# Patient Record
Sex: MALE | Race: BLACK OR AFRICAN AMERICAN | NOT HISPANIC OR LATINO | Employment: OTHER | URBAN - METROPOLITAN AREA
[De-identification: names, ages, dates, MRNs, and addresses within clinical notes are randomized per-mention and may not be internally consistent; named-entity substitution may affect disease eponyms.]

---

## 2023-04-21 ENCOUNTER — OFFICE VISIT (OUTPATIENT)
Dept: PRIMARY CARE | Facility: CLINIC | Age: 63
End: 2023-04-21
Payer: COMMERCIAL

## 2023-04-21 VITALS
SYSTOLIC BLOOD PRESSURE: 138 MMHG | HEIGHT: 61 IN | BODY MASS INDEX: 38.17 KG/M2 | TEMPERATURE: 97.2 F | OXYGEN SATURATION: 98 % | DIASTOLIC BLOOD PRESSURE: 88 MMHG | HEART RATE: 70 BPM

## 2023-04-21 DIAGNOSIS — E11.69 TYPE 2 DIABETES MELLITUS WITH OTHER SPECIFIED COMPLICATION, WITH LONG-TERM CURRENT USE OF INSULIN (MULTI): ICD-10-CM

## 2023-04-21 DIAGNOSIS — N28.9 LESION OF RIGHT NATIVE KIDNEY: ICD-10-CM

## 2023-04-21 DIAGNOSIS — T82.9XXA COMPLICATION DUE TO IMPLANTABLE CARDIOVERTER-DEFIBRILLATOR (ICD), INITIAL ENCOUNTER: ICD-10-CM

## 2023-04-21 DIAGNOSIS — B19.20 HEPATITIS C VIRUS INFECTION WITHOUT HEPATIC COMA, UNSPECIFIED CHRONICITY: ICD-10-CM

## 2023-04-21 DIAGNOSIS — I50.20 HFREF (HEART FAILURE WITH REDUCED EJECTION FRACTION) (MULTI): ICD-10-CM

## 2023-04-21 DIAGNOSIS — T81.49XA WOUND INFECTION AFTER SURGERY: Primary | ICD-10-CM

## 2023-04-21 DIAGNOSIS — I10 PRIMARY HYPERTENSION: ICD-10-CM

## 2023-04-21 DIAGNOSIS — Z79.4 TYPE 2 DIABETES MELLITUS WITH OTHER SPECIFIED COMPLICATION, WITH LONG-TERM CURRENT USE OF INSULIN (MULTI): ICD-10-CM

## 2023-04-21 PROCEDURE — 3079F DIAST BP 80-89 MM HG: CPT | Performed by: STUDENT IN AN ORGANIZED HEALTH CARE EDUCATION/TRAINING PROGRAM

## 2023-04-21 PROCEDURE — 1036F TOBACCO NON-USER: CPT | Performed by: STUDENT IN AN ORGANIZED HEALTH CARE EDUCATION/TRAINING PROGRAM

## 2023-04-21 PROCEDURE — 99214 OFFICE O/P EST MOD 30 MIN: CPT | Performed by: STUDENT IN AN ORGANIZED HEALTH CARE EDUCATION/TRAINING PROGRAM

## 2023-04-21 PROCEDURE — 3051F HG A1C>EQUAL 7.0%<8.0%: CPT | Performed by: STUDENT IN AN ORGANIZED HEALTH CARE EDUCATION/TRAINING PROGRAM

## 2023-04-21 PROCEDURE — 3075F SYST BP GE 130 - 139MM HG: CPT | Performed by: STUDENT IN AN ORGANIZED HEALTH CARE EDUCATION/TRAINING PROGRAM

## 2023-04-21 RX ORDER — ATORVASTATIN CALCIUM 40 MG/1
40 TABLET, FILM COATED ORAL
Qty: 90 TABLET | Refills: 3 | Status: CANCELLED | OUTPATIENT
Start: 2023-04-21

## 2023-04-21 RX ORDER — ISOSORBIDE MONONITRATE 60 MG/1
60 TABLET, EXTENDED RELEASE ORAL
COMMUNITY
Start: 2021-07-02 | End: 2023-04-27

## 2023-04-21 RX ORDER — LOPERAMIDE HYDROCHLORIDE 2 MG/1
4 CAPSULE ORAL EVERY 12 HOURS PRN
COMMUNITY
Start: 2018-10-17 | End: 2023-09-27 | Stop reason: SDUPTHER

## 2023-04-21 RX ORDER — CARVEDILOL 25 MG/1
2 TABLET ORAL 2 TIMES DAILY
COMMUNITY
Start: 2018-09-13 | End: 2023-09-27 | Stop reason: SDUPTHER

## 2023-04-21 RX ORDER — CARVEDILOL 25 MG/1
50 TABLET ORAL 2 TIMES DAILY
Status: CANCELLED | OUTPATIENT
Start: 2023-04-21

## 2023-04-21 RX ORDER — INSULIN GLARGINE 100 [IU]/ML
40 INJECTION, SOLUTION SUBCUTANEOUS NIGHTLY
Qty: 144 ML | Refills: 0 | Status: CANCELLED | OUTPATIENT
Start: 2023-04-21 | End: 2024-04-20

## 2023-04-21 RX ORDER — NAPROXEN SODIUM 220 MG/1
81 TABLET, FILM COATED ORAL DAILY
Qty: 90 TABLET | Refills: 3 | Status: CANCELLED | OUTPATIENT
Start: 2023-04-21

## 2023-04-21 RX ORDER — ATORVASTATIN CALCIUM 40 MG/1
40 TABLET, FILM COATED ORAL
COMMUNITY
Start: 2021-07-02 | End: 2023-09-27 | Stop reason: SDUPTHER

## 2023-04-21 RX ORDER — NAPROXEN SODIUM 220 MG/1
1 TABLET, FILM COATED ORAL DAILY
COMMUNITY
Start: 2018-09-13 | End: 2023-09-27 | Stop reason: SDUPTHER

## 2023-04-21 RX ORDER — SACUBITRIL AND VALSARTAN 97; 103 MG/1; MG/1
1 TABLET, FILM COATED ORAL 2 TIMES DAILY
COMMUNITY
End: 2023-09-27 | Stop reason: SDUPTHER

## 2023-04-21 ASSESSMENT — PAIN SCALES - GENERAL: PAINLEVEL: 7

## 2023-04-21 NOTE — PROGRESS NOTES
Subjective   Patient ID: Delvis Ventura is a 62 y.o. male who presents for Establish care    HPI    HPI:      Here to establish care     Mr. Ventura is a 62 year old male with a PMHx of HFrEF (EF 55-60 in 5/2022, 15-20%, TTE 10/2021),   HTN, HLD, IDDM, GERD, PTSD, depression,   cerebral palsy c/b chronic diarrhea and hepatitis C (unclear treatment history). Records obtained from [facility] EMR.    #DMII  - HA 1c at 8.4%   - Needs Freestyle maite 2 sensors; out of refills  - BG have been in 120s per report     #HFrEF  - EF 15-20% in 10/2021  - Defibrillator placed at [facility] on 3/6/23 by Dr. Daren Torres  - Having pus seep out of incision site a few days after surgery. Had seen Dr. Torres about 2 weeks post-op to remove stitches who didn't feel he needed further assessment. Patient upset with care he received at [facility] and wants to establish care at .    SOC:  Alcohol use- no  Tobacco use-no  Illicit drug use- denied  lives alone    Medications per [facility] (called after appointment):  - Aspirin 81mg PO daily  - Atorvastatin 40mg PO daily  - Carvedilol 50mg PO BID  - Cetirizine 10mg PO daily  - Entresto 97-103mg PO BID  - Farxiga 10mg PO daily  - Ferrous Sulfate 325mg PO daily  - Free Style Maite (sensor - 14 day sensor)  - Isosorbide Mononitrate ER 90mg ER PO daily  - Loperamide 4mg PO BID  ----------------------------------  - Has not picked up Lantus Solostar 100u/ml 40u subcutaneous daily for several months      Review of Systems   Constitutional:  Negative for chills, diaphoresis, fatigue and fever.   HENT:  Negative for congestion.    Eyes:  Negative for pain and visual disturbance.   Respiratory:  Negative for cough, chest tightness, shortness of breath and wheezing.    Cardiovascular:  Negative for chest pain, palpitations and leg swelling.   Gastrointestinal:  Negative for abdominal distention, abdominal pain, constipation, diarrhea, nausea and vomiting.   Genitourinary:  Negative for dysuria.  "  Musculoskeletal:  Positive for arthralgias and myalgias. Negative for back pain and joint swelling.   Skin:  Positive for wound. Negative for color change and rash.   Neurological:  Negative for dizziness, weakness, light-headedness, numbness and headaches.   Psychiatric/Behavioral:  Negative for behavioral problems.        Objective   Physical Exam  Constitutional:       Appearance: Normal appearance. He is obese.   HENT:      Head: Normocephalic and atraumatic.      Nose: Nose normal.   Eyes:      Extraocular Movements: Extraocular movements intact.      Conjunctiva/sclera: Conjunctivae normal.   Cardiovascular:      Rate and Rhythm: Normal rate and regular rhythm.      Pulses: Normal pulses.      Heart sounds: Normal heart sounds. No murmur heard.  Pulmonary:      Effort: Pulmonary effort is normal. No respiratory distress.      Breath sounds: Normal breath sounds. No wheezing.   Chest:      Chest wall: Tenderness present.   Abdominal:      General: Abdomen is flat. Bowel sounds are normal.      Palpations: Abdomen is soft.   Musculoskeletal:         General: Normal range of motion.      Cervical back: Normal range of motion and neck supple.   Skin:     General: Skin is warm and dry.      Capillary Refill: Capillary refill takes less than 2 seconds.      Comments: 2 incision sites noted near xiphoid process (1 inch) as well as lateral left rib cage (0.5 inch) with pus visualized on kerlex material; sites are mildly erythematous    Neurological:      General: No focal deficit present.      Mental Status: He is alert and oriented to person, place, and time. Mental status is at baseline.   Psychiatric:         Mood and Affect: Mood normal.         Behavior: Behavior normal.       Temp 36.2 °C (97.2 °F)   Ht 1.549 m (5' 1\")   BMI 38.17 kg/m²      Assessment/Plan   Problem List Items Addressed This Visit    None    Mr. Ventura is a 62 year old male with a PMHx of HFrEF (EF 55-60 in 5/2022, 15-20%, TTE 10/2021), " HTN, HLD, IDDM, GERD, PTSD, depression, cerebral palsy c/b chronic diarrhea and hepatitis C (unclear treatment history) here to establish care.     #HTN  #HFrEF (EF 55-60 in 5/2022, 15-20%, TTE 10/2021),   - Concern for infected ICD site; does have visiting home nurse that comes every other day to change dressings of wounds  - C/w Carvedilol, Entresto, Farxiga, and Imdur; will hold refills until patient evaluated in ED pending possible admission with changes in medication dosing; per pharmacy, patient has enough medication to last him at least 30 days  - Plan to send patient directly to ED for further imaging/evaluation given yellow-thick pustulant discharge from incision site     #DM2  -Last A1c 8.4 5/2022  -C/w Farxiga 10mg PO daily  - Refilled Freestyle Maite 2 Sensors for 1 year  -Per pharmacy, has not been on home Lantus in several months  - Given blood glucose levels in 120s-low 200s and no recent lantus refill, will not refill Lantus at this time     #Hep C  -Unclear tx hx; needs HCV Viral Load tested  -Will assess at next visit     #R renal lesion  -5/2022 CT AP showed c/f R kidney lower pole hypodensity, question pyleo v infarct, a kidney US w/ doppler was neg. UA neg for infx markers  -Pt not endorsing any sxs, pain/urinary  -CTPE was negative. patient was started on lovenox 5/28/22, and home with xarelto, for what was presumed to be possible renal infarct, given a month supply. Patient ran out as he did not f/u and hasn't taken since then.   -Consider repeat CT AP  -Discuss referral to Urology at next visit     Plan for patient to go to ED for further evaluation of ICD placement site for possible wound infection. ED aware of patient's arrival.     Plan to follow-up in 2 weeks. Patient's meds will be refilled after ED visit reviewed.     Patient seen and discussed with Dr. Gallito Byrnes MD   Resident Physician, PGY3  Family and Preventive Medicine

## 2023-04-21 NOTE — PROGRESS NOTES
"62 year old man with recovered , severe heart failure.  Initial EF in 2021 15% in setting of fluid overload, MI.  Also diabetic on insulin.  2022 EF 55%.  Followed by Dr. Hernandez in cardiology at , but in March 2023 had implanted defibrillator placed by his EP cardiologist Dr. Torres at [facility].  I am not able to view an operative report.  He has two wounds, one over the lower sternum and one in L flank.  He had an ED visit at [place] because of bleeding and a hematoma was evacuated from wound site on L flank.   Since then, he has had wound care every 2 days from a home nurse.  8 days ago, he was seen in Dr. Torres's office, where 2 \"blue\" sutures were removed from sternal wound.  At that time, there was no redness surrounding the wounds, and no purulent discharge.  Mr. Ling notes that every day, his 4 X 4\" dressings are soaked with yellow, thick pus.  He has pain at the flank wound.  Now it is red in about an 8 cm diameter area.  The wound edges both look like they are granulating in, but they have a deep center, from which pus is draining.  WE advised he immediately contact and followup with his EP surgeon.  However, Mr. Ling refuses and states that he wants all of his care, including a \"second opinion\" at  and will not see Dr. Torres for followup.  He also requires refills of his medications from [facility], but states that the discharge med list from [facility] is not accurate, does not know which meds he is taking, because they all come in a packet daily.      Because of the concern for infection, given purulent drainage from the wounds, and surrounding redness and increased pain, we are advising him to go to the ED to consult today, and we will find out his meds and send refills to [facility].   "

## 2023-04-27 RX ORDER — FLASH GLUCOSE SENSOR
KIT MISCELLANEOUS
Qty: 1 EACH | Refills: 11 | Status: SHIPPED | OUTPATIENT
Start: 2023-04-27 | End: 2023-05-16 | Stop reason: SDUPTHER

## 2023-04-27 RX ORDER — ISOSORBIDE MONONITRATE 60 MG/1
60 TABLET, EXTENDED RELEASE ORAL DAILY
COMMUNITY
End: 2023-09-27 | Stop reason: SDUPTHER

## 2023-04-27 RX ORDER — FLASH GLUCOSE SCANNING READER
EACH MISCELLANEOUS
Qty: 1 EACH | Refills: 0 | Status: SHIPPED | OUTPATIENT
Start: 2023-04-27 | End: 2023-05-09

## 2023-04-27 RX ORDER — ISOSORBIDE MONONITRATE 30 MG/1
30 TABLET, EXTENDED RELEASE ORAL DAILY
COMMUNITY
End: 2023-10-13 | Stop reason: DRUGHIGH

## 2023-04-27 RX ORDER — FERROUS SULFATE 325(65) MG
65 TABLET ORAL
COMMUNITY
End: 2023-09-27 | Stop reason: ALTCHOICE

## 2023-04-27 RX ORDER — DAPAGLIFLOZIN 10 MG/1
10 TABLET, FILM COATED ORAL DAILY
COMMUNITY
End: 2023-09-27 | Stop reason: SDUPTHER

## 2023-04-27 RX ORDER — CETIRIZINE HYDROCHLORIDE 10 MG/1
10 TABLET ORAL DAILY
COMMUNITY
End: 2023-09-27 | Stop reason: SDUPTHER

## 2023-04-27 ASSESSMENT — ENCOUNTER SYMPTOMS
DIARRHEA: 0
ARTHRALGIAS: 1
NUMBNESS: 0
MYALGIAS: 1
DIZZINESS: 0
NAUSEA: 0
WHEEZING: 0
DYSURIA: 0
FEVER: 0
CHEST TIGHTNESS: 0
ABDOMINAL PAIN: 0
CONSTIPATION: 0
DIAPHORESIS: 0
WOUND: 1
COUGH: 0
EYE PAIN: 0
HEADACHES: 0
CHILLS: 0
SHORTNESS OF BREATH: 0
JOINT SWELLING: 0
VOMITING: 0
COLOR CHANGE: 0
WEAKNESS: 0
ABDOMINAL DISTENTION: 0
PALPITATIONS: 0
LIGHT-HEADEDNESS: 0
BACK PAIN: 0
FATIGUE: 0

## 2023-05-04 ENCOUNTER — PATIENT OUTREACH (OUTPATIENT)
Dept: CARE COORDINATION | Facility: CLINIC | Age: 63
End: 2023-05-04
Payer: COMMERCIAL

## 2023-05-04 NOTE — PROGRESS NOTES
Patient returned CM call.    Outreach call to patient to support a smooth transition of care from recent admission.  Spoke with patient, reviewed discharge medications, discharge instructions, assessed social needs, and provided education on importance of follow-up appointment with provider. Enrolled patient in [employer] for additional support and education through transition period.  Will continue to monitor through transition period.    Engagement  Call Start Time: 1308 (5/4/2023  1:17 PM)    Medications  Medications reviewed with patient/caregiver?: Yes (5/4/2023  1:17 PM)  Is the patient having any side effects they believe may be caused by any medication additions or changes?: No (5/4/2023  1:17 PM)  Does the patient have all medications ordered at discharge?: Yes (5/4/2023  1:17 PM)  Care Management Interventions: No intervention needed (5/4/2023  1:17 PM)  Prescription Comments: Patient states he has his new medications prescribed at discharge and his pill pack is scheduled to be delivered tomorrow. (5/4/2023  1:17 PM)  Is the patient taking all medications as directed (includes completed medication regime)?: Yes (5/4/2023  1:17 PM)    Appointments  Does the patient have a primary care provider?: Yes (scheduled for 5/11/2023) (5/4/2023  1:17 PM)  Care Management Interventions: Verified appointment date/time/provider (5/4/2023  1:17 PM)  Has the patient kept scheduled appointments due by today?: No (5/4/2023  1:17 PM)    Self Management  What is the home health agency?: [facility] (resumption of services) (5/4/2023  1:17 PM)  Has home health visited the patient within 72 hours of discharge?: Yes (Patient states his next visit is scheduled for tomorrow (5/5/2023)) (5/4/2023  1:17 PM)  What Durable Medical Equipment (DME) was ordered?: not applicable (5/4/2023  1:17 PM)    Patient Teaching  Does the patient have access to their discharge instructions?: Yes (5/4/2023  1:17 PM)  Care Management  Interventions: Reviewed instructions with patient (5/4/2023  1:17 PM)  What is the patient's perception of their health status since discharge?: Improving (5/4/2023  1:17 PM)  Is the patient/caregiver able to teach back the hierarchy of who to call/visit for symptoms/problems? PCP, Specialist, Home Health nurse, Urgent Care, ED, 911: Yes (5/4/2023  1:17 PM)

## 2023-05-04 NOTE — PROGRESS NOTES
Outreach call to patient to support a smooth transition of care from recent admission.  Left voicemail message for patient with my contact information.  Enrolled patient in [employer] chatbot for additional support and patient education through transition period.  Will continue to monitor through transition period.

## 2023-05-09 ENCOUNTER — TELEPHONE (OUTPATIENT)
Dept: PRIMARY CARE | Facility: CLINIC | Age: 63
End: 2023-05-09
Payer: COMMERCIAL

## 2023-05-09 NOTE — TELEPHONE ENCOUNTER
Patient called in and stated that he only needs the sensor for his freestyle because the insurance is not going to pay for it. He already has the [place] he just need the sensors to be sent back over to [facility]

## 2023-05-12 ENCOUNTER — TELEPHONE (OUTPATIENT)
Dept: PRIMARY CARE | Facility: CLINIC | Age: 63
End: 2023-05-12
Payer: COMMERCIAL

## 2023-05-12 DIAGNOSIS — Z79.4 TYPE 2 DIABETES MELLITUS WITH OTHER SPECIFIED COMPLICATION, WITH LONG-TERM CURRENT USE OF INSULIN (MULTI): ICD-10-CM

## 2023-05-12 DIAGNOSIS — E11.69 TYPE 2 DIABETES MELLITUS WITH OTHER SPECIFIED COMPLICATION, WITH LONG-TERM CURRENT USE OF INSULIN (MULTI): ICD-10-CM

## 2023-05-12 NOTE — TELEPHONE ENCOUNTER
Patient called and asked for a callback to address his medication for his said that he was recently in the hospital and wants to talk with you

## 2023-05-16 ENCOUNTER — TELEPHONE (OUTPATIENT)
Dept: PRIMARY CARE | Facility: CLINIC | Age: 63
End: 2023-05-16
Payer: COMMERCIAL

## 2023-05-16 DIAGNOSIS — E11.9 TYPE 2 DIABETES MELLITUS WITHOUT COMPLICATION, WITH LONG-TERM CURRENT USE OF INSULIN (MULTI): Primary | ICD-10-CM

## 2023-05-16 DIAGNOSIS — Z79.4 TYPE 2 DIABETES MELLITUS WITHOUT COMPLICATION, WITH LONG-TERM CURRENT USE OF INSULIN (MULTI): Primary | ICD-10-CM

## 2023-05-16 RX ORDER — INSULIN GLARGINE 100 [IU]/ML
40 INJECTION, SOLUTION SUBCUTANEOUS NIGHTLY
Qty: 10 ML | Refills: 11 | Status: SHIPPED | OUTPATIENT
Start: 2023-05-16 | End: 2023-05-22 | Stop reason: ENTERED-IN-ERROR

## 2023-05-16 RX ORDER — FLASH GLUCOSE SENSOR
KIT MISCELLANEOUS
Qty: 1 EACH | Refills: 11 | Status: SHIPPED | OUTPATIENT
Start: 2023-05-16 | End: 2023-05-23 | Stop reason: SDUPTHER

## 2023-05-19 NOTE — TELEPHONE ENCOUNTER
PATIENT CALLED SAYING THAT HIS MEDICATION WAS WRONG HE TAKES THE PEN INSTEAD OF WHAT YOU PRESCRIBED.

## 2023-05-22 DIAGNOSIS — Z79.4 TYPE 2 DIABETES MELLITUS WITH OTHER SPECIFIED COMPLICATION, WITH LONG-TERM CURRENT USE OF INSULIN (MULTI): Primary | ICD-10-CM

## 2023-05-22 DIAGNOSIS — E11.69 TYPE 2 DIABETES MELLITUS WITH OTHER SPECIFIED COMPLICATION, WITH LONG-TERM CURRENT USE OF INSULIN (MULTI): Primary | ICD-10-CM

## 2023-05-22 RX ORDER — INSULIN GLARGINE 100 [IU]/ML
40 INJECTION, SOLUTION SUBCUTANEOUS NIGHTLY
Qty: 36 ML | Refills: 3 | Status: SHIPPED | OUTPATIENT
Start: 2023-05-22 | End: 2024-05-21

## 2023-05-22 RX ORDER — PEN NEEDLE, DIABETIC 30 GX3/16"
NEEDLE, DISPOSABLE MISCELLANEOUS
Qty: 100 EACH | Refills: 11 | Status: SHIPPED | OUTPATIENT
Start: 2023-05-22 | End: 2024-05-21

## 2023-05-23 DIAGNOSIS — E11.69 TYPE 2 DIABETES MELLITUS WITH OTHER SPECIFIED COMPLICATION, WITH LONG-TERM CURRENT USE OF INSULIN (MULTI): ICD-10-CM

## 2023-05-23 DIAGNOSIS — Z79.4 TYPE 2 DIABETES MELLITUS WITH OTHER SPECIFIED COMPLICATION, WITH LONG-TERM CURRENT USE OF INSULIN (MULTI): ICD-10-CM

## 2023-05-23 RX ORDER — FLASH GLUCOSE SENSOR
KIT MISCELLANEOUS
Qty: 1 EACH | Refills: 11 | Status: SHIPPED | OUTPATIENT
Start: 2023-05-23 | End: 2023-06-18 | Stop reason: SDUPTHER

## 2023-06-18 DIAGNOSIS — E11.69 TYPE 2 DIABETES MELLITUS WITH OTHER SPECIFIED COMPLICATION, WITH LONG-TERM CURRENT USE OF INSULIN (MULTI): ICD-10-CM

## 2023-06-18 DIAGNOSIS — Z79.4 TYPE 2 DIABETES MELLITUS WITH OTHER SPECIFIED COMPLICATION, WITH LONG-TERM CURRENT USE OF INSULIN (MULTI): ICD-10-CM

## 2023-06-18 RX ORDER — FLASH GLUCOSE SENSOR
KIT MISCELLANEOUS
Qty: 4 EACH | Refills: 11 | Status: SHIPPED | OUTPATIENT
Start: 2023-06-18 | End: 2023-08-09

## 2023-07-07 LAB — PROSTATE SPECIFIC ANTIGEN,SCREEN: 1.45 NG/ML (ref 0–4)

## 2023-08-08 ENCOUNTER — TELEPHONE (OUTPATIENT)
Dept: PRIMARY CARE | Facility: CLINIC | Age: 63
End: 2023-08-08
Payer: COMMERCIAL

## 2023-08-08 DIAGNOSIS — E11.69 TYPE 2 DIABETES MELLITUS WITH OTHER SPECIFIED COMPLICATION, WITH LONG-TERM CURRENT USE OF INSULIN (MULTI): Primary | ICD-10-CM

## 2023-08-08 DIAGNOSIS — Z79.4 TYPE 2 DIABETES MELLITUS WITH OTHER SPECIFIED COMPLICATION, WITH LONG-TERM CURRENT USE OF INSULIN (MULTI): Primary | ICD-10-CM

## 2023-08-08 RX ORDER — BLOOD-GLUCOSE SENSOR
EACH MISCELLANEOUS
Qty: 1 EACH | Refills: 0 | Status: SHIPPED | OUTPATIENT
Start: 2023-08-08 | End: 2023-09-28 | Stop reason: SDUPTHER

## 2023-08-08 RX ORDER — BLOOD-GLUCOSE,RECEIVER,CONT
EACH MISCELLANEOUS
Qty: 1 EACH | Refills: 0 | Status: SHIPPED | OUTPATIENT
Start: 2023-08-08 | End: 2023-09-27 | Stop reason: SDUPTHER

## 2023-08-08 NOTE — TELEPHONE ENCOUNTER
Patient is requesting to switch from the FreeStyle Maite to the Dexcom G7 as the FreeStyle Maite does not stay on his arm

## 2023-09-01 ENCOUNTER — PATIENT OUTREACH (OUTPATIENT)
Dept: CARE COORDINATION | Facility: CLINIC | Age: 63
End: 2023-09-01
Payer: COMMERCIAL

## 2023-09-01 NOTE — PROGRESS NOTES
Outreach call to patient to support a smooth transition of care from recent admission.  Left voicemail message for patient with my contact information.    Lani MANUELN RN CCM

## 2023-09-05 ENCOUNTER — PATIENT OUTREACH (OUTPATIENT)
Dept: CARE COORDINATION | Facility: CLINIC | Age: 63
End: 2023-09-05
Payer: COMMERCIAL

## 2023-09-05 NOTE — PROGRESS NOTES
Second unsuccessful attempt to reach patient.  Request for call back left on unidentified answering machine.     Lani MANUELN RN CCM

## 2023-09-25 PROBLEM — I42.9 CARDIOMYOPATHY (MULTI): Status: ACTIVE | Noted: 2023-05-03

## 2023-09-25 PROBLEM — R06.00 DYSPNEA: Status: ACTIVE | Noted: 2023-09-25

## 2023-09-25 PROBLEM — L03.90 CELLULITIS: Status: ACTIVE | Noted: 2023-09-25

## 2023-09-25 PROBLEM — N52.9 MALE ERECTILE DYSFUNCTION, UNSPECIFIED: Status: ACTIVE | Noted: 2023-09-25

## 2023-09-25 PROBLEM — R07.9 CHEST PAIN: Status: ACTIVE | Noted: 2022-03-10

## 2023-09-25 PROBLEM — H25.813 COMBINED FORM OF SENILE CATARACT OF BOTH EYES: Status: ACTIVE | Noted: 2022-12-02

## 2023-09-25 PROBLEM — F33.1 MODERATE RECURRENT MAJOR DEPRESSION (MULTI): Status: ACTIVE | Noted: 2023-09-25

## 2023-09-25 PROBLEM — E66.811 OBESITY, CLASS I, BMI 30-34.9: Status: ACTIVE | Noted: 2021-06-29

## 2023-09-25 PROBLEM — R39.11 URINARY HESITANCY: Status: ACTIVE | Noted: 2023-09-25

## 2023-09-25 PROBLEM — E66.9 OBESITY, CLASS I, BMI 30-34.9: Status: ACTIVE | Noted: 2021-06-29

## 2023-09-25 PROBLEM — K21.9 GASTROESOPHAGEAL REFLUX DISEASE WITHOUT ESOPHAGITIS: Status: ACTIVE | Noted: 2017-03-14

## 2023-09-25 PROBLEM — F32.A DEPRESSION, UNSPECIFIED: Status: ACTIVE | Noted: 2022-03-10

## 2023-09-25 PROBLEM — N50.82 PAIN IN SCROTUM: Status: ACTIVE | Noted: 2023-09-25

## 2023-09-25 PROBLEM — I50.9 CONGESTIVE HEART FAILURE OF UNKNOWN ETIOLOGY (MULTI): Status: ACTIVE | Noted: 2023-07-22

## 2023-09-25 PROBLEM — R09.89 CARDIOVASCULAR SYMPTOMS: Status: ACTIVE | Noted: 2022-05-31

## 2023-09-25 PROBLEM — G47.00 INSOMNIA: Status: ACTIVE | Noted: 2023-09-25

## 2023-09-25 PROBLEM — N28.0 RENAL INFARCT (MULTI): Status: ACTIVE | Noted: 2023-09-25

## 2023-09-25 PROBLEM — R61 EXCESSIVE SWEATING: Status: ACTIVE | Noted: 2022-05-31

## 2023-09-25 PROBLEM — M79.89 SYMPTOM OF LEG SWELLING: Status: ACTIVE | Noted: 2023-09-25

## 2023-09-25 PROBLEM — I50.32 CHRONIC DIASTOLIC HEART FAILURE (MULTI): Status: ACTIVE | Noted: 2023-09-25

## 2023-09-25 PROBLEM — M25.519 SHOULDER PAIN: Status: ACTIVE | Noted: 2023-09-25

## 2023-09-25 PROBLEM — M17.9 OSTEOARTHRITIS OF KNEE: Status: ACTIVE | Noted: 2023-09-25

## 2023-09-25 PROBLEM — Z91.148 PATIENT'S OTHER NONCOMPLIANCE WITH MEDICATION REGIMEN FOR OTHER REASON: Status: ACTIVE | Noted: 2023-05-03

## 2023-09-25 PROBLEM — R06.02 SOB (SHORTNESS OF BREATH): Status: ACTIVE | Noted: 2018-07-06

## 2023-09-25 PROBLEM — F43.12 CHRONIC POST-TRAUMATIC STRESS DISORDER (PTSD): Status: ACTIVE | Noted: 2022-05-31

## 2023-09-25 PROBLEM — E11.9 TYPE 2 DIABETES MELLITUS WITHOUT COMPLICATIONS (MULTI): Status: ACTIVE | Noted: 2022-03-10

## 2023-09-25 PROBLEM — E11.65 TYPE 2 DIABETES MELLITUS WITH HYPERGLYCEMIA (MULTI): Status: ACTIVE | Noted: 2023-09-25

## 2023-09-25 PROBLEM — M77.8 TENDINITIS OF SHOULDER: Status: ACTIVE | Noted: 2023-09-25

## 2023-09-25 PROBLEM — I50.30 HEART FAILURE WITH PRESERVED EJECTION FRACTION (MULTI): Status: ACTIVE | Noted: 2017-03-14

## 2023-09-25 PROBLEM — I50.42 CHRONIC COMBINED SYSTOLIC AND DIASTOLIC HEART FAILURE, NYHA CLASS 3 (MULTI): Status: ACTIVE | Noted: 2023-09-25

## 2023-09-25 PROBLEM — M75.120 COMPLETE TEAR OF ROTATOR CUFF: Status: ACTIVE | Noted: 2023-09-25

## 2023-09-25 PROBLEM — T82.9XXA: Status: ACTIVE | Noted: 2023-04-22

## 2023-09-25 PROBLEM — M67.919 DISORDER OF TENDON OF SHOULDER REGION: Status: ACTIVE | Noted: 2023-09-25

## 2023-09-25 PROBLEM — T81.49XA POSTOPERATIVE WOUND INFECTION: Status: ACTIVE | Noted: 2023-05-03

## 2023-09-25 PROBLEM — E61.1 IRON DEFICIENCY: Status: ACTIVE | Noted: 2022-12-02

## 2023-09-25 PROBLEM — R05.3 CHRONIC COUGH: Status: ACTIVE | Noted: 2017-03-14

## 2023-09-25 PROBLEM — E87.6 HYPOKALEMIA: Status: ACTIVE | Noted: 2023-09-25

## 2023-09-25 PROBLEM — R42 DIZZINESS AND GIDDINESS: Status: ACTIVE | Noted: 2023-05-03

## 2023-09-25 PROBLEM — R53.81 PHYSICAL DECONDITIONING: Status: ACTIVE | Noted: 2023-01-13

## 2023-09-25 PROBLEM — R07.89 ATYPICAL CHEST PAIN: Status: ACTIVE | Noted: 2023-09-25

## 2023-09-25 PROBLEM — M17.9 KNEE OSTEOARTHRITIS: Status: ACTIVE | Noted: 2023-09-25

## 2023-09-25 PROBLEM — R53.1 WEAKNESS: Status: ACTIVE | Noted: 2022-03-10

## 2023-09-25 PROBLEM — R00.0 TACHYCARDIA: Status: ACTIVE | Noted: 2023-09-25

## 2023-09-25 RX ORDER — HYDRALAZINE HYDROCHLORIDE 50 MG/1
50 TABLET, FILM COATED ORAL EVERY 8 HOURS
COMMUNITY
End: 2023-09-27 | Stop reason: SDUPTHER

## 2023-09-25 RX ORDER — SPIRONOLACTONE 25 MG/1
25 TABLET ORAL
COMMUNITY
Start: 2023-09-02 | End: 2023-09-27 | Stop reason: SDUPTHER

## 2023-09-25 RX ORDER — TRAMADOL HYDROCHLORIDE 50 MG/1
50 TABLET ORAL 2 TIMES DAILY
COMMUNITY
Start: 2022-11-17 | End: 2023-09-27 | Stop reason: ALTCHOICE

## 2023-09-25 RX ORDER — PRAZOSIN HYDROCHLORIDE 2 MG/1
2 CAPSULE ORAL NIGHTLY
COMMUNITY
End: 2023-09-27 | Stop reason: SDUPTHER

## 2023-09-27 ENCOUNTER — OFFICE VISIT (OUTPATIENT)
Dept: PRIMARY CARE | Facility: CLINIC | Age: 63
End: 2023-09-27
Payer: COMMERCIAL

## 2023-09-27 VITALS
HEIGHT: 71 IN | BODY MASS INDEX: 28.15 KG/M2 | HEART RATE: 100 BPM | DIASTOLIC BLOOD PRESSURE: 102 MMHG | TEMPERATURE: 96.9 F | SYSTOLIC BLOOD PRESSURE: 152 MMHG | OXYGEN SATURATION: 96 %

## 2023-09-27 DIAGNOSIS — I50.42 CHRONIC COMBINED SYSTOLIC AND DIASTOLIC HEART FAILURE, NYHA CLASS 3 (MULTI): ICD-10-CM

## 2023-09-27 DIAGNOSIS — K21.9 GASTROESOPHAGEAL REFLUX DISEASE WITHOUT ESOPHAGITIS: ICD-10-CM

## 2023-09-27 DIAGNOSIS — T78.40XS ALLERGY, SEQUELA: ICD-10-CM

## 2023-09-27 DIAGNOSIS — Z79.4 TYPE 2 DIABETES MELLITUS WITH OTHER SPECIFIED COMPLICATION, WITH LONG-TERM CURRENT USE OF INSULIN (MULTI): ICD-10-CM

## 2023-09-27 DIAGNOSIS — G47.00 INSOMNIA, UNSPECIFIED TYPE: ICD-10-CM

## 2023-09-27 DIAGNOSIS — I10 BENIGN ESSENTIAL HYPERTENSION: Primary | ICD-10-CM

## 2023-09-27 DIAGNOSIS — E78.5 HYPERLIPIDEMIA, UNSPECIFIED HYPERLIPIDEMIA TYPE: ICD-10-CM

## 2023-09-27 DIAGNOSIS — G80.0 SPASTIC QUADRIPLEGIC CEREBRAL PALSY (MULTI): ICD-10-CM

## 2023-09-27 DIAGNOSIS — E11.9 TYPE 2 DIABETES MELLITUS WITHOUT COMPLICATION, UNSPECIFIED WHETHER LONG TERM INSULIN USE (MULTI): ICD-10-CM

## 2023-09-27 DIAGNOSIS — E11.69 TYPE 2 DIABETES MELLITUS WITH OTHER SPECIFIED COMPLICATION, WITH LONG-TERM CURRENT USE OF INSULIN (MULTI): ICD-10-CM

## 2023-09-27 DIAGNOSIS — R19.7 DIARRHEA, UNSPECIFIED TYPE: ICD-10-CM

## 2023-09-27 PROCEDURE — 3077F SYST BP >= 140 MM HG: CPT | Performed by: FAMILY MEDICINE

## 2023-09-27 PROCEDURE — 1036F TOBACCO NON-USER: CPT | Performed by: FAMILY MEDICINE

## 2023-09-27 PROCEDURE — 3052F HG A1C>EQUAL 8.0%<EQUAL 9.0%: CPT | Performed by: FAMILY MEDICINE

## 2023-09-27 PROCEDURE — 99213 OFFICE O/P EST LOW 20 MIN: CPT | Performed by: FAMILY MEDICINE

## 2023-09-27 PROCEDURE — 3080F DIAST BP >= 90 MM HG: CPT | Performed by: FAMILY MEDICINE

## 2023-09-27 PROCEDURE — 3008F BODY MASS INDEX DOCD: CPT | Performed by: FAMILY MEDICINE

## 2023-09-27 RX ORDER — LOPERAMIDE HYDROCHLORIDE 2 MG/1
4 CAPSULE ORAL EVERY 12 HOURS PRN
Qty: 30 CAPSULE | Refills: 3 | Status: SHIPPED | OUTPATIENT
Start: 2023-09-27 | End: 2023-10-17

## 2023-09-27 RX ORDER — ZOLPIDEM TARTRATE 5 MG/1
5 TABLET ORAL NIGHTLY PRN
Qty: 10 TABLET | Refills: 3 | Status: SHIPPED | OUTPATIENT
Start: 2023-09-27 | End: 2023-09-27 | Stop reason: SDUPTHER

## 2023-09-27 RX ORDER — SPIRONOLACTONE 25 MG/1
25 TABLET ORAL DAILY
Qty: 30 TABLET | Refills: 3 | Status: SHIPPED | OUTPATIENT
Start: 2023-09-27

## 2023-09-27 RX ORDER — SACUBITRIL AND VALSARTAN 97; 103 MG/1; MG/1
1 TABLET, FILM COATED ORAL 2 TIMES DAILY
Qty: 60 TABLET | Refills: 3 | Status: SHIPPED | OUTPATIENT
Start: 2023-09-27

## 2023-09-27 RX ORDER — CETIRIZINE HYDROCHLORIDE 10 MG/1
10 TABLET ORAL DAILY
Qty: 30 TABLET | Refills: 3 | Status: SHIPPED | OUTPATIENT
Start: 2023-09-27 | End: 2024-01-04 | Stop reason: ENTERED-IN-ERROR

## 2023-09-27 RX ORDER — BLOOD-GLUCOSE,RECEIVER,CONT
EACH MISCELLANEOUS
Qty: 1 EACH | Refills: 3 | Status: SHIPPED | OUTPATIENT
Start: 2023-09-27

## 2023-09-27 RX ORDER — PRAZOSIN HYDROCHLORIDE 2 MG/1
2 CAPSULE ORAL NIGHTLY
Qty: 30 CAPSULE | Refills: 3 | Status: SHIPPED | OUTPATIENT
Start: 2023-09-27

## 2023-09-27 RX ORDER — AMIODARONE HYDROCHLORIDE 200 MG/1
TABLET ORAL
COMMUNITY
Start: 2023-09-06 | End: 2023-09-27 | Stop reason: SDUPTHER

## 2023-09-27 RX ORDER — ATORVASTATIN CALCIUM 40 MG/1
40 TABLET, FILM COATED ORAL
Qty: 30 TABLET | Refills: 3 | Status: SHIPPED | OUTPATIENT
Start: 2023-09-27

## 2023-09-27 RX ORDER — DAPAGLIFLOZIN 10 MG/1
10 TABLET, FILM COATED ORAL DAILY
Qty: 30 TABLET | Refills: 3 | Status: SHIPPED | OUTPATIENT
Start: 2023-09-27

## 2023-09-27 RX ORDER — ISOSORBIDE MONONITRATE 60 MG/1
60 TABLET, EXTENDED RELEASE ORAL DAILY
Qty: 30 TABLET | Refills: 3 | Status: SHIPPED | OUTPATIENT
Start: 2023-09-27 | End: 2023-10-13 | Stop reason: SDUPTHER

## 2023-09-27 RX ORDER — NAPROXEN SODIUM 220 MG/1
81 TABLET, FILM COATED ORAL DAILY
Qty: 30 TABLET | Refills: 3 | Status: SHIPPED | OUTPATIENT
Start: 2023-09-27 | End: 2024-03-03

## 2023-09-27 RX ORDER — PANTOPRAZOLE SODIUM 40 MG/1
TABLET, DELAYED RELEASE ORAL
COMMUNITY
Start: 2023-09-06 | End: 2023-09-27 | Stop reason: SDUPTHER

## 2023-09-27 RX ORDER — PANTOPRAZOLE SODIUM 40 MG/1
TABLET, DELAYED RELEASE ORAL
Qty: 30 TABLET | Refills: 3 | Status: SHIPPED | OUTPATIENT
Start: 2023-09-27 | End: 2024-01-04 | Stop reason: ENTERED-IN-ERROR

## 2023-09-27 RX ORDER — ZOLPIDEM TARTRATE 5 MG/1
5 TABLET ORAL NIGHTLY PRN
Qty: 14 TABLET | Refills: 0 | Status: SHIPPED | OUTPATIENT
Start: 2023-09-27 | End: 2024-05-24

## 2023-09-27 RX ORDER — AMIODARONE HYDROCHLORIDE 200 MG/1
TABLET ORAL
Qty: 30 TABLET | Refills: 3 | Status: SHIPPED | OUTPATIENT
Start: 2023-09-27 | End: 2024-02-15 | Stop reason: HOSPADM

## 2023-09-27 RX ORDER — CARVEDILOL 25 MG/1
50 TABLET ORAL 2 TIMES DAILY
Qty: 30 TABLET | Refills: 3 | Status: SHIPPED | OUTPATIENT
Start: 2023-09-27 | End: 2023-11-13 | Stop reason: HOSPADM

## 2023-09-27 RX ORDER — HYDRALAZINE HYDROCHLORIDE 50 MG/1
50 TABLET, FILM COATED ORAL EVERY 8 HOURS
Qty: 30 TABLET | Refills: 3 | Status: SHIPPED | OUTPATIENT
Start: 2023-09-27 | End: 2023-10-11

## 2023-09-27 RX ORDER — FUROSEMIDE 40 MG/1
TABLET ORAL
COMMUNITY
Start: 2023-09-06 | End: 2023-09-27 | Stop reason: SINTOL

## 2023-09-27 RX ORDER — FUROSEMIDE 40 MG/1
TABLET ORAL
Qty: 30 TABLET | Refills: 0 | Status: ON HOLD | OUTPATIENT
Start: 2023-09-27 | End: 2023-11-13 | Stop reason: SDUPTHER

## 2023-09-27 ASSESSMENT — ENCOUNTER SYMPTOMS
FEVER: 0
PALPITATIONS: 0
CHEST TIGHTNESS: 0
ABDOMINAL PAIN: 0
CHILLS: 0
DIARRHEA: 0
SHORTNESS OF BREATH: 0
WHEEZING: 0
APPETITE CHANGE: 0
ACTIVITY CHANGE: 0
CONSTIPATION: 0
COUGH: 0
ABDOMINAL DISTENTION: 0

## 2023-09-27 ASSESSMENT — PAIN SCALES - GENERAL: PAINLEVEL: 0-NO PAIN

## 2023-09-27 NOTE — PROGRESS NOTES
History Of Present Illness  Delvis Ventura is a 63 y.o. male with a history significant for T2DM, HTN,  cerebral palsy, NICM w. rEF presenting for and established care visit.    New concerns: Pt complains of new onset sleeping concerns and would like a new medication for it. He states that he is sleeping only two two hours. Takes melatonin and it does not work. He has tried lifestyle changes including reducing light and sleeping in the bed. Denies any other new concerns.     Diabetes  Patient states that blood sugar is well controlled. Ranges from 100 or less. He takes medication as prescribed and feel the DexomG7 is helpful.  Patient is interested in physical therapy to assist with exercise. He avoids sugar and salt.     Unsure when last eye doctor appointment.    HTN  Patient states that blood pressures is 140's/80's. Blood pressure 151/102 today. Patient takes medication as prescribed and is interested in modifying medication. He believes it will improve with improved sleep and decreased.     NICM  Managed by Dr. Hernandez. Plan to see her October 13.    Cerebral Palsy  Stable    Past Medical History  He has a past medical history of Chronic systolic (congestive) heart failure (CMS/HCC) (09/01/2020) and Unspecified systolic (congestive) heart failure (CMS/HCC) (06/01/2021).    Surgical History  He has no past surgical history on file.     Social History  He reports that he has never smoked. He has never used smokeless tobacco. He reports current alcohol use. He reports that he does not use drugs.    Family History  No family history on file.     Allergies  Metformin    Social History  Smoking: Never  Alcohol: Beer occasionally  Recreational Drug: Never  Lives with: Lives alone  Work: Retired,         Review of Systems   Constitutional:  Negative for activity change, appetite change, chills and fever.   HENT: Negative.     Respiratory:  Negative for cough, chest tightness, shortness of breath and  "wheezing.    Cardiovascular:  Negative for chest pain, palpitations and leg swelling.   Gastrointestinal:  Negative for abdominal distention, abdominal pain, constipation and diarrhea.   Genitourinary: Negative.    Musculoskeletal:  Positive for gait problem.        History of cerebral palsy   Skin: Negative.           Physical Exam  Constitutional:       General: He is not in acute distress.     Appearance: Normal appearance.   Eyes:      Pupils: Pupils are equal, round, and reactive to light.   Cardiovascular:      Rate and Rhythm: Normal rate and regular rhythm.      Pulses: Normal pulses.      Heart sounds: Normal heart sounds.      Comments: S3 present  Pulmonary:      Effort: Pulmonary effort is normal.      Breath sounds: Normal breath sounds.   Abdominal:      General: Abdomen is flat. Bowel sounds are normal.      Palpations: Abdomen is soft.   Musculoskeletal:         General: No swelling or tenderness.   Skin:     General: Skin is warm and dry.      Capillary Refill: Capillary refill takes less than 2 seconds.      Findings: No bruising, erythema, lesion or rash.   Neurological:      Mental Status: He is alert.          Last Recorded Vitals  Blood pressure (!) 152/102, pulse 100, temperature 36.1 °C (96.9 °F), temperature source Temporal, height 1.791 m (5' 10.5\"), SpO2 96 %.      Assessment/Plan      Insomnia  Pt complains of sleep disturbances for the last few months. Only sleeps about 2 hours per night. He takes meletonin which is ineffective. He has tried ambien in the past that worked.    P: Ambien 5 mg for 14 days PRN  Patient to follow up in 2-4 weeks     HTN  A: Patient reports elevated blood pressure at home in 140/80's. Clinic blood pressure 152/102. Pt denies hypertensive symptoms but at increased risk of complications related to hypertension. Patient reports not sleeping well and think this is affecting his blood pressure.    P: Plan to increase hydralazine to 50 mg daily    Pt to follow up in " 4 weeks

## 2023-09-28 ENCOUNTER — TELEPHONE (OUTPATIENT)
Dept: PRIMARY CARE | Facility: CLINIC | Age: 63
End: 2023-09-28
Payer: COMMERCIAL

## 2023-09-28 DIAGNOSIS — Z79.4 TYPE 2 DIABETES MELLITUS WITH OTHER SPECIFIED COMPLICATION, WITH LONG-TERM CURRENT USE OF INSULIN (MULTI): ICD-10-CM

## 2023-09-28 DIAGNOSIS — E11.69 TYPE 2 DIABETES MELLITUS WITH OTHER SPECIFIED COMPLICATION, WITH LONG-TERM CURRENT USE OF INSULIN (MULTI): ICD-10-CM

## 2023-09-29 NOTE — TELEPHONE ENCOUNTER
PT is calling saying that the medication was ordered in correctly for the Loperamide HCI.  You put it as needed and its supposed to be two, 2 mg tablets.  Twice a day.  Once in the morning and once In the evening.

## 2023-10-02 ENCOUNTER — APPOINTMENT (OUTPATIENT)
Dept: PRIMARY CARE | Facility: CLINIC | Age: 63
End: 2023-10-02
Payer: COMMERCIAL

## 2023-10-02 NOTE — TELEPHONE ENCOUNTER
Pharmacy rep calling for refills of Dexcom G7 sensors. Multiple attempts made. Message to provider.

## 2023-10-06 NOTE — PROGRESS NOTES
I saw and evaluated the patient. I personally obtained the key and critical portions of the history and physical exam or was physically present for key and critical portions performed by the resident/fellow. I reviewed the resident/fellow's documentation and discussed the patient with the resident/fellow. I agree with the resident/fellow's medical decision making as documented in the note with the exception/addition of the following:    Htydralazine is TID, notdaily.   Ambien may be prescribed once,  for short term of episodeic use only, and should not be represcribed.     Zenia Robbins MD

## 2023-10-11 RX ORDER — BLOOD-GLUCOSE SENSOR
EACH MISCELLANEOUS
Qty: 1 EACH | Refills: 0 | Status: SHIPPED | OUTPATIENT
Start: 2023-10-11

## 2023-10-13 ENCOUNTER — BIOMETRIC VISIT (OUTPATIENT)
Dept: GASTROENTEROLOGY | Facility: HOSPITAL | Age: 63
End: 2023-10-13
Payer: COMMERCIAL

## 2023-10-13 ENCOUNTER — OFFICE VISIT (OUTPATIENT)
Dept: CARDIOLOGY | Facility: HOSPITAL | Age: 63
End: 2023-10-13
Payer: COMMERCIAL

## 2023-10-13 VITALS — OXYGEN SATURATION: 97 % | DIASTOLIC BLOOD PRESSURE: 82 MMHG | HEART RATE: 96 BPM | SYSTOLIC BLOOD PRESSURE: 144 MMHG

## 2023-10-13 VITALS
WEIGHT: 202 LBS | HEIGHT: 70 IN | SYSTOLIC BLOOD PRESSURE: 122 MMHG | BODY MASS INDEX: 28.92 KG/M2 | DIASTOLIC BLOOD PRESSURE: 82 MMHG

## 2023-10-13 DIAGNOSIS — I10 BENIGN ESSENTIAL HYPERTENSION: ICD-10-CM

## 2023-10-13 DIAGNOSIS — I50.42 CHRONIC COMBINED SYSTOLIC AND DIASTOLIC HEART FAILURE, NYHA CLASS 3 (MULTI): Primary | ICD-10-CM

## 2023-10-13 DIAGNOSIS — Z12.11 COLON CANCER SCREENING: Primary | ICD-10-CM

## 2023-10-13 PROCEDURE — G0463 HOSPITAL OUTPT CLINIC VISIT: HCPCS | Performed by: STUDENT IN AN ORGANIZED HEALTH CARE EDUCATION/TRAINING PROGRAM

## 2023-10-13 PROCEDURE — 3008F BODY MASS INDEX DOCD: CPT | Performed by: STUDENT IN AN ORGANIZED HEALTH CARE EDUCATION/TRAINING PROGRAM

## 2023-10-13 PROCEDURE — 3077F SYST BP >= 140 MM HG: CPT | Performed by: STUDENT IN AN ORGANIZED HEALTH CARE EDUCATION/TRAINING PROGRAM

## 2023-10-13 PROCEDURE — 3066F NEPHROPATHY DOC TX: CPT | Performed by: STUDENT IN AN ORGANIZED HEALTH CARE EDUCATION/TRAINING PROGRAM

## 2023-10-13 PROCEDURE — 1036F TOBACCO NON-USER: CPT | Performed by: STUDENT IN AN ORGANIZED HEALTH CARE EDUCATION/TRAINING PROGRAM

## 2023-10-13 PROCEDURE — 3052F HG A1C>EQUAL 8.0%<EQUAL 9.0%: CPT | Performed by: STUDENT IN AN ORGANIZED HEALTH CARE EDUCATION/TRAINING PROGRAM

## 2023-10-13 PROCEDURE — 99215 OFFICE O/P EST HI 40 MIN: CPT | Performed by: STUDENT IN AN ORGANIZED HEALTH CARE EDUCATION/TRAINING PROGRAM

## 2023-10-13 PROCEDURE — 3079F DIAST BP 80-89 MM HG: CPT | Performed by: STUDENT IN AN ORGANIZED HEALTH CARE EDUCATION/TRAINING PROGRAM

## 2023-10-13 RX ORDER — HYDRALAZINE HYDROCHLORIDE 50 MG/1
75 TABLET, FILM COATED ORAL EVERY 8 HOURS
Qty: 135 TABLET | Refills: 3 | Status: SHIPPED | OUTPATIENT
Start: 2023-10-13 | End: 2023-11-13 | Stop reason: HOSPADM

## 2023-10-13 RX ORDER — ISOSORBIDE MONONITRATE 120 MG/1
120 TABLET, EXTENDED RELEASE ORAL DAILY
Qty: 30 TABLET | Refills: 3 | Status: SHIPPED | OUTPATIENT
Start: 2023-10-13 | End: 2024-01-30

## 2023-10-13 ASSESSMENT — LIFESTYLE VARIABLES
HOW OFTEN DO YOU HAVE SIX OR MORE DRINKS ON ONE OCCASION: NEVER
HOW OFTEN DO YOU HAVE A DRINK CONTAINING ALCOHOL: NEVER
SKIP TO QUESTIONS 9-10: 1
AUDIT-C TOTAL SCORE: 0
HOW MANY STANDARD DRINKS CONTAINING ALCOHOL DO YOU HAVE ON A TYPICAL DAY: PATIENT DOES NOT DRINK

## 2023-10-13 ASSESSMENT — PAIN SCALES - GENERAL: PAINLEVEL: 0-NO PAIN

## 2023-10-13 NOTE — PATIENT INSTRUCTIONS
63-year-old male for evaluation at community out reach/biometric screening    He follows with a new primary care provider and has an upcoming on 10/19/2023  He has an allergy to metformin and takes insulin.  He bone and is in a wheelchair    Treated for diabetes and hypertension  He has never had a colonoscopy and no family history of colon cancer  We did discuss colonoscopy versus Cologuard and he does not want to have a colonoscopy and he he will not touch his feces to collect a stool study.  We did discuss the possibility of somebody else being able to collect his stool such as a home health aide he does not want to consider doing this at this time    It was a pleasure meeting you at the /[facility] community outreach program today

## 2023-10-13 NOTE — PROGRESS NOTES
Chief Complaint    Ambulatory heart failure care        History of Present Illness    Accompanied by:alone    HPI:    63 y.o. former / known to have DM, cerebral palsy, nonischemic cardiomyopathy (on [facility] 8/2018 he had trivial CAD), HFrEF previously considered recovered who has completed enrollment in the GALACTIC -HF trial ([employer] Mecarbil v/s placebo). On TTE 8/2018 which LVEF ~ 20-25%, he has been started on study medication and he previously had LV systolic recovery with LVEF 50-55% on TTE 2/2020 and was symptomatically improved. He had COVID 12/2021.  He was hospitalized that the [facility] 10/2021 and reports that he was found to have depressed ejection fraction. On TTE 10/2021 LVEF 15 -20% LVIDD 5.1 cm with decreased right ventricular systolic function. Normal nuclear pharmacological stress test 6/2022.  Since then he has been hospitalized 11/2021, 12/2021 with acutely decompensated heart failure. He was admitted 3/2022 with abdominal pain.  He is s/p S-ICD ([employer] scientific; 3/6/2023; by Dr. Mickey Torres at [facility]) and was admitted 4/2023 with non healing wound from ICD site c/f device infection. He is s/p ICD explant on 4/24/2023 from left lateral chest wall and epigastric region, . 4/24 cultures from lead tip, subxiphoid incision, left lateral chest wall incision all positive Staph aureas treat with antibiotics. He is s/p single chamber ICD implantation 4/28/2023   HF GDMT is being re-tiitrated.    Today Mr. Ling reports that he has been well between visits. No further fevers, chills, chest pain, dyspnea at rest, exertional dyspnea, orthopnea, PND, syncope, palpitations or leg swelling.     He has been fully adherent with prescribed therapies.  He has not had any defibrillator shocks.    Functionally, he is wheelchair bound due to cerebral palsy but is able to get around in his apartment without exertional symptoms.    He was considered for CardioMEMS  implantation, but for logistical reasons was unable to be implanted.    He was hospitalized 8/19/23-8/29/23 for ADHF.    The electronic medical record has been reviewed by me for salient history. All cardiovascular imaging and testing available in the electronic medical record, and [employer] has been reviewed.          Active Problems  Problems    · Benign essential hypertension (401.1) (I10)   · Cardiomyopathy (425.4) (I42.9)   · Chronic combined systolic and diastolic heart failure, NYHA class 3 (428.42) (I50.42)   · Combined form of senile cataract of both eyes (366.19) (H25.813)   · Diarrhea (787.91) (R19.7)   · Insomnia (780.52) (G47.00)   · Iron deficiency (280.9) (E61.1)   · Knee osteoarthritis (715.36) (M17.9)   · Male erectile dysfunction, unspecified (607.84) (N52.9)   · Other cerebral palsy (343.8) (G80.8)   · Renal infarct (593.81) (N28.0)   · Tachycardia (785.0) (R00.0)   · Tendinopathy of left shoulder (727.9) (M67.912)   · Type 2 diabetes mellitus with hyperglycemia (250.00) (E11.65)   · Type 2 diabetes mellitus without complications (250.00) (E11.9)    (HFpEF) heart failure with preserved ejection fraction (428.9) (I50.30)      Male erectile dysfunction, unspecified (607.84) (N52.9)     Surgical History  Problems    · History of Cardioverter defibrillator insertion   · History of Cardioverter defibrillator removal    Past Medical History  Problems    · History of Chronic systolic heart failure (428.22) (I50.22)   · Resolved Date: 02 Mar 2020   · Encounter for preventive health examination (V70.0) (Z00.00)   · Resolved Date: 01 Jan 1900   · History of HFrEF (heart failure with reduced ejection fraction) (428.20) (I50.20)    Current Meds    Medication Name Instruction   Acarbose 25 MG Oral Tablet TAKE 1 TABLET 3 times daily   Aspirin 81 MG Oral Tablet Chewable Take 1 tablet daily   Atorvastatin Calcium 40 MG Oral Tablet TAKE 1 TABLET Bedtime   Carvedilol 25 MG Oral Tablet TAKE 2 TABLETS BY MOUTH TWICE  DAILY.   Entresto  MG Oral Tablet Take 1 tablet twice daily   Farxiga 10 MG Oral Tablet Take 1 tablet daily   Ferrous Sulfate 325 (65 Fe) MG Oral Tablet Take 1 tablet twice daily   FreeStyle Maite 2 Cornell Device Use as directed.   FreeStyle Maite 2 Sensor USE AS DIRECTED   hydrALAZINE HCl - 100 MG Oral Tablet TAKE  1  TABLET 3 times daily   Isosorbide Mononitrate ER 60 MG Oral Tablet Extended Release 24 Hour TAKE 1.5 TABLET Daily   Lantus SoloStar 100 UNIT/ML Subcutaneous Solution Pen-injector INJECT 40 UNIT   Loperamide HCl - 2 MG Oral Capsule TAKE 2 CAPSULES AT 1ST DIARRHEAL BOWEL MOVEMENT, THEN TAKE 1 CAPSULE AT EACH ADDITIONAL BOWEL MOVEMENT.  MAXIMUM 8 CAPSULES IN 24 HOURS.   Multiple Vitamins Oral Tablet TAKE 1 TABLET DAILY.   Prazosin HCl - 2 MG Oral Capsule TAKE 1 CAPSULE Bedtime   Torsemide 20 MG Oral Tablet TAKE  2  TABLET Daily   traMADol HCl - 50 MG Oral Tablet Take 1 tablet twice a day for pain as needed     Allergies  Medication    · metformin  Intolerance; Diarrhea; Updated By: Lexus Nicholson; 11/15/2022 1:08:57 PM    Family History  Mother    · No pertinent family history  Father    · No pertinent family history    Social History  Problems    · Does not use illicit drugs (V49.89) (Z78.9)   · Feels safe at home   · Never smoker   · No alcohol use   · Person living alone (V60.3) (Z60.2)    Review of Systems    Constitutional: not feeling tired, not feeling poorly, no fever and no chills.   Cardiovascular: no chest pain, no palpitations and no lower extremity edema.   Respiratory: no cough, no shortness of breath, no shortness of breath during exertion, no wheezing, no orthopnea and no PND.   Gastrointestinal: no change in bowel habits, no blood in stools, no diarrhea, no constipation, no nausea and no vomiting.   Genitourinary: no dysuria, no incontinence and no urinary hesitancy.   Musculoskeletal: no arthralgias, no limb pain and no limb swelling.   Integumentary: no skin lesions.    Neurological: no frequent falls, no headache, no dizziness, no tingling and no numbness.      Vitals  Vitals:    10/13/23 1405   BP: 144/82   Pulse: 96   SpO2:          Physical Exam  On examination:    Very pleasant obese -American man in no apparent CP or painful distress. In wheelchair   Neck: No JVD or HJR  CVS: HS 1,2. No added sounds  Resp: CTA bilaterally. Percussion note resonant  Abdomen: Obese, SNT, BS wnl  Extremities: right 1=, left trace pedal oedema bilaterally  Skin: warm and dry  CNS: AO x 4, no gross deficits       Impressions    IMPRESSION/PLAN:    63 y.o. former / known to have DM, cerebral palsy, nonischemic cardiomyopathy (on [facility] 8/2018 he had trivial CAD), HFrEF previously considered recovered who has completed enrollment in the GALACTIC -HF trial ([employer] Mecarbil v/s placebo). On TTE 8/2018 which LVEF ~ 20-25%, he has been started on study medication and he previously had LV systolic recovery with LVEF 50-55% on TTE 2/2020 and was symptomatically improved. He had COVID 12/2021.  He was hospitalized that the [facility] 10/2021 and reports that he was found to have depressed ejection fraction. On TTE 10/2021 LVEF 15 -20% LVIDD 5.1 cm with decreased right ventricular systolic function. Normal nuclear pharmacological stress test 6/2022.  Since then he has been hospitalized 11/2021, 12/2021 with acutely decompensated heart failure. He was admitted 3/2022 with abdominal pain.  He is s/p S-ICD ([employer]; 3/6/2023; by Dr. Mickey Torres at [facility]) and was admitted 4/2023 with non healing wound from ICD site c/f device infection. He is s/p ICD explant on 4/24/2023 from left lateral chest wall and epigastric region, . 4/24 cultures from lead tip, subxiphoid incision, left lateral chest wall incision all positive Staph aureas treat with antibiotics. He is s/p single chamber ICD implantation 4/28/2023   HF GDMT is being re-tiitrated.    NYHA  FC ~ 2 ( difficult to assess as wheelchair bound but no symptoms with in home exertion)  ACC C  Volume status: Euvolemic  Perfusion status: Warm to touch    Plan:  #HFrEF (declined LVEF 10/2021)  -Medication:  -Increase hydralazine to 75 mg 3 times daily  -Increase isosorbide mononitrate to 120 mg once daily  -Continue carvedilol 50 mg twice daily, dapagliflozin 10 mg once daily, sacubitril/valsartan 97/103 mg twice daily, spironolactone 25 mg once daily  -Labs and echocardiogram in 12/2023  -Consider cardiac modulation once HF GDMT is optimized, if LVEF remains low      This note was transcribed using the Dragon Dictation system. There may be grammatical, punctuation, or verbiage errors that can occur with voice recognition programs.      Su Hernandez MD PhD

## 2023-10-13 NOTE — PATIENT INSTRUCTIONS
Thank you for coming in today. If you have any questions or concerns, you may call the Heart Failure Office at [phone] option [phone], or [phone]. You may also contact our heart failure nursing team via email on [email or web address]     Please bring all your pills/medications to your Cardiology appointments.     **  - Please make the following medication changes:  INCREASE Hydralazine to 75 mg three times a day  INCREASE isosorbide mononitrate to 120 mg once a day    - Please get an echocardiogram done in DECEMBER 2023     - Please have the following tests done:  1.Blood tests just before your next visit (RFP, BNP, CBC, iron, TIBC, ferritin)     - Please make an appointment to be seen in DECEMBER 2023

## 2023-10-14 DIAGNOSIS — R19.7 DIARRHEA, UNSPECIFIED TYPE: ICD-10-CM

## 2023-10-16 ENCOUNTER — HOSPITAL ENCOUNTER (OUTPATIENT)
Dept: CARDIOLOGY | Facility: CLINIC | Age: 63
Discharge: HOME | End: 2023-10-16
Payer: COMMERCIAL

## 2023-10-16 DIAGNOSIS — I42.0 DILATED CARDIOMYOPATHY (MULTI): ICD-10-CM

## 2023-10-16 DIAGNOSIS — Z95.810 PRESENCE OF AUTOMATIC (IMPLANTABLE) CARDIAC DEFIBRILLATOR: ICD-10-CM

## 2023-10-17 RX ORDER — LOPERAMIDE HYDROCHLORIDE 2 MG/1
CAPSULE ORAL
Qty: 30 CAPSULE | Refills: 2 | Status: SHIPPED | OUTPATIENT
Start: 2023-10-17

## 2023-11-03 DIAGNOSIS — I50.30 HEART FAILURE WITH PRESERVED EJECTION FRACTION, UNSPECIFIED HF CHRONICITY (MULTI): Primary | ICD-10-CM

## 2023-11-03 RX ORDER — FERROUS SULFATE TAB 325 MG (65 MG ELEMENTAL FE) 325 (65 FE) MG
1 TAB ORAL 2 TIMES DAILY
Qty: 60 TABLET | Refills: 3 | Status: SHIPPED | OUTPATIENT
Start: 2023-11-03

## 2023-11-06 ENCOUNTER — HOSPITAL ENCOUNTER (OUTPATIENT)
Dept: CARDIOLOGY | Facility: CLINIC | Age: 63
Discharge: HOME | End: 2023-11-06
Payer: COMMERCIAL

## 2023-11-06 DIAGNOSIS — Z95.810 PRESENCE OF AUTOMATIC (IMPLANTABLE) CARDIAC DEFIBRILLATOR: ICD-10-CM

## 2023-11-06 DIAGNOSIS — I42.0 DILATED CARDIOMYOPATHY (MULTI): ICD-10-CM

## 2023-11-07 ENCOUNTER — HOSPITAL ENCOUNTER (INPATIENT)
Facility: HOSPITAL | Age: 63
LOS: 6 days | Discharge: HOME | DRG: 291 | End: 2023-11-13
Attending: EMERGENCY MEDICINE | Admitting: INTERNAL MEDICINE
Payer: COMMERCIAL

## 2023-11-07 ENCOUNTER — APPOINTMENT (OUTPATIENT)
Dept: RADIOLOGY | Facility: HOSPITAL | Age: 63
DRG: 291 | End: 2023-11-07
Payer: COMMERCIAL

## 2023-11-07 DIAGNOSIS — I50.23 ACUTE ON CHRONIC SYSTOLIC CONGESTIVE HEART FAILURE (MULTI): ICD-10-CM

## 2023-11-07 DIAGNOSIS — I10 BENIGN ESSENTIAL HYPERTENSION: ICD-10-CM

## 2023-11-07 DIAGNOSIS — R42 DIZZINESS: ICD-10-CM

## 2023-11-07 DIAGNOSIS — I50.42 CHRONIC COMBINED SYSTOLIC AND DIASTOLIC HEART FAILURE, NYHA CLASS 3 (MULTI): ICD-10-CM

## 2023-11-07 DIAGNOSIS — I50.9 HEART FAILURE, UNSPECIFIED HF CHRONICITY, UNSPECIFIED HEART FAILURE TYPE (MULTI): Primary | ICD-10-CM

## 2023-11-07 LAB
ALBUMIN SERPL BCP-MCNC: 4 G/DL (ref 3.4–5)
ALP SERPL-CCNC: 153 U/L (ref 33–136)
ALT SERPL W P-5'-P-CCNC: 46 U/L (ref 10–52)
ANION GAP SERPL CALC-SCNC: 18 MMOL/L (ref 10–20)
AST SERPL W P-5'-P-CCNC: 29 U/L (ref 9–39)
BASOPHILS # BLD AUTO: 0.03 X10*3/UL (ref 0–0.1)
BASOPHILS NFR BLD AUTO: 0.6 %
BILIRUB SERPL-MCNC: 1.4 MG/DL (ref 0–1.2)
BNP SERPL-MCNC: 1857 PG/ML (ref 0–99)
BUN SERPL-MCNC: 13 MG/DL (ref 6–23)
CALCIUM SERPL-MCNC: 8.8 MG/DL (ref 8.6–10.6)
CARDIAC TROPONIN I PNL SERPL HS: 42 NG/L (ref 0–53)
CARDIAC TROPONIN I PNL SERPL HS: 43 NG/L (ref 0–53)
CHLORIDE SERPL-SCNC: 106 MMOL/L (ref 98–107)
CO2 SERPL-SCNC: 19 MMOL/L (ref 21–32)
CREAT SERPL-MCNC: 0.77 MG/DL (ref 0.5–1.3)
EOSINOPHIL # BLD AUTO: 0.04 X10*3/UL (ref 0–0.7)
EOSINOPHIL NFR BLD AUTO: 0.8 %
ERYTHROCYTE [DISTWIDTH] IN BLOOD BY AUTOMATED COUNT: 15.8 % (ref 11.5–14.5)
GFR SERPL CREATININE-BSD FRML MDRD: >90 ML/MIN/1.73M*2
GLUCOSE BLD MANUAL STRIP-MCNC: 239 MG/DL (ref 74–99)
GLUCOSE BLD MANUAL STRIP-MCNC: 262 MG/DL (ref 74–99)
GLUCOSE SERPL-MCNC: 201 MG/DL (ref 74–99)
HCT VFR BLD AUTO: 48.5 % (ref 41–52)
HGB BLD-MCNC: 15.6 G/DL (ref 13.5–17.5)
IMM GRANULOCYTES # BLD AUTO: 0.01 X10*3/UL (ref 0–0.7)
IMM GRANULOCYTES NFR BLD AUTO: 0.2 % (ref 0–0.9)
LYMPHOCYTES # BLD AUTO: 2.17 X10*3/UL (ref 1.2–4.8)
LYMPHOCYTES NFR BLD AUTO: 45.8 %
MAGNESIUM SERPL-MCNC: 1.56 MG/DL (ref 1.6–2.4)
MCH RBC QN AUTO: 28.9 PG (ref 26–34)
MCHC RBC AUTO-ENTMCNC: 32.2 G/DL (ref 32–36)
MCV RBC AUTO: 90 FL (ref 80–100)
MONOCYTES # BLD AUTO: 0.24 X10*3/UL (ref 0.1–1)
MONOCYTES NFR BLD AUTO: 5.1 %
NEUTROPHILS # BLD AUTO: 2.25 X10*3/UL (ref 1.2–7.7)
NEUTROPHILS NFR BLD AUTO: 47.5 %
NRBC BLD-RTO: 0 /100 WBCS (ref 0–0)
PLATELET # BLD AUTO: 171 X10*3/UL (ref 150–450)
POTASSIUM SERPL-SCNC: 3.8 MMOL/L (ref 3.5–5.3)
PROT SERPL-MCNC: 6.7 G/DL (ref 6.4–8.2)
RBC # BLD AUTO: 5.39 X10*6/UL (ref 4.5–5.9)
SODIUM SERPL-SCNC: 139 MMOL/L (ref 136–145)
WBC # BLD AUTO: 4.7 X10*3/UL (ref 4.4–11.3)

## 2023-11-07 PROCEDURE — 85025 COMPLETE CBC W/AUTO DIFF WBC: CPT

## 2023-11-07 PROCEDURE — 84484 ASSAY OF TROPONIN QUANT: CPT

## 2023-11-07 PROCEDURE — 36415 COLL VENOUS BLD VENIPUNCTURE: CPT

## 2023-11-07 PROCEDURE — 93010 ELECTROCARDIOGRAM REPORT: CPT | Performed by: EMERGENCY MEDICINE

## 2023-11-07 PROCEDURE — 83880 ASSAY OF NATRIURETIC PEPTIDE: CPT

## 2023-11-07 PROCEDURE — 83735 ASSAY OF MAGNESIUM: CPT

## 2023-11-07 PROCEDURE — 80053 COMPREHEN METABOLIC PANEL: CPT

## 2023-11-07 PROCEDURE — 2500000001 HC RX 250 WO HCPCS SELF ADMINISTERED DRUGS (ALT 637 FOR MEDICARE OP)

## 2023-11-07 PROCEDURE — 99285 EMERGENCY DEPT VISIT HI MDM: CPT | Mod: 25 | Performed by: EMERGENCY MEDICINE

## 2023-11-07 PROCEDURE — 96374 THER/PROPH/DIAG INJ IV PUSH: CPT

## 2023-11-07 PROCEDURE — 82947 ASSAY GLUCOSE BLOOD QUANT: CPT

## 2023-11-07 PROCEDURE — 99285 EMERGENCY DEPT VISIT HI MDM: CPT | Performed by: EMERGENCY MEDICINE

## 2023-11-07 PROCEDURE — 2500000001 HC RX 250 WO HCPCS SELF ADMINISTERED DRUGS (ALT 637 FOR MEDICARE OP): Performed by: STUDENT IN AN ORGANIZED HEALTH CARE EDUCATION/TRAINING PROGRAM

## 2023-11-07 PROCEDURE — 2500000004 HC RX 250 GENERAL PHARMACY W/ HCPCS (ALT 636 FOR OP/ED)

## 2023-11-07 PROCEDURE — 99285 EMERGENCY DEPT VISIT HI MDM: CPT | Mod: 25

## 2023-11-07 PROCEDURE — 1200000002 HC GENERAL ROOM WITH TELEMETRY DAILY

## 2023-11-07 PROCEDURE — 71045 X-RAY EXAM CHEST 1 VIEW: CPT | Mod: FY

## 2023-11-07 PROCEDURE — 84484 ASSAY OF TROPONIN QUANT: CPT | Performed by: STUDENT IN AN ORGANIZED HEALTH CARE EDUCATION/TRAINING PROGRAM

## 2023-11-07 PROCEDURE — 2500000002 HC RX 250 W HCPCS SELF ADMINISTERED DRUGS (ALT 637 FOR MEDICARE OP, ALT 636 FOR OP/ED): Performed by: STUDENT IN AN ORGANIZED HEALTH CARE EDUCATION/TRAINING PROGRAM

## 2023-11-07 PROCEDURE — 2500000004 HC RX 250 GENERAL PHARMACY W/ HCPCS (ALT 636 FOR OP/ED): Performed by: STUDENT IN AN ORGANIZED HEALTH CARE EDUCATION/TRAINING PROGRAM

## 2023-11-07 RX ORDER — POLYETHYLENE GLYCOL 3350 17 G/17G
17 POWDER, FOR SOLUTION ORAL DAILY PRN
Status: DISCONTINUED | OUTPATIENT
Start: 2023-11-07 | End: 2023-11-13 | Stop reason: HOSPADM

## 2023-11-07 RX ORDER — ACETAMINOPHEN 160 MG/5ML
650 SOLUTION ORAL EVERY 6 HOURS PRN
Status: DISCONTINUED | OUTPATIENT
Start: 2023-11-07 | End: 2023-11-13 | Stop reason: HOSPADM

## 2023-11-07 RX ORDER — DEXTROSE MONOHYDRATE 100 MG/ML
0.3 INJECTION, SOLUTION INTRAVENOUS ONCE AS NEEDED
Status: DISCONTINUED | OUTPATIENT
Start: 2023-11-07 | End: 2023-11-13 | Stop reason: HOSPADM

## 2023-11-07 RX ORDER — CARVEDILOL 25 MG/1
50 TABLET ORAL 2 TIMES DAILY
Status: DISCONTINUED | OUTPATIENT
Start: 2023-11-08 | End: 2023-11-10

## 2023-11-07 RX ORDER — ENOXAPARIN SODIUM 100 MG/ML
40 INJECTION SUBCUTANEOUS EVERY 24 HOURS
Status: DISCONTINUED | OUTPATIENT
Start: 2023-11-07 | End: 2023-11-13 | Stop reason: HOSPADM

## 2023-11-07 RX ORDER — PANTOPRAZOLE SODIUM 40 MG/1
40 TABLET, DELAYED RELEASE ORAL
Status: DISCONTINUED | OUTPATIENT
Start: 2023-11-08 | End: 2023-11-13 | Stop reason: HOSPADM

## 2023-11-07 RX ORDER — DEXTROSE 50 % IN WATER (D50W) INTRAVENOUS SYRINGE
25
Status: DISCONTINUED | OUTPATIENT
Start: 2023-11-07 | End: 2023-11-13 | Stop reason: HOSPADM

## 2023-11-07 RX ORDER — SPIRONOLACTONE 25 MG/1
25 TABLET ORAL DAILY
Status: DISCONTINUED | OUTPATIENT
Start: 2023-11-07 | End: 2023-11-13 | Stop reason: HOSPADM

## 2023-11-07 RX ORDER — ACETAMINOPHEN 325 MG/1
650 TABLET ORAL EVERY 6 HOURS PRN
Status: DISCONTINUED | OUTPATIENT
Start: 2023-11-07 | End: 2023-11-13 | Stop reason: HOSPADM

## 2023-11-07 RX ORDER — AMIODARONE HYDROCHLORIDE 200 MG/1
200 TABLET ORAL DAILY
Status: DISCONTINUED | OUTPATIENT
Start: 2023-11-07 | End: 2023-11-13 | Stop reason: HOSPADM

## 2023-11-07 RX ORDER — ISOSORBIDE MONONITRATE 30 MG/1
120 TABLET, EXTENDED RELEASE ORAL DAILY
Status: DISCONTINUED | OUTPATIENT
Start: 2023-11-07 | End: 2023-11-13 | Stop reason: HOSPADM

## 2023-11-07 RX ORDER — FERROUS SULFATE 325(65) MG
1 TABLET ORAL 2 TIMES DAILY
Status: DISCONTINUED | OUTPATIENT
Start: 2023-11-07 | End: 2023-11-13 | Stop reason: HOSPADM

## 2023-11-07 RX ORDER — CARVEDILOL 12.5 MG/1
25 TABLET ORAL ONCE
Status: COMPLETED | OUTPATIENT
Start: 2023-11-07 | End: 2023-11-07

## 2023-11-07 RX ORDER — INSULIN LISPRO 100 [IU]/ML
0-5 INJECTION, SOLUTION INTRAVENOUS; SUBCUTANEOUS
Status: DISCONTINUED | OUTPATIENT
Start: 2023-11-08 | End: 2023-11-13 | Stop reason: HOSPADM

## 2023-11-07 RX ORDER — FUROSEMIDE 10 MG/ML
40 INJECTION INTRAMUSCULAR; INTRAVENOUS ONCE
Status: COMPLETED | OUTPATIENT
Start: 2023-11-07 | End: 2023-11-07

## 2023-11-07 RX ORDER — NAPROXEN SODIUM 220 MG/1
81 TABLET, FILM COATED ORAL DAILY
Status: DISCONTINUED | OUTPATIENT
Start: 2023-11-07 | End: 2023-11-13 | Stop reason: HOSPADM

## 2023-11-07 RX ORDER — MAGNESIUM SULFATE HEPTAHYDRATE 40 MG/ML
2 INJECTION, SOLUTION INTRAVENOUS ONCE
Status: COMPLETED | OUTPATIENT
Start: 2023-11-07 | End: 2023-11-07

## 2023-11-07 RX ORDER — HYDRALAZINE HYDROCHLORIDE 10 MG/1
10 TABLET, FILM COATED ORAL 3 TIMES DAILY
Status: DISCONTINUED | OUTPATIENT
Start: 2023-11-07 | End: 2023-11-07

## 2023-11-07 RX ORDER — FUROSEMIDE 10 MG/ML
60 INJECTION INTRAMUSCULAR; INTRAVENOUS ONCE
Status: COMPLETED | OUTPATIENT
Start: 2023-11-07 | End: 2023-11-07

## 2023-11-07 RX ORDER — PRAZOSIN HYDROCHLORIDE 2 MG/1
2 CAPSULE ORAL NIGHTLY
Status: DISCONTINUED | OUTPATIENT
Start: 2023-11-07 | End: 2023-11-13 | Stop reason: HOSPADM

## 2023-11-07 RX ORDER — SODIUM CHLORIDE 9 MG/ML
3 INJECTION, SOLUTION INTRAMUSCULAR; INTRAVENOUS; SUBCUTANEOUS AS NEEDED
Status: DISCONTINUED | OUTPATIENT
Start: 2023-11-07 | End: 2023-11-07

## 2023-11-07 RX ORDER — LOPERAMIDE HYDROCHLORIDE 2 MG/1
4 CAPSULE ORAL 2 TIMES DAILY
Status: DISCONTINUED | OUTPATIENT
Start: 2023-11-08 | End: 2023-11-13 | Stop reason: HOSPADM

## 2023-11-07 RX ORDER — SODIUM CHLORIDE 9 MG/ML
100 INJECTION, SOLUTION INTRAVENOUS CONTINUOUS
Status: DISCONTINUED | OUTPATIENT
Start: 2023-11-07 | End: 2023-11-07

## 2023-11-07 RX ORDER — ATORVASTATIN CALCIUM 40 MG/1
40 TABLET, FILM COATED ORAL
Status: DISCONTINUED | OUTPATIENT
Start: 2023-11-08 | End: 2023-11-13 | Stop reason: HOSPADM

## 2023-11-07 RX ORDER — CARVEDILOL 12.5 MG/1
50 TABLET ORAL 2 TIMES DAILY
Status: DISCONTINUED | OUTPATIENT
Start: 2023-11-07 | End: 2023-11-07

## 2023-11-07 RX ORDER — INSULIN GLARGINE 100 [IU]/ML
30 INJECTION, SOLUTION SUBCUTANEOUS NIGHTLY
Status: DISCONTINUED | OUTPATIENT
Start: 2023-11-07 | End: 2023-11-13 | Stop reason: HOSPADM

## 2023-11-07 RX ORDER — BISMUTH SUBSALICYLATE 262 MG
1 TABLET,CHEWABLE ORAL DAILY
Status: DISCONTINUED | OUTPATIENT
Start: 2023-11-07 | End: 2023-11-13 | Stop reason: HOSPADM

## 2023-11-07 RX ORDER — DAPAGLIFLOZIN 10 MG/1
10 TABLET, FILM COATED ORAL DAILY
Status: DISCONTINUED | OUTPATIENT
Start: 2023-11-07 | End: 2023-11-13 | Stop reason: HOSPADM

## 2023-11-07 RX ADMIN — CARVEDILOL 25 MG: 12.5 TABLET, FILM COATED ORAL at 16:15

## 2023-11-07 RX ADMIN — DAPAGLIFLOZIN 10 MG: 10 TABLET, FILM COATED ORAL at 20:18

## 2023-11-07 RX ADMIN — ISOSORBIDE MONONITRATE 120 MG: 30 TABLET, EXTENDED RELEASE ORAL at 20:17

## 2023-11-07 RX ADMIN — INSULIN GLARGINE 30 UNITS: 100 INJECTION, SOLUTION SUBCUTANEOUS at 23:13

## 2023-11-07 RX ADMIN — Medication: at 22:30

## 2023-11-07 RX ADMIN — FERROUS SULFATE TAB 325 MG (65 MG ELEMENTAL FE) 325 MG: 325 (65 FE) TAB at 20:18

## 2023-11-07 RX ADMIN — ASPIRIN 81 MG CHEWABLE TABLET 81 MG: 81 TABLET CHEWABLE at 20:17

## 2023-11-07 RX ADMIN — HYDRALAZINE HYDROCHLORIDE 10 MG: 10 TABLET, FILM COATED ORAL at 16:14

## 2023-11-07 RX ADMIN — SACUBITRIL AND VALSARTAN 1 TABLET: 97; 103 TABLET, FILM COATED ORAL at 20:20

## 2023-11-07 RX ADMIN — ENOXAPARIN SODIUM 40 MG: 100 INJECTION SUBCUTANEOUS at 20:18

## 2023-11-07 RX ADMIN — HYDRALAZINE HYDROCHLORIDE 75 MG: 50 TABLET, FILM COATED ORAL at 20:17

## 2023-11-07 RX ADMIN — SPIRONOLACTONE 25 MG: 25 TABLET ORAL at 19:10

## 2023-11-07 RX ADMIN — AMIODARONE HYDROCHLORIDE 200 MG: 200 TABLET ORAL at 20:17

## 2023-11-07 RX ADMIN — FUROSEMIDE 40 MG: 10 INJECTION, SOLUTION INTRAVENOUS at 16:15

## 2023-11-07 RX ADMIN — FUROSEMIDE 60 MG: 10 INJECTION, SOLUTION INTRAVENOUS at 23:13

## 2023-11-07 RX ADMIN — MAGNESIUM SULFATE HEPTAHYDRATE 2 G: 40 INJECTION, SOLUTION INTRAVENOUS at 20:23

## 2023-11-07 SDOH — SOCIAL STABILITY: SOCIAL INSECURITY: HAVE YOU HAD THOUGHTS OF HARMING ANYONE ELSE?: NO

## 2023-11-07 SDOH — SOCIAL STABILITY: SOCIAL INSECURITY: ABUSE: ADULT

## 2023-11-07 SDOH — SOCIAL STABILITY: SOCIAL INSECURITY: DOES ANYONE TRY TO KEEP YOU FROM HAVING/CONTACTING OTHER FRIENDS OR DOING THINGS OUTSIDE YOUR HOME?: NO

## 2023-11-07 SDOH — SOCIAL STABILITY: SOCIAL INSECURITY: DO YOU FEEL ANYONE HAS EXPLOITED OR TAKEN ADVANTAGE OF YOU FINANCIALLY OR OF YOUR PERSONAL PROPERTY?: NO

## 2023-11-07 SDOH — SOCIAL STABILITY: SOCIAL INSECURITY: ARE YOU OR HAVE YOU BEEN THREATENED OR ABUSED PHYSICALLY, EMOTIONALLY, OR SEXUALLY BY ANYONE?: NO

## 2023-11-07 SDOH — SOCIAL STABILITY: SOCIAL INSECURITY: ARE THERE ANY APPARENT SIGNS OF INJURIES/BEHAVIORS THAT COULD BE RELATED TO ABUSE/NEGLECT?: NO

## 2023-11-07 SDOH — SOCIAL STABILITY: SOCIAL INSECURITY: DO YOU FEEL UNSAFE GOING BACK TO THE PLACE WHERE YOU ARE LIVING?: NO

## 2023-11-07 SDOH — SOCIAL STABILITY: SOCIAL INSECURITY: HAS ANYONE EVER THREATENED TO HURT YOUR FAMILY OR YOUR PETS?: NO

## 2023-11-07 ASSESSMENT — LIFESTYLE VARIABLES
AUDIT-C TOTAL SCORE: 1
HAVE YOU EVER FELT YOU SHOULD CUT DOWN ON YOUR DRINKING: NO
HOW OFTEN DO YOU HAVE A DRINK CONTAINING ALCOHOL: MONTHLY OR LESS
REASON UNABLE TO ASSESS: NO
EVER HAD A DRINK FIRST THING IN THE MORNING TO STEADY YOUR NERVES TO GET RID OF A HANGOVER: NO
EVER FELT BAD OR GUILTY ABOUT YOUR DRINKING: NO
SUBSTANCE_ABUSE_PAST_12_MONTHS: NO
HOW OFTEN DO YOU HAVE 6 OR MORE DRINKS ON ONE OCCASION: NEVER
PRESCIPTION_ABUSE_PAST_12_MONTHS: NO
SKIP TO QUESTIONS 9-10: 1
HOW MANY STANDARD DRINKS CONTAINING ALCOHOL DO YOU HAVE ON A TYPICAL DAY: 1 OR 2
AUDIT-C TOTAL SCORE: 1
HAVE PEOPLE ANNOYED YOU BY CRITICIZING YOUR DRINKING: NO

## 2023-11-07 ASSESSMENT — ACTIVITIES OF DAILY LIVING (ADL)
ADEQUATE_TO_COMPLETE_ADL: YES
TOILETING: NEEDS ASSISTANCE
JUDGMENT_ADEQUATE_SAFELY_COMPLETE_DAILY_ACTIVITIES: YES
HEARING - RIGHT EAR: FUNCTIONAL
ASSISTIVE_DEVICE: EYEGLASSES;WHEELCHAIR
WALKS IN HOME: DEPENDENT
BATHING: NEEDS ASSISTANCE
PATIENT'S MEMORY ADEQUATE TO SAFELY COMPLETE DAILY ACTIVITIES?: YES
DRESSING YOURSELF: INDEPENDENT
GROOMING: INDEPENDENT
HEARING - LEFT EAR: FUNCTIONAL
FEEDING YOURSELF: INDEPENDENT

## 2023-11-07 ASSESSMENT — PAIN SCALES - GENERAL: PAINLEVEL_OUTOF10: 8

## 2023-11-07 ASSESSMENT — COLUMBIA-SUICIDE SEVERITY RATING SCALE - C-SSRS
1. IN THE PAST MONTH, HAVE YOU WISHED YOU WERE DEAD OR WISHED YOU COULD GO TO SLEEP AND NOT WAKE UP?: NO
2. HAVE YOU ACTUALLY HAD ANY THOUGHTS OF KILLING YOURSELF?: NO
6. HAVE YOU EVER DONE ANYTHING, STARTED TO DO ANYTHING, OR PREPARED TO DO ANYTHING TO END YOUR LIFE?: NO

## 2023-11-07 ASSESSMENT — PATIENT HEALTH QUESTIONNAIRE - PHQ9
2. FEELING DOWN, DEPRESSED OR HOPELESS: NOT AT ALL
SUM OF ALL RESPONSES TO PHQ9 QUESTIONS 1 & 2: 0
1. LITTLE INTEREST OR PLEASURE IN DOING THINGS: NOT AT ALL

## 2023-11-07 ASSESSMENT — PAIN - FUNCTIONAL ASSESSMENT: PAIN_FUNCTIONAL_ASSESSMENT: 0-10

## 2023-11-07 ASSESSMENT — PAIN DESCRIPTION - DESCRIPTORS: DESCRIPTORS: POUNDING

## 2023-11-07 NOTE — ED PROVIDER NOTES
CC: Shortness of Breath (Sob x 2 days. Mild cough. Chest discomfort midchest. pressure)     HPI:  This patient is a 63-year-old male with past medical history of heart failure with reduced ejection fraction (ejection fraction 10 to 15% in April 2023), nonischemic cardiomyopathy status post ICD placement in March 2023, hypertension, diabetes, cerebral palsy, hepatitis C who presents to the emergency department with shortness of breath.  Patient states his symptoms have been going on for about 3 days now.  Endorses shortness of breath while he is at rest and worse when he lies down.  He does not get up and walk around due to using a motorized scooter to get around.  Does state his shortness of breath is sometimes worse when he lies flat however happens all the time.  He also describes chest pain associated with this.  Centrally located and does not radiate.  It feels like a chest pressure.  Denies any diaphoresis or radiation of his pain.  Denies any abdominal pain however has had nausea and vomiting for the past couple of days.  States he has felt overall unwell and has been not taking his medications.  Also endorses headache.  Denies any sick contacts.  Does complain of a dry cough.  No other symptoms at this time.    Limitations to history: None  Independent historian(s): Patient  Records Reviewed: Recent available ED and inpatient notes reviewed in EMR.    PMHx/PSHx:  Per HPI.   - has a past medical history of Chronic systolic (congestive) heart failure (CMS/HCC) (09/01/2020) and Unspecified systolic (congestive) heart failure (CMS/HCC) (06/01/2021).  - has no past surgical history on file.    Medications:  Reviewed in EMR. See EMR for complete list of medications and doses.    Allergies:  Metformin    Social History:  - Tobacco:  reports that he has never smoked. He has never been exposed to tobacco smoke. He has never used smokeless tobacco.   - Alcohol:  reports current alcohol use.   - Illicit Drugs:  reports  no history of drug use.     ROS:  Per HPI.       ???????????????????????????????????????????????????????????????  Triage Vitals:  T 36.6 °C (97.9 °F)    BP (!) 162/95  RR 18  O2 98 %      Physical Exam    General: Male patient sitting in a chair, no acute distress, breathing easily, chronically ill-appearing, appropriately conversational without confusion or mental status changes.  Head: Normocephalic. Atraumatic.  Neck: FROM. No gross masses.   Eyes: EOMI. No scleral icterus or injection.  ENT: Moist mucous membranes, no apparent trauma or lesions.  CV: Regular rhythm. No murmurs, rubs, gallops appreciated. 2+ radial pulses bilaterally.  Resp: Decreased aeration throughout however clear to auscultation bilaterally. No respiratory distress.   GI: Soft, non-distended.  No tenderness with palpation.    EXT: No peripheral edema, contusions, or wounds.  Skin: Warm and dry, no rashes or lesions.  Neuro: Alert and oriented.  Cranial nerves II-XII grossly intact.  No focal neurological deficits.  Motor and sensation intact throughout.  Speech fluent.    ???????????????????????????????????????????????????????????????  Labs:   Labs Reviewed   CBC WITH AUTO DIFFERENTIAL - Abnormal       Result Value    WBC 4.7      nRBC 0.0      RBC 5.39      Hemoglobin 15.6      Hematocrit 48.5      MCV 90      MCH 28.9      MCHC 32.2      RDW 15.8 (*)     Platelets 171      Neutrophils % 47.5      Immature Granulocytes %, Automated 0.2      Lymphocytes % 45.8      Monocytes % 5.1      Eosinophils % 0.8      Basophils % 0.6      Neutrophils Absolute 2.25      Immature Granulocytes Absolute, Automated 0.01      Lymphocytes Absolute 2.17      Monocytes Absolute 0.24      Eosinophils Absolute 0.04      Basophils Absolute 0.03     COMPREHENSIVE METABOLIC PANEL - Abnormal    Glucose 201 (*)     Sodium 139      Potassium 3.8      Chloride 106      Bicarbonate 19 (*)     Anion Gap 18      Urea Nitrogen 13      Creatinine 0.77      eGFR  >90      Calcium 8.8      Albumin 4.0      Alkaline Phosphatase 153 (*)     Total Protein 6.7      AST 29      Bilirubin, Total 1.4 (*)     ALT 46     MAGNESIUM - Abnormal    Magnesium 1.56 (*)    B-TYPE NATRIURETIC PEPTIDE - Abnormal    BNP 1,857 (*)     Narrative:        <100 pg/mL - Heart failure unlikely  100-299 pg/mL - Intermediate probability of acute heart                  failure exacerbation. Correlate with clinical                  context and patient history.    >=300 pg/mL - Heart Failure likely. Correlate with clinical                  context and patient history.     Biotin interference may cause falsely decreased results. Patients taking a Biotin dose of up to 5 mg/day should refrain from taking Biotin for 24 hours before sample  collection. Providers may contact their local laboratory for further information.   SERIAL TROPONIN-INITIAL - Normal    Troponin I, High Sensitivity 42      Narrative:     Less than 99th percentile of normal range cutoff-  Female and children under 18 years old <35 ng/L; Male <54 ng/L: Negative  Repeat testing should be performed if clinically indicated.     Female and children under 18 years old  ng/L; Male  ng/L:  Consistent with possible cardiac damage and possible increased clinical   risk. Serial measurements may help to assess extent of myocardial damage.     >120 ng/L: Consistent with cardiac damage, increased clinical risk and  myocardial infarction. Serial measurements may help assess extent of   myocardial damage.      NOTE: Children less than 1 year old may have higher baseline troponin   levels and results should be interpreted in conjunction with the overall   clinical context.    NOTE: Troponin I testing is performed using a different   testing methodology at [facility] than at other   [facility]. Direct result comparisons should only   be made within the same method.     TROPONIN SERIES- (INITIAL, 1 HR)    Narrative:     The  following orders were created for panel order Troponin I Series, High Sensitivity (0, 1 HR).  Procedure                               Abnormality         Status                     ---------                               -----------         ------                     Troponin I, High Sensiti...[[id number]]  Normal              Final result               Troponin, High Sensitivi...[[id number]]                                                   Please view results for these tests on the individual orders.   SERIAL TROPONIN, 1 HOUR        Imaging:   XR chest 1 view   Final Result   1.  Cardiomegaly with findings suggestive of interstitial pulmonary   edema, correlation with patient volume status may be of value.        Signed by: Kylie Quintanilla 2023 3:42 PM   Dictation workstation:   [id number]           EK: 15: Rate of 100 bpm, sinus rhythm, right axis deviation, SC interval of 198, QTc interview of 508, , no evidence of ST segment elevation or depressions, no patterned T wave abnormalities.  Overall similar findings to EKG obtained 2023.    17: 54: Rate of 89 bpm, sinus rhythm, right axis, SC interval 202, , QTc 515, no evidence of ST segment elevations or depressions, no pattern T wave abnormalities.  Similar EKG to prior.    MDM:  This patient is a 63-year-old male who presents to the emergency department with shortness of breath.  Initial vital signs are significant for hypertension with 162/95.  Other vitals are normal.  Patient continued to be hypertensive with peak blood pressure of 173/135.  His physical exam is largely unremarkable.  He is having decreased aeration throughout his lungs however no significant respiratory distress.  At this time given his history I am concerned for heart failure exacerbation given self discontinuation of medication.  His recent echo shows ejection fraction of 10 to 15%.  Will obtain labs to rule out acute coronary syndrome as cause of  exacerbation.  Will obtain chest x-ray to rule out pneumonia and evaluate for fluid overload.  Lower suspicion at this time for pulmonary cause of his shortness of breath such as COPD or asthma given lack of history and physical exam findings.  Home blood pressure medications as well as diuresis ordered.  There is no leukocytosis or anemia on CBC.  Electrolytes within normal limits on metabolic panel.  Alkaline phosphatase and bilirubin mildly elevated.  BNP markedly increased at 1857.  Patient had a similarly elevated BNP in August when he was admitted.  He had a normal BNP in April.  Troponins are 42 and 40 on repeat.  Chest x-ray obtained and interpreted independently by myself showing bilateral vascular congestion without any effusions or consolidations.  Lab analysis, imaging, and patient's clinical presentation all consistent with CHF exacerbation.  Patient did have good response to diuresis.  Blood pressures improved with treatment. Given the patient's multiple comorbidities I do think he is appropriate for admission to the hospital to reestablish adequate diuresis and ensure continued improvement in his symptoms.  Patient expressed his understanding and agreed with this plan.  Admissions coordinator contacted and the patient is excepted to medicine.  He remained hemodynamically stable be transferred to regular nursing floor.    ED Course:  Diagnoses as of 11/09/23 1701   Acute on chronic systolic congestive heart failure (CMS/HCC)       Social Determinants Limiting Care:      Disposition:  Admit    Paul Arrieta DO   Emergency Medicine PGY-2  [facility]      Procedures ? SmartLinks last updated 11/7/2023 4:21 PM        Paul Arrieta DO  Resident  11/09/23 1711

## 2023-11-08 ENCOUNTER — APPOINTMENT (OUTPATIENT)
Dept: CARDIOLOGY | Facility: HOSPITAL | Age: 63
DRG: 291 | End: 2023-11-08
Payer: COMMERCIAL

## 2023-11-08 LAB
ALBUMIN SERPL BCP-MCNC: 3.1 G/DL (ref 3.4–5)
ALBUMIN SERPL BCP-MCNC: 3.6 G/DL (ref 3.4–5)
ANION GAP SERPL CALC-SCNC: 15 MMOL/L (ref 10–20)
ANION GAP SERPL CALC-SCNC: 16 MMOL/L (ref 10–20)
ATRIAL RATE: 100 BPM
ATRIAL RATE: 89 BPM
BUN SERPL-MCNC: 12 MG/DL (ref 6–23)
BUN SERPL-MCNC: 16 MG/DL (ref 6–23)
CALCIUM SERPL-MCNC: 9 MG/DL (ref 8.6–10.6)
CALCIUM SERPL-MCNC: 9.2 MG/DL (ref 8.6–10.6)
CARDIAC TROPONIN I PNL SERPL HS: 40 NG/L (ref 0–53)
CHLORIDE SERPL-SCNC: 103 MMOL/L (ref 98–107)
CHLORIDE SERPL-SCNC: 104 MMOL/L (ref 98–107)
CO2 SERPL-SCNC: 24 MMOL/L (ref 21–32)
CO2 SERPL-SCNC: 25 MMOL/L (ref 21–32)
CREAT SERPL-MCNC: 0.82 MG/DL (ref 0.5–1.3)
CREAT SERPL-MCNC: 1.07 MG/DL (ref 0.5–1.3)
ERYTHROCYTE [DISTWIDTH] IN BLOOD BY AUTOMATED COUNT: 15.3 % (ref 11.5–14.5)
GFR SERPL CREATININE-BSD FRML MDRD: 78 ML/MIN/1.73M*2
GFR SERPL CREATININE-BSD FRML MDRD: >90 ML/MIN/1.73M*2
GLUCOSE BLD MANUAL STRIP-MCNC: 145 MG/DL (ref 74–99)
GLUCOSE BLD MANUAL STRIP-MCNC: 268 MG/DL (ref 74–99)
GLUCOSE BLD MANUAL STRIP-MCNC: 325 MG/DL (ref 74–99)
GLUCOSE SERPL-MCNC: 139 MG/DL (ref 74–99)
GLUCOSE SERPL-MCNC: 229 MG/DL (ref 74–99)
HCT VFR BLD AUTO: 49.4 % (ref 41–52)
HGB BLD-MCNC: 16.3 G/DL (ref 13.5–17.5)
MAGNESIUM SERPL-MCNC: 1.77 MG/DL (ref 1.6–2.4)
MCH RBC QN AUTO: 29.3 PG (ref 26–34)
MCHC RBC AUTO-ENTMCNC: 33 G/DL (ref 32–36)
MCV RBC AUTO: 89 FL (ref 80–100)
NRBC BLD-RTO: 0 /100 WBCS (ref 0–0)
P AXIS: 53 DEGREES
P AXIS: 61 DEGREES
P OFFSET: 174 MS
P OFFSET: 182 MS
P ONSET: 114 MS
P ONSET: 120 MS
PHOSPHATE SERPL-MCNC: 3.4 MG/DL (ref 2.5–4.9)
PHOSPHATE SERPL-MCNC: 4.4 MG/DL (ref 2.5–4.9)
PLATELET # BLD AUTO: 213 X10*3/UL (ref 150–450)
POTASSIUM SERPL-SCNC: 3.1 MMOL/L (ref 3.5–5.3)
POTASSIUM SERPL-SCNC: 3.8 MMOL/L (ref 3.5–5.3)
PR INTERVAL: 198 MS
PR INTERVAL: 202 MS
Q ONSET: 215 MS
Q ONSET: 219 MS
QRS COUNT: 14 BEATS
QRS COUNT: 17 BEATS
QRS DURATION: 106 MS
QRS DURATION: 110 MS
QT INTERVAL: 394 MS
QT INTERVAL: 424 MS
QTC CALCULATION(BAZETT): 508 MS
QTC CALCULATION(BAZETT): 515 MS
QTC FREDERICIA: 467 MS
QTC FREDERICIA: 483 MS
R AXIS: -58 DEGREES
R AXIS: 119 DEGREES
RBC # BLD AUTO: 5.57 X10*6/UL (ref 4.5–5.9)
SODIUM SERPL-SCNC: 139 MMOL/L (ref 136–145)
SODIUM SERPL-SCNC: 141 MMOL/L (ref 136–145)
T AXIS: 6 DEGREES
T AXIS: 71 DEGREES
T OFFSET: 416 MS
T OFFSET: 427 MS
VENTRICULAR RATE: 100 BPM
VENTRICULAR RATE: 89 BPM
WBC # BLD AUTO: 6.5 X10*3/UL (ref 4.4–11.3)

## 2023-11-08 PROCEDURE — 1200000002 HC GENERAL ROOM WITH TELEMETRY DAILY

## 2023-11-08 PROCEDURE — 93005 ELECTROCARDIOGRAM TRACING: CPT

## 2023-11-08 PROCEDURE — 82947 ASSAY GLUCOSE BLOOD QUANT: CPT

## 2023-11-08 PROCEDURE — 36415 COLL VENOUS BLD VENIPUNCTURE: CPT | Performed by: STUDENT IN AN ORGANIZED HEALTH CARE EDUCATION/TRAINING PROGRAM

## 2023-11-08 PROCEDURE — 2500000001 HC RX 250 WO HCPCS SELF ADMINISTERED DRUGS (ALT 637 FOR MEDICARE OP): Performed by: STUDENT IN AN ORGANIZED HEALTH CARE EDUCATION/TRAINING PROGRAM

## 2023-11-08 PROCEDURE — 2500000002 HC RX 250 W HCPCS SELF ADMINISTERED DRUGS (ALT 637 FOR MEDICARE OP, ALT 636 FOR OP/ED): Performed by: STUDENT IN AN ORGANIZED HEALTH CARE EDUCATION/TRAINING PROGRAM

## 2023-11-08 PROCEDURE — 2500000004 HC RX 250 GENERAL PHARMACY W/ HCPCS (ALT 636 FOR OP/ED)

## 2023-11-08 PROCEDURE — 84484 ASSAY OF TROPONIN QUANT: CPT

## 2023-11-08 PROCEDURE — 83735 ASSAY OF MAGNESIUM: CPT | Performed by: STUDENT IN AN ORGANIZED HEALTH CARE EDUCATION/TRAINING PROGRAM

## 2023-11-08 PROCEDURE — 99222 1ST HOSP IP/OBS MODERATE 55: CPT | Performed by: INTERNAL MEDICINE

## 2023-11-08 PROCEDURE — 2500000004 HC RX 250 GENERAL PHARMACY W/ HCPCS (ALT 636 FOR OP/ED): Performed by: STUDENT IN AN ORGANIZED HEALTH CARE EDUCATION/TRAINING PROGRAM

## 2023-11-08 PROCEDURE — 36415 COLL VENOUS BLD VENIPUNCTURE: CPT

## 2023-11-08 PROCEDURE — 85027 COMPLETE CBC AUTOMATED: CPT | Performed by: STUDENT IN AN ORGANIZED HEALTH CARE EDUCATION/TRAINING PROGRAM

## 2023-11-08 PROCEDURE — 96372 THER/PROPH/DIAG INJ SC/IM: CPT | Performed by: STUDENT IN AN ORGANIZED HEALTH CARE EDUCATION/TRAINING PROGRAM

## 2023-11-08 PROCEDURE — 80069 RENAL FUNCTION PANEL: CPT

## 2023-11-08 RX ORDER — FUROSEMIDE 10 MG/ML
60 INJECTION INTRAMUSCULAR; INTRAVENOUS ONCE
Status: COMPLETED | OUTPATIENT
Start: 2023-11-08 | End: 2023-11-08

## 2023-11-08 RX ORDER — POTASSIUM CHLORIDE 20 MEQ/1
20 TABLET, EXTENDED RELEASE ORAL ONCE
Status: COMPLETED | OUTPATIENT
Start: 2023-11-08 | End: 2023-11-08

## 2023-11-08 RX ORDER — MAGNESIUM SULFATE HEPTAHYDRATE 40 MG/ML
2 INJECTION, SOLUTION INTRAVENOUS ONCE
Status: COMPLETED | OUTPATIENT
Start: 2023-11-08 | End: 2023-11-08

## 2023-11-08 RX ADMIN — MAGNESIUM SULFATE HEPTAHYDRATE 2 G: 40 INJECTION, SOLUTION INTRAVENOUS at 09:23

## 2023-11-08 RX ADMIN — LOPERAMIDE HYDROCHLORIDE 4 MG: 2 CAPSULE ORAL at 03:05

## 2023-11-08 RX ADMIN — DAPAGLIFLOZIN 10 MG: 10 TABLET, FILM COATED ORAL at 09:16

## 2023-11-08 RX ADMIN — CARVEDILOL 50 MG: 25 TABLET, FILM COATED ORAL at 20:50

## 2023-11-08 RX ADMIN — HYDRALAZINE HYDROCHLORIDE 75 MG: 50 TABLET, FILM COATED ORAL at 03:05

## 2023-11-08 RX ADMIN — SACUBITRIL AND VALSARTAN 1 TABLET: 97; 103 TABLET, FILM COATED ORAL at 09:16

## 2023-11-08 RX ADMIN — FERROUS SULFATE TAB 325 MG (65 MG ELEMENTAL FE) 325 MG: 325 (65 FE) TAB at 20:51

## 2023-11-08 RX ADMIN — ISOSORBIDE MONONITRATE 120 MG: 30 TABLET, EXTENDED RELEASE ORAL at 09:21

## 2023-11-08 RX ADMIN — ENOXAPARIN SODIUM 40 MG: 100 INJECTION SUBCUTANEOUS at 18:42

## 2023-11-08 RX ADMIN — HYDRALAZINE HYDROCHLORIDE 75 MG: 50 TABLET, FILM COATED ORAL at 18:41

## 2023-11-08 RX ADMIN — POTASSIUM CHLORIDE 20 MEQ: 1500 TABLET, EXTENDED RELEASE ORAL at 11:51

## 2023-11-08 RX ADMIN — PANTOPRAZOLE SODIUM 40 MG: 40 TABLET, DELAYED RELEASE ORAL at 09:21

## 2023-11-08 RX ADMIN — LOPERAMIDE HYDROCHLORIDE 4 MG: 2 CAPSULE ORAL at 09:17

## 2023-11-08 RX ADMIN — MULTIVITAMIN TABLET 1 TABLET: TABLET at 09:00

## 2023-11-08 RX ADMIN — LOPERAMIDE HYDROCHLORIDE 4 MG: 2 CAPSULE ORAL at 22:36

## 2023-11-08 RX ADMIN — INSULIN LISPRO 3 UNITS: 100 INJECTION, SOLUTION INTRAVENOUS; SUBCUTANEOUS at 11:31

## 2023-11-08 RX ADMIN — INSULIN LISPRO 4 UNITS: 100 INJECTION, SOLUTION INTRAVENOUS; SUBCUTANEOUS at 18:05

## 2023-11-08 RX ADMIN — ASPIRIN 81 MG CHEWABLE TABLET 81 MG: 81 TABLET CHEWABLE at 09:21

## 2023-11-08 RX ADMIN — FERROUS SULFATE TAB 325 MG (65 MG ELEMENTAL FE) 325 MG: 325 (65 FE) TAB at 09:22

## 2023-11-08 RX ADMIN — SACUBITRIL AND VALSARTAN 1 TABLET: 97; 103 TABLET, FILM COATED ORAL at 22:32

## 2023-11-08 RX ADMIN — CARVEDILOL 50 MG: 25 TABLET, FILM COATED ORAL at 09:21

## 2023-11-08 RX ADMIN — INSULIN GLARGINE 30 UNITS: 100 INJECTION, SOLUTION SUBCUTANEOUS at 20:48

## 2023-11-08 RX ADMIN — PRAZOSIN HYDROCHLORIDE 2 MG: 2 CAPSULE ORAL at 22:34

## 2023-11-08 RX ADMIN — HYDRALAZINE HYDROCHLORIDE 75 MG: 50 TABLET, FILM COATED ORAL at 11:30

## 2023-11-08 RX ADMIN — SPIRONOLACTONE 25 MG: 25 TABLET ORAL at 09:22

## 2023-11-08 RX ADMIN — FUROSEMIDE 60 MG: 10 INJECTION, SOLUTION INTRAVENOUS at 09:22

## 2023-11-08 RX ADMIN — ATORVASTATIN CALCIUM 40 MG: 40 TABLET, FILM COATED ORAL at 09:22

## 2023-11-08 RX ADMIN — AMIODARONE HYDROCHLORIDE 200 MG: 200 TABLET ORAL at 09:22

## 2023-11-08 ASSESSMENT — PAIN SCALES - GENERAL
PAINLEVEL_OUTOF10: 0 - NO PAIN

## 2023-11-08 ASSESSMENT — COGNITIVE AND FUNCTIONAL STATUS - GENERAL
STANDING UP FROM CHAIR USING ARMS: A LOT
MOVING FROM LYING ON BACK TO SITTING ON SIDE OF FLAT BED WITH BEDRAILS: A LITTLE
MOVING TO AND FROM BED TO CHAIR: A LITTLE
WALKING IN HOSPITAL ROOM: TOTAL
TOILETING: A LITTLE
MOVING TO AND FROM BED TO CHAIR: A LOT
STANDING UP FROM CHAIR USING ARMS: TOTAL
TOILETING: A LITTLE
MOVING FROM LYING ON BACK TO SITTING ON SIDE OF FLAT BED WITH BEDRAILS: A LITTLE
DAILY ACTIVITIY SCORE: 24
CLIMB 3 TO 5 STEPS WITH RAILING: A LOT
MOBILITY SCORE: 14
MOVING TO AND FROM BED TO CHAIR: A LOT
STANDING UP FROM CHAIR USING ARMS: TOTAL
PATIENT BASELINE BEDBOUND: NO
TURNING FROM BACK TO SIDE WHILE IN FLAT BAD: A LITTLE
CLIMB 3 TO 5 STEPS WITH RAILING: TOTAL
DAILY ACTIVITIY SCORE: 23
WALKING IN HOSPITAL ROOM: TOTAL
WALKING IN HOSPITAL ROOM: A LOT
TURNING FROM BACK TO SIDE WHILE IN FLAT BAD: A LITTLE
DAILY ACTIVITIY SCORE: 23
MOVING FROM LYING ON BACK TO SITTING ON SIDE OF FLAT BED WITH BEDRAILS: A LITTLE
CLIMB 3 TO 5 STEPS WITH RAILING: TOTAL
TURNING FROM BACK TO SIDE WHILE IN FLAT BAD: A LITTLE
MOBILITY SCORE: 12
MOBILITY SCORE: 11

## 2023-11-08 ASSESSMENT — PAIN SCALES - WONG BAKER: WONGBAKER_NUMERICALRESPONSE: NO HURT

## 2023-11-08 ASSESSMENT — ACTIVITIES OF DAILY LIVING (ADL): LACK_OF_TRANSPORTATION: NO

## 2023-11-08 NOTE — PROGRESS NOTES
"Delvis Ventura is a 63 y.o. male on day 1 of admission presenting with Acute on chronic systolic congestive heart failure (CMS/HCC).    Subjective   Tristan Ventura had no acute event overnight. He is endorsing flank pain s/p 100 mg Lasix IV and UO ~7.5 L in 12 hours. Otherwise denies CP, SOB, fevers, chills, N/V/D.      Objective     Physical Exam  Constitutional:       Appearance: Normal appearance.   HENT:      Head: Normocephalic and atraumatic.      Mouth/Throat:      Mouth: Mucous membranes are dry.   Eyes:      Conjunctiva/sclera: Conjunctivae normal.      Pupils: Pupils are equal, round, and reactive to light.   Cardiovascular:      Rate and Rhythm: Normal rate and regular rhythm.      Pulses: Normal pulses.      Heart sounds: Murmur (systolic murmur heard over l sternal border) heard.   Pulmonary:      Effort: Pulmonary effort is normal.      Breath sounds: Normal breath sounds. No rhonchi or rales.   Abdominal:      General: Abdomen is flat.      Palpations: Abdomen is soft.   Musculoskeletal:      Right lower leg: Edema present.      Left lower leg: Edema present.   Skin:     General: Skin is warm and dry.      Capillary Refill: Capillary refill takes less than 2 seconds.   Neurological:      Mental Status: He is alert and oriented to person, place, and time. Mental status is at baseline.   Psychiatric:         Mood and Affect: Mood normal.         Behavior: Behavior normal.         Last Recorded Vitals  Blood pressure 132/73, pulse 85, temperature 37 °C (98.6 °F), resp. rate 20, height 1.778 m (5' 10\"), weight 91.6 kg (202 lb), SpO2 95 %.  Intake/Output last 3 Shifts:  I/O last 3 completed shifts:  In: 400 (4.4 mL/kg) [P.O.:350; I.V.:50 (0.5 mL/kg)]  Out: 6300 (68.8 mL/kg) [Urine:6300 (1.9 mL/kg/hr)]  Weight: 91.6 kg     Relevant Results  Results for orders placed or performed during the hospital encounter of 11/07/23 (from the past 24 hour(s))   CBC and Auto Differential   Result Value Ref Range    WBC 4.7 " 4.4 - 11.3 x10*3/uL    nRBC 0.0 0.0 - 0.0 /100 WBCs    RBC 5.39 4.50 - 5.90 x10*6/uL    Hemoglobin 15.6 13.5 - 17.5 g/dL    Hematocrit 48.5 41.0 - 52.0 %    MCV 90 80 - 100 fL    MCH 28.9 26.0 - 34.0 pg    MCHC 32.2 32.0 - 36.0 g/dL    RDW 15.8 (H) 11.5 - 14.5 %    Platelets 171 150 - 450 x10*3/uL    Neutrophils % 47.5 40.0 - 80.0 %    Immature Granulocytes %, Automated 0.2 0.0 - 0.9 %    Lymphocytes % 45.8 13.0 - 44.0 %    Monocytes % 5.1 2.0 - 10.0 %    Eosinophils % 0.8 0.0 - 6.0 %    Basophils % 0.6 0.0 - 2.0 %    Neutrophils Absolute 2.25 1.20 - 7.70 x10*3/uL    Immature Granulocytes Absolute, Automated 0.01 0.00 - 0.70 x10*3/uL    Lymphocytes Absolute 2.17 1.20 - 4.80 x10*3/uL    Monocytes Absolute 0.24 0.10 - 1.00 x10*3/uL    Eosinophils Absolute 0.04 0.00 - 0.70 x10*3/uL    Basophils Absolute 0.03 0.00 - 0.10 x10*3/uL   B-type natriuretic peptide   Result Value Ref Range    BNP 1,857 (H) 0 - 99 pg/mL   Troponin I, High Sensitivity, Initial   Result Value Ref Range    Troponin I, High Sensitivity 42 0 - 53 ng/L   Comprehensive Metabolic Panel   Result Value Ref Range    Glucose 201 (H) 74 - 99 mg/dL    Sodium 139 136 - 145 mmol/L    Potassium 3.8 3.5 - 5.3 mmol/L    Chloride 106 98 - 107 mmol/L    Bicarbonate 19 (L) 21 - 32 mmol/L    Anion Gap 18 10 - 20 mmol/L    Urea Nitrogen 13 6 - 23 mg/dL    Creatinine 0.77 0.50 - 1.30 mg/dL    eGFR >90 >60 mL/min/1.73m*2    Calcium 8.8 8.6 - 10.6 mg/dL    Albumin 4.0 3.4 - 5.0 g/dL    Alkaline Phosphatase 153 (H) 33 - 136 U/L    Total Protein 6.7 6.4 - 8.2 g/dL    AST 29 9 - 39 U/L    Bilirubin, Total 1.4 (H) 0.0 - 1.2 mg/dL    ALT 46 10 - 52 U/L   Magnesium   Result Value Ref Range    Magnesium 1.56 (L) 1.60 - 2.40 mg/dL   Troponin I, High Sensitivity   Result Value Ref Range    Troponin I, High Sensitivity 40 0 - 53 ng/L   Troponin, High Sensitivity, 1 Hour   Result Value Ref Range    Troponin I, High Sensitivity 43 0 - 53 ng/L   POCT GLUCOSE   Result Value Ref Range     POCT Glucose 239 (H) 74 - 99 mg/dL   POCT GLUCOSE   Result Value Ref Range    POCT Glucose 262 (H) 74 - 99 mg/dL   ECG 12 lead   Result Value Ref Range    Ventricular Rate 89 BPM    Atrial Rate 89 BPM    AL Interval 202 ms    QRS Duration 110 ms    QT Interval 424 ms    QTC Calculation(Bazett) 515 ms    P Axis 61 degrees    R Axis -58 degrees    T Axis 71 degrees    QRS Count 14 beats    Q Onset 215 ms    P Onset 114 ms    P Offset 174 ms    T Offset 427 ms    QTC Fredericia 483 ms   ECG 12 lead   Result Value Ref Range    Ventricular Rate 100 BPM    Atrial Rate 100 BPM    AL Interval 198 ms    QRS Duration 106 ms    QT Interval 394 ms    QTC Calculation(Bazett) 508 ms    P Axis 53 degrees    R Axis 119 degrees    T Axis 6 degrees    QRS Count 17 beats    Q Onset 219 ms    P Onset 120 ms    P Offset 182 ms    T Offset 416 ms    QTC Fredericia 467 ms   POCT GLUCOSE   Result Value Ref Range    POCT Glucose 145 (H) 74 - 99 mg/dL   CBC   Result Value Ref Range    WBC 6.5 4.4 - 11.3 x10*3/uL    nRBC 0.0 0.0 - 0.0 /100 WBCs    RBC 5.57 4.50 - 5.90 x10*6/uL    Hemoglobin 16.3 13.5 - 17.5 g/dL    Hematocrit 49.4 41.0 - 52.0 %    MCV 89 80 - 100 fL    MCH 29.3 26.0 - 34.0 pg    MCHC 33.0 32.0 - 36.0 g/dL    RDW 15.3 (H) 11.5 - 14.5 %    Platelets 213 150 - 450 x10*3/uL   Magnesium   Result Value Ref Range    Magnesium 1.77 1.60 - 2.40 mg/dL   Renal function panel   Result Value Ref Range    Glucose 139 (H) 74 - 99 mg/dL    Sodium 141 136 - 145 mmol/L    Potassium 3.1 (L) 3.5 - 5.3 mmol/L    Chloride 104 98 - 107 mmol/L    Bicarbonate 24 21 - 32 mmol/L    Anion Gap 16 10 - 20 mmol/L    Urea Nitrogen 12 6 - 23 mg/dL    Creatinine 0.82 0.50 - 1.30 mg/dL    eGFR >90 >60 mL/min/1.73m*2    Calcium 9.2 8.6 - 10.6 mg/dL    Phosphorus 3.4 2.5 - 4.9 mg/dL    Albumin 3.6 3.4 - 5.0 g/dL   POCT GLUCOSE   Result Value Ref Range    POCT Glucose 268 (H) 74 - 99 mg/dL         Assessment/Plan   Principal Problem:    Acute on chronic  systolic congestive heart failure (CMS/HCC)    Tristan Ventura is a 64 yo M w/ PMHx of HFrEF (10-15% 2023), NICM s/p ICD (replaced 2023 for infected line), HTN, T2DM on insulin, CP w/ residual BLE weakness requiring motorized chair, Hep C who was admitted to the cardiology inpatient service on 23 for ADHF w/ symptoms of shortness of breath and BLE swelling making transfer difficult. CXR revealed interstitial edema and BNP was ~2k. No O2 requirement. EKG revealed no ischemic changes and hs troponin was ~40. The patient has symptomatically improved s/p 3x doses of IV Lasix w/ almost 9 L UO. Found to be hypokalemic s/o diuresis and actively being repleted PRN.     #CHF exacerbation  #HFrEF (EF 10-15% in 2023)  #HTN   :: s/s ADHF in form of SOB w/ CXR interstitial edema, BLE swelling  - s/p ICD 2023, no discharges  - s/p lasix 40mg, Lasix 60mg IV x2  - Home meds: hydralazine to 75 mg 3 TID  - isosorbide mononitrate to 120 mg once daily  - GDMT: carvedilol 50 mg twice daily, dapagliflozin 10 mg once daily, sacubitril/valsartan 97/103 mg twice daily, spironolactone 25 mg once daily  - BNP 1857,  Plan  - c/w home meds  - continuous tele   - BID RFP  - hold lasix for electrolyte derangement and symptomatic improvement, most likeley dispo on home torsemide w/ follow up with Dr. Hernandez scheduled.  - PT eval today     #DM Type 2  - last A1c 8.4 on 23  - home regimen: glargine 40u QHS  Plan:  - glargine 30u QHS  - mild SSI  - Diabetes educator for DexCmG7 BS sensor    #Hypokalemia  - 3.3 s/p diuresis, repleted PO   - BID RFP    F: IVF prn   E: Lytes prn  N: Adult Cardiac  A: PIV     DVT ppx: lovenox  GI ppx: PPI    Code Status: FULL CODE (confirmed at bedside)  Surrogate Medical Decision-maker: Pt states most of his family is  and he has no friends.  Patient informed in the case of emergency a hospital appointed POA would be assigned.  Patient was agreeable.             Salinas Lo MD  PGY1  Medicine

## 2023-11-08 NOTE — H&P
"Chief complaint: \"hard to breath\"    HPI:  Mr. Ventura is a 63-year-old male with a PMH of HFrEF (EF 10-15% in April 2023), nonischemic cardiomyopathy status post ICD placement in March 2023, hypertension, DM, cerebral palsy, and hepatitis C who presented to the ED due to shortness of breath.  CXR was significant for interstitial pulmonary edema and labs were notable for BNP 1,857.  The patient was given Lasix 40 in the ED.      Per Patient:   Patient reports experiencing shortness of breath for the past week that has worsened within the past few days.  He was not able to get out of the bed this morning due to severe shortness of breath with exertion.  He reports compliance with his HF medication; however, noncompliance was reported to the ED physician during his admission. His SOB is associated with central chest pain which he describes as a constant pressure that does not radiate.  Upon evaluation, he denied fever, chills, or hemoptysis; however, endorses  ongoing nausea, orthopnea, decreased PO intake, productive cough (yellow sputum), and increased lower extremity edema.    ED Course:    Imaging:    -CXR:  1.  Cardiomegaly with findings suggestive of interstitial pulmonary  edema, correlation with patient volume status may be of value    ED Interventions  -lasix 40mg     Cardiac Hx:  TTE 8/2023:   1. Left ventricular systolic function is severely decreased with a 15-20% estimated ejection fraction.  2. Spectral Doppler shows a pseudonormal pattern of left ventricular diastolic filling.  3. There is no evidence of left ventricular hypertrophy.  4. There is mildly reduced right ventricular systolic function.  5. The pulmonary artery is not well visualized.  6. There is global hypokinesis of the left ventricle with minor regional variations.      Past medical history:  Past Medical History:   Diagnosis Date    Chronic systolic (congestive) heart failure (CMS/[facility]) 09/01/2020    Chronic systolic heart failure    " Unspecified systolic (congestive) heart failure (CMS/[facility]) 2021    HFrEF (heart failure with reduced ejection fraction)       Surgical history:  No past surgical history on file.    Family history:  No family history on file.    Social history:   reports that he has never smoked. He has never been exposed to tobacco smoke. He has never used smokeless tobacco. He reports current alcohol use. He reports that he does not use drugs.Pt states most of his family is  and he has no friends.     Medications:  Scheduled medications  amiodarone, 200 mg, oral, Daily  aspirin, 81 mg, oral, Daily  [START ON 2023] atorvastatin, 40 mg, oral, Daily  [START ON 2023] carvedilol, 50 mg, oral, BID  dapagliflozin propanediol, 10 mg, oral, Daily  enoxaparin, 40 mg, subcutaneous, q24h  ferrous sulfate, 1 tablet, oral, BID  furosemide, 60 mg, intravenous, Once  hydrALAZINE, 75 mg, oral, q8h  insulin glargine, 30 Units, subcutaneous, Nightly  [START ON 2023] insulin lispro, 0-5 Units, subcutaneous, TID with meals  isosorbide mononitrate ER, 120 mg, oral, Daily  magnesium sulfate, 2 g, intravenous, Once  multivitamin, 1 tablet, oral, Daily  [START ON 2023] pantoprazole, 40 mg, oral, Daily before breakfast  prazosin, 2 mg, oral, Nightly  sacubitriL-valsartan, 1 tablet, oral, BID  spironolactone, 25 mg, oral, Daily    Continuous medications     PRN medications  PRN medications: acetaminophen **OR** acetaminophen, dextrose 10 % in water (D10W), dextrose, glucagon, oxygen, polyethylene glycol       Allergies:  Allergies   Allergen Reactions    Metformin Diarrhea     Review of systems:  A 12 point ROS was significant for findings mentioned in the HPI.    Vitals:  Vitals:    23   BP: (!) 140/92   Pulse: 79   Resp: 20   Temp: 36.4 °C (97.5 °F)   SpO2: 96%       Physical exam:  Constitutional: Well-developed male in no acute distress.  HEENT: Normocephalic, atraumatic. PERRL. EOMI. No cervical  lymphadenopathy.  Respiratory: CTA bilaterally. No wheezes, rales, or rhonchi. Normal respiratory effort.  Cardiovascular: RRR. No murmurs, gallops, or rubs. Difficult to assess JVD due to body habitus    Abdominal: Soft, nondistended, nontender to palpation. Bowel sounds present.   Neuro: CN II-XII intact. UE and LE strength 5/5 bilaterally and sensation intact.   MSK: 3+ LE edema bilaterally.  Skin: Warm, dry. No rashes or wounds.  Psych: Appropriate mood and affect.    Labs:  Results for orders placed or performed during the hospital encounter of 11/07/23 (from the past 24 hour(s))   CBC and Auto Differential   Result Value Ref Range    WBC 4.7 4.4 - 11.3 x10*3/uL    nRBC 0.0 0.0 - 0.0 /100 WBCs    RBC 5.39 4.50 - 5.90 x10*6/uL    Hemoglobin 15.6 13.5 - 17.5 g/dL    Hematocrit 48.5 41.0 - 52.0 %    MCV 90 80 - 100 fL    MCH 28.9 26.0 - 34.0 pg    MCHC 32.2 32.0 - 36.0 g/dL    RDW 15.8 (H) 11.5 - 14.5 %    Platelets 171 150 - 450 x10*3/uL    Neutrophils % 47.5 40.0 - 80.0 %    Immature Granulocytes %, Automated 0.2 0.0 - 0.9 %    Lymphocytes % 45.8 13.0 - 44.0 %    Monocytes % 5.1 2.0 - 10.0 %    Eosinophils % 0.8 0.0 - 6.0 %    Basophils % 0.6 0.0 - 2.0 %    Neutrophils Absolute 2.25 1.20 - 7.70 x10*3/uL    Immature Granulocytes Absolute, Automated 0.01 0.00 - 0.70 x10*3/uL    Lymphocytes Absolute 2.17 1.20 - 4.80 x10*3/uL    Monocytes Absolute 0.24 0.10 - 1.00 x10*3/uL    Eosinophils Absolute 0.04 0.00 - 0.70 x10*3/uL    Basophils Absolute 0.03 0.00 - 0.10 x10*3/uL   B-type natriuretic peptide   Result Value Ref Range    BNP 1,857 (H) 0 - 99 pg/mL   Troponin I, High Sensitivity, Initial   Result Value Ref Range    Troponin I, High Sensitivity 42 0 - 53 ng/L   Comprehensive Metabolic Panel   Result Value Ref Range    Glucose 201 (H) 74 - 99 mg/dL    Sodium 139 136 - 145 mmol/L    Potassium 3.8 3.5 - 5.3 mmol/L    Chloride 106 98 - 107 mmol/L    Bicarbonate 19 (L) 21 - 32 mmol/L    Anion Gap 18 10 - 20 mmol/L     Urea Nitrogen 13 6 - 23 mg/dL    Creatinine 0.77 0.50 - 1.30 mg/dL    eGFR >90 >60 mL/min/1.73m*2    Calcium 8.8 8.6 - 10.6 mg/dL    Albumin 4.0 3.4 - 5.0 g/dL    Alkaline Phosphatase 153 (H) 33 - 136 U/L    Total Protein 6.7 6.4 - 8.2 g/dL    AST 29 9 - 39 U/L    Bilirubin, Total 1.4 (H) 0.0 - 1.2 mg/dL    ALT 46 10 - 52 U/L   Magnesium   Result Value Ref Range    Magnesium 1.56 (L) 1.60 - 2.40 mg/dL   Troponin, High Sensitivity, 1 Hour   Result Value Ref Range    Troponin I, High Sensitivity 43 0 - 53 ng/L   POCT GLUCOSE   Result Value Ref Range    POCT Glucose 239 (H) 74 - 99 mg/dL       Imaging:  XR chest 1 view    Result Date: 11/7/2023  Interpreted By:  Kylie Quintanilla, STUDY: XR CHEST 1 VIEW;  11/7/2023 2:58 pm   INDICATION: Signs/Symptoms:short of breath, suspect heart failure exacerbation.   COMPARISON: Chest x-ray 08/20/2023.   ACCESSION NUMBER(S): [id number]   ORDERING CLINICIAN: FRANCINE DON   FINDINGS: AP radiograph of the chest.   A left subclavian approach cardiac pacer/AICD is in stable position.   CARDIOMEDIASTINAL SILHOUETTE: Cardiomediastinal silhouette is stable in size and configuration. Persistently enlarged.   LUNGS: There is prominence of the interstitial markings. No discrete pleural effusion. No pneumothorax.   ABDOMEN: No remarkable upper abdominal findings.   BONES: No acute osseous changes.       1.  Cardiomegaly with findings suggestive of interstitial pulmonary edema, correlation with patient volume status may be of value.   Signed by: Kylie Quintanilla 11/7/2023 3:42 PM Dictation workstation:   [id number]      Assessment and plan:  Mr. Ventura is a 63-year-old male with a PMH of HFrEF (EF 10-15% in April 2023), nonischemic cardiomyopathy status post ICD placement in March 2023, hypertension, DM, cerebral palsy, and hepatitis C who presents emergency department shortness of breath.  CXR was significant for interstitial pulmonary edema and labs were notable for BNP 1,857.  The  patient was given Lasix 40 in the ED . Urine output was minimally responsive to Lasix 40 and he was given an additional Lasix 60. Clinical presentation and patient evaluation are concerning for heart failure exacerbation.  Upon evaluation the patient was stable and saturating well on room air.      #CHF exacerbation  #HFrEF (EF 10-15% in 2023)  #HTN   -s/p ICD   - s/p lasix 40mg, Lasix 60mg IV   -Home meds: hydralazine to 75 mg 3 TID  -isosorbide mononitrate to 120 mg once daily  -GDMT: carvedilol 50 mg twice daily, dapagliflozin 10 mg once daily, sacubitril/valsartan 97/103 mg twice daily, spironolactone 25 mg once daily  -BNP 1857   Plan  - c/w home meds  - continuous tele     #DM Type 2  - last A1c 8.4 on 23  - home regimen: glargine 40u QHS  Plan:  - glargine 30u QHS  - mild SSI    F: IVF prn   E: Lytes prn  N: Adult diet/low carb/NPO  A: PIV    DVT ppx: lovenox  GI ppx: PPI    Code Status: FULL CODE (confirmed at bedside)  Surrogate Medical Decision-maker: Pt states most of his family is  and he has no friends.  Patient informed in the case of emergency a hospital appointed POA would be assigned.  Patient was agreeable.

## 2023-11-08 NOTE — CONSULTS
"Inpatient Diabetes Education Consult    Reason for Visit:  Delvis Ventura is a 63 y.o. male who presents for heart failure.      Consulting Service/Provider: Dr. Sutton    Visit Type: Initial visit    Visit Modality: In-person    Discharge Equipment/Supply Needs:       Patient has supplies at home: Blood glucose meter: One touch meter, Testing strips:  , Lancets, Pen needles, and CGM supplies: DexCom G7 sensors at home but needs assist to place.    Patient History and Assessment:  New diagnosis:  n/a  Previous diagnosis: Type 2  Patient known to Diabetes Education department: Yes  Treatment prior to hospital admission: Oral medications Farxiga  Insulin: Long acting  Blood glucose testing: Before Meals  Complications: cardiovascular disease  PTA Medications:    Current Outpatient Medications   Medication Instructions    amiodarone (Pacerone) 200 mg tablet Delvis Emersonoach    aspirin 81 mg, oral, Daily    atorvastatin (LIPITOR) 40 mg, oral, Daily RT    carvedilol (COREG) 50 mg, oral, 2 times daily    cetirizine (ZYRTEC) 10 mg, oral, Daily    dapagliflozin propanediol (FARXIGA) 10 mg, oral, Daily    Dexcom G4 platinum  (Dexcom G7 ) misc Use as instructed    Dexcom G7 Sensor device Please use to check your glucose before meals and at night.    Entresto  mg tablet 1 tablet, oral, 2 times daily    FeroSuL 325 mg, oral, 2 times daily    furosemide (Lasix) 40 mg tablet Delvis Ventura    hydrALAZINE (APRESOLINE) 75 mg, oral, Every 8 hours    insulin glargine (LANTUS) 40 Units, subcutaneous, Nightly, Take as directed per insulin instructions.    isosorbide mononitrate ER (IMDUR) 120 mg, oral, Daily, Do not crush or chew.    loperamide (Imodium) 2 mg capsule Take 2 capsules by mouth every 12 hours if needed for diarrhea.    MULTIVITAMIN ORAL 1 tablet, oral, Daily    pantoprazole (ProtoNix) 40 mg EC tablet Delvis Ventura    pen needle, diabetic 31 gauge x 5/16\" needle Use to inject insulin daily    prazosin " "(MINIPRESS) 2 mg, oral, Nightly    spironolactone (ALDACTONE) 25 mg, oral, Daily    zolpidem (AMBIEN) 5 mg, oral, Nightly PRN       Glucose   Date/Time Value Ref Range Status   11/08/2023 08:57  (H) 74 - 99 mg/dL Final   11/07/2023 03:06  (H) 74 - 99 mg/dL Final   08/29/2023 06:58  (H) 74 - 99 mg/dL Final   08/28/2023 07:01 AM 95 74 - 99 mg/dL Final   08/26/2023 07:24 AM 97 74 - 99 mg/dL Final   08/25/2023 08:38  (H) 74 - 99 mg/dL Final   08/24/2023 07:07 AM 76 74 - 99 mg/dL Final   08/23/2023 02:02 AM 78 74 - 99 mg/dL Final   08/22/2023 02:07  (H) 74 - 99 mg/dL Final   08/21/2023 06:14  (H) 74 - 99 mg/dL Final   08/21/2023 02:17  (H) 74 - 99 mg/dL Final   08/19/2023 07:02  (H) 74 - 99 mg/dL Final     No results found for: \"CPEPTIDE\"  Hemoglobin A1C   Date Value Ref Range Status   08/21/2023 8.4 (A) % Final     Comment:          Diagnosis of Diabetes-Adults   Non-Diabetic: < or = 5.6%   Increased risk for developing diabetes: 5.7-6.4%   Diagnostic of diabetes: > or = 6.5%  .       Monitoring of Diabetes                Age (y)     Therapeutic Goal (%)   Adults:          >18           <7.0   Pediatrics:    13-18           <7.5                   7-12           <8.0                   0- 6            7.5-8.5   American Diabetes Association. Diabetes Care 33(S1), Jan 2010.     04/22/2023 7.6 (A) % Final     Comment:          Diagnosis of Diabetes-Adults   Non-Diabetic: < or = 5.6%   Increased risk for developing diabetes: 5.7-6.4%   Diagnostic of diabetes: > or = 6.5%  .       Monitoring of Diabetes                Age (y)     Therapeutic Goal (%)   Adults:          >18           <7.0   Pediatrics:    13-18           <7.5                   7-12           <8.0                   0- 6            7.5-8.5   American Diabetes Association. Diabetes Care 33(S1), Jan 2010.     01/08/2023 10.3 (H) 4.3 - 5.6 % Final     Comment:     American Diabetes Association guidelines indicate that " patients with HgbA1c in the range 5.7-6.4% are at increased risk for development of diabetes, and intervention by lifestyle modification may be beneficial. HgbA1c greater or equal to 6.5% is considered diagnostic of diabetes.   11/04/2022 8.4 (A) % Final     Comment:          Diagnosis of Diabetes-Adults   Non-Diabetic: < or = 5.6%   Increased risk for developing diabetes: 5.7-6.4%   Diagnostic of diabetes: > or = 6.5%  .       Monitoring of Diabetes                Age (y)     Therapeutic Goal (%)   Adults:          >18           <7.0   Pediatrics:    13-18           <7.5                   7-12           <8.0                   0- 6            7.5-8.5   American Diabetes Association. Diabetes Care 33(S1), Jan 2010.     09/30/2022 7.8 (H) 4.0 - 6.0 % Final     Comment:     Hemoglobin A1C levels are related to mean blood glucose during the   preceding 2-3 months. The relationship table below may be used as a   general guide. Each 1% increase in HGB A1C is a reflection of an   increase in mean glucose of approximately 30 mg/dl.   Reference: Diabetes Care, volume 29, supplement 1 Jan. 2006                        HGB A1C ................. Approx. Mean Glucose   _______________________________________________   6%   ...............................  120 mg/dl   7%   ...............................  150 mg/dl   8%   ...............................  180 mg/dl   9%   ...............................  210 mg/dl   10%  ...............................  240 mg/dl  Performed at [address] [address]         Patient Learning/Readiness Assessment:  Mr. Ventura expresses desire to learn how to apply CGM: DexCom G7    Interventions/Topics Covered:  See After Visit Summary for handouts/information sheets provided to patient.  Education Documentation  No documentation found.        Additional topics covered: reviewed his current insulin regimen and he is comfortable with the plan  Additional materials provided:  n/a    Trial CGM provided to patient: Dexcom will place prior to discharge and instruct on how to apply device.    Education Outcome/Recommendations:        Recommendations for bedside nursing: Allow patient to self-inject insulin (supervised)    Recommendations for Providers: Follow-up w/ PCP and/or Endocrinology    Additional Comments: Mr. Ventura was in hospital approx 4 months ago.  He manages his diabetes fairly well and is comfortable with insulin and oral med combo. Will follow as needed while inpatient.  Follow up visit on 11/10 to place CGM.   He is agreeable to this plan.  Time spent:  25 mins.

## 2023-11-08 NOTE — SIGNIFICANT EVENT
63 y.o. pmh DM, cerebral palsy, nonischemic cardiomyopathy (on [facility] 8/2018 he had trivial CAD), HFrEF (EF 15-20%) s/p S-ICD 4/28/23. Presenting with 3 days of increasing lower leg swelling and shortness of breath.  Patient endorses ongoing left-sided chest pain, cough without sputum production, and orthopnea.  He states his chest pain is not triggered by anything, he is unable to elaborate on duration of chest pain or quality.  Patient initially told the ED resident that he was not taking his medications although told this provider that he  was taking all of his medications including his torsemide 20 mg once a day.  He denies any fevers, chills, sweats, abdominal pain, dysuria, constipation, and diarrhea.    Discharge weight at last admission:  89.3 kg     ED:  -lasix 40mg    8/21/23 TTE  CONCLUSIONS:  1. Left ventricular systolic function is severely decreased with a 15-20% estimated ejection fraction.  2. Spectral Doppler shows a pseudonormal pattern of left ventricular diastolic filling.  3. There is no evidence of left ventricular hypertrophy.  4. There is mildly reduced right ventricular systolic function.  5. The pulmonary artery is not well visualized.  6. There is global hypokinesis of the left ventricle with minor regional variations.    Results for orders placed or performed during the hospital encounter of 11/07/23 (from the past 24 hour(s))   CBC and Auto Differential   Result Value Ref Range    WBC 4.7 4.4 - 11.3 x10*3/uL    nRBC 0.0 0.0 - 0.0 /100 WBCs    RBC 5.39 4.50 - 5.90 x10*6/uL    Hemoglobin 15.6 13.5 - 17.5 g/dL    Hematocrit 48.5 41.0 - 52.0 %    MCV 90 80 - 100 fL    MCH 28.9 26.0 - 34.0 pg    MCHC 32.2 32.0 - 36.0 g/dL    RDW 15.8 (H) 11.5 - 14.5 %    Platelets 171 150 - 450 x10*3/uL    Neutrophils % 47.5 40.0 - 80.0 %    Immature Granulocytes %, Automated 0.2 0.0 - 0.9 %    Lymphocytes % 45.8 13.0 - 44.0 %    Monocytes % 5.1 2.0 - 10.0 %    Eosinophils % 0.8 0.0 - 6.0 %    Basophils % 0.6  0.0 - 2.0 %    Neutrophils Absolute 2.25 1.20 - 7.70 x10*3/uL    Immature Granulocytes Absolute, Automated 0.01 0.00 - 0.70 x10*3/uL    Lymphocytes Absolute 2.17 1.20 - 4.80 x10*3/uL    Monocytes Absolute 0.24 0.10 - 1.00 x10*3/uL    Eosinophils Absolute 0.04 0.00 - 0.70 x10*3/uL    Basophils Absolute 0.03 0.00 - 0.10 x10*3/uL   B-type natriuretic peptide   Result Value Ref Range    BNP 1,857 (H) 0 - 99 pg/mL   Troponin I, High Sensitivity, Initial   Result Value Ref Range    Troponin I, High Sensitivity 42 0 - 53 ng/L   Comprehensive Metabolic Panel   Result Value Ref Range    Glucose 201 (H) 74 - 99 mg/dL    Sodium 139 136 - 145 mmol/L    Potassium 3.8 3.5 - 5.3 mmol/L    Chloride 106 98 - 107 mmol/L    Bicarbonate 19 (L) 21 - 32 mmol/L    Anion Gap 18 10 - 20 mmol/L    Urea Nitrogen 13 6 - 23 mg/dL    Creatinine 0.77 0.50 - 1.30 mg/dL    eGFR >90 >60 mL/min/1.73m*2    Calcium 8.8 8.6 - 10.6 mg/dL    Albumin 4.0 3.4 - 5.0 g/dL    Alkaline Phosphatase 153 (H) 33 - 136 U/L    Total Protein 6.7 6.4 - 8.2 g/dL    AST 29 9 - 39 U/L    Bilirubin, Total 1.4 (H) 0.0 - 1.2 mg/dL    ALT 46 10 - 52 U/L   Magnesium   Result Value Ref Range    Magnesium 1.56 (L) 1.60 - 2.40 mg/dL   Troponin, High Sensitivity, 1 Hour   Result Value Ref Range    Troponin I, High Sensitivity 43 0 - 53 ng/L   POCT GLUCOSE   Result Value Ref Range    POCT Glucose 239 (H) 74 - 99 mg/dL       XR chest 1 view    Result Date: 11/7/2023  Interpreted By:  Kylie Quintanilla, STUDY: XR CHEST 1 VIEW;  11/7/2023 2:58 pm   INDICATION: Signs/Symptoms:short of breath, suspect heart failure exacerbation.   COMPARISON: Chest x-ray 08/20/2023.   ACCESSION NUMBER(S): [id number]   ORDERING CLINICIAN: FRANCINE DON   FINDINGS: AP radiograph of the chest.   A left subclavian approach cardiac pacer/AICD is in stable position.   CARDIOMEDIASTINAL SILHOUETTE: Cardiomediastinal silhouette is stable in size and configuration. Persistently enlarged.   LUNGS: There is  prominence of the interstitial markings. No discrete pleural effusion. No pneumothorax.   ABDOMEN: No remarkable upper abdominal findings.   BONES: No acute osseous changes.       1.  Cardiomegaly with findings suggestive of interstitial pulmonary edema, correlation with patient volume status may be of value.   Signed by: Kylie Quintanilla 11/7/2023 3:42 PM Dictation workstation:   [id number]     PE:  GENERAL APPEARANCE: Well developed, well nourished, alert and cooperative, and appears to be in no acute distress.  CARDIAC: Normal S1 and S2. RRR. Difficult to assess JVD due to body habitus   LUNGS: Clear to auscultation bilaterlaly   ABDOMEN: Positive bowel sounds. Soft, nondistended, nontender. No guarding or rebound. No masses.  EXTREMITIES: 3+ bilateral lower extremity edema   NEUROLOGICAL: CN II-XII grossly intact. Strength and sensation symmetric and intact throughout.   SKIN: Skin normal color, texture and turgor with no lesions or eruptions.      A/P:  63 y.o. pmh DM, cerebral palsy, nonischemic cardiomyopathy (on [facility] 8/2018 he had trivial CAD), HFrEF (EF 15-20%)  s/p S-ICD 4/28/23 admitted for ADHF. Plan to diuresis and continue GDMT.     #HFrEF EF (15-20%)  #s/p ICD  -c/w home hydralazine to 75 mg 3 times daily, isosorbide mononitrate to 120 mg once daily, carvedilol 50 mg twice daily, dapagliflozin 10 mg once daily, sacubitril/valsartan 97/103 mg twice daily, spironolactone 25 mg once daily  -lasix 40mg, Lasix 60mg IV  -home diuretic: torsemide 30mg  -BNP 1857     #DMII  -home glargine 40u QHS  -glargine 30u QHS + mild SSI     FULL CODE  NOK: per patient has no family

## 2023-11-08 NOTE — CARE PLAN
Problem: Fall/Injury  Goal: Not fall by end of shift  Outcome: Progressing  Goal: Be free from injury by end of the shift  Outcome: Progressing  Goal: Verbalize understanding of personal risk factors for fall in the hospital  Outcome: Progressing  Goal: Verbalize understanding of risk factor reduction measures to prevent injury from fall in the home  Outcome: Progressing  Goal: Use assistive devices by end of the shift  Outcome: Progressing  Goal: Pace activities to prevent fatigue by end of the shift  Outcome: Progressing   The patient's goals for the shift include rest    The clinical goals for the shift include remain safe, hds    Over the shift, the patient did make progress toward the following goals.

## 2023-11-08 NOTE — CARE PLAN
The patient's goals for the shift include rest    The clinical goals for the shift include remain safe, hds    Over the shift, the patient did make progress toward the following goals.

## 2023-11-09 ENCOUNTER — HOME HEALTH ADMISSION (OUTPATIENT)
Dept: HOME HEALTH SERVICES | Facility: HOME HEALTH | Age: 63
End: 2023-11-09
Payer: COMMERCIAL

## 2023-11-09 ENCOUNTER — APPOINTMENT (OUTPATIENT)
Dept: RADIOLOGY | Facility: HOSPITAL | Age: 63
DRG: 291 | End: 2023-11-09
Payer: COMMERCIAL

## 2023-11-09 ENCOUNTER — HOSPITAL ENCOUNTER (OUTPATIENT)
Dept: CARDIOLOGY | Facility: HOSPITAL | Age: 63
Discharge: HOME | End: 2023-11-09
Payer: COMMERCIAL

## 2023-11-09 LAB
ALBUMIN SERPL BCP-MCNC: 3 G/DL (ref 3.4–5)
ALBUMIN SERPL BCP-MCNC: 3.1 G/DL (ref 3.4–5)
ANION GAP SERPL CALC-SCNC: 13 MMOL/L (ref 10–20)
ANION GAP SERPL CALC-SCNC: 14 MMOL/L (ref 10–20)
BASOPHILS # BLD AUTO: 0.01 X10*3/UL (ref 0–0.1)
BASOPHILS NFR BLD AUTO: 0.2 %
BUN SERPL-MCNC: 16 MG/DL (ref 6–23)
BUN SERPL-MCNC: 20 MG/DL (ref 6–23)
CALCIUM SERPL-MCNC: 8.8 MG/DL (ref 8.6–10.6)
CALCIUM SERPL-MCNC: 9 MG/DL (ref 8.6–10.6)
CARDIAC TROPONIN I PNL SERPL HS: 29 NG/L (ref 0–53)
CARDIAC TROPONIN I PNL SERPL HS: 38 NG/L (ref 0–53)
CARDIAC TROPONIN I PNL SERPL HS: 42 NG/L (ref 0–53)
CHLORIDE SERPL-SCNC: 101 MMOL/L (ref 98–107)
CHLORIDE SERPL-SCNC: 104 MMOL/L (ref 98–107)
CO2 SERPL-SCNC: 25 MMOL/L (ref 21–32)
CO2 SERPL-SCNC: 26 MMOL/L (ref 21–32)
CREAT SERPL-MCNC: 1.04 MG/DL (ref 0.5–1.3)
CREAT SERPL-MCNC: 1.21 MG/DL (ref 0.5–1.3)
EOSINOPHIL # BLD AUTO: 0.03 X10*3/UL (ref 0–0.7)
EOSINOPHIL NFR BLD AUTO: 0.6 %
ERYTHROCYTE [DISTWIDTH] IN BLOOD BY AUTOMATED COUNT: 15 % (ref 11.5–14.5)
GFR SERPL CREATININE-BSD FRML MDRD: 67 ML/MIN/1.73M*2
GFR SERPL CREATININE-BSD FRML MDRD: 81 ML/MIN/1.73M*2
GLUCOSE BLD MANUAL STRIP-MCNC: 167 MG/DL (ref 74–99)
GLUCOSE BLD MANUAL STRIP-MCNC: 183 MG/DL (ref 74–99)
GLUCOSE BLD MANUAL STRIP-MCNC: 245 MG/DL (ref 74–99)
GLUCOSE BLD MANUAL STRIP-MCNC: 277 MG/DL (ref 74–99)
GLUCOSE SERPL-MCNC: 169 MG/DL (ref 74–99)
GLUCOSE SERPL-MCNC: 238 MG/DL (ref 74–99)
HCT VFR BLD AUTO: 42.4 % (ref 41–52)
HGB BLD-MCNC: 14.4 G/DL (ref 13.5–17.5)
IMM GRANULOCYTES # BLD AUTO: 0.02 X10*3/UL (ref 0–0.7)
IMM GRANULOCYTES NFR BLD AUTO: 0.4 % (ref 0–0.9)
LACTATE SERPL-SCNC: 0.9 MMOL/L (ref 0.4–2)
LYMPHOCYTES # BLD AUTO: 1.29 X10*3/UL (ref 1.2–4.8)
LYMPHOCYTES NFR BLD AUTO: 24.1 %
MAGNESIUM SERPL-MCNC: 1.94 MG/DL (ref 1.6–2.4)
MCH RBC QN AUTO: 29.1 PG (ref 26–34)
MCHC RBC AUTO-ENTMCNC: 34 G/DL (ref 32–36)
MCV RBC AUTO: 86 FL (ref 80–100)
MONOCYTES # BLD AUTO: 0.46 X10*3/UL (ref 0.1–1)
MONOCYTES NFR BLD AUTO: 8.6 %
NEUTROPHILS # BLD AUTO: 3.55 X10*3/UL (ref 1.2–7.7)
NEUTROPHILS NFR BLD AUTO: 66.1 %
NRBC BLD-RTO: 0 /100 WBCS (ref 0–0)
PHOSPHATE SERPL-MCNC: 4.5 MG/DL (ref 2.5–4.9)
PHOSPHATE SERPL-MCNC: 5.3 MG/DL (ref 2.5–4.9)
PLATELET # BLD AUTO: 185 X10*3/UL (ref 150–450)
POTASSIUM SERPL-SCNC: 3.4 MMOL/L (ref 3.5–5.3)
POTASSIUM SERPL-SCNC: 4.4 MMOL/L (ref 3.5–5.3)
RBC # BLD AUTO: 4.94 X10*6/UL (ref 4.5–5.9)
SODIUM SERPL-SCNC: 137 MMOL/L (ref 136–145)
SODIUM SERPL-SCNC: 139 MMOL/L (ref 136–145)
WBC # BLD AUTO: 5.4 X10*3/UL (ref 4.4–11.3)

## 2023-11-09 PROCEDURE — 2500000004 HC RX 250 GENERAL PHARMACY W/ HCPCS (ALT 636 FOR OP/ED)

## 2023-11-09 PROCEDURE — 93005 ELECTROCARDIOGRAM TRACING: CPT

## 2023-11-09 PROCEDURE — 2500000001 HC RX 250 WO HCPCS SELF ADMINISTERED DRUGS (ALT 637 FOR MEDICARE OP): Performed by: STUDENT IN AN ORGANIZED HEALTH CARE EDUCATION/TRAINING PROGRAM

## 2023-11-09 PROCEDURE — 83735 ASSAY OF MAGNESIUM: CPT

## 2023-11-09 PROCEDURE — 97161 PT EVAL LOW COMPLEX 20 MIN: CPT | Mod: GP | Performed by: PHYSICAL THERAPIST

## 2023-11-09 PROCEDURE — 84484 ASSAY OF TROPONIN QUANT: CPT

## 2023-11-09 PROCEDURE — 2500000002 HC RX 250 W HCPCS SELF ADMINISTERED DRUGS (ALT 637 FOR MEDICARE OP, ALT 636 FOR OP/ED): Performed by: STUDENT IN AN ORGANIZED HEALTH CARE EDUCATION/TRAINING PROGRAM

## 2023-11-09 PROCEDURE — 1200000002 HC GENERAL ROOM WITH TELEMETRY DAILY

## 2023-11-09 PROCEDURE — 36415 COLL VENOUS BLD VENIPUNCTURE: CPT

## 2023-11-09 PROCEDURE — 80069 RENAL FUNCTION PANEL: CPT

## 2023-11-09 PROCEDURE — 71045 X-RAY EXAM CHEST 1 VIEW: CPT | Mod: FY

## 2023-11-09 PROCEDURE — 84100 ASSAY OF PHOSPHORUS: CPT

## 2023-11-09 PROCEDURE — 2500000001 HC RX 250 WO HCPCS SELF ADMINISTERED DRUGS (ALT 637 FOR MEDICARE OP)

## 2023-11-09 PROCEDURE — 97530 THERAPEUTIC ACTIVITIES: CPT | Mod: GO

## 2023-11-09 PROCEDURE — 85025 COMPLETE CBC W/AUTO DIFF WBC: CPT

## 2023-11-09 PROCEDURE — 2500000004 HC RX 250 GENERAL PHARMACY W/ HCPCS (ALT 636 FOR OP/ED): Performed by: STUDENT IN AN ORGANIZED HEALTH CARE EDUCATION/TRAINING PROGRAM

## 2023-11-09 PROCEDURE — 99232 SBSQ HOSP IP/OBS MODERATE 35: CPT | Performed by: INTERNAL MEDICINE

## 2023-11-09 PROCEDURE — 93010 ELECTROCARDIOGRAM REPORT: CPT | Performed by: INTERNAL MEDICINE

## 2023-11-09 PROCEDURE — 71045 X-RAY EXAM CHEST 1 VIEW: CPT | Performed by: RADIOLOGY

## 2023-11-09 PROCEDURE — 82947 ASSAY GLUCOSE BLOOD QUANT: CPT

## 2023-11-09 PROCEDURE — 97530 THERAPEUTIC ACTIVITIES: CPT | Mod: GP | Performed by: PHYSICAL THERAPIST

## 2023-11-09 PROCEDURE — 96372 THER/PROPH/DIAG INJ SC/IM: CPT | Performed by: STUDENT IN AN ORGANIZED HEALTH CARE EDUCATION/TRAINING PROGRAM

## 2023-11-09 PROCEDURE — 83605 ASSAY OF LACTIC ACID: CPT

## 2023-11-09 PROCEDURE — 97165 OT EVAL LOW COMPLEX 30 MIN: CPT | Mod: GO

## 2023-11-09 RX ORDER — LOPERAMIDE HYDROCHLORIDE 2 MG/1
2 CAPSULE ORAL 2 TIMES DAILY PRN
Status: DISCONTINUED | OUTPATIENT
Start: 2023-11-09 | End: 2023-11-13 | Stop reason: HOSPADM

## 2023-11-09 RX ORDER — POTASSIUM CHLORIDE 20 MEQ/1
20 TABLET, EXTENDED RELEASE ORAL ONCE
Status: COMPLETED | OUTPATIENT
Start: 2023-11-09 | End: 2023-11-09

## 2023-11-09 RX ADMIN — ENOXAPARIN SODIUM 40 MG: 100 INJECTION SUBCUTANEOUS at 19:10

## 2023-11-09 RX ADMIN — AMIODARONE HYDROCHLORIDE 200 MG: 200 TABLET ORAL at 08:54

## 2023-11-09 RX ADMIN — INSULIN LISPRO 1 UNITS: 100 INJECTION, SOLUTION INTRAVENOUS; SUBCUTANEOUS at 08:57

## 2023-11-09 RX ADMIN — LOPERAMIDE HYDROCHLORIDE 4 MG: 2 CAPSULE ORAL at 22:00

## 2023-11-09 RX ADMIN — CARVEDILOL 50 MG: 25 TABLET, FILM COATED ORAL at 08:54

## 2023-11-09 RX ADMIN — DAPAGLIFLOZIN 10 MG: 10 TABLET, FILM COATED ORAL at 08:57

## 2023-11-09 RX ADMIN — POTASSIUM CHLORIDE 20 MEQ: 1500 TABLET, EXTENDED RELEASE ORAL at 12:33

## 2023-11-09 RX ADMIN — CARVEDILOL 50 MG: 25 TABLET, FILM COATED ORAL at 22:00

## 2023-11-09 RX ADMIN — SACUBITRIL AND VALSARTAN 1 TABLET: 97; 103 TABLET, FILM COATED ORAL at 08:56

## 2023-11-09 RX ADMIN — LOPERAMIDE HYDROCHLORIDE 4 MG: 2 CAPSULE ORAL at 08:55

## 2023-11-09 RX ADMIN — INSULIN LISPRO 3 UNITS: 100 INJECTION, SOLUTION INTRAVENOUS; SUBCUTANEOUS at 12:40

## 2023-11-09 RX ADMIN — ISOSORBIDE MONONITRATE 120 MG: 30 TABLET, EXTENDED RELEASE ORAL at 08:54

## 2023-11-09 RX ADMIN — ATORVASTATIN CALCIUM 40 MG: 40 TABLET, FILM COATED ORAL at 06:06

## 2023-11-09 RX ADMIN — PANTOPRAZOLE SODIUM 40 MG: 40 TABLET, DELAYED RELEASE ORAL at 06:06

## 2023-11-09 RX ADMIN — HYDRALAZINE HYDROCHLORIDE 75 MG: 50 TABLET, FILM COATED ORAL at 03:29

## 2023-11-09 RX ADMIN — INSULIN LISPRO 1 UNITS: 100 INJECTION, SOLUTION INTRAVENOUS; SUBCUTANEOUS at 17:58

## 2023-11-09 RX ADMIN — FERROUS SULFATE TAB 325 MG (65 MG ELEMENTAL FE) 325 MG: 325 (65 FE) TAB at 08:54

## 2023-11-09 RX ADMIN — PRAZOSIN HYDROCHLORIDE 2 MG: 2 CAPSULE ORAL at 22:00

## 2023-11-09 RX ADMIN — FERROUS SULFATE TAB 325 MG (65 MG ELEMENTAL FE) 325 MG: 325 (65 FE) TAB at 22:00

## 2023-11-09 RX ADMIN — INSULIN GLARGINE 30 UNITS: 100 INJECTION, SOLUTION SUBCUTANEOUS at 22:44

## 2023-11-09 RX ADMIN — MULTIVITAMIN TABLET 1 TABLET: TABLET at 09:00

## 2023-11-09 RX ADMIN — ASPIRIN 81 MG CHEWABLE TABLET 81 MG: 81 TABLET CHEWABLE at 08:54

## 2023-11-09 RX ADMIN — SACUBITRIL AND VALSARTAN 1 TABLET: 97; 103 TABLET, FILM COATED ORAL at 22:00

## 2023-11-09 ASSESSMENT — COGNITIVE AND FUNCTIONAL STATUS - GENERAL
CLIMB 3 TO 5 STEPS WITH RAILING: TOTAL
DAILY ACTIVITIY SCORE: 21
MOVING FROM LYING ON BACK TO SITTING ON SIDE OF FLAT BED WITH BEDRAILS: A LITTLE
TURNING FROM BACK TO SIDE WHILE IN FLAT BAD: A LITTLE
MOBILITY SCORE: 12
TOILETING: A LITTLE
HELP NEEDED FOR BATHING: A LITTLE
STANDING UP FROM CHAIR USING ARMS: TOTAL
DRESSING REGULAR LOWER BODY CLOTHING: A LITTLE
MOVING TO AND FROM BED TO CHAIR: A LITTLE
WALKING IN HOSPITAL ROOM: TOTAL

## 2023-11-09 ASSESSMENT — PAIN - FUNCTIONAL ASSESSMENT
PAIN_FUNCTIONAL_ASSESSMENT: 0-10
PAIN_FUNCTIONAL_ASSESSMENT: 0-10

## 2023-11-09 ASSESSMENT — PAIN SCALES - GENERAL
PAINLEVEL_OUTOF10: 6
PAINLEVEL_OUTOF10: 6

## 2023-11-09 ASSESSMENT — ACTIVITIES OF DAILY LIVING (ADL)
ADL_ASSISTANCE: INDEPENDENT
ADL_ASSISTANCE: INDEPENDENT

## 2023-11-09 NOTE — NURSING NOTE
"Mr. Ventura is resting in bed and feeling \"very tired today\"  He states he may be discharged tomorrow and will have Dexcom G7 placed at that time.  He has had opportunity to work on his phone to be able to get no for the CGM.  Will follow up in am.    "

## 2023-11-09 NOTE — PROGRESS NOTES
Occupational Therapy    Evaluation/Treatment    Patient Name: Delvis Ventura  MRN: [id number]  : 1960  Today's Date: 23  Time Calculation  Start Time: 1029  Stop Time: 1114  Time Calculation (min): 45 min       Assessment:  OT Assessment: Delvis is a 62yo male  Prognosis: Good  Barriers to Discharge: None  Evaluation/Treatment Tolerance: Patient tolerated treatment well, Patient limited by fatigue  Medical Staff Made Aware: Yes  End of Session Communication: Bedside nurse, Physician  End of Session Patient Position: Alarm off, caregiver present (EOB discussing with care team)  OT Assessment Results: Decreased upper extremity strength, Decreased endurance, Decreased functional mobility  Prognosis: Good  Barriers to Discharge: None  Evaluation/Treatment Tolerance: Patient tolerated treatment well, Patient limited by fatigue  Medical Staff Made Aware: Yes  Strengths: Capable of completing ADLs semi/independent, Housing layout, Living arrangement secure  Plan:  Treatment Interventions: ADL retraining, UE strengthening/ROM, Endurance training, Functional transfer training  OT Frequency: 2 times per week  OT Discharge Recommendations: Low intensity level of continued care  OT Recommended Transfer Status: Assist of 1  OT - OK to Discharge: Yes  Treatment Interventions: ADL retraining, UE strengthening/ROM, Endurance training, Functional transfer training    Subjective   Current Problem:  1. Acute on chronic systolic congestive heart failure (CMS/HCC)  Referral to Cardiology        General:   OT Received On: 23  General  Reason for Referral: Admitted for SOB and BLE edema  Past Medical History Relevant to Rehab: ICD placement, HTN, DM, CP  Co-Treatment: PT  Co-Treatment Reason: To facilitate higher level therapeutic benefits and faciliate safe transfers  Prior to Session Communication: Bedside nurse  Patient Position Received: Bed, 3 rail up, Alarm off, not on at start of session  Preferred Learning Style:  verbal  General Comment: Patient uses motor chair at baseline, does not walk, completes transfers only for functional mobility  Precautions:  Medical Precautions: Fall precautions  Vital Signs:  Heart Rate: 69  SpO2: 95 %  Pain:  Pain Assessment  Pain Assessment: 0-10  Pain Score: 6  Pain Location: Chest    Objective   Cognition:  Overall Cognitive Status: Within Functional Limits     Confusion Assessment Method (CAM)  Acute Onset and Fluctuating Course (1A): No     Home Living:  Type of Home: Apartment  Lives With: Alone  Home Adaptive Equipment: Wheelchair-power  Home Layout: One level  Home Access: Elevator (11th floor)  Bathroom Shower/Tub: Tub/shower unit  Bathroom Toilet: Standard  Bathroom Accessibility: reports bathroom is too small for power chair uses toilet to transfer to toilet  Prior Function:  Level of [place]: Independent with ADLs and functional transfers, Independent with homemaking with wheelchair  ADL Assistance: Independent  Homemaking Assistance: Independent  Prior Function Comments: Pt reports I with ADLs at baseline, reports he completes ADLs from power chair. Pt reports he rolls out of bed onto his knees from a low bed, reports he crawls for any transfers in home. Completes toilet transfer from ground and then toilet to tub  d/t no space for shower chair. Pt reports he orders groceries  IADL History:  Mode of Transportation: Bus, Other (Comment) (Paratransit)  ADL:  LE Dressing Assistance: Stand by  LE Dressing Deficit: Supervision/safety (Pt demos ability to bring LE up to figure 4 position in power chair)  Activities of Daily Living:    Activity Tolerance:  Endurance: Tolerates less than 10 min exercise, no significant change in vital signs  Functional Standing Tolerance:     Bed Mobility/Transfers: Bed Mobility  Bed Mobility: Yes  Bed Mobility 1  Bed Mobility 1: Supine to sitting  Level of Assistance 1: Minimum assistance  Bed Mobility Comments 1: Requires assistance for  BLE    Transfers  Transfer: Yes  Transfer 1  Transfer From 1: Bed to  Transfer to 1: Wheelchair  Technique 1: Lateral  Transfer Device 1:  (none)  Transfer Level of Assistance 1: Minimum assistance  Trials/Comments 1: Pt requires Min A for sliding from bed to power chair  Transfers 2  Transfer From 2: Wheelchair to  Transfer to 2: Bed  Technique 2: Lateral  Transfer Level of Assistance 2: Minimum assistance  Trials/Comments 2: Requires MIn A for safe transfer from wc to bed    Sitting Balance:  Static Sitting Balance  Static Sitting-Balance Support: No upper extremity supported  Static Sitting-Level of Assistance: Close supervision  Dynamic Sitting Balance  Dynamic Sitting-Balance Support: Bilateral upper extremity supported  Dynamic Sitting-Comments: pt requires BUE support for dynamic sitting balance at EOB while preparing for transfer    Modalities:     IADL's:   Splinting:     Casting:     Therapy/Activity: Therapeutic Activity  Therapeutic Activity Performed: Yes  Therapeutic Activity 1: Pt completing functional transfers from bed to wc and back. Pt completing wheelchair mobility navigating obstacles. Pt reporting fatigue and UE weakness during session and requires increased assistance s/t this  Sensory:     Cognitive Skill Development:     Community/Work Reintegration Training:     Community Mobility:     Vision:Vision - Basic Assessment  Current Vision: No visual deficits  Sensation:     Strength:     Other Activity:     Home Environment:     Perception:     Coordination:  Movements are Fluid and Coordinated: No  Coordination Comment: mildly increased time for UE coordination   Hand Function:     Extremities: RUE   RUE : Exceptions to WFL  RUE Strength  RUE Overall Strength: Greater than or equal to 3/5 as evidenced by functional mobility, Deficits (fatigue and weakness reported compared to baseline, about 4-/5 quick to fatigue) and LUE   LUE: Exceptions to WFL  LUE Strength  LUE Overall Strength: Greater  than or equal to 3/5 as evidenced by functional mobility, Deficits (fatigue and weakness reported compared to baseline, about 4-/5 quick to fatigue)      Outcome Measures: [facility] Daily Activity  Putting on and taking off regular lower body clothing: A little  Bathing (including washing, rinsing, drying): A little  Putting on and taking off regular upper body clothing: None  Toileting, which includes using toilet, bedpan or urinal: A little  Taking care of personal grooming such as brushing teeth: None  Eating Meals: None  Daily Activity - Total Score: 21        , Brief Confusion Assessment Method (bCAM)  CAM Result: CAM -    , and OT Adult Other Outcome Measures  4AT: -    Education Documentation  Precautions, taught by Mary Sylvester OT at 11/9/2023 12:06 PM.  Learner: Patient  Readiness: Acceptance  Method: Explanation  Response: Verbalizes Understanding    ADL Training, taught by Mary Sylvester OT at 11/9/2023 12:06 PM.  Learner: Patient  Readiness: Acceptance  Method: Explanation  Response: Verbalizes Understanding    Education Comments  No comments found.        OP EDUCATION:       Goals:  Encounter Problems       Encounter Problems (Active)       ADLs       Patient will complete toileting including hygiene clothing management/hygiene with contact guard assist level of assistance and bedside commode. (Progressing)       Start:  11/09/23    Expected End:  11/30/23               EXERCISE/STRENGTHENING       Patient with increase BUE to 5/5 strength. (Progressing)       Start:  11/09/23    Expected End:  11/30/23            Pt will demonstrate I with energy conservation techniques to increase functional activity tolerance to x15+ min without rest breaks/while maintaining O2 sats.  (Progressing)       Start:  11/09/23    Expected End:  11/30/23               TRANSFERS       Patient will complete all functional transfers with power wheelchair with modified independent level of assistance. (Progressing)       Start:   11/09/23    Expected End:  11/30/23

## 2023-11-09 NOTE — HOSPITAL COURSE
Mr. Ventura was admitted to the cardiology inpatient service on 11/7/23 for s/s of ADHF (elevated BNP, interstitial infiltrates on CXR, SOB, pitting edema). He received 3x IV Lasix totaling 160 mg. He had extensive UO totaling net negative 10 L. Interval EKGs revealed no ischemic changes, and monitoring metabolic panels had no severe electrolyte disturbances. We added home going diuretic Lasix with intention to follow up with cardiology. Additionally, we prescribed meclazine for dizziness.

## 2023-11-09 NOTE — PROGRESS NOTES
"Delvis Ventura is a 63 y.o. male on day 2 of admission presenting with Acute on chronic systolic congestive heart failure (CMS/HCC).    Subjective   Tristan Ventura had no acute event overnight. He is endorsing chest pain that is chronic in nature.     Objective     Physical Exam  Constitutional:       Appearance: Normal appearance.   HENT:      Head: Normocephalic and atraumatic.      Mouth/Throat:      Mouth: Mucous membranes are dry.   Eyes:      Conjunctiva/sclera: Conjunctivae normal.      Pupils: Pupils are equal, round, and reactive to light.   Cardiovascular:      Rate and Rhythm: Normal rate and regular rhythm.      Pulses: Normal pulses.      Heart sounds: Murmur (systolic murmur heard over l sternal border) heard.   Pulmonary:      Effort: Pulmonary effort is normal.      Breath sounds: Normal breath sounds. No rhonchi or rales.   Abdominal:      General: Abdomen is flat.      Palpations: Abdomen is soft.   Musculoskeletal:      Right lower leg: Edema present.      Left lower leg: Edema present.   Skin:     General: Skin is warm and dry.      Capillary Refill: Capillary refill takes less than 2 seconds.   Neurological:      Mental Status: He is alert and oriented to person, place, and time. Mental status is at baseline.   Psychiatric:         Mood and Affect: Mood normal.         Behavior: Behavior normal.       Last Recorded Vitals  Blood pressure 85/52, pulse 63, temperature 36.6 °C (97.9 °F), resp. rate 16, height 1.778 m (5' 10\"), weight 91.6 kg (202 lb), SpO2 97 %.  Intake/Output last 3 Shifts:  I/O last 3 completed shifts:  In: 400 (4.4 mL/kg) [P.O.:350; I.V.:50 (0.5 mL/kg)]  Out: 9075 (99 mL/kg) [Urine:9075 (2.8 mL/kg/hr)]  Weight: 91.6 kg     Relevant Results  Results for orders placed or performed during the hospital encounter of 11/07/23 (from the past 24 hour(s))   POCT GLUCOSE   Result Value Ref Range    POCT Glucose 325 (H) 74 - 99 mg/dL   Renal Function Panel   Result Value Ref Range    Glucose " 229 (H) 74 - 99 mg/dL    Sodium 139 136 - 145 mmol/L    Potassium 3.8 3.5 - 5.3 mmol/L    Chloride 103 98 - 107 mmol/L    Bicarbonate 25 21 - 32 mmol/L    Anion Gap 15 10 - 20 mmol/L    Urea Nitrogen 16 6 - 23 mg/dL    Creatinine 1.07 0.50 - 1.30 mg/dL    eGFR 78 >60 mL/min/1.73m*2    Calcium 9.0 8.6 - 10.6 mg/dL    Phosphorus 4.4 2.5 - 4.9 mg/dL    Albumin 3.1 (L) 3.4 - 5.0 g/dL   Troponin I, High Sensitivity   Result Value Ref Range    Troponin I, High Sensitivity 42 0 - 53 ng/L   POCT GLUCOSE   Result Value Ref Range    POCT Glucose 167 (H) 74 - 99 mg/dL   Magnesium   Result Value Ref Range    Magnesium 1.94 1.60 - 2.40 mg/dL   Renal Function Panel   Result Value Ref Range    Glucose 169 (H) 74 - 99 mg/dL    Sodium 139 136 - 145 mmol/L    Potassium 3.4 (L) 3.5 - 5.3 mmol/L    Chloride 104 98 - 107 mmol/L    Bicarbonate 25 21 - 32 mmol/L    Anion Gap 13 10 - 20 mmol/L    Urea Nitrogen 16 6 - 23 mg/dL    Creatinine 1.04 0.50 - 1.30 mg/dL    eGFR 81 >60 mL/min/1.73m*2    Calcium 8.8 8.6 - 10.6 mg/dL    Phosphorus 4.5 2.5 - 4.9 mg/dL    Albumin 3.0 (L) 3.4 - 5.0 g/dL   CBC and Auto Differential   Result Value Ref Range    WBC 5.4 4.4 - 11.3 x10*3/uL    nRBC 0.0 0.0 - 0.0 /100 WBCs    RBC 4.94 4.50 - 5.90 x10*6/uL    Hemoglobin 14.4 13.5 - 17.5 g/dL    Hematocrit 42.4 41.0 - 52.0 %    MCV 86 80 - 100 fL    MCH 29.1 26.0 - 34.0 pg    MCHC 34.0 32.0 - 36.0 g/dL    RDW 15.0 (H) 11.5 - 14.5 %    Platelets 185 150 - 450 x10*3/uL    Neutrophils % 66.1 40.0 - 80.0 %    Immature Granulocytes %, Automated 0.4 0.0 - 0.9 %    Lymphocytes % 24.1 13.0 - 44.0 %    Monocytes % 8.6 2.0 - 10.0 %    Eosinophils % 0.6 0.0 - 6.0 %    Basophils % 0.2 0.0 - 2.0 %    Neutrophils Absolute 3.55 1.20 - 7.70 x10*3/uL    Immature Granulocytes Absolute, Automated 0.02 0.00 - 0.70 x10*3/uL    Lymphocytes Absolute 1.29 1.20 - 4.80 x10*3/uL    Monocytes Absolute 0.46 0.10 - 1.00 x10*3/uL    Eosinophils Absolute 0.03 0.00 - 0.70 x10*3/uL    Basophils  "Absolute 0.01 0.00 - 0.10 x10*3/uL   Lactate   Result Value Ref Range    Lactate 0.9 0.4 - 2.0 mmol/L   POCT GLUCOSE   Result Value Ref Range    POCT Glucose 277 (H) 74 - 99 mg/dL         Assessment/Plan   Principal Problem:    Acute on chronic systolic congestive heart failure (CMS/HCC)    Tristan Ventura is a 64 yo M w/ PMHx of HFrEF (10-15% 04/2023), NICM s/p ICD (replaced 03/2023 for infected line), HTN, T2DM on insulin, CP w/ residual BLE weakness requiring motorized chair, Hep C who was admitted to the cardiology inpatient service on 11/7/23 for ADHF w/ symptoms of shortness of breath and BLE swelling making transfer difficult. CXR revealed interstitial edema and BNP was ~2k. No O2 requirement. EKG revealed no ischemic changes and hs troponin was ~40. The patient has symptomatically improved s/p 3x doses of IV Lasix w/ almost 9 L UO. Found to be hypokalemic s/o diuresis and actively being repleted PRN. On 11/9/23, is relatively hypotensive and \"dizzy,\" although worked with PT w/o problems.     Updated 11/9/23  - Cr 1.0  - Hypotensive and presyncopal, holding hydral and aldactone until SBP > 120.  - Continue to monitor electrolytes (K = 3.8 11/9/23, s/p K+ PO supplementation)    #CHF exacerbation  #HFrEF (EF 10-15% in April 2023)  #HTN   :: s/s ADHF in form of SOB w/ CXR interstitial edema, BLE swelling  - s/p ICD 03/2023, no discharges recorded since last interrogation  - s/p lasix 40mg, Lasix 60mg IV x2  - Home meds: hydralazine to 75 mg 3 TID  - isosorbide mononitrate to 120 mg once daily  - GDMT: carvedilol 50 mg twice daily, dapagliflozin 10 mg once daily, sacubitril/valsartan 97/103 mg twice daily, spironolactone 25 mg once daily  - BNP 1857,  Plan  - c/w home meds  - continuous tele   - BID RFP  - hold lasix for electrolyte derangement and symptomatic improvement, most likeley dispo on home torsemide w/ follow up with Dr. Hernandez scheduled.  - PT eval today     #DM Type 2  - last A1c 8.4 on 8/21/23  - home " regimen: glargine 40u QHS  Plan:  - glargine 30u QHS  - mild SSI  - Diabetes educator for DexCmG7 BS sensor pending dispo will set up no on phone.    #Hypokalemia  - 3.3 s/p diuresis, repleted PO   - BID RFP    F: IVF prn   E: Lytes prn  N: Adult Cardiac  A: PIV     DVT ppx: lovenox  GI ppx: PPI    Code Status: FULL CODE (confirmed at bedside)  Surrogate Medical Decision-maker: Pt states most of his family is  and he has no friends.  Patient informed in the case of emergency a hospital appointed POA would be assigned.  Patient was agreeable.             Salinas Lo MD  PGY1 Medicine

## 2023-11-09 NOTE — PROGRESS NOTES
Physical Therapy    Physical Therapy Evaluation & Treatment    Patient Name: Delvis Ventura  MRN: [id number]  Today's Date: 11/9/2023   Time Calculation  Start Time: 1028  Stop Time: 1114  Time Calculation (min): 46 min    Assessment/Plan   PT Assessment  PT Assessment Results: Decreased strength, Decreased range of motion, Decreased endurance, Impaired balance, Decreased mobility  Rehab Prognosis: Good  End of Session Communication: Bedside nurse  End of Session Patient Position: Bed, 3 rail up, Alarm off, not on at start of session  IP OR SWING BED PT PLAN  Inpatient or Swing Bed: Inpatient  PT Plan  Treatment/Interventions: Bed mobility, Transfer training, Balance training, Strengthening, Endurance training, Therapeutic exercise, Therapeutic activity, Home exercise program  PT Plan: Skilled PT  PT Frequency: 3 times per week  PT Discharge Recommendations: Low intensity level of continued care  PT Recommended Transfer Status: Assist x1  PT - OK to Discharge: Yes      Subjective     General Visit Information:  General  Reason for Referral: Admitted to the cardiology inpatient service on 11/7/23 for ADHF w/ symptoms of shortness of breath and BLE swelling  Past Medical History Relevant to Rehab: HFrEF (10-15% 04/2023), NICM s/p ICD (replaced 03/2023 for infected line), HTN, T2DM on insulin, CP w/ residual BLE weakness requiring motorized chair, Hep C  Family/Caregiver Present: No  Co-Treatment: OT  Co-Treatment Reason: Cotx with OT to maximize safety during mobility training  Prior to Session Communication: Bedside nurse  Patient Position Received: Bed, 3 rail up, Alarm off, not on at start of session  Preferred Learning Style: verbal  General Comment: Pt supine in bed upon arrival and agreeable to participate in PT session  Home Living:  Home Living  Type of Home: Apartment  Lives With: Alone  Home Adaptive Equipment: Wheelchair-power  Home Layout: One level  Home Access: Elevator (11 th floor)  Bathroom Shower/Tub:  Tub/shower unit  Prior Level of Function:  Prior Function Per Pt/Caregiver Report  Level of [place]: Independent with ADLs and functional transfers, Independent with homemaking with wheelchair  ADL Assistance: Independent  Homemaking Assistance: Independent  Ambulatory Assistance:  (Non ambulatory at baseline, Bill with mobility using power W/C)  Hand Dominance: Left  Prior Function Comments: Pt reports his bed is low and he rolls out of bed onto his knees and  crawls for transfers at baseline. Pt reports he orders groceries and uses public tranportation  Precautions:  Precautions  Medical Precautions: Fall precautions  Vital Signs:  Vital Signs  Heart Rate: 69  SpO2: 95 %    Objective   Pain:  Pain Assessment  Pain Assessment: 0-10  Pain Score: 6  Pain Location: Chest  Cognition:  Cognition  Overall Cognitive Status: Within Functional Limits    General Assessments:                                          Static Sitting Balance  Static Sitting-Level of Assistance: Close supervision  Dynamic Sitting Balance  Dynamic Sitting-Balance:  (SBA)       Functional Assessments:  Bed Mobility  Bed Mobility: Yes  Bed Mobility 1  Bed Mobility 1: Supine to sitting  Level of Assistance 1: Minimum assistance  Bed Mobility Comments 1: increased time to complete (cues for sequencing)  Bed Mobility 2  Bed Mobility  2: Sitting to supine  Level of Assistance 2: Minimum assistance    Transfers  Transfer: Yes  Transfer 1  Transfer From 1: Bed to  Transfer to 1: Wheelchair  Technique 1: Lateral  Transfer Device 1:  (none)  Transfer Level of Assistance 1: Minimum assistance  Trials/Comments 1: cues for  sequencing and safety, Pt educated on lateral scooting technique from bed >w/c, pt declined  Transfers 2  Transfer From 2: Wheelchair to  Transfer to 2: Bed  Technique 2: Lateral  Transfer Device 2:  (none)  Transfer Level of Assistance 2: Minimum assistance  Trials/Comments 2: cues for sequencing and safety    Wheelchair  Activities  Wheelchair Type:  (power w/c)  Propulsion: Yes (Bill w/c mobility ~150 ft)  Extremity/Trunk Assessments:  RLE   RLE :  (2+/5 to 3-/5)  LLE   LLE :  (2+/5 to 3-/5)  Treatments:     Transfers  Transfer: Yes  Transfer 1  Transfer From 1: Bed to  Transfer to 1: Wheelchair  Technique 1: Lateral  Transfer Device 1:  (none)  Transfer Level of Assistance 1: Minimum assistance  Trials/Comments 1: cues for  sequencing and safety, Pt educated on lateral scooting technque from bed >w/c, pt declined  Transfers 2  Transfer From 2: Wheelchair to  Transfer to 2: Bed  Technique 2: Lateral  Transfer Device 2:  (none)  Transfer Level of Assistance 2: Minimum assistance  Trials/Comments 2: cues for sequencing and safety  Outcome Measures:  [facility] Basic Mobility  Turning from your back to your side while in a flat bed without using bedrails: A little  Moving from lying on your back to sitting on the side of a flat bed without using bedrails: A little  Moving to and from bed to chair (including a wheelchair): A little  Standing up from a chair using your arms (e.g. wheelchair or bedside chair): Total  To walk in hospital room: Total  Climbing 3-5 steps with railing: Total  Basic Mobility - Total Score: 12    Encounter Problems       Encounter Problems (Active)       PT Problem       Patient will increase BLE strength to 3+/5 for improved independence with mobility (Progressing)       Start:  11/09/23    Expected End:  11/23/23               Transfers       Patient will be Bill with transfers with LRAD (Progressing)       Start:  11/09/23    Expected End:  11/23/23            Patient will be Bill with bed mobility (Progressing)       Start:  11/09/23    Expected End:  11/23/23                   Education Documentation  Precautions, taught by Ariadna Linda PT at 11/9/2023  1:29 PM.  Learner: Patient  Readiness: Acceptance  Method: Explanation  Response: Verbalizes Understanding    Mobility Training, taught by Ariadna Linda PT at  11/9/2023  1:29 PM.  Learner: Patient  Readiness: Acceptance  Method: Explanation  Response: Verbalizes Understanding    Education Comments  No comments found.

## 2023-11-10 LAB
ALBUMIN SERPL BCP-MCNC: 2.9 G/DL (ref 3.4–5)
ALBUMIN SERPL BCP-MCNC: 3 G/DL (ref 3.4–5)
ANION GAP SERPL CALC-SCNC: 10 MMOL/L (ref 10–20)
ANION GAP SERPL CALC-SCNC: 13 MMOL/L (ref 10–20)
BASOPHILS # BLD AUTO: 0.03 X10*3/UL (ref 0–0.1)
BASOPHILS NFR BLD AUTO: 0.6 %
BUN SERPL-MCNC: 18 MG/DL (ref 6–23)
BUN SERPL-MCNC: 21 MG/DL (ref 6–23)
CALCIUM SERPL-MCNC: 8.4 MG/DL (ref 8.6–10.6)
CALCIUM SERPL-MCNC: 8.9 MG/DL (ref 8.6–10.6)
CHLORIDE SERPL-SCNC: 103 MMOL/L (ref 98–107)
CHLORIDE SERPL-SCNC: 104 MMOL/L (ref 98–107)
CO2 SERPL-SCNC: 26 MMOL/L (ref 21–32)
CO2 SERPL-SCNC: 29 MMOL/L (ref 21–32)
CREAT SERPL-MCNC: 1.06 MG/DL (ref 0.5–1.3)
CREAT SERPL-MCNC: 1.07 MG/DL (ref 0.5–1.3)
EOSINOPHIL # BLD AUTO: 0.1 X10*3/UL (ref 0–0.7)
EOSINOPHIL NFR BLD AUTO: 1.9 %
ERYTHROCYTE [DISTWIDTH] IN BLOOD BY AUTOMATED COUNT: 15.5 % (ref 11.5–14.5)
GFR SERPL CREATININE-BSD FRML MDRD: 78 ML/MIN/1.73M*2
GFR SERPL CREATININE-BSD FRML MDRD: 79 ML/MIN/1.73M*2
GLUCOSE BLD MANUAL STRIP-MCNC: 131 MG/DL (ref 74–99)
GLUCOSE BLD MANUAL STRIP-MCNC: 188 MG/DL (ref 74–99)
GLUCOSE BLD MANUAL STRIP-MCNC: 210 MG/DL (ref 74–99)
GLUCOSE BLD MANUAL STRIP-MCNC: 252 MG/DL (ref 74–99)
GLUCOSE SERPL-MCNC: 121 MG/DL (ref 74–99)
GLUCOSE SERPL-MCNC: 233 MG/DL (ref 74–99)
HCT VFR BLD AUTO: 43.8 % (ref 41–52)
HGB BLD-MCNC: 14.3 G/DL (ref 13.5–17.5)
IMM GRANULOCYTES # BLD AUTO: 0.04 X10*3/UL (ref 0–0.7)
IMM GRANULOCYTES NFR BLD AUTO: 0.8 % (ref 0–0.9)
LYMPHOCYTES # BLD AUTO: 1.92 X10*3/UL (ref 1.2–4.8)
LYMPHOCYTES NFR BLD AUTO: 37.4 %
MAGNESIUM SERPL-MCNC: 1.89 MG/DL (ref 1.6–2.4)
MCH RBC QN AUTO: 29.5 PG (ref 26–34)
MCHC RBC AUTO-ENTMCNC: 32.6 G/DL (ref 32–36)
MCV RBC AUTO: 90 FL (ref 80–100)
MONOCYTES # BLD AUTO: 0.49 X10*3/UL (ref 0.1–1)
MONOCYTES NFR BLD AUTO: 9.5 %
NEUTROPHILS # BLD AUTO: 2.56 X10*3/UL (ref 1.2–7.7)
NEUTROPHILS NFR BLD AUTO: 49.8 %
NRBC BLD-RTO: 0 /100 WBCS (ref 0–0)
PHOSPHATE SERPL-MCNC: 4.2 MG/DL (ref 2.5–4.9)
PHOSPHATE SERPL-MCNC: 4.2 MG/DL (ref 2.5–4.9)
PLATELET # BLD AUTO: 184 X10*3/UL (ref 150–450)
POTASSIUM SERPL-SCNC: 3.8 MMOL/L (ref 3.5–5.3)
POTASSIUM SERPL-SCNC: 4.6 MMOL/L (ref 3.5–5.3)
RBC # BLD AUTO: 4.85 X10*6/UL (ref 4.5–5.9)
SODIUM SERPL-SCNC: 137 MMOL/L (ref 136–145)
SODIUM SERPL-SCNC: 139 MMOL/L (ref 136–145)
WBC # BLD AUTO: 5.1 X10*3/UL (ref 4.4–11.3)

## 2023-11-10 PROCEDURE — 80069 RENAL FUNCTION PANEL: CPT

## 2023-11-10 PROCEDURE — 2500000001 HC RX 250 WO HCPCS SELF ADMINISTERED DRUGS (ALT 637 FOR MEDICARE OP): Performed by: STUDENT IN AN ORGANIZED HEALTH CARE EDUCATION/TRAINING PROGRAM

## 2023-11-10 PROCEDURE — 85025 COMPLETE CBC W/AUTO DIFF WBC: CPT

## 2023-11-10 PROCEDURE — 82947 ASSAY GLUCOSE BLOOD QUANT: CPT

## 2023-11-10 PROCEDURE — 36415 COLL VENOUS BLD VENIPUNCTURE: CPT

## 2023-11-10 PROCEDURE — 2500000001 HC RX 250 WO HCPCS SELF ADMINISTERED DRUGS (ALT 637 FOR MEDICARE OP)

## 2023-11-10 PROCEDURE — 1200000002 HC GENERAL ROOM WITH TELEMETRY DAILY

## 2023-11-10 PROCEDURE — 83735 ASSAY OF MAGNESIUM: CPT

## 2023-11-10 PROCEDURE — 99232 SBSQ HOSP IP/OBS MODERATE 35: CPT | Performed by: INTERNAL MEDICINE

## 2023-11-10 PROCEDURE — 2500000002 HC RX 250 W HCPCS SELF ADMINISTERED DRUGS (ALT 637 FOR MEDICARE OP, ALT 636 FOR OP/ED): Performed by: STUDENT IN AN ORGANIZED HEALTH CARE EDUCATION/TRAINING PROGRAM

## 2023-11-10 PROCEDURE — 2500000004 HC RX 250 GENERAL PHARMACY W/ HCPCS (ALT 636 FOR OP/ED): Performed by: STUDENT IN AN ORGANIZED HEALTH CARE EDUCATION/TRAINING PROGRAM

## 2023-11-10 PROCEDURE — 96372 THER/PROPH/DIAG INJ SC/IM: CPT | Performed by: STUDENT IN AN ORGANIZED HEALTH CARE EDUCATION/TRAINING PROGRAM

## 2023-11-10 RX ORDER — CARVEDILOL 25 MG/1
25 TABLET ORAL 2 TIMES DAILY
Status: DISCONTINUED | OUTPATIENT
Start: 2023-11-10 | End: 2023-11-13 | Stop reason: HOSPADM

## 2023-11-10 RX ADMIN — LOPERAMIDE HYDROCHLORIDE 4 MG: 2 CAPSULE ORAL at 21:03

## 2023-11-10 RX ADMIN — SACUBITRIL AND VALSARTAN 1 TABLET: 97; 103 TABLET, FILM COATED ORAL at 08:21

## 2023-11-10 RX ADMIN — SACUBITRIL AND VALSARTAN 1 TABLET: 97; 103 TABLET, FILM COATED ORAL at 21:02

## 2023-11-10 RX ADMIN — INSULIN LISPRO 2 UNITS: 100 INJECTION, SOLUTION INTRAVENOUS; SUBCUTANEOUS at 12:28

## 2023-11-10 RX ADMIN — FERROUS SULFATE TAB 325 MG (65 MG ELEMENTAL FE) 325 MG: 325 (65 FE) TAB at 08:18

## 2023-11-10 RX ADMIN — ISOSORBIDE MONONITRATE 120 MG: 30 TABLET, EXTENDED RELEASE ORAL at 08:18

## 2023-11-10 RX ADMIN — ASPIRIN 81 MG CHEWABLE TABLET 81 MG: 81 TABLET CHEWABLE at 08:18

## 2023-11-10 RX ADMIN — PANTOPRAZOLE SODIUM 40 MG: 40 TABLET, DELAYED RELEASE ORAL at 06:13

## 2023-11-10 RX ADMIN — PRAZOSIN HYDROCHLORIDE 2 MG: 2 CAPSULE ORAL at 21:02

## 2023-11-10 RX ADMIN — CARVEDILOL 25 MG: 25 TABLET, FILM COATED ORAL at 21:01

## 2023-11-10 RX ADMIN — ENOXAPARIN SODIUM 40 MG: 100 INJECTION SUBCUTANEOUS at 21:10

## 2023-11-10 RX ADMIN — LOPERAMIDE HYDROCHLORIDE 4 MG: 2 CAPSULE ORAL at 08:18

## 2023-11-10 RX ADMIN — MULTIVITAMIN TABLET 1 TABLET: TABLET at 09:00

## 2023-11-10 RX ADMIN — INSULIN GLARGINE 30 UNITS: 100 INJECTION, SOLUTION SUBCUTANEOUS at 21:06

## 2023-11-10 RX ADMIN — FERROUS SULFATE TAB 325 MG (65 MG ELEMENTAL FE) 325 MG: 325 (65 FE) TAB at 21:01

## 2023-11-10 RX ADMIN — AMIODARONE HYDROCHLORIDE 200 MG: 200 TABLET ORAL at 08:18

## 2023-11-10 RX ADMIN — ATORVASTATIN CALCIUM 40 MG: 40 TABLET, FILM COATED ORAL at 06:13

## 2023-11-10 RX ADMIN — HYDRALAZINE HYDROCHLORIDE 75 MG: 50 TABLET, FILM COATED ORAL at 12:37

## 2023-11-10 RX ADMIN — DAPAGLIFLOZIN 10 MG: 10 TABLET, FILM COATED ORAL at 08:19

## 2023-11-10 ASSESSMENT — COGNITIVE AND FUNCTIONAL STATUS - GENERAL
TURNING FROM BACK TO SIDE WHILE IN FLAT BAD: A LITTLE
MOVING TO AND FROM BED TO CHAIR: A LITTLE
WALKING IN HOSPITAL ROOM: TOTAL
HELP NEEDED FOR BATHING: A LITTLE
MOVING FROM LYING ON BACK TO SITTING ON SIDE OF FLAT BED WITH BEDRAILS: A LITTLE
STANDING UP FROM CHAIR USING ARMS: TOTAL
MOVING FROM LYING ON BACK TO SITTING ON SIDE OF FLAT BED WITH BEDRAILS: A LITTLE
CLIMB 3 TO 5 STEPS WITH RAILING: TOTAL
CLIMB 3 TO 5 STEPS WITH RAILING: TOTAL
TOILETING: A LITTLE
DAILY ACTIVITIY SCORE: 21
WALKING IN HOSPITAL ROOM: TOTAL
MOVING TO AND FROM BED TO CHAIR: A LITTLE
DAILY ACTIVITIY SCORE: 22
STANDING UP FROM CHAIR USING ARMS: TOTAL
DAILY ACTIVITIY SCORE: 21
CLIMB 3 TO 5 STEPS WITH RAILING: TOTAL
MOVING FROM LYING ON BACK TO SITTING ON SIDE OF FLAT BED WITH BEDRAILS: A LITTLE
TURNING FROM BACK TO SIDE WHILE IN FLAT BAD: A LITTLE
TOILETING: A LITTLE
DRESSING REGULAR LOWER BODY CLOTHING: A LITTLE
HELP NEEDED FOR BATHING: A LITTLE
DRESSING REGULAR LOWER BODY CLOTHING: A LITTLE
MOVING TO AND FROM BED TO CHAIR: A LITTLE
TURNING FROM BACK TO SIDE WHILE IN FLAT BAD: A LITTLE
DRESSING REGULAR LOWER BODY CLOTHING: A LITTLE
MOBILITY SCORE: 12
MOBILITY SCORE: 12
STANDING UP FROM CHAIR USING ARMS: TOTAL
MOBILITY SCORE: 12
WALKING IN HOSPITAL ROOM: TOTAL
HELP NEEDED FOR BATHING: A LITTLE

## 2023-11-10 ASSESSMENT — PAIN SCALES - GENERAL
PAINLEVEL_OUTOF10: 0 - NO PAIN
PAINLEVEL_OUTOF10: 0 - NO PAIN
PAINLEVEL_OUTOF10: 8

## 2023-11-10 ASSESSMENT — PAIN SCALES - PAIN ASSESSMENT IN ADVANCED DEMENTIA (PAINAD)
BREATHING: NORMAL
FACIALEXPRESSION: SMILING OR INEXPRESSIVE

## 2023-11-10 ASSESSMENT — PAIN SCALES - WONG BAKER: WONGBAKER_NUMERICALRESPONSE: HURTS WHOLE LOT

## 2023-11-10 NOTE — CARE PLAN
The patient's goals for the shift include rest    The clinical goals for the shift include Pt will remain HDS throughout shift      Problem: Fall/Injury  Goal: Not fall by end of shift  Outcome: Progressing  Goal: Be free from injury by end of the shift  Outcome: Progressing  Goal: Verbalize understanding of personal risk factors for fall in the hospital  Outcome: Progressing  Goal: Verbalize understanding of risk factor reduction measures to prevent injury from fall in the home  Outcome: Progressing  Goal: Use assistive devices by end of the shift  Outcome: Progressing  Goal: Pace activities to prevent fatigue by end of the shift  Outcome: Progressing     Problem: Respiratory  Goal: Minimize anxiety/maximize coping throughout shift  Outcome: Progressing  Goal: Minimal/no exertional discomfort or dyspnea this shift  Outcome: Progressing  Goal: No signs of respiratory distress (eg. Use of accessory muscles. Peds grunting)  Outcome: Progressing  Goal: Verbalize decreased shortness of breath this shift  Outcome: Progressing     Problem: Pain  Goal: My pain/discomfort is manageable  Outcome: Progressing     Problem: Safety  Goal: Patient will be injury free during hospitalization  Outcome: Progressing  Goal: I will remain free of falls  Outcome: Progressing     Problem: Daily Care  Goal: Daily care needs are met  Outcome: Progressing     Problem: Psychosocial Needs  Goal: Demonstrates ability to cope with hospitalization/illness  Outcome: Progressing  Goal: Collaborate with me, my family, and caregiver to identify my specific goals  Outcome: Progressing     Problem: Discharge Barriers  Goal: My discharge needs are met  Outcome: Progressing     Problem: Skin  Goal: Participates in plan/prevention/treatment measures  Outcome: Progressing  Goal: Prevent/manage excess moisture  Outcome: Progressing  Goal: Prevent/minimize sheer/friction injuries  Outcome: Progressing  Goal: Promote/optimize nutrition  Outcome:  Progressing  Goal: Promote skin healing  Outcome: Progressing

## 2023-11-10 NOTE — CARE PLAN
The patient's goals for the shift include rest    The clinical goals for the shift include Pt will remain free from injury throughout shift    Over the shift, the patient did make progress toward the following goals. Recommendations to address these barriers include pt will use call bell for any assistance.

## 2023-11-10 NOTE — DISCHARGE INSTRUCTIONS
Dear Mr. Ventura,    Thank you for choosing care at [facility]. You were admitted for acute decompensated heart failure with symptoms of swelling and difficulty breathing. After admission, we diuresed you to remove excess fluid and altered medications to prevent low blood pressure. Please continue to take your medications as prescribed, and ensure you follow up w/ cardiology as an outpatient and PCP (both numbers are listed to schedule appointments).    Here are your medications: I bolded the new/changed medications    Aspirin 81, amiodarone 200 daily, atorvastatin 40, carvedilol 25 mg twice daily, dapagliflozin 10 mg once daily, sacubitril/valsartan 97/103 mg twice daily, spironolactone 25 mg once daily, hydralazine to 50 mg TID, isosorbide mononitrate to 120 mg once daily, lasix mg 20 daily      Outpatient Follow-Up         Future Appointments   Date Time Provider Department Center   11/27/2023  9:20 AM Fernando Seymour MD [id number] Academic   12/8/2023  9:00 AM Su Hernandez MD PhD [id number] Academic   12/8/2023  1:00 PM Aniya Zeng MD [id number] Academic        Sincerely,     [facility]

## 2023-11-10 NOTE — PROGRESS NOTES
11/10/2023 Care coordination  63 y.o. pmh DM, cerebral palsy, nonischemic cardiomyopathy (on [facility] 8/2018 he had trivial CAD), HFrEF (EF 15-20%) s/p S-ICD 4/28/23. Presenting with 3 days of increasing lower leg swelling and shortness of breath.  Pt states he lives alone, uses his wheelchair for mobility, is not averse to C.

## 2023-11-10 NOTE — NURSING NOTE
"Mr. Ventura is resting in bed and states he is not feeling well today: \" I have cramping and diarrhea... moving my bowels all night\"  States his discharge will not be today ( he had spoken with team this am).  He prefers to wait until discharge to have DexCom 7 CGM placed by his pharmacist.  He has video resource to review the G7 and placement, etc.  Reviewed the device with him today.  We reviewed his current BG values and insulin regimen.  He is aware that the BG is risen and he would like to resume his home dose of glargine (40 units).  He will speak with team when they return to see him today.  Will follow  up on 11/13 if he is still inpatient.  Time spent:  15 mins.  "

## 2023-11-10 NOTE — PROGRESS NOTES
"Delvis Ventura is a 63 y.o. male on day 3 of admission presenting with Acute on chronic systolic congestive heart failure (CMS/HCC).    Subjective   Tristan Ventura had no acute event overnight. He is endorsing diarrhea and gas, consistent b/c he was not on imodium.     Denies worsening CP or SOB.     Objective     Physical Exam  Constitutional:       Appearance: Normal appearance.   HENT:      Head: Normocephalic and atraumatic.      Mouth/Throat:      Mouth: Mucous membranes are dry.   Eyes:      Conjunctiva/sclera: Conjunctivae normal.      Pupils: Pupils are equal, round, and reactive to light.   Cardiovascular:      Rate and Rhythm: Normal rate and regular rhythm.      Pulses: Normal pulses.      Heart sounds: Murmur (systolic murmur heard over l sternal border) heard.   Pulmonary:      Effort: Pulmonary effort is normal.      Breath sounds: Normal breath sounds. No rhonchi or rales.   Abdominal:      General: Abdomen is flat.      Palpations: Abdomen is soft.   Musculoskeletal:      Right lower leg: Edema present.      Left lower leg: Edema present.   Skin:     General: Skin is warm and dry.      Capillary Refill: Capillary refill takes less than 2 seconds.   Neurological:      Mental Status: He is alert and oriented to person, place, and time. Mental status is at baseline.   Psychiatric:         Mood and Affect: Mood normal.         Behavior: Behavior normal.     Last Recorded Vitals  Blood pressure 131/74, pulse 69, temperature 36.9 °C (98.4 °F), resp. rate 20, height 1.778 m (5' 10\"), weight 91.6 kg (202 lb), SpO2 96 %.  Intake/Output last 3 Shifts:  I/O last 3 completed shifts:  In: - (0 mL/kg)   Out: 1700 (18.6 mL/kg) [Urine:1700 (0.5 mL/kg/hr)]  Weight: 91.6 kg     Relevant Results  Results for orders placed or performed during the hospital encounter of 11/07/23 (from the past 24 hour(s))   POCT GLUCOSE   Result Value Ref Range    POCT Glucose 183 (H) 74 - 99 mg/dL   Troponin I, High Sensitivity   Result " Value Ref Range    Troponin I, High Sensitivity 29 0 - 53 ng/L   Renal Function Panel   Result Value Ref Range    Glucose 238 (H) 74 - 99 mg/dL    Sodium 137 136 - 145 mmol/L    Potassium 4.4 3.5 - 5.3 mmol/L    Chloride 101 98 - 107 mmol/L    Bicarbonate 26 21 - 32 mmol/L    Anion Gap 14 10 - 20 mmol/L    Urea Nitrogen 20 6 - 23 mg/dL    Creatinine 1.21 0.50 - 1.30 mg/dL    eGFR 67 >60 mL/min/1.73m*2    Calcium 9.0 8.6 - 10.6 mg/dL    Phosphorus 5.3 (H) 2.5 - 4.9 mg/dL    Albumin 3.1 (L) 3.4 - 5.0 g/dL   POCT GLUCOSE   Result Value Ref Range    POCT Glucose 245 (H) 74 - 99 mg/dL   Magnesium   Result Value Ref Range    Magnesium 1.89 1.60 - 2.40 mg/dL   Renal Function Panel   Result Value Ref Range    Glucose 121 (H) 74 - 99 mg/dL    Sodium 139 136 - 145 mmol/L    Potassium 3.8 3.5 - 5.3 mmol/L    Chloride 104 98 - 107 mmol/L    Bicarbonate 26 21 - 32 mmol/L    Anion Gap 13 10 - 20 mmol/L    Urea Nitrogen 18 6 - 23 mg/dL    Creatinine 1.06 0.50 - 1.30 mg/dL    eGFR 79 >60 mL/min/1.73m*2    Calcium 8.9 8.6 - 10.6 mg/dL    Phosphorus 4.2 2.5 - 4.9 mg/dL    Albumin 2.9 (L) 3.4 - 5.0 g/dL   CBC and Auto Differential   Result Value Ref Range    WBC 5.1 4.4 - 11.3 x10*3/uL    nRBC 0.0 0.0 - 0.0 /100 WBCs    RBC 4.85 4.50 - 5.90 x10*6/uL    Hemoglobin 14.3 13.5 - 17.5 g/dL    Hematocrit 43.8 41.0 - 52.0 %    MCV 90 80 - 100 fL    MCH 29.5 26.0 - 34.0 pg    MCHC 32.6 32.0 - 36.0 g/dL    RDW 15.5 (H) 11.5 - 14.5 %    Platelets 184 150 - 450 x10*3/uL    Neutrophils % 49.8 40.0 - 80.0 %    Immature Granulocytes %, Automated 0.8 0.0 - 0.9 %    Lymphocytes % 37.4 13.0 - 44.0 %    Monocytes % 9.5 2.0 - 10.0 %    Eosinophils % 1.9 0.0 - 6.0 %    Basophils % 0.6 0.0 - 2.0 %    Neutrophils Absolute 2.56 1.20 - 7.70 x10*3/uL    Immature Granulocytes Absolute, Automated 0.04 0.00 - 0.70 x10*3/uL    Lymphocytes Absolute 1.92 1.20 - 4.80 x10*3/uL    Monocytes Absolute 0.49 0.10 - 1.00 x10*3/uL    Eosinophils Absolute 0.10 0.00 - 0.70  "x10*3/uL    Basophils Absolute 0.03 0.00 - 0.10 x10*3/uL   POCT GLUCOSE   Result Value Ref Range    POCT Glucose 131 (H) 74 - 99 mg/dL   POCT GLUCOSE   Result Value Ref Range    POCT Glucose 210 (H) 74 - 99 mg/dL         Assessment/Plan   Principal Problem:    Acute on chronic systolic congestive heart failure (CMS/HCC)    Tristan Ventura is a 62 yo M w/ PMHx of HFrEF (10-15% 04/2023), NICM s/p ICD (replaced 03/2023 for infected line), HTN, T2DM on insulin, CP w/ residual BLE weakness requiring motorized chair, Hep C who was admitted to the cardiology inpatient service on 11/7/23 for ADHF w/ symptoms of shortness of breath and BLE swelling making transfer difficult. CXR revealed interstitial edema and BNP was ~2k. No O2 requirement. EKG revealed no ischemic changes and hs troponin was ~40. The patient has symptomatically improved s/p 3x doses of IV Lasix w/ almost 9 L UO. Found to be hypokalemic s/p diuresis and actively being repleted PRN. On 11/9/23, is relatively hypotensive and \"dizzy,\" although worked with PT w/o problems. Has been hypotensive since diuresis and hydral and coreg has been held PRN. Otherwise improving and ok for dispo.    Updated 11/10/23  - Cr 0.77 (11/8/23) -> 1.07 -> 1.2 (11/10/23). UO is good.  - Hypotensive and presyncopal, holding hydral and aldactone until SBP > 120.  - Reducing Carvedilol to 25 mg BID  - Continue to monitor electrolytes (K = 4.4 11/10/23)  - Schedule transport for dispo home w/ [facility] PT    #CHF exacerbation  #HFrEF (EF 10-15% in April 2023)  #HTN   :: s/s ADHF in form of SOB w/ CXR interstitial edema, BLE swelling  - s/p ICD 03/2023, no discharges recorded since last interrogation  - s/p lasix 40mg, Lasix 60mg IV x2  - Home meds: hydralazine to 75 mg 3 TID  - isosorbide mononitrate to 120 mg once daily  - GDMT: carvedilol 50 mg twice daily, dapagliflozin 10 mg once daily, sacubitril/valsartan 97/103 mg twice daily, spironolactone 25 mg once daily  - BNP 1857,  Plan  - c/w home " meds (decreased coreg to 25 mg BID)  - continuous tele   - BID RFP  - hold lasix for electrolyte derangement and symptomatic improvement, most likeley dispo on home torsemide w/ follow up with Dr. Hernandez scheduled.  - PT eval today     #DM Type 2  - last A1c 8.4 on 23  - home regimen: glargine 40u QHS  Plan:  - glargine 30u QHS  - mild SSI  - Diabetes educator for DexCmG7 BS sensor pending dispo will set up no on phone.    #Hypokalemia  - 3.3 s/p diuresis, repleted PO   - BID RFP    F: IVF prn   E: Lytes prn  N: Adult Cardiac  A: PIV     DVT ppx: lovenox  GI ppx: PPI    Code Status: FULL CODE (confirmed at bedside)  Surrogate Medical Decision-maker: Pt states most of his family is  and he has no friends.  Patient informed in the case of emergency a hospital appointed POA would be assigned.  Patient was agreeable.             Salinas Lo MD  PGY1 Medicine

## 2023-11-10 NOTE — CARE PLAN
The clinical goals for the shift include Pt will remain free from injury throughout shift    Over the shift, the patient did make progress toward the following goals. Recommendations to address these barriers include purposeful rounding and pt will use call bell for any assistance.

## 2023-11-11 LAB
ALBUMIN SERPL BCP-MCNC: 3 G/DL (ref 3.4–5)
ANION GAP SERPL CALC-SCNC: 13 MMOL/L (ref 10–20)
BASOPHILS # BLD AUTO: 0.02 X10*3/UL (ref 0–0.1)
BASOPHILS NFR BLD AUTO: 0.4 %
BUN SERPL-MCNC: 17 MG/DL (ref 6–23)
CALCIUM SERPL-MCNC: 8.3 MG/DL (ref 8.6–10.6)
CHLORIDE SERPL-SCNC: 105 MMOL/L (ref 98–107)
CO2 SERPL-SCNC: 24 MMOL/L (ref 21–32)
CREAT SERPL-MCNC: 0.93 MG/DL (ref 0.5–1.3)
EOSINOPHIL # BLD AUTO: 0.09 X10*3/UL (ref 0–0.7)
EOSINOPHIL NFR BLD AUTO: 2 %
ERYTHROCYTE [DISTWIDTH] IN BLOOD BY AUTOMATED COUNT: 15.3 % (ref 11.5–14.5)
GFR SERPL CREATININE-BSD FRML MDRD: >90 ML/MIN/1.73M*2
GLUCOSE BLD MANUAL STRIP-MCNC: 113 MG/DL (ref 74–99)
GLUCOSE BLD MANUAL STRIP-MCNC: 200 MG/DL (ref 74–99)
GLUCOSE BLD MANUAL STRIP-MCNC: 242 MG/DL (ref 74–99)
GLUCOSE BLD MANUAL STRIP-MCNC: 246 MG/DL (ref 74–99)
GLUCOSE SERPL-MCNC: 95 MG/DL (ref 74–99)
HCT VFR BLD AUTO: 45.2 % (ref 41–52)
HGB BLD-MCNC: 14.2 G/DL (ref 13.5–17.5)
IMM GRANULOCYTES # BLD AUTO: 0.01 X10*3/UL (ref 0–0.7)
IMM GRANULOCYTES NFR BLD AUTO: 0.2 % (ref 0–0.9)
LYMPHOCYTES # BLD AUTO: 2.01 X10*3/UL (ref 1.2–4.8)
LYMPHOCYTES NFR BLD AUTO: 43.6 %
MAGNESIUM SERPL-MCNC: 2.11 MG/DL (ref 1.6–2.4)
MCH RBC QN AUTO: 29.2 PG (ref 26–34)
MCHC RBC AUTO-ENTMCNC: 31.4 G/DL (ref 32–36)
MCV RBC AUTO: 93 FL (ref 80–100)
MONOCYTES # BLD AUTO: 0.4 X10*3/UL (ref 0.1–1)
MONOCYTES NFR BLD AUTO: 8.7 %
NEUTROPHILS # BLD AUTO: 2.08 X10*3/UL (ref 1.2–7.7)
NEUTROPHILS NFR BLD AUTO: 45.1 %
NRBC BLD-RTO: 0 /100 WBCS (ref 0–0)
PHOSPHATE SERPL-MCNC: 3.5 MG/DL (ref 2.5–4.9)
PLATELET # BLD AUTO: 187 X10*3/UL (ref 150–450)
POTASSIUM SERPL-SCNC: 4 MMOL/L (ref 3.5–5.3)
RBC # BLD AUTO: 4.86 X10*6/UL (ref 4.5–5.9)
SODIUM SERPL-SCNC: 138 MMOL/L (ref 136–145)
WBC # BLD AUTO: 4.6 X10*3/UL (ref 4.4–11.3)

## 2023-11-11 PROCEDURE — 83735 ASSAY OF MAGNESIUM: CPT

## 2023-11-11 PROCEDURE — 2500000004 HC RX 250 GENERAL PHARMACY W/ HCPCS (ALT 636 FOR OP/ED): Performed by: STUDENT IN AN ORGANIZED HEALTH CARE EDUCATION/TRAINING PROGRAM

## 2023-11-11 PROCEDURE — 2500000001 HC RX 250 WO HCPCS SELF ADMINISTERED DRUGS (ALT 637 FOR MEDICARE OP)

## 2023-11-11 PROCEDURE — 99232 SBSQ HOSP IP/OBS MODERATE 35: CPT | Performed by: INTERNAL MEDICINE

## 2023-11-11 PROCEDURE — 2500000001 HC RX 250 WO HCPCS SELF ADMINISTERED DRUGS (ALT 637 FOR MEDICARE OP): Performed by: STUDENT IN AN ORGANIZED HEALTH CARE EDUCATION/TRAINING PROGRAM

## 2023-11-11 PROCEDURE — 82947 ASSAY GLUCOSE BLOOD QUANT: CPT

## 2023-11-11 PROCEDURE — 2500000002 HC RX 250 W HCPCS SELF ADMINISTERED DRUGS (ALT 637 FOR MEDICARE OP, ALT 636 FOR OP/ED): Performed by: STUDENT IN AN ORGANIZED HEALTH CARE EDUCATION/TRAINING PROGRAM

## 2023-11-11 PROCEDURE — 96372 THER/PROPH/DIAG INJ SC/IM: CPT | Performed by: STUDENT IN AN ORGANIZED HEALTH CARE EDUCATION/TRAINING PROGRAM

## 2023-11-11 PROCEDURE — 1200000002 HC GENERAL ROOM WITH TELEMETRY DAILY

## 2023-11-11 PROCEDURE — 85025 COMPLETE CBC W/AUTO DIFF WBC: CPT

## 2023-11-11 PROCEDURE — 93010 ELECTROCARDIOGRAM REPORT: CPT | Performed by: INTERNAL MEDICINE

## 2023-11-11 PROCEDURE — 2500000004 HC RX 250 GENERAL PHARMACY W/ HCPCS (ALT 636 FOR OP/ED)

## 2023-11-11 PROCEDURE — 80069 RENAL FUNCTION PANEL: CPT

## 2023-11-11 PROCEDURE — 36415 COLL VENOUS BLD VENIPUNCTURE: CPT

## 2023-11-11 RX ORDER — HYDRALAZINE HYDROCHLORIDE 50 MG/1
75 TABLET, FILM COATED ORAL EVERY 8 HOURS
Qty: 135 TABLET | Refills: 1 | Status: CANCELLED | OUTPATIENT
Start: 2023-11-11 | End: 2024-11-10

## 2023-11-11 RX ORDER — HYDRALAZINE HYDROCHLORIDE 50 MG/1
50 TABLET, FILM COATED ORAL EVERY 8 HOURS
Status: DISCONTINUED | OUTPATIENT
Start: 2023-11-11 | End: 2023-11-13 | Stop reason: HOSPADM

## 2023-11-11 RX ORDER — MECLIZINE HYDROCHLORIDE 25 MG/1
25 TABLET ORAL 3 TIMES DAILY PRN
Status: DISCONTINUED | OUTPATIENT
Start: 2023-11-11 | End: 2023-11-13 | Stop reason: HOSPADM

## 2023-11-11 RX ORDER — MAGNESIUM SULFATE 1 G/100ML
1 INJECTION INTRAVENOUS ONCE
Status: COMPLETED | OUTPATIENT
Start: 2023-11-11 | End: 2023-11-11

## 2023-11-11 RX ADMIN — SPIRONOLACTONE 25 MG: 25 TABLET ORAL at 08:56

## 2023-11-11 RX ADMIN — MULTIVITAMIN TABLET 1 TABLET: TABLET at 08:58

## 2023-11-11 RX ADMIN — SACUBITRIL AND VALSARTAN 1 TABLET: 97; 103 TABLET, FILM COATED ORAL at 21:01

## 2023-11-11 RX ADMIN — MAGNESIUM SULFATE HEPTAHYDRATE 1 G: 1 INJECTION, SOLUTION INTRAVENOUS at 02:45

## 2023-11-11 RX ADMIN — PRAZOSIN HYDROCHLORIDE 2 MG: 2 CAPSULE ORAL at 21:00

## 2023-11-11 RX ADMIN — AMIODARONE HYDROCHLORIDE 200 MG: 200 TABLET ORAL at 08:57

## 2023-11-11 RX ADMIN — HYDRALAZINE HYDROCHLORIDE 50 MG: 50 TABLET, FILM COATED ORAL at 21:01

## 2023-11-11 RX ADMIN — FERROUS SULFATE TAB 325 MG (65 MG ELEMENTAL FE) 325 MG: 325 (65 FE) TAB at 08:56

## 2023-11-11 RX ADMIN — MECLIZINE HYDROCHLORIDE 25 MG: 25 TABLET ORAL at 14:12

## 2023-11-11 RX ADMIN — ISOSORBIDE MONONITRATE 120 MG: 30 TABLET, EXTENDED RELEASE ORAL at 08:56

## 2023-11-11 RX ADMIN — SACUBITRIL AND VALSARTAN 1 TABLET: 97; 103 TABLET, FILM COATED ORAL at 08:57

## 2023-11-11 RX ADMIN — FERROUS SULFATE TAB 325 MG (65 MG ELEMENTAL FE) 325 MG: 325 (65 FE) TAB at 21:00

## 2023-11-11 RX ADMIN — CARVEDILOL 25 MG: 25 TABLET, FILM COATED ORAL at 08:56

## 2023-11-11 RX ADMIN — ENOXAPARIN SODIUM 40 MG: 100 INJECTION SUBCUTANEOUS at 20:58

## 2023-11-11 RX ADMIN — PANTOPRAZOLE SODIUM 40 MG: 40 TABLET, DELAYED RELEASE ORAL at 06:26

## 2023-11-11 RX ADMIN — ASPIRIN 81 MG CHEWABLE TABLET 81 MG: 81 TABLET CHEWABLE at 08:56

## 2023-11-11 RX ADMIN — HYDRALAZINE HYDROCHLORIDE 50 MG: 50 TABLET, FILM COATED ORAL at 12:07

## 2023-11-11 RX ADMIN — LOPERAMIDE HYDROCHLORIDE 4 MG: 2 CAPSULE ORAL at 08:57

## 2023-11-11 RX ADMIN — LOPERAMIDE HYDROCHLORIDE 4 MG: 2 CAPSULE ORAL at 21:01

## 2023-11-11 RX ADMIN — INSULIN GLARGINE 30 UNITS: 100 INJECTION, SOLUTION SUBCUTANEOUS at 21:05

## 2023-11-11 RX ADMIN — CARVEDILOL 25 MG: 25 TABLET, FILM COATED ORAL at 20:59

## 2023-11-11 RX ADMIN — ATORVASTATIN CALCIUM 40 MG: 40 TABLET, FILM COATED ORAL at 06:25

## 2023-11-11 RX ADMIN — DAPAGLIFLOZIN 10 MG: 10 TABLET, FILM COATED ORAL at 08:57

## 2023-11-11 ASSESSMENT — COGNITIVE AND FUNCTIONAL STATUS - GENERAL
CLIMB 3 TO 5 STEPS WITH RAILING: TOTAL
DAILY ACTIVITIY SCORE: 22
MOBILITY SCORE: 12
HELP NEEDED FOR BATHING: A LITTLE
TURNING FROM BACK TO SIDE WHILE IN FLAT BAD: A LITTLE
WALKING IN HOSPITAL ROOM: TOTAL
STANDING UP FROM CHAIR USING ARMS: TOTAL
TURNING FROM BACK TO SIDE WHILE IN FLAT BAD: A LITTLE
STANDING UP FROM CHAIR USING ARMS: TOTAL
MOBILITY SCORE: 12
DAILY ACTIVITIY SCORE: 21
MOVING FROM LYING ON BACK TO SITTING ON SIDE OF FLAT BED WITH BEDRAILS: A LITTLE
MOVING FROM LYING ON BACK TO SITTING ON SIDE OF FLAT BED WITH BEDRAILS: A LITTLE
MOVING TO AND FROM BED TO CHAIR: A LITTLE
HELP NEEDED FOR BATHING: A LITTLE
WALKING IN HOSPITAL ROOM: TOTAL
DRESSING REGULAR UPPER BODY CLOTHING: A LITTLE
DRESSING REGULAR LOWER BODY CLOTHING: A LITTLE
DRESSING REGULAR LOWER BODY CLOTHING: A LITTLE
CLIMB 3 TO 5 STEPS WITH RAILING: TOTAL
MOVING TO AND FROM BED TO CHAIR: A LITTLE

## 2023-11-11 ASSESSMENT — PAIN SCALES - WONG BAKER: WONGBAKER_NUMERICALRESPONSE: HURTS LITTLE BIT

## 2023-11-11 ASSESSMENT — PAIN SCALES - GENERAL
PAINLEVEL_OUTOF10: 0 - NO PAIN
PAINLEVEL_OUTOF10: 3

## 2023-11-11 NOTE — PROGRESS NOTES
"Delvis Ventura is a 63 y.o. male on day 4 of admission presenting with Acute on chronic systolic congestive heart failure (CMS/HCC).    Subjective   No acute events overnight.  Continues to report dizziness while lying down and sitting.  States that it feels like lightheadedness.  Denies any chest pain, shortness of breath, nausea or vomiting.    Objective     Physical Exam  Constitutional:       Appearance: Normal appearance.   HENT:      Head: Normocephalic and atraumatic.      Mouth/Throat:      Mouth: Mucous membranes are dry.   Eyes:      Conjunctiva/sclera: Conjunctivae normal.      Pupils: Pupils are equal, round, and reactive to light.   Cardiovascular:      Rate and Rhythm: Normal rate and regular rhythm.      Pulses: Normal pulses.      Heart sounds: Murmur (systolic murmur heard over l sternal border) heard.   Pulmonary:      Effort: Pulmonary effort is normal.      Breath sounds: Normal breath sounds. No rhonchi or rales.   Abdominal:      General: Abdomen is flat.      Palpations: Abdomen is soft.   Musculoskeletal:      Right lower leg: Edema present.      Left lower leg: Edema present.   Skin:     General: Skin is warm and dry.      Capillary Refill: Capillary refill takes less than 2 seconds.   Neurological:      Mental Status: He is alert and oriented to person, place, and time. Mental status is at baseline.      Comments: HINTS exam negative   Psychiatric:         Mood and Affect: Mood normal.         Behavior: Behavior normal.       Last Recorded Vitals  Blood pressure (!) 112/94, pulse 59, temperature 36.7 °C (98.1 °F), resp. rate 18, height 1.778 m (5' 10\"), weight 91.6 kg (202 lb), SpO2 95 %.  Intake/Output last 3 Shifts:  I/O last 3 completed shifts:  In: - (0 mL/kg)   Out: 2050 (22.4 mL/kg) [Urine:2050 (0.6 mL/kg/hr)]  Weight: 91.6 kg     Relevant Results  Results for orders placed or performed during the hospital encounter of 11/07/23 (from the past 24 hour(s))   POCT GLUCOSE   Result Value " Ref Range    POCT Glucose 188 (H) 74 - 99 mg/dL   Renal Function Panel   Result Value Ref Range    Glucose 233 (H) 74 - 99 mg/dL    Sodium 137 136 - 145 mmol/L    Potassium 4.6 3.5 - 5.3 mmol/L    Chloride 103 98 - 107 mmol/L    Bicarbonate 29 21 - 32 mmol/L    Anion Gap 10 10 - 20 mmol/L    Urea Nitrogen 21 6 - 23 mg/dL    Creatinine 1.07 0.50 - 1.30 mg/dL    eGFR 78 >60 mL/min/1.73m*2    Calcium 8.4 (L) 8.6 - 10.6 mg/dL    Phosphorus 4.2 2.5 - 4.9 mg/dL    Albumin 3.0 (L) 3.4 - 5.0 g/dL   POCT GLUCOSE   Result Value Ref Range    POCT Glucose 252 (H) 74 - 99 mg/dL   Magnesium   Result Value Ref Range    Magnesium 2.11 1.60 - 2.40 mg/dL   Renal Function Panel   Result Value Ref Range    Glucose 95 74 - 99 mg/dL    Sodium 138 136 - 145 mmol/L    Potassium 4.0 3.5 - 5.3 mmol/L    Chloride 105 98 - 107 mmol/L    Bicarbonate 24 21 - 32 mmol/L    Anion Gap 13 10 - 20 mmol/L    Urea Nitrogen 17 6 - 23 mg/dL    Creatinine 0.93 0.50 - 1.30 mg/dL    eGFR >90 >60 mL/min/1.73m*2    Calcium 8.3 (L) 8.6 - 10.6 mg/dL    Phosphorus 3.5 2.5 - 4.9 mg/dL    Albumin 3.0 (L) 3.4 - 5.0 g/dL   CBC and Auto Differential   Result Value Ref Range    WBC 4.6 4.4 - 11.3 x10*3/uL    nRBC 0.0 0.0 - 0.0 /100 WBCs    RBC 4.86 4.50 - 5.90 x10*6/uL    Hemoglobin 14.2 13.5 - 17.5 g/dL    Hematocrit 45.2 41.0 - 52.0 %    MCV 93 80 - 100 fL    MCH 29.2 26.0 - 34.0 pg    MCHC 31.4 (L) 32.0 - 36.0 g/dL    RDW 15.3 (H) 11.5 - 14.5 %    Platelets 187 150 - 450 x10*3/uL    Neutrophils % 45.1 40.0 - 80.0 %    Immature Granulocytes %, Automated 0.2 0.0 - 0.9 %    Lymphocytes % 43.6 13.0 - 44.0 %    Monocytes % 8.7 2.0 - 10.0 %    Eosinophils % 2.0 0.0 - 6.0 %    Basophils % 0.4 0.0 - 2.0 %    Neutrophils Absolute 2.08 1.20 - 7.70 x10*3/uL    Immature Granulocytes Absolute, Automated 0.01 0.00 - 0.70 x10*3/uL    Lymphocytes Absolute 2.01 1.20 - 4.80 x10*3/uL    Monocytes Absolute 0.40 0.10 - 1.00 x10*3/uL    Eosinophils Absolute 0.09 0.00 - 0.70 x10*3/uL     "Basophils Absolute 0.02 0.00 - 0.10 x10*3/uL   POCT GLUCOSE   Result Value Ref Range    POCT Glucose 113 (H) 74 - 99 mg/dL   POCT GLUCOSE   Result Value Ref Range    POCT Glucose 200 (H) 74 - 99 mg/dL       Assessment and Plan  Tristan Ventura is a 62 yo M w/ PMHx of HFrEF (10-15% 04/2023), NICM s/p ICD (replaced 03/2023 for infected line), HTN, T2DM on insulin, CP w/ residual BLE weakness requiring motorized chair, Hep C who was admitted to the cardiology inpatient service on 11/7/23 for ADHF w/ symptoms of shortness of breath and BLE swelling making transfer difficult. CXR revealed interstitial edema and BNP was ~2k. No O2 requirement. EKG revealed no ischemic changes and hs troponin was ~40. The patient has symptomatically improved s/p 3x doses of IV Lasix w/ almost 9 L UO. Found to be hypokalemic s/p diuresis and actively being repleted PRN. On 11/9, is relatively hypotensive and \"dizzy,\" although worked with PT w/o problems. Has been hypotensive since diuresis and hydral and coreg has been held PRN.    Updates 11/11:  - Cr 0.77 (11/8/23) -> 1.07 -> 1.2 -> 0.93  - Hypotensive and presyncopal, holding hydral and aldactone until SBP > 120.  - Reduce hydralazine to 50 mg 3 times daily  - Repeat EKG, QTc 437  - Meclizine 25 mg q6h PRN    #CHF exacerbation  #HFrEF (EF 10-15% in April 2023)  #HTN   :: s/s ADHF in form of SOB w/ CXR interstitial edema, BLE swelling  - s/p ICD 03/2023, no discharges recorded since last interrogation  - s/p lasix 40mg, Lasix 60mg IV x2  - Home meds: hydralazine to 75 mg TID  - isosorbide mononitrate to 120 mg once daily  - GDMT: carvedilol 50 mg twice daily, dapagliflozin 10 mg once daily, sacubitril/valsartan 97/103 mg twice daily, spironolactone 25 mg once daily  - BNP 1857,  Plan  - c/w home meds (decreased coreg to 25 mg BID)  -Decrease hydralazine to 50 mg 3 times daily  - continuous tele   - BID RFP  - hold lasix for electrolyte derangement and symptomatic improvement, most likeley " dispo on home torsemide w/ follow up with Dr. Hernandez scheduled.  - PT eval today     #DM Type 2  - last A1c 8.4 on 23  - home regimen: glargine 40u QHS  Plan:  - glargine 30u QHS  - mild SSI  - Diabetes educator for DexCmG7 BS sensor pending dispo will set up no on phone.    #Hypokalemia  - 3.3 s/p diuresis, repleted PO   - BID RFP    F: IVF prn   E: Lytes prn  N: Adult Cardiac  A: PIV     DVT ppx: lovenox  GI ppx: PPI    Code Status: FULL CODE (confirmed at bedside)  Surrogate Medical Decision-maker: Pt states most of his family is  and he has no friends.  Patient informed in the case of emergency a hospital appointed POA would be assigned.  Patient was agreeable.             Refugio Pena MD

## 2023-11-12 LAB
GLUCOSE BLD MANUAL STRIP-MCNC: 107 MG/DL (ref 74–99)
GLUCOSE BLD MANUAL STRIP-MCNC: 175 MG/DL (ref 74–99)
GLUCOSE BLD MANUAL STRIP-MCNC: 229 MG/DL (ref 74–99)
GLUCOSE BLD MANUAL STRIP-MCNC: 229 MG/DL (ref 74–99)

## 2023-11-12 PROCEDURE — 2500000001 HC RX 250 WO HCPCS SELF ADMINISTERED DRUGS (ALT 637 FOR MEDICARE OP)

## 2023-11-12 PROCEDURE — 96372 THER/PROPH/DIAG INJ SC/IM: CPT | Performed by: STUDENT IN AN ORGANIZED HEALTH CARE EDUCATION/TRAINING PROGRAM

## 2023-11-12 PROCEDURE — 2500000002 HC RX 250 W HCPCS SELF ADMINISTERED DRUGS (ALT 637 FOR MEDICARE OP, ALT 636 FOR OP/ED): Performed by: STUDENT IN AN ORGANIZED HEALTH CARE EDUCATION/TRAINING PROGRAM

## 2023-11-12 PROCEDURE — 2500000004 HC RX 250 GENERAL PHARMACY W/ HCPCS (ALT 636 FOR OP/ED): Performed by: STUDENT IN AN ORGANIZED HEALTH CARE EDUCATION/TRAINING PROGRAM

## 2023-11-12 PROCEDURE — 82947 ASSAY GLUCOSE BLOOD QUANT: CPT

## 2023-11-12 PROCEDURE — 2500000001 HC RX 250 WO HCPCS SELF ADMINISTERED DRUGS (ALT 637 FOR MEDICARE OP): Performed by: STUDENT IN AN ORGANIZED HEALTH CARE EDUCATION/TRAINING PROGRAM

## 2023-11-12 PROCEDURE — 1200000002 HC GENERAL ROOM WITH TELEMETRY DAILY

## 2023-11-12 PROCEDURE — 99232 SBSQ HOSP IP/OBS MODERATE 35: CPT | Performed by: INTERNAL MEDICINE

## 2023-11-12 RX ADMIN — ENOXAPARIN SODIUM 40 MG: 100 INJECTION SUBCUTANEOUS at 20:39

## 2023-11-12 RX ADMIN — ATORVASTATIN CALCIUM 40 MG: 40 TABLET, FILM COATED ORAL at 06:16

## 2023-11-12 RX ADMIN — HYDRALAZINE HYDROCHLORIDE 50 MG: 50 TABLET, FILM COATED ORAL at 12:18

## 2023-11-12 RX ADMIN — SPIRONOLACTONE 25 MG: 25 TABLET ORAL at 09:19

## 2023-11-12 RX ADMIN — SACUBITRIL AND VALSARTAN 1 TABLET: 97; 103 TABLET, FILM COATED ORAL at 06:39

## 2023-11-12 RX ADMIN — LOPERAMIDE HYDROCHLORIDE 4 MG: 2 CAPSULE ORAL at 09:18

## 2023-11-12 RX ADMIN — MULTIVITAMIN TABLET 1 TABLET: TABLET at 09:00

## 2023-11-12 RX ADMIN — DAPAGLIFLOZIN 10 MG: 10 TABLET, FILM COATED ORAL at 09:19

## 2023-11-12 RX ADMIN — SACUBITRIL AND VALSARTAN 1 TABLET: 97; 103 TABLET, FILM COATED ORAL at 20:37

## 2023-11-12 RX ADMIN — HYDRALAZINE HYDROCHLORIDE 50 MG: 50 TABLET, FILM COATED ORAL at 20:37

## 2023-11-12 RX ADMIN — ISOSORBIDE MONONITRATE 120 MG: 30 TABLET, EXTENDED RELEASE ORAL at 09:17

## 2023-11-12 RX ADMIN — FERROUS SULFATE TAB 325 MG (65 MG ELEMENTAL FE) 325 MG: 325 (65 FE) TAB at 20:37

## 2023-11-12 RX ADMIN — INSULIN GLARGINE 30 UNITS: 100 INJECTION, SOLUTION SUBCUTANEOUS at 20:40

## 2023-11-12 RX ADMIN — FERROUS SULFATE TAB 325 MG (65 MG ELEMENTAL FE) 325 MG: 325 (65 FE) TAB at 09:17

## 2023-11-12 RX ADMIN — ASPIRIN 81 MG CHEWABLE TABLET 81 MG: 81 TABLET CHEWABLE at 09:18

## 2023-11-12 RX ADMIN — LOPERAMIDE HYDROCHLORIDE 4 MG: 2 CAPSULE ORAL at 20:38

## 2023-11-12 RX ADMIN — AMIODARONE HYDROCHLORIDE 200 MG: 200 TABLET ORAL at 09:20

## 2023-11-12 RX ADMIN — PRAZOSIN HYDROCHLORIDE 2 MG: 2 CAPSULE ORAL at 20:39

## 2023-11-12 RX ADMIN — CARVEDILOL 25 MG: 25 TABLET, FILM COATED ORAL at 20:36

## 2023-11-12 RX ADMIN — CARVEDILOL 25 MG: 25 TABLET, FILM COATED ORAL at 06:40

## 2023-11-12 RX ADMIN — PANTOPRAZOLE SODIUM 40 MG: 40 TABLET, DELAYED RELEASE ORAL at 06:17

## 2023-11-12 ASSESSMENT — PAIN SCALES - GENERAL: PAINLEVEL_OUTOF10: 0 - NO PAIN

## 2023-11-12 NOTE — PROGRESS NOTES
"Delvis Ventura is a 63 y.o. male on day 5 of admission presenting with Acute on chronic systolic congestive heart failure (CMS/HCC).    Subjective   No acute events overnight.  His dizziness has improved. Endorses chest pain that was relieved upon AM meds.     Objective     Physical Exam  Constitutional:       Appearance: Normal appearance.   HENT:      Head: Normocephalic and atraumatic.      Mouth/Throat:      Mouth: Mucous membranes are dry.   Eyes:      Conjunctiva/sclera: Conjunctivae normal.      Pupils: Pupils are equal, round, and reactive to light.   Cardiovascular:      Rate and Rhythm: Normal rate and regular rhythm.      Pulses: Normal pulses.      Heart sounds: Murmur (systolic murmur heard over l sternal border) heard.   Pulmonary:      Effort: Pulmonary effort is normal.      Breath sounds: Normal breath sounds. No rhonchi or rales.   Abdominal:      General: Abdomen is flat.      Palpations: Abdomen is soft.   Skin:     General: Skin is warm and dry.      Capillary Refill: Capillary refill takes less than 2 seconds.   Neurological:      Mental Status: He is alert and oriented to person, place, and time. Mental status is at baseline.      Comments: HINTS exam negative   Psychiatric:         Mood and Affect: Mood normal.         Behavior: Behavior normal.       Last Recorded Vitals  Blood pressure (!) 112/93, pulse 65, temperature 36.7 °C (98.1 °F), resp. rate 16, height 1.778 m (5' 10\"), weight 91.6 kg (202 lb), SpO2 90 %.  Intake/Output last 3 Shifts:  I/O last 3 completed shifts:  In: - (0 mL/kg)   Out: 1825 (19.9 mL/kg) [Urine:1825 (0.6 mL/kg/hr)]  Weight: 91.6 kg     Relevant Results  Results for orders placed or performed during the hospital encounter of 11/07/23 (from the past 24 hour(s))   POCT GLUCOSE   Result Value Ref Range    POCT Glucose 242 (H) 74 - 99 mg/dL   POCT GLUCOSE   Result Value Ref Range    POCT Glucose 246 (H) 74 - 99 mg/dL   POCT GLUCOSE   Result Value Ref Range    POCT Glucose " "107 (H) 74 - 99 mg/dL   POCT GLUCOSE   Result Value Ref Range    POCT Glucose 229 (H) 74 - 99 mg/dL       Assessment and Plan  Tristan Ventura is a 62 yo M w/ PMHx of HFrEF (10-15% 04/2023), NICM s/p ICD (replaced 03/2023 for infected line), HTN, T2DM on insulin, CP w/ residual BLE weakness requiring motorized chair, Hep C who was admitted to the cardiology inpatient service on 11/7/23 for ADHF w/ symptoms of shortness of breath and BLE swelling making transfer difficult. CXR revealed interstitial edema and BNP was ~2k. No O2 requirement. EKG revealed no ischemic changes and hs troponin was ~40. The patient has symptomatically improved s/p 3x doses of IV Lasix w/ almost 9 L UO. Found to be hypokalemic s/p diuresis and actively being repleted PRN. On 11/9, is relatively hypotensive and \"dizzy,\" although worked with PT w/o problems. Was hypotensive in s/o diuresis and meds held PRN. Initiate meclizine for HINTS negative dizziness and has improved.    Updates 11/12:  - Cr 0.77 (11/8/23) -> 1.07 -> 1.2 -> 0.93 (11/11/23)  - Reduce hydralazine to 50 mg 3 times daily  - Repeat EKG, QTc 437  - Meclizine 25 mg q6h PRN  - Schedule transport for dispo home w/ HCC on 11/13/23    #CHF exacerbation  #HFrEF (EF 10-15% in April 2023)  #HTN   :: s/s ADHF in form of SOB w/ CXR interstitial edema, BLE swelling  - s/p ICD 03/2023, no discharges recorded since last interrogation  - s/p lasix 40 mg IV x1,  60mg IV x2  - Home meds: hydralazine to 75 mg TID  - isosorbide mononitrate to 120 mg once daily  - GDMT: carvedilol 50 mg twice daily, dapagliflozin 10 mg once daily, sacubitril/valsartan 97/103 mg twice daily, spironolactone 25 mg once daily  - BNP 1857,  Plan  - c/w home meds (decreased coreg to 25 mg BID)  -Decrease hydralazine to 50 mg 3 times daily  - continuous tele   - BID RFP  - hold lasix for electrolyte derangement and symptomatic improvement, most likeley dispo on home torsemide w/ follow up with Dr. Hernandez scheduled.  - PT " eval: Home w/ PT     #DM Type 2  - last A1c 8.4 on 23  - home regimen: glargine 40u QHS  Plan:  - glargine 30u QHS  - mild SSI  - Diabetes educator for DexCmG7 BS sensor pending dispo will set up no on phone.    #Hypokalemia  - 3.3 s/p diuresis, repleted PO   - BID RFP    F: IVF prn   E: Lytes prn  N: Adult Cardiac  A: PIV     DVT ppx: lovenox  GI ppx: PPI    Code Status: FULL CODE (confirmed at bedside)  Surrogate Medical Decision-maker: Pt states most of his family is  and he has no friends.  Patient informed in the case of emergency a hospital appointed POA would be assigned.  Patient was agreeable.             Salinas Lo MD

## 2023-11-13 ENCOUNTER — PHARMACY VISIT (OUTPATIENT)
Dept: PHARMACY | Facility: CLINIC | Age: 63
End: 2023-11-13
Payer: MEDICARE

## 2023-11-13 VITALS
OXYGEN SATURATION: 95 % | WEIGHT: 202 LBS | RESPIRATION RATE: 18 BRPM | HEIGHT: 70 IN | HEART RATE: 65 BPM | BODY MASS INDEX: 28.92 KG/M2 | DIASTOLIC BLOOD PRESSURE: 72 MMHG | SYSTOLIC BLOOD PRESSURE: 119 MMHG | TEMPERATURE: 97.7 F

## 2023-11-13 LAB
ALBUMIN SERPL BCP-MCNC: 3.4 G/DL (ref 3.4–5)
ANION GAP SERPL CALC-SCNC: 13 MMOL/L (ref 10–20)
BASOPHILS # BLD AUTO: 0.03 X10*3/UL (ref 0–0.1)
BASOPHILS NFR BLD AUTO: 0.7 %
BUN SERPL-MCNC: 19 MG/DL (ref 6–23)
CALCIUM SERPL-MCNC: 8.8 MG/DL (ref 8.6–10.6)
CHLORIDE SERPL-SCNC: 105 MMOL/L (ref 98–107)
CO2 SERPL-SCNC: 20 MMOL/L (ref 21–32)
CREAT SERPL-MCNC: 0.93 MG/DL (ref 0.5–1.3)
EOSINOPHIL # BLD AUTO: 0.07 X10*3/UL (ref 0–0.7)
EOSINOPHIL NFR BLD AUTO: 1.7 %
ERYTHROCYTE [DISTWIDTH] IN BLOOD BY AUTOMATED COUNT: 15.1 % (ref 11.5–14.5)
GFR SERPL CREATININE-BSD FRML MDRD: >90 ML/MIN/1.73M*2
GLUCOSE BLD MANUAL STRIP-MCNC: 129 MG/DL (ref 74–99)
GLUCOSE BLD MANUAL STRIP-MCNC: 185 MG/DL (ref 74–99)
GLUCOSE SERPL-MCNC: 126 MG/DL (ref 74–99)
HCT VFR BLD AUTO: 50.4 % (ref 41–52)
HGB BLD-MCNC: 16 G/DL (ref 13.5–17.5)
IMM GRANULOCYTES # BLD AUTO: 0.01 X10*3/UL (ref 0–0.7)
IMM GRANULOCYTES NFR BLD AUTO: 0.2 % (ref 0–0.9)
LYMPHOCYTES # BLD AUTO: 1.92 X10*3/UL (ref 1.2–4.8)
LYMPHOCYTES NFR BLD AUTO: 45.3 %
MAGNESIUM SERPL-MCNC: 2.02 MG/DL (ref 1.6–2.4)
MCH RBC QN AUTO: 29.7 PG (ref 26–34)
MCHC RBC AUTO-ENTMCNC: 31.7 G/DL (ref 32–36)
MCV RBC AUTO: 94 FL (ref 80–100)
MONOCYTES # BLD AUTO: 0.3 X10*3/UL (ref 0.1–1)
MONOCYTES NFR BLD AUTO: 7.1 %
NEUTROPHILS # BLD AUTO: 1.91 X10*3/UL (ref 1.2–7.7)
NEUTROPHILS NFR BLD AUTO: 45 %
NRBC BLD-RTO: 0 /100 WBCS (ref 0–0)
PHOSPHATE SERPL-MCNC: 3.2 MG/DL (ref 2.5–4.9)
PLATELET # BLD AUTO: 199 X10*3/UL (ref 150–450)
POTASSIUM SERPL-SCNC: 4.5 MMOL/L (ref 3.5–5.3)
RBC # BLD AUTO: 5.38 X10*6/UL (ref 4.5–5.9)
SODIUM SERPL-SCNC: 133 MMOL/L (ref 136–145)
WBC # BLD AUTO: 4.2 X10*3/UL (ref 4.4–11.3)

## 2023-11-13 PROCEDURE — 80069 RENAL FUNCTION PANEL: CPT

## 2023-11-13 PROCEDURE — 2500000002 HC RX 250 W HCPCS SELF ADMINISTERED DRUGS (ALT 637 FOR MEDICARE OP, ALT 636 FOR OP/ED): Performed by: STUDENT IN AN ORGANIZED HEALTH CARE EDUCATION/TRAINING PROGRAM

## 2023-11-13 PROCEDURE — RXMED WILLOW AMBULATORY MEDICATION CHARGE

## 2023-11-13 PROCEDURE — 82947 ASSAY GLUCOSE BLOOD QUANT: CPT

## 2023-11-13 PROCEDURE — 2500000004 HC RX 250 GENERAL PHARMACY W/ HCPCS (ALT 636 FOR OP/ED): Performed by: STUDENT IN AN ORGANIZED HEALTH CARE EDUCATION/TRAINING PROGRAM

## 2023-11-13 PROCEDURE — 2500000001 HC RX 250 WO HCPCS SELF ADMINISTERED DRUGS (ALT 637 FOR MEDICARE OP)

## 2023-11-13 PROCEDURE — 85025 COMPLETE CBC W/AUTO DIFF WBC: CPT

## 2023-11-13 PROCEDURE — 99239 HOSP IP/OBS DSCHRG MGMT >30: CPT

## 2023-11-13 PROCEDURE — 36415 COLL VENOUS BLD VENIPUNCTURE: CPT

## 2023-11-13 PROCEDURE — 83735 ASSAY OF MAGNESIUM: CPT

## 2023-11-13 PROCEDURE — 2500000001 HC RX 250 WO HCPCS SELF ADMINISTERED DRUGS (ALT 637 FOR MEDICARE OP): Performed by: STUDENT IN AN ORGANIZED HEALTH CARE EDUCATION/TRAINING PROGRAM

## 2023-11-13 RX ORDER — MECLIZINE HYDROCHLORIDE 25 MG/1
25 TABLET ORAL 3 TIMES DAILY PRN
Qty: 30 TABLET | Refills: 0 | Status: SHIPPED | OUTPATIENT
Start: 2023-11-13 | End: 2024-01-04 | Stop reason: ENTERED-IN-ERROR

## 2023-11-13 RX ORDER — HYDRALAZINE HYDROCHLORIDE 50 MG/1
50 TABLET, FILM COATED ORAL EVERY 8 HOURS
Qty: 90 TABLET | Refills: 2 | Status: SHIPPED | OUTPATIENT
Start: 2023-11-13 | End: 2024-02-15 | Stop reason: HOSPADM

## 2023-11-13 RX ORDER — CARVEDILOL 25 MG/1
25 TABLET ORAL 2 TIMES DAILY
Qty: 60 TABLET | Refills: 2 | Status: SHIPPED | OUTPATIENT
Start: 2023-11-13 | End: 2024-11-12

## 2023-11-13 RX ORDER — FUROSEMIDE 20 MG/1
20 TABLET ORAL DAILY
Qty: 30 TABLET | Refills: 0 | Status: SHIPPED | OUTPATIENT
Start: 2023-11-13 | End: 2024-02-15 | Stop reason: HOSPADM

## 2023-11-13 RX ADMIN — SPIRONOLACTONE 25 MG: 25 TABLET ORAL at 09:36

## 2023-11-13 RX ADMIN — CARVEDILOL 25 MG: 25 TABLET, FILM COATED ORAL at 09:37

## 2023-11-13 RX ADMIN — ASPIRIN 81 MG CHEWABLE TABLET 81 MG: 81 TABLET CHEWABLE at 09:37

## 2023-11-13 RX ADMIN — AMIODARONE HYDROCHLORIDE 200 MG: 200 TABLET ORAL at 09:37

## 2023-11-13 RX ADMIN — HYDRALAZINE HYDROCHLORIDE 50 MG: 50 TABLET, FILM COATED ORAL at 03:11

## 2023-11-13 RX ADMIN — MULTIVITAMIN TABLET 1 TABLET: TABLET at 09:00

## 2023-11-13 RX ADMIN — SACUBITRIL AND VALSARTAN 1 TABLET: 97; 103 TABLET, FILM COATED ORAL at 09:36

## 2023-11-13 RX ADMIN — LOPERAMIDE HYDROCHLORIDE 4 MG: 2 CAPSULE ORAL at 09:39

## 2023-11-13 RX ADMIN — PANTOPRAZOLE SODIUM 40 MG: 40 TABLET, DELAYED RELEASE ORAL at 06:20

## 2023-11-13 RX ADMIN — FERROUS SULFATE TAB 325 MG (65 MG ELEMENTAL FE) 325 MG: 325 (65 FE) TAB at 09:37

## 2023-11-13 RX ADMIN — ISOSORBIDE MONONITRATE 120 MG: 30 TABLET, EXTENDED RELEASE ORAL at 09:37

## 2023-11-13 RX ADMIN — DAPAGLIFLOZIN 10 MG: 10 TABLET, FILM COATED ORAL at 09:37

## 2023-11-13 RX ADMIN — HYDRALAZINE HYDROCHLORIDE 50 MG: 50 TABLET, FILM COATED ORAL at 12:00

## 2023-11-13 RX ADMIN — ATORVASTATIN CALCIUM 40 MG: 40 TABLET, FILM COATED ORAL at 06:20

## 2023-11-13 NOTE — PROGRESS NOTES
11/13/2023 Care coordination.  Per primary service, pt will discharge home  today.  Requesting transport home.  Pt is agreeable to Home care.  Has used [facility] in the past.

## 2023-11-13 NOTE — DISCHARGE SUMMARY
Discharge Diagnosis  Acute on chronic systolic congestive heart failure (CMS/HCC)    Issues Requiring Follow-Up  See below    Test Results Pending At Discharge  Pending Labs       No current pending labs.            Hospital Course  Mr. Ventura was admitted to the cardiology inpatient service on 11/7/23 for s/s of ADHF (elevated BNP, interstitial infiltrates on CXR, SOB, pitting edema). He received 3x IV Lasix totaling 160 mg. He had extensive UO totaling net negative 10 L. Interval EKGs revealed no ischemic changes, and monitoring metabolic panels had no severe electrolyte disturbances. We added home going diuretic Lasix with intention to follow up with cardiology. Additionally, we prescribed meclazine for dizziness.     #CHF exacerbation  :: s/s ADHF in form of SOB w/ CXR interstitial edema, BLE swelling  - diuresed with lasix,net negative 14 L this admission   Plan  - GDMT/cardiac meds: aspirin, atorvastatin, carvedilol 25 mg twice daily, dapagliflozin 10 mg once daily, sacubitril/valsartan 97/103 mg twice daily, spironolactone 25 mg once daily, hydralazine to 50 mg TID, isosorbide mononitrate to 120 mg once daily, lasix mg 20 daily   -follow up with Dr. Hernandez scheduled.     #NICM, HFrEF (EF 10-15% in April 2023)  ::8/2023 TTE with global hypokinesis, nuclear stress test 2022 without ischemia, [facility] 2018 with clean coronaries  :: /p ICD ([employer]; 3/6/2023; by Dr. Mickey Torres at [facility]) c/b explantation  due to infection with re-implantation of single chamber ICD (4/28/2023 at ), no discharges recorded since last interrogation  - amiodarone 200mg daily      #dizziness  -meclizine 25mg prn     #hyponatremia, 133  -I/s/o diuretics     #DM Type 2  - last A1c 8.4 on 8/21/23  - home regimen: glargine 40u QHS    Pertinent Physical Exam At Time of Discharge  Physical Exam  HENT:      Head: Normocephalic.   Cardiovascular:      Rate and Rhythm: Normal rate and regular rhythm.   Pulmonary:      Effort:  Pulmonary effort is normal.      Breath sounds: Normal breath sounds.   Abdominal:      Palpations: Abdomen is soft.   Skin:     General: Skin is warm and dry.      Capillary Refill: Capillary refill takes less than 2 seconds.   Neurological:      General: No focal deficit present.      Mental Status: He is alert.         Home Medications     Medication List      START taking these medications     meclizine 25 mg tablet; Commonly known as: Antivert; Take 1 tablet (25   mg) by mouth 3 times a day as needed for dizziness.     CHANGE how you take these medications     carvedilol 25 mg tablet; Commonly known as: Coreg; Take 1 tablet (25 mg)   by mouth 2 times a day.; What changed: how much to take   furosemide 20 mg tablet; Commonly known as: Lasix; Take 1 tablet (20 mg)   by mouth once daily.; What changed: medication strength, how much to take,   how to take this, when to take this, additional instructions   hydrALAZINE 50 mg tablet; Commonly known as: Apresoline; Take 1 tablet   (50 mg) by mouth every 8 hours.; What changed: how much to take     CONTINUE taking these medications     amiodarone 200 mg tablet; Commonly known as: Pacerone; [facility]   aspirin 81 mg chewable tablet; Chew 1 tablet (81 mg) once daily.   atorvastatin 40 mg tablet; Commonly known as: Lipitor; Take 1 tablet (40   mg) by mouth once daily.   cetirizine 10 mg tablet; Commonly known as: ZyrTEC; Take 1 tablet (10   mg) by mouth once daily.   dapagliflozin propanediol 10 mg; Commonly known as: Farxiga; Take 1   tablet (10 mg) by mouth once daily.   Dexcom G7  misc; Generic drug: Dexcom G4 platinum ; Use   as instructed   Dexcom G7 Sensor device; Generic drug: blood-glucose sensor; Please use   to check your glucose before meals and at night.   Entresto  mg tablet; Generic drug: sacubitriL-valsartan; Take 1   tablet by mouth 2 times a day.   FeroSuL tablet; Generic drug: ferrous sulfate (325 mg ferrous sulfate);   Take 1  "tablet by mouth twice daily.   insulin glargine 100 unit/mL (3 mL) pen; Commonly known as: Lantus;   Inject 40 Units under the skin once daily at bedtime. Take as directed per   insulin instructions.   isosorbide mononitrate  mg 24 hr tablet; Commonly known as: Imdur;   Take 1 tablet (120 mg) by mouth once daily. Do not crush or chew.   loperamide 2 mg capsule; Commonly known as: Imodium; Take 2 capsules by   mouth every 12 hours if needed for diarrhea.   MULTIVITAMIN ORAL   pantoprazole 40 mg EC tablet; Commonly known as: ProtoNix; [facility]   pen needle, diabetic 31 gauge x 5/16\" needle; Use to inject insulin   daily   prazosin 2 mg capsule; Commonly known as: Minipress; Take 1 capsule (2   mg) by mouth once daily at bedtime.   spironolactone 25 mg tablet; Commonly known as: Aldactone; Take 1 tablet   (25 mg) by mouth once daily.   zolpidem 5 mg tablet; Commonly known as: Ambien; Take 1 tablet (5 mg) by   mouth as needed at bedtime for sleep.       Outpatient Follow-Up  Future Appointments   Date Time Provider Department Center   11/27/2023  9:20 AM Fernando Seymour MD [id number] Academic   12/8/2023  9:00 AM Su Hernandez MD PhD [id number] Academic   12/8/2023  1:00 PM Aniya Zeng MD [id number] Academic       Bruce Ballesteros MD  "

## 2023-11-13 NOTE — PROGRESS NOTES
"Delvis Ventura is a 63 y.o. male on day 6 of admission presenting with Acute on chronic systolic congestive heart failure (CMS/HCC).    NICM, s/p ICD ([employer]; 3/6/2023; by Dr. Mickey Torres at [facility]) c/b explantation  due to infection with re-implantation of single chamber ICD (4/28/2023 at ),     Subjective   No acute events overnight.  Endorses chest pain that has been going on for several days.      Objective   Last Recorded Vitals  Visit Vitals  /84   Pulse 72   Temp 36.6 °C (97.9 °F)   Resp 21   Ht 1.778 m (5' 10\")   Wt 91.6 kg (202 lb)   SpO2 98%   BMI 28.98 kg/m²   Smoking Status Never   BSA 2.13 m²     Vitals:    11/07/23 1113   Weight: 91.6 kg (202 lb)     Intake/Output last 3 Shifts:  I/O last 3 completed shifts:  In: - (0 mL/kg)   Out: 3326 (36.3 mL/kg) [Urine:3325 (1 mL/kg/hr); Stool:1]  Weight: 91.6 kg   No intake/output data recorded.  Since admit, -14L   2xBMyesterday      Physical Exam  Constitutional:       Appearance: Normal appearance.   HENT:      Head: Normocephalic and atraumatic.   Eyes:      Conjunctiva/sclera: Conjunctivae normal.      Pupils: Pupils are equal, round, and reactive to light.   Cardiovascular:      Rate and Rhythm: Normal rate and regular rhythm.      Pulses: Normal pulses.      Heart sounds: Murmur (systolic murmur heard over l sternal border) heard.   Pulmonary:      Effort: Pulmonary effort is normal.      Breath sounds: Normal breath sounds. No rhonchi or rales.   Abdominal:      General: Abdomen is flat.      Palpations: Abdomen is soft.   Skin:     General: Skin is warm and dry.      Capillary Refill: Capillary refill takes less than 2 seconds.   Neurological:      Mental Status: He is alert and oriented to person, place, and time. Mental status is at baseline.   Psychiatric:         Mood and Affect: Mood normal.         Behavior: Behavior normal.         Relevant Results    Lab Results   Component Value Date    WBC 4.2 (L) 11/13/2023    HGB 16.0 " 11/13/2023    HCT 50.4 11/13/2023    MCV 94 11/13/2023     11/13/2023     Lab Results   Component Value Date    CREATININE 0.93 11/13/2023    BUN 19 11/13/2023     (L) 11/13/2023    K 4.5 11/13/2023     11/13/2023    CO2 20 (L) 11/13/2023   Glucose 126  AG 9   Lab Results   Component Value Date    CALCIUM 8.8 11/13/2023    PHOS 3.2 11/13/2023     Magnesium   Date/Time Value Ref Range Status   11/13/2023 08:30 AM 2.02 1.60 - 2.40 mg/dL Final     TTE 8/2023:   1. Left ventricular systolic function is severely decreased with a 15-20% estimated ejection fraction.  2. Spectral Doppler shows a pseudonormal pattern of left ventricular diastolic filling.  3. There is no evidence of left ventricular hypertrophy.  4. There is mildly reduced right ventricular systolic function.  5. The pulmonary artery is not well visualized.  6. There is global hypokinesis of the left ventricle with minor regional variations.     Nuclear Stress test 6/2022 IMPRESSION:  1. Normal myocardial perfusion study without evidence of ischemia or  prior infarction.  2. Moderate to severe left ventricular enlargement and global LV wall  hypokinesis within LVEF estimated at 27%.  3. When compared to the prior SPECT CT MPI dated 06/12/2015, there  has been significant interval worsening of left ventricular  dilatation and decrease in LVEF.    [facility] 8/2018   CONCLUSIONS:   1. No significant CAD.   2. Filling pressures and cardiac index as reported above.    Assessment/Plan     Assessment and Plan  Tristan Ventura is a 64 yo M w/ PMHx of HFrEF (10-15% 04/2023), NICM s/p ICD (replaced 03/2023 for infected line), HTN, T2DM on insulin, CP w/ residual BLE weakness requiring motorized chair, Hep C who was admitted to the cardiology inpatient service on 11/7/23 for ADHF w/ symptoms of shortness of breath and BLE swelling making transfer difficult. CXR revealed interstitial edema and BNP was ~2k. No O2 requirement. EKG revealed no ischemic changes and  "hs troponin was ~40. The patient has symptomatically improved s/p 3x doses of IV Lasix w/ almost 9 L UO. Found to be hypokalemic s/p diuresis and actively being repleted PRN. On , is relatively hypotensive and \"dizzy,\" although worked with PT w/o problems. Was hypotensive in s/o diuresis and meds held PRN. Initiate meclizine for HINTS negative dizziness and has improved.    Updates :  -added lasix 20 mg daily to home regimen  - Schedule transport for dispo home w/ HCC on 23    #CHF exacerbation  :: s/s ADHF in form of SOB w/ CXR interstitial edema, BLE swelling  - diuresed with lasix,net negative 14 L this admission   Plan  - GDMT/cardiac meds: aspirin, atorvastatin, carvedilol 25 mg twice daily, dapagliflozin 10 mg once daily, sacubitril/valsartan 97/103 mg twice daily, spironolactone 25 mg once daily, hydralazine to 50 mg TID, isosorbide mononitrate to 120 mg once daily, lasix mg 20 daily   -follow up with Dr. Hernandez scheduled.    #NICM, HFrEF (EF 10-15% in 2023)  ::2023 TTE with global hypokinesis, nuclear stress test  without ischemia, [facility]  with clean coronaries  :: /p ICD ([employer]; 3/6/2023; by Dr. Mickey Torres at [facility]) c/b explantation  due to infection with re-implantation of single chamber ICD (2023 at ), no discharges recorded since last interrogation  - amiodarone 200mg daily     #dizziness  -meclizine 25mg prn    #hyponatremia, 133  -I/s/o diuretics    #Hypokalemia, resolved    #DM Type 2  - last A1c 8.4 on 23  - home regimen: glargine 40u QHS  Plan:  - glargine 30u QHS  - mild SSI  - Diabetes educator for DexCmG7 BS sensor pending dispo will set up no on phone.      F: IVF prn   E: Lytes prn  N: Adult Cardiac  A: PIV     DVT ppx: lovenox  GI ppx: PPI           Code Status: FULL CODE (confirmed at bedside)  Surrogate Medical Decision-maker: Pt states most of his family is  and he has no friends.  Patient informed in the case of emergency a " hospital appointed POA would be assigned.  Patient was agreeable.             Bruce Ballesteros MD

## 2023-11-13 NOTE — NURSING NOTE
Mr. Ventura is feeling better today and is anticipating discharge home later today.  He is comfortable with insulin regimen -- will discuss with team prior to discharge re any changes to his regimen,  Dex vom G7 CGM placed rt upper posterior arm.  Reviewed the videos on the no while applying.  He understands how the device works and has additional sensors at home.  Will sign off at this time -- he is agreeable to this plan.  Time spent:  30 minutes.

## 2023-11-14 ENCOUNTER — PATIENT OUTREACH (OUTPATIENT)
Dept: CARE COORDINATION | Facility: CLINIC | Age: 63
End: 2023-11-14
Payer: COMMERCIAL

## 2023-11-14 NOTE — PROGRESS NOTES
Outreach call to patient to support a smooth transition of care from recent admission.  Left voicemail message for patient with my contact information.  Enrolled patient in [employer] chatbot for additional support and patient education through transition period.  Will continue to monitor through transition period.    Lani VARNER RN [facility]  [phone]

## 2023-11-16 ENCOUNTER — HOME HEALTH ADMISSION (OUTPATIENT)
Dept: HOME HEALTH SERVICES | Facility: HOME HEALTH | Age: 63
End: 2023-11-16
Payer: COMMERCIAL

## 2023-11-17 ENCOUNTER — HOSPITAL ENCOUNTER (OUTPATIENT)
Dept: CARDIOLOGY | Facility: HOSPITAL | Age: 63
Discharge: HOME | End: 2023-11-17
Payer: COMMERCIAL

## 2023-11-17 LAB
ATRIAL RATE: 58 BPM
ATRIAL RATE: 63 BPM
P AXIS: 55 DEGREES
P AXIS: 60 DEGREES
P OFFSET: 178 MS
P OFFSET: 186 MS
P ONSET: 115 MS
P ONSET: 123 MS
PR INTERVAL: 196 MS
PR INTERVAL: 200 MS
Q ONSET: 215 MS
Q ONSET: 221 MS
QRS COUNT: 10 BEATS
QRS COUNT: 11 BEATS
QRS DURATION: 102 MS
QRS DURATION: 104 MS
QT INTERVAL: 446 MS
QT INTERVAL: 500 MS
QTC CALCULATION(BAZETT): 437 MS
QTC CALCULATION(BAZETT): 511 MS
QTC FREDERICIA: 440 MS
QTC FREDERICIA: 508 MS
R AXIS: 57 DEGREES
R AXIS: 78 DEGREES
T AXIS: 255 DEGREES
T AXIS: 5 DEGREES
T OFFSET: 438 MS
T OFFSET: 471 MS
VENTRICULAR RATE: 58 BPM
VENTRICULAR RATE: 63 BPM

## 2023-11-17 PROCEDURE — 93005 ELECTROCARDIOGRAM TRACING: CPT

## 2023-11-24 ENCOUNTER — APPOINTMENT (OUTPATIENT)
Dept: CARDIOLOGY | Facility: HOSPITAL | Age: 63
End: 2023-11-24
Payer: COMMERCIAL

## 2023-12-01 ENCOUNTER — APPOINTMENT (OUTPATIENT)
Dept: PRIMARY CARE | Facility: CLINIC | Age: 63
End: 2023-12-01
Payer: COMMERCIAL

## 2023-12-08 ENCOUNTER — APPOINTMENT (OUTPATIENT)
Dept: PRIMARY CARE | Facility: CLINIC | Age: 63
End: 2023-12-08
Payer: COMMERCIAL

## 2023-12-11 ENCOUNTER — HOSPITAL ENCOUNTER (OUTPATIENT)
Dept: CARDIOLOGY | Facility: CLINIC | Age: 63
Discharge: HOME | End: 2023-12-11
Payer: COMMERCIAL

## 2023-12-11 DIAGNOSIS — Z95.810 PRESENCE OF AUTOMATIC (IMPLANTABLE) CARDIAC DEFIBRILLATOR: ICD-10-CM

## 2023-12-11 DIAGNOSIS — I42.0 DILATED CARDIOMYOPATHY (MULTI): ICD-10-CM

## 2023-12-11 PROCEDURE — 93296 REM INTERROG EVL PM/IDS: CPT

## 2023-12-11 PROCEDURE — 93295 DEV INTERROG REMOTE 1/2/MLT: CPT | Performed by: INTERNAL MEDICINE

## 2024-01-03 ENCOUNTER — APPOINTMENT (OUTPATIENT)
Dept: RADIOLOGY | Facility: HOSPITAL | Age: 64
DRG: 291 | End: 2024-01-03
Payer: COMMERCIAL

## 2024-01-03 ENCOUNTER — APPOINTMENT (OUTPATIENT)
Dept: CARDIOLOGY | Facility: HOSPITAL | Age: 64
DRG: 291 | End: 2024-01-03
Payer: COMMERCIAL

## 2024-01-03 ENCOUNTER — HOSPITAL ENCOUNTER (INPATIENT)
Facility: HOSPITAL | Age: 64
LOS: 4 days | Discharge: HOME | DRG: 291 | End: 2024-01-08
Attending: EMERGENCY MEDICINE | Admitting: INTERNAL MEDICINE
Payer: COMMERCIAL

## 2024-01-03 DIAGNOSIS — I50.23 ACUTE ON CHRONIC SYSTOLIC CONGESTIVE HEART FAILURE (MULTI): ICD-10-CM

## 2024-01-03 DIAGNOSIS — Z86.718 HISTORY OF DVT (DEEP VEIN THROMBOSIS): Chronic | ICD-10-CM

## 2024-01-03 DIAGNOSIS — I50.9 ACUTE ON CHRONIC CONGESTIVE HEART FAILURE, UNSPECIFIED HEART FAILURE TYPE (MULTI): ICD-10-CM

## 2024-01-03 DIAGNOSIS — Z86.711 HISTORY OF PULMONARY EMBOLUS (PE): Chronic | ICD-10-CM

## 2024-01-03 DIAGNOSIS — I50.43 ACUTE ON CHRONIC COMBINED SYSTOLIC AND DIASTOLIC HRT FAIL (MULTI): Primary | ICD-10-CM

## 2024-01-03 PROBLEM — I50.42 CHRONIC COMBINED SYSTOLIC AND DIASTOLIC HEART FAILURE, NYHA CLASS 3 (MULTI): Chronic | Status: ACTIVE | Noted: 2023-09-25

## 2024-01-03 LAB
ALBUMIN SERPL BCP-MCNC: 3.7 G/DL (ref 3.4–5)
ALP SERPL-CCNC: 165 U/L (ref 33–136)
ALT SERPL W P-5'-P-CCNC: 41 U/L (ref 10–52)
ANION GAP SERPL CALC-SCNC: 14 MMOL/L (ref 10–20)
AST SERPL W P-5'-P-CCNC: 40 U/L (ref 9–39)
ATRIAL RATE: 99 BPM
BASOPHILS # BLD AUTO: 0.03 X10*3/UL (ref 0–0.1)
BASOPHILS NFR BLD AUTO: 0.6 %
BILIRUB SERPL-MCNC: 1.8 MG/DL (ref 0–1.2)
BNP SERPL-MCNC: 1519 PG/ML (ref 0–99)
BUN SERPL-MCNC: 15 MG/DL (ref 6–23)
CALCIUM SERPL-MCNC: 8.7 MG/DL (ref 8.6–10.3)
CARDIAC TROPONIN I PNL SERPL HS: 34 NG/L (ref 0–20)
CARDIAC TROPONIN I PNL SERPL HS: 36 NG/L (ref 0–20)
CHLORIDE SERPL-SCNC: 103 MMOL/L (ref 98–107)
CO2 SERPL-SCNC: 24 MMOL/L (ref 21–32)
CREAT SERPL-MCNC: 0.74 MG/DL (ref 0.5–1.3)
EOSINOPHIL # BLD AUTO: 0.01 X10*3/UL (ref 0–0.7)
EOSINOPHIL NFR BLD AUTO: 0.2 %
ERYTHROCYTE [DISTWIDTH] IN BLOOD BY AUTOMATED COUNT: 14.4 % (ref 11.5–14.5)
GFR SERPL CREATININE-BSD FRML MDRD: >90 ML/MIN/1.73M*2
GLUCOSE SERPL-MCNC: 142 MG/DL (ref 74–99)
HCT VFR BLD AUTO: 48.2 % (ref 41–52)
HGB BLD-MCNC: 15.8 G/DL (ref 13.5–17.5)
IMM GRANULOCYTES # BLD AUTO: 0.02 X10*3/UL (ref 0–0.7)
IMM GRANULOCYTES NFR BLD AUTO: 0.4 % (ref 0–0.9)
LYMPHOCYTES # BLD AUTO: 2.08 X10*3/UL (ref 1.2–4.8)
LYMPHOCYTES NFR BLD AUTO: 40.2 %
MCH RBC QN AUTO: 29.8 PG (ref 26–34)
MCHC RBC AUTO-ENTMCNC: 32.8 G/DL (ref 32–36)
MCV RBC AUTO: 91 FL (ref 80–100)
MONOCYTES # BLD AUTO: 0.26 X10*3/UL (ref 0.1–1)
MONOCYTES NFR BLD AUTO: 5 %
NEUTROPHILS # BLD AUTO: 2.78 X10*3/UL (ref 1.2–7.7)
NEUTROPHILS NFR BLD AUTO: 53.6 %
NRBC BLD-RTO: 0 /100 WBCS (ref 0–0)
P AXIS: 59 DEGREES
P OFFSET: 169 MS
P ONSET: 116 MS
PLATELET # BLD AUTO: 175 X10*3/UL (ref 150–450)
POTASSIUM SERPL-SCNC: 3.7 MMOL/L (ref 3.5–5.3)
PR INTERVAL: 196 MS
PROT SERPL-MCNC: 6.9 G/DL (ref 6.4–8.2)
Q ONSET: 214 MS
QRS COUNT: 16 BEATS
QRS DURATION: 106 MS
QT INTERVAL: 386 MS
QTC CALCULATION(BAZETT): 495 MS
QTC FREDERICIA: 456 MS
R AXIS: -47 DEGREES
RBC # BLD AUTO: 5.31 X10*6/UL (ref 4.5–5.9)
SODIUM SERPL-SCNC: 137 MMOL/L (ref 136–145)
T AXIS: 66 DEGREES
T OFFSET: 407 MS
VENTRICULAR RATE: 99 BPM
WBC # BLD AUTO: 5.2 X10*3/UL (ref 4.4–11.3)

## 2024-01-03 PROCEDURE — 36415 COLL VENOUS BLD VENIPUNCTURE: CPT | Performed by: PHYSICIAN ASSISTANT

## 2024-01-03 PROCEDURE — 85025 COMPLETE CBC W/AUTO DIFF WBC: CPT | Performed by: PHYSICIAN ASSISTANT

## 2024-01-03 PROCEDURE — 2550000001 HC RX 255 CONTRASTS: Performed by: EMERGENCY MEDICINE

## 2024-01-03 PROCEDURE — 84484 ASSAY OF TROPONIN QUANT: CPT | Performed by: EMERGENCY MEDICINE

## 2024-01-03 PROCEDURE — 96374 THER/PROPH/DIAG INJ IV PUSH: CPT

## 2024-01-03 PROCEDURE — 80053 COMPREHEN METABOLIC PANEL: CPT | Performed by: EMERGENCY MEDICINE

## 2024-01-03 PROCEDURE — 85025 COMPLETE CBC W/AUTO DIFF WBC: CPT | Performed by: EMERGENCY MEDICINE

## 2024-01-03 PROCEDURE — 71275 CT ANGIOGRAPHY CHEST: CPT | Mod: FR

## 2024-01-03 PROCEDURE — 99285 EMERGENCY DEPT VISIT HI MDM: CPT | Performed by: EMERGENCY MEDICINE

## 2024-01-03 PROCEDURE — G0378 HOSPITAL OBSERVATION PER HR: HCPCS

## 2024-01-03 PROCEDURE — 71275 CT ANGIOGRAPHY CHEST: CPT | Mod: FOREIGN READ | Performed by: RADIOLOGY

## 2024-01-03 PROCEDURE — 2500000004 HC RX 250 GENERAL PHARMACY W/ HCPCS (ALT 636 FOR OP/ED): Performed by: EMERGENCY MEDICINE

## 2024-01-03 PROCEDURE — 96372 THER/PROPH/DIAG INJ SC/IM: CPT | Mod: 25

## 2024-01-03 PROCEDURE — 83880 ASSAY OF NATRIURETIC PEPTIDE: CPT | Performed by: PHYSICIAN ASSISTANT

## 2024-01-03 PROCEDURE — 84484 ASSAY OF TROPONIN QUANT: CPT | Performed by: PHYSICIAN ASSISTANT

## 2024-01-03 PROCEDURE — 83880 ASSAY OF NATRIURETIC PEPTIDE: CPT | Performed by: EMERGENCY MEDICINE

## 2024-01-03 PROCEDURE — 71046 X-RAY EXAM CHEST 2 VIEWS: CPT

## 2024-01-03 PROCEDURE — 96376 TX/PRO/DX INJ SAME DRUG ADON: CPT

## 2024-01-03 PROCEDURE — 80053 COMPREHEN METABOLIC PANEL: CPT | Performed by: PHYSICIAN ASSISTANT

## 2024-01-03 PROCEDURE — 93005 ELECTROCARDIOGRAM TRACING: CPT

## 2024-01-03 PROCEDURE — 71046 X-RAY EXAM CHEST 2 VIEWS: CPT | Performed by: RADIOLOGY

## 2024-01-03 RX ORDER — ACETAMINOPHEN 325 MG/1
950 TABLET ORAL EVERY 6 HOURS PRN
Status: DISCONTINUED | OUTPATIENT
Start: 2024-01-03 | End: 2024-01-08 | Stop reason: HOSPADM

## 2024-01-03 RX ORDER — ENOXAPARIN SODIUM 100 MG/ML
40 INJECTION SUBCUTANEOUS EVERY 24 HOURS
Status: DISCONTINUED | OUTPATIENT
Start: 2024-01-04 | End: 2024-01-08 | Stop reason: HOSPADM

## 2024-01-03 RX ORDER — PANTOPRAZOLE SODIUM 40 MG/10ML
40 INJECTION, POWDER, LYOPHILIZED, FOR SOLUTION INTRAVENOUS
Status: DISCONTINUED | OUTPATIENT
Start: 2024-01-04 | End: 2024-01-08 | Stop reason: HOSPADM

## 2024-01-03 RX ORDER — TALC
3 POWDER (GRAM) TOPICAL
Status: DISCONTINUED | OUTPATIENT
Start: 2024-01-04 | End: 2024-01-08 | Stop reason: HOSPADM

## 2024-01-03 RX ORDER — DEXTROSE 50 % IN WATER (D50W) INTRAVENOUS SYRINGE
25
Status: DISCONTINUED | OUTPATIENT
Start: 2024-01-03 | End: 2024-01-04 | Stop reason: SDUPTHER

## 2024-01-03 RX ORDER — FUROSEMIDE 10 MG/ML
40 INJECTION INTRAMUSCULAR; INTRAVENOUS
Status: DISCONTINUED | OUTPATIENT
Start: 2024-01-04 | End: 2024-01-08

## 2024-01-03 RX ORDER — DOCUSATE SODIUM 100 MG/1
100 CAPSULE, LIQUID FILLED ORAL 2 TIMES DAILY PRN
Status: DISCONTINUED | OUTPATIENT
Start: 2024-01-04 | End: 2024-01-08 | Stop reason: HOSPADM

## 2024-01-03 RX ORDER — FUROSEMIDE 10 MG/ML
80 INJECTION INTRAMUSCULAR; INTRAVENOUS ONCE
Status: COMPLETED | OUTPATIENT
Start: 2024-01-03 | End: 2024-01-03

## 2024-01-03 RX ORDER — INSULIN LISPRO 100 [IU]/ML
0-5 INJECTION, SOLUTION INTRAVENOUS; SUBCUTANEOUS
Status: DISCONTINUED | OUTPATIENT
Start: 2024-01-04 | End: 2024-01-08 | Stop reason: HOSPADM

## 2024-01-03 RX ORDER — DEXTROSE MONOHYDRATE 100 MG/ML
30 INJECTION, SOLUTION INTRAVENOUS ONCE AS NEEDED
Status: DISCONTINUED | OUTPATIENT
Start: 2024-01-03 | End: 2024-01-04

## 2024-01-03 RX ORDER — PANTOPRAZOLE SODIUM 40 MG/1
40 TABLET, DELAYED RELEASE ORAL
Status: DISCONTINUED | OUTPATIENT
Start: 2024-01-04 | End: 2024-01-08 | Stop reason: HOSPADM

## 2024-01-03 RX ADMIN — IOHEXOL 75 ML: 350 INJECTION, SOLUTION INTRAVENOUS at 23:27

## 2024-01-03 RX ADMIN — FUROSEMIDE 80 MG: 10 INJECTION, SOLUTION INTRAMUSCULAR; INTRAVENOUS at 22:15

## 2024-01-03 ASSESSMENT — ENCOUNTER SYMPTOMS
SHORTNESS OF BREATH: 1
FEVER: 0
SORE THROAT: 0
DIZZINESS: 1
CONFUSION: 0
HALLUCINATIONS: 0
CHILLS: 0
WOUND: 0
UNEXPECTED WEIGHT CHANGE: 1
DIARRHEA: 0
VOMITING: 0
EYE PAIN: 0
NECK PAIN: 0
CHEST TIGHTNESS: 1
NAUSEA: 0
NECK STIFFNESS: 0
DYSURIA: 0
NUMBNESS: 0

## 2024-01-03 ASSESSMENT — PAIN DESCRIPTION - LOCATION: LOCATION: CHEST

## 2024-01-03 ASSESSMENT — PAIN SCALES - GENERAL
PAINLEVEL_OUTOF10: 9
PAINLEVEL_OUTOF10: 10 - WORST POSSIBLE PAIN
PAINLEVEL_OUTOF10: 9

## 2024-01-03 ASSESSMENT — PAIN DESCRIPTION - ORIENTATION: ORIENTATION: MID

## 2024-01-03 ASSESSMENT — PAIN - FUNCTIONAL ASSESSMENT: PAIN_FUNCTIONAL_ASSESSMENT: 0-10

## 2024-01-03 ASSESSMENT — COLUMBIA-SUICIDE SEVERITY RATING SCALE - C-SSRS
1. IN THE PAST MONTH, HAVE YOU WISHED YOU WERE DEAD OR WISHED YOU COULD GO TO SLEEP AND NOT WAKE UP?: NO
6. HAVE YOU EVER DONE ANYTHING, STARTED TO DO ANYTHING, OR PREPARED TO DO ANYTHING TO END YOUR LIFE?: NO
2. HAVE YOU ACTUALLY HAD ANY THOUGHTS OF KILLING YOURSELF?: NO

## 2024-01-03 NOTE — ED TRIAGE NOTES
TRIAGE NOTE   I saw the patient as the Clinician in Triage and performed a brief history and physical exam, established acuity, and ordered appropriate tests to develop basic plan of care. Patient will be seen by an GRANT, resident and/or physician who will independently evaluate the patient. Please see subsequent provider notes for further details and disposition.     Brief HPI: In brief, Delvis Ventura is a 63 y.o. male that presents for bilateral lower extremity edema, nonradiating chest pain, shortness of breath, and generalized unwell feeling over the past 6 days.  Patient states he does have a history of heart failure and has been taking his Lasix as prescribed.  He states he has not seen a provider because it has been very difficult to get dressed due to significant swelling.  He has no other complaints.    Focused Physical exam:   Able to speak in short sentences before taking a breath.  On room air satting 96%.  Vital signs are notable for tachycardia and hypertension.  3+ pitting edema to the lower extremities that is symmetric.  Brief differential includes heart failure exacerbation, ACS, pneumonia.    Plan/MDM:   Will obtain troponin, delta troponin, BNP, CMP, CBC with differential, two-view chest x-ray, and EKG.  Patient will likely need to be admitted as well as IV Lasix.    Please see subsequent provider note for further details and disposition     Cassidy Moran PA-C

## 2024-01-03 NOTE — ED TRIAGE NOTES
C/O NON RADIATING CP/BLE EDEMA/SOB, HX OF CHF, DENIES PALPITATIONS/N/V/D/F/CHILLS, PT STATES HE HAS BEEN VOMITING AND UNABLE TO KEEP HTN MEDICATION DOWN

## 2024-01-04 ENCOUNTER — APPOINTMENT (OUTPATIENT)
Dept: CARDIOLOGY | Facility: HOSPITAL | Age: 64
DRG: 291 | End: 2024-01-04
Payer: COMMERCIAL

## 2024-01-04 PROBLEM — I50.9 ACUTE ON CHRONIC CONGESTIVE HEART FAILURE, UNSPECIFIED HEART FAILURE TYPE (MULTI): Status: ACTIVE | Noted: 2024-01-04

## 2024-01-04 LAB
ALBUMIN SERPL BCP-MCNC: 3.6 G/DL (ref 3.4–5)
ALP SERPL-CCNC: 137 U/L (ref 33–136)
ALT SERPL W P-5'-P-CCNC: 41 U/L (ref 10–52)
ANION GAP SERPL CALC-SCNC: 13 MMOL/L (ref 10–20)
AORTIC VALVE MEAN GRADIENT: 2
AORTIC VALVE PEAK VELOCITY: 0.97
AST SERPL W P-5'-P-CCNC: 47 U/L (ref 9–39)
AV PEAK GRADIENT: 3.8
AVA (PEAK VEL): 1.95
AVA (VTI): 1.61
BILIRUB SERPL-MCNC: 1.8 MG/DL (ref 0–1.2)
BUN SERPL-MCNC: 14 MG/DL (ref 6–23)
CALCIUM SERPL-MCNC: 8.6 MG/DL (ref 8.6–10.3)
CHLORIDE SERPL-SCNC: 102 MMOL/L (ref 98–107)
CO2 SERPL-SCNC: 27 MMOL/L (ref 21–32)
CREAT SERPL-MCNC: 0.79 MG/DL (ref 0.5–1.3)
EJECTION FRACTION APICAL 4 CHAMBER: 15.5
EJECTION FRACTION: 15
ERYTHROCYTE [DISTWIDTH] IN BLOOD BY AUTOMATED COUNT: 14.6 % (ref 11.5–14.5)
GFR SERPL CREATININE-BSD FRML MDRD: >90 ML/MIN/1.73M*2
GLUCOSE BLD MANUAL STRIP-MCNC: 108 MG/DL (ref 74–99)
GLUCOSE BLD MANUAL STRIP-MCNC: 237 MG/DL (ref 74–99)
GLUCOSE BLD MANUAL STRIP-MCNC: 270 MG/DL (ref 74–99)
GLUCOSE BLD MANUAL STRIP-MCNC: 299 MG/DL (ref 74–99)
GLUCOSE SERPL-MCNC: 121 MG/DL (ref 74–99)
HCT VFR BLD AUTO: 45.7 % (ref 41–52)
HGB BLD-MCNC: 15.3 G/DL (ref 13.5–17.5)
LEFT ATRIUM VOLUME AREA LENGTH INDEX BSA: 30.7
LEFT VENTRICLE INTERNAL DIMENSION DIASTOLE: 5.4 (ref 3.5–6)
LEFT VENTRICULAR OUTFLOW TRACT DIAMETER: 2
MAGNESIUM SERPL-MCNC: 1.7 MG/DL (ref 1.6–2.4)
MCH RBC QN AUTO: 29.8 PG (ref 26–34)
MCHC RBC AUTO-ENTMCNC: 33.5 G/DL (ref 32–36)
MCV RBC AUTO: 89 FL (ref 80–100)
MITRAL VALVE E/A RATIO: 2.18
MITRAL VALVE E/E' RATIO: 25.25
NRBC BLD-RTO: 0 /100 WBCS (ref 0–0)
PLATELET # BLD AUTO: 170 X10*3/UL (ref 150–450)
POTASSIUM SERPL-SCNC: 3.6 MMOL/L (ref 3.5–5.3)
PROT SERPL-MCNC: 6.8 G/DL (ref 6.4–8.2)
RBC # BLD AUTO: 5.13 X10*6/UL (ref 4.5–5.9)
RIGHT VENTRICLE FREE WALL PEAK S': 7.72
RIGHT VENTRICLE PEAK SYSTOLIC PRESSURE: 34
SODIUM SERPL-SCNC: 138 MMOL/L (ref 136–145)
TRICUSPID ANNULAR PLANE SYSTOLIC EXCURSION: 1.1
WBC # BLD AUTO: 4.7 X10*3/UL (ref 4.4–11.3)

## 2024-01-04 PROCEDURE — 83735 ASSAY OF MAGNESIUM: CPT | Performed by: PHYSICIAN ASSISTANT

## 2024-01-04 PROCEDURE — 4B02XTZ MEASUREMENT OF CARDIAC DEFIBRILLATOR, EXTERNAL APPROACH: ICD-10-PCS | Performed by: INTERNAL MEDICINE

## 2024-01-04 PROCEDURE — 2500000002 HC RX 250 W HCPCS SELF ADMINISTERED DRUGS (ALT 637 FOR MEDICARE OP, ALT 636 FOR OP/ED): Performed by: PHYSICIAN ASSISTANT

## 2024-01-04 PROCEDURE — 1200000002 HC GENERAL ROOM WITH TELEMETRY DAILY

## 2024-01-04 PROCEDURE — 36415 COLL VENOUS BLD VENIPUNCTURE: CPT | Performed by: PHYSICIAN ASSISTANT

## 2024-01-04 PROCEDURE — 2500000004 HC RX 250 GENERAL PHARMACY W/ HCPCS (ALT 636 FOR OP/ED): Performed by: PHYSICIAN ASSISTANT

## 2024-01-04 PROCEDURE — 99233 SBSQ HOSP IP/OBS HIGH 50: CPT | Performed by: PHYSICIAN ASSISTANT

## 2024-01-04 PROCEDURE — 93306 TTE W/DOPPLER COMPLETE: CPT

## 2024-01-04 PROCEDURE — 85027 COMPLETE CBC AUTOMATED: CPT | Performed by: PHYSICIAN ASSISTANT

## 2024-01-04 PROCEDURE — 2500000001 HC RX 250 WO HCPCS SELF ADMINISTERED DRUGS (ALT 637 FOR MEDICARE OP): Performed by: PHYSICIAN ASSISTANT

## 2024-01-04 PROCEDURE — 82947 ASSAY GLUCOSE BLOOD QUANT: CPT

## 2024-01-04 PROCEDURE — 93282 PRGRMG EVAL IMPLANTABLE DFB: CPT

## 2024-01-04 PROCEDURE — 99223 1ST HOSP IP/OBS HIGH 75: CPT | Performed by: INTERNAL MEDICINE

## 2024-01-04 PROCEDURE — 93306 TTE W/DOPPLER COMPLETE: CPT | Performed by: INTERNAL MEDICINE

## 2024-01-04 PROCEDURE — 80053 COMPREHEN METABOLIC PANEL: CPT | Performed by: PHYSICIAN ASSISTANT

## 2024-01-04 PROCEDURE — 93282 PRGRMG EVAL IMPLANTABLE DFB: CPT | Performed by: INTERNAL MEDICINE

## 2024-01-04 RX ORDER — LANOLIN ALCOHOL/MO/W.PET/CERES
400 CREAM (GRAM) TOPICAL DAILY
Status: DISCONTINUED | OUTPATIENT
Start: 2024-01-04 | End: 2024-01-05

## 2024-01-04 RX ORDER — NAPROXEN SODIUM 220 MG/1
81 TABLET, FILM COATED ORAL DAILY
Status: DISCONTINUED | OUTPATIENT
Start: 2024-01-04 | End: 2024-01-08 | Stop reason: HOSPADM

## 2024-01-04 RX ORDER — DEXTROSE 50 % IN WATER (D50W) INTRAVENOUS SYRINGE
25
Status: DISCONTINUED | OUTPATIENT
Start: 2024-01-04 | End: 2024-01-08 | Stop reason: HOSPADM

## 2024-01-04 RX ORDER — INSULIN GLARGINE 100 [IU]/ML
20 INJECTION, SOLUTION SUBCUTANEOUS NIGHTLY
Status: DISCONTINUED | OUTPATIENT
Start: 2024-01-04 | End: 2024-01-08 | Stop reason: HOSPADM

## 2024-01-04 RX ORDER — POTASSIUM CHLORIDE 20 MEQ/1
20 TABLET, EXTENDED RELEASE ORAL ONCE
Status: COMPLETED | OUTPATIENT
Start: 2024-01-04 | End: 2024-01-04

## 2024-01-04 RX ORDER — DAPAGLIFLOZIN 10 MG/1
10 TABLET, FILM COATED ORAL DAILY
Status: DISCONTINUED | OUTPATIENT
Start: 2024-01-04 | End: 2024-01-05

## 2024-01-04 RX ORDER — CARVEDILOL 25 MG/1
25 TABLET ORAL 2 TIMES DAILY
Status: DISCONTINUED | OUTPATIENT
Start: 2024-01-04 | End: 2024-01-08 | Stop reason: HOSPADM

## 2024-01-04 RX ORDER — PRAZOSIN HYDROCHLORIDE 1 MG/1
2 CAPSULE ORAL NIGHTLY
Status: DISCONTINUED | OUTPATIENT
Start: 2024-01-04 | End: 2024-01-08 | Stop reason: HOSPADM

## 2024-01-04 RX ORDER — AMIODARONE HYDROCHLORIDE 200 MG/1
200 TABLET ORAL DAILY
Status: DISCONTINUED | OUTPATIENT
Start: 2024-01-04 | End: 2024-01-05

## 2024-01-04 RX ORDER — HYDRALAZINE HYDROCHLORIDE 50 MG/1
50 TABLET, FILM COATED ORAL EVERY 8 HOURS
Status: DISCONTINUED | OUTPATIENT
Start: 2024-01-04 | End: 2024-01-08 | Stop reason: HOSPADM

## 2024-01-04 RX ORDER — ATORVASTATIN CALCIUM 40 MG/1
40 TABLET, FILM COATED ORAL NIGHTLY
Status: DISCONTINUED | OUTPATIENT
Start: 2024-01-04 | End: 2024-01-08 | Stop reason: HOSPADM

## 2024-01-04 RX ORDER — FUROSEMIDE 20 MG/1
20 TABLET ORAL 2 TIMES DAILY
COMMUNITY
End: 2024-01-08 | Stop reason: HOSPADM

## 2024-01-04 RX ORDER — DEXTROSE MONOHYDRATE 100 MG/ML
0.3 INJECTION, SOLUTION INTRAVENOUS ONCE AS NEEDED
Status: DISCONTINUED | OUTPATIENT
Start: 2024-01-04 | End: 2024-01-08 | Stop reason: HOSPADM

## 2024-01-04 RX ORDER — SPIRONOLACTONE 25 MG/1
25 TABLET ORAL DAILY
Status: DISCONTINUED | OUTPATIENT
Start: 2024-01-04 | End: 2024-01-08 | Stop reason: HOSPADM

## 2024-01-04 RX ORDER — ISOSORBIDE MONONITRATE 30 MG/1
120 TABLET, EXTENDED RELEASE ORAL DAILY
Status: DISCONTINUED | OUTPATIENT
Start: 2024-01-04 | End: 2024-01-08 | Stop reason: HOSPADM

## 2024-01-04 RX ADMIN — Medication 400 MG: at 09:06

## 2024-01-04 RX ADMIN — Medication 3 MG: at 18:40

## 2024-01-04 RX ADMIN — PRAZOSIN HYDROCHLORIDE 2 MG: 1 CAPSULE ORAL at 21:28

## 2024-01-04 RX ADMIN — DAPAGLIFLOZIN 10 MG: 10 TABLET, FILM COATED ORAL at 14:41

## 2024-01-04 RX ADMIN — PANTOPRAZOLE SODIUM 40 MG: 40 TABLET, DELAYED RELEASE ORAL at 09:06

## 2024-01-04 RX ADMIN — PERFLUTREN 10 ML OF DILUTION: 6.52 INJECTION, SUSPENSION INTRAVENOUS at 08:30

## 2024-01-04 RX ADMIN — ENOXAPARIN SODIUM 40 MG: 40 INJECTION SUBCUTANEOUS at 09:05

## 2024-01-04 RX ADMIN — SACUBITRIL AND VALSARTAN 2 TABLET: 49; 51 TABLET, FILM COATED ORAL at 14:41

## 2024-01-04 RX ADMIN — ISOSORBIDE MONONITRATE 120 MG: 30 TABLET, EXTENDED RELEASE ORAL at 14:40

## 2024-01-04 RX ADMIN — CARVEDILOL 25 MG: 25 TABLET, FILM COATED ORAL at 21:27

## 2024-01-04 RX ADMIN — ATORVASTATIN CALCIUM 40 MG: 40 TABLET, FILM COATED ORAL at 21:27

## 2024-01-04 RX ADMIN — FUROSEMIDE 40 MG: 10 INJECTION, SOLUTION INTRAMUSCULAR; INTRAVENOUS at 09:05

## 2024-01-04 RX ADMIN — POTASSIUM CHLORIDE 20 MEQ: 1500 TABLET, EXTENDED RELEASE ORAL at 09:06

## 2024-01-04 RX ADMIN — AMIODARONE HYDROCHLORIDE 200 MG: 200 TABLET ORAL at 14:41

## 2024-01-04 RX ADMIN — HYDRALAZINE HYDROCHLORIDE 50 MG: 25 TABLET ORAL at 21:27

## 2024-01-04 RX ADMIN — FUROSEMIDE 40 MG: 10 INJECTION, SOLUTION INTRAMUSCULAR; INTRAVENOUS at 14:40

## 2024-01-04 RX ADMIN — SPIRONOLACTONE 25 MG: 25 TABLET ORAL at 14:44

## 2024-01-04 RX ADMIN — HYDRALAZINE HYDROCHLORIDE 50 MG: 25 TABLET ORAL at 13:08

## 2024-01-04 RX ADMIN — SACUBITRIL AND VALSARTAN 2 TABLET: 49; 51 TABLET, FILM COATED ORAL at 21:27

## 2024-01-04 RX ADMIN — INSULIN LISPRO 3 UNITS: 100 INJECTION, SOLUTION INTRAVENOUS; SUBCUTANEOUS at 16:00

## 2024-01-04 RX ADMIN — ASPIRIN 81 MG CHEWABLE TABLET 81 MG: 81 TABLET CHEWABLE at 14:41

## 2024-01-04 RX ADMIN — INSULIN GLARGINE 20 UNITS: 100 INJECTION, SOLUTION SUBCUTANEOUS at 21:28

## 2024-01-04 SDOH — SOCIAL STABILITY: SOCIAL INSECURITY: HAS ANYONE EVER THREATENED TO HURT YOUR FAMILY OR YOUR PETS?: NO

## 2024-01-04 SDOH — SOCIAL STABILITY: SOCIAL INSECURITY: DOES ANYONE TRY TO KEEP YOU FROM HAVING/CONTACTING OTHER FRIENDS OR DOING THINGS OUTSIDE YOUR HOME?: NO

## 2024-01-04 SDOH — SOCIAL STABILITY: SOCIAL INSECURITY: ARE THERE ANY APPARENT SIGNS OF INJURIES/BEHAVIORS THAT COULD BE RELATED TO ABUSE/NEGLECT?: NO

## 2024-01-04 SDOH — SOCIAL STABILITY: SOCIAL INSECURITY: ABUSE: ADULT

## 2024-01-04 SDOH — SOCIAL STABILITY: SOCIAL INSECURITY: WERE YOU ABLE TO COMPLETE ALL THE BEHAVIORAL HEALTH SCREENINGS?: YES

## 2024-01-04 SDOH — SOCIAL STABILITY: SOCIAL INSECURITY: HAVE YOU HAD THOUGHTS OF HARMING ANYONE ELSE?: NO

## 2024-01-04 SDOH — SOCIAL STABILITY: SOCIAL INSECURITY: DO YOU FEEL UNSAFE GOING BACK TO THE PLACE WHERE YOU ARE LIVING?: NO

## 2024-01-04 SDOH — SOCIAL STABILITY: SOCIAL INSECURITY: DO YOU FEEL ANYONE HAS EXPLOITED OR TAKEN ADVANTAGE OF YOU FINANCIALLY OR OF YOUR PERSONAL PROPERTY?: NO

## 2024-01-04 SDOH — SOCIAL STABILITY: SOCIAL INSECURITY: ARE YOU OR HAVE YOU BEEN THREATENED OR ABUSED PHYSICALLY, EMOTIONALLY, OR SEXUALLY BY ANYONE?: NO

## 2024-01-04 ASSESSMENT — ACTIVITIES OF DAILY LIVING (ADL)
HEARING - LEFT EAR: FUNCTIONAL
WALKS IN HOME: DEPENDENT
GROOMING: INDEPENDENT
TOILETING: NEEDS ASSISTANCE
LACK_OF_TRANSPORTATION: NO
BATHING: NEEDS ASSISTANCE
DRESSING YOURSELF: INDEPENDENT
PATIENT'S MEMORY ADEQUATE TO SAFELY COMPLETE DAILY ACTIVITIES?: YES
FEEDING YOURSELF: INDEPENDENT
HEARING - RIGHT EAR: FUNCTIONAL
JUDGMENT_ADEQUATE_SAFELY_COMPLETE_DAILY_ACTIVITIES: YES
ADEQUATE_TO_COMPLETE_ADL: YES
ASSISTIVE_DEVICE: EYEGLASSES;WHEELCHAIR

## 2024-01-04 ASSESSMENT — PATIENT HEALTH QUESTIONNAIRE - PHQ9
1. LITTLE INTEREST OR PLEASURE IN DOING THINGS: NOT AT ALL
SUM OF ALL RESPONSES TO PHQ9 QUESTIONS 1 & 2: 0
2. FEELING DOWN, DEPRESSED OR HOPELESS: NOT AT ALL

## 2024-01-04 ASSESSMENT — COGNITIVE AND FUNCTIONAL STATUS - GENERAL
DAILY ACTIVITIY SCORE: 24
CLIMB 3 TO 5 STEPS WITH RAILING: TOTAL
PATIENT BASELINE BEDBOUND: NO
MOBILITY SCORE: 18
WALKING IN HOSPITAL ROOM: TOTAL

## 2024-01-04 ASSESSMENT — PAIN SCALES - WONG BAKER: WONGBAKER_NUMERICALRESPONSE: NO HURT

## 2024-01-04 ASSESSMENT — LIFESTYLE VARIABLES
HOW OFTEN DO YOU HAVE A DRINK CONTAINING ALCOHOL: MONTHLY OR LESS
SKIP TO QUESTIONS 9-10: 1
AUDIT-C TOTAL SCORE: 1
AUDIT-C TOTAL SCORE: 1
HOW OFTEN DO YOU HAVE 6 OR MORE DRINKS ON ONE OCCASION: NEVER
HOW MANY STANDARD DRINKS CONTAINING ALCOHOL DO YOU HAVE ON A TYPICAL DAY: 1 OR 2

## 2024-01-04 ASSESSMENT — PAIN - FUNCTIONAL ASSESSMENT: PAIN_FUNCTIONAL_ASSESSMENT: 0-10

## 2024-01-04 ASSESSMENT — PAIN SCALES - GENERAL: PAINLEVEL_OUTOF10: 0 - NO PAIN

## 2024-01-04 NOTE — PROGRESS NOTES
01/04/24 0757   [facility] Disability Status   Are you deaf or do you have serious difficulty hearing? N   Are you blind or do you have serious difficulty seeing, even when wearing glasses? N   Because of a physical, mental, or emotional condition, do you have serious difficulty concentrating, remembering, or making decisions? (5 years old or older) N   Do you have serious difficulty walking or climbing stairs? Y  (cerebral palsy wheelchair-bound)   Do you have serious difficulty dressing or bathing? N   Because of a physical, mental, or emotional condition, do you have serious difficulty doing errands alone such as visiting the doctor? Y

## 2024-01-04 NOTE — H&P
History Of Present Illness  Delvis Ventura is a 63 y.o. male with PMH significant for HFrEF with EF 10 to 15% on 04/2023, type 2 DM, cerebral palsy wheelchair-bound who presented to the ED for lower extremity edema and recent 8lb weight gain. Pt also reports pleuritic chest pain, SOB, and dizziness for the past 6 days. Pt states these symptoms are similar to when he was hospitalized for CHF exacerbation 1 month ago and received Lasix x3 with good diuresis. Pt notes h/o DVT/PE 6 years ago and is not currently taking any blood thinners.       ED course: EKG showed normal sinus rhythm, rate 99, poor R wave progression,  no ischemic ST changes. Lab work showed CBC with no leukocytosis, CMP with elevated alk phos 165, elevated AST 40. BNP was significantly elevated at 1519, suggestive of CHF exacerbation. Elevated HS troponin at 36. Pt's heart rate continued to increase while in the ED and was more tachycardic than baseline and he started having pleuritic chest pain. CXR showed pulmonary vascular congestion. CTA showed cardiomegaly with a trace right pleural effusion.  No definite large central pulmonary embolus identified. Pt is admitted for further evaluation and management.    Past Medical History  Past Medical History:   Diagnosis Date    Benign essential hypertension 02/19/2016    Cerebral palsy (CMS/HCC) 02/19/2016    Chronic combined systolic and diastolic heart failure, NYHA class 3 (CMS/HCC) 09/25/2023    Chronic systolic (congestive) heart failure (CMS/HCC) 09/01/2020    History of DVT (deep vein thrombosis) 01/03/2024    History of pulmonary embolus (PE) 01/03/2024    Mixed hyperlipidemia 02/19/2016       Surgical History  No past surgical history on file.    Social History  Social History     Socioeconomic History    Marital status: Single     Spouse name: None    Number of children: None    Years of education: None    Highest education level: None   Occupational History    None   Tobacco Use    Smoking  status: Never     Passive exposure: Never    Smokeless tobacco: Never   Substance and Sexual Activity    Alcohol use: Yes     Comment: ocassional beer    Drug use: Never    Sexual activity: None   Other Topics Concern    None   Social History Narrative    None     Social Determinants of Health     Financial Resource Strain: Low Risk  (11/8/2023)    Overall Financial Resource Strain (CARDIA)     Difficulty of Paying Living Expenses: Not very hard   Food Insecurity: Not on file   Transportation Needs: No Transportation Needs (11/8/2023)    PRAPARE - Transportation     Lack of Transportation (Medical): No     Lack of Transportation (Non-Medical): No   Physical Activity: Not on file   Stress: Not on file   Social Connections: Not on file   Intimate Partner Violence: Not on file   Housing Stability: Low Risk  (11/8/2023)    Housing Stability Vital Sign     Unable to Pay for Housing in the Last Year: No     Number of Places Lived in the Last Year: 1     Unstable Housing in the Last Year: No        Family History  No family history on file.     Allergies  Allergies as of 01/03/2024 - Reviewed 01/03/2024   Allergen Reaction Noted    Metformin Diarrhea 03/11/2017        Review of Systems  Review of Systems   Constitutional:  Positive for unexpected weight change. Negative for chills and fever.   HENT:  Negative for ear pain and sore throat.    Eyes:  Negative for pain.   Respiratory:  Positive for chest tightness and shortness of breath.    Cardiovascular:  Positive for chest pain and leg swelling.   Gastrointestinal:  Negative for diarrhea, nausea and vomiting.   Genitourinary:  Negative for dysuria.   Musculoskeletal:  Negative for neck pain and neck stiffness.   Skin:  Negative for rash and wound.   Neurological:  Positive for dizziness. Negative for numbness.   Psychiatric/Behavioral:  Negative for confusion and hallucinations.        Relevant results reviewed   PROVIDER notes  Nursing notes  MEDS:  Current  Facility-Administered Medications   Medication Dose Route Frequency Provider Last Rate Last Admin    acetaminophen (Tylenol) tablet 975 mg  975 mg oral q6h PRN Katie Quintero PA-C        dextrose 10 % in water (D10W) infusion  30 mL/hr intravenous Once PRN Katie Quintero PA-C        dextrose 50 % injection 25 g  25 g intravenous q15 min PRN Katie Quintero PA-C        docusate sodium (Colace) capsule 100 mg  100 mg oral BID PRN Katie Quintero PA-C        enoxaparin (Lovenox) syringe 40 mg  40 mg subcutaneous q24h Katie Quintero PA-C        furosemide (Lasix) injection 40 mg  40 mg intravenous 2 times per day Katie Quintero PA-C        glucagon (Glucagen) injection 1 mg  1 mg intramuscular q15 min PRN Katie Quintero PA-C        insulin lispro (HumaLOG) injection 0-5 Units  0-5 Units subcutaneous Before meals & nightly Katie Quintero PA-C        melatonin tablet 3 mg  3 mg oral Daily Katie Quintero PA-C        pantoprazole (ProtoNix) EC tablet 40 mg  40 mg oral Daily before breakfast Katie Quintero PA-C        Or    pantoprazole (ProtoNix) injection 40 mg  40 mg intravenous Daily before breakfast Katie Quintero PA-C         Current Outpatient Medications   Medication Sig Dispense Refill    amiodarone (Pacerone) 200 mg tablet Delvis Ventura (Patient taking differently: Take 1 tablet (200 mg) by mouth once daily.) 30 tablet 3    aspirin 81 mg chewable tablet Chew 1 tablet (81 mg) once daily. 30 tablet 3    atorvastatin (Lipitor) 40 mg tablet Take 1 tablet (40 mg) by mouth once daily. 30 tablet 3    carvedilol (Coreg) 25 mg tablet Take 1 tablet (25 mg) by mouth 2 times a day. 60 tablet 2    cetirizine (ZyrTEC) 10 mg tablet Take 1 tablet (10 mg) by mouth once daily. 30 tablet 3    dapagliflozin propanediol (Farxiga) 10 mg Take 1 tablet (10 mg) by mouth once daily. 30 tablet 3    Dexcom G4 platinum  (Dexcom G7 ) misc Use as instructed 1 each 3    Dexcom G7 Sensor device Please use to  "check your glucose before meals and at night. 1 each 0    Entresto  mg tablet Take 1 tablet by mouth 2 times a day. 60 tablet 3    FeroSuL 325 mg (65 mg iron) tablet Take 1 tablet by mouth twice daily. 60 tablet 3    furosemide (Lasix) 20 mg tablet Take 1 tablet (20 mg) by mouth once daily. 30 tablet 0    hydrALAZINE (Apresoline) 50 mg tablet Take 1 tablet (50 mg) by mouth every 8 hours. 90 tablet 2    insulin glargine (Lantus) 100 unit/mL (3 mL) pen Inject 40 Units under the skin once daily at bedtime. Take as directed per insulin instructions. 36 mL 3    isosorbide mononitrate ER (Imdur) 120 mg 24 hr tablet Take 1 tablet (120 mg) by mouth once daily. Do not crush or chew. 30 tablet 3    loperamide (Imodium) 2 mg capsule Take 2 capsules by mouth every 12 hours if needed for diarrhea. 30 capsule 2    meclizine (Antivert) 25 mg tablet Take 1 tablet (25 mg) by mouth 3 times a day as needed for dizziness. 30 tablet 0    MULTIVITAMIN ORAL Take 1 tablet by mouth once daily.      pantoprazole (ProtoNix) 40 mg EC tablet Delvis Ventura 30 tablet 3    pen needle, diabetic 31 gauge x 5/16\" needle Use to inject insulin daily 100 each 11    prazosin (Minipress) 2 mg capsule Take 1 capsule (2 mg) by mouth once daily at bedtime. 30 capsule 3    spironolactone (Aldactone) 25 mg tablet Take 1 tablet (25 mg) by mouth once daily. 30 tablet 3    zolpidem (Ambien) 5 mg tablet Take 1 tablet (5 mg) by mouth as needed at bedtime for sleep. 14 tablet 0      LABS:  Results for orders placed or performed during the hospital encounter of 01/03/24 (from the past 24 hour(s))   ECG 12 lead   Result Value Ref Range    Ventricular Rate 99 BPM    Atrial Rate 99 BPM    AK Interval 196 ms    QRS Duration 106 ms    QT Interval 386 ms    QTC Calculation(Bazett) 495 ms    P Axis 59 degrees    R Axis -47 degrees    T Axis 66 degrees    QRS Count 16 beats    Q Onset 214 ms    P Onset 116 ms    P Offset 169 ms    T Offset 407 ms    QTC Fredericia 456 " ms   CBC with Differential   Result Value Ref Range    WBC 5.2 4.4 - 11.3 x10*3/uL    nRBC 0.0 0.0 - 0.0 /100 WBCs    RBC 5.31 4.50 - 5.90 x10*6/uL    Hemoglobin 15.8 13.5 - 17.5 g/dL    Hematocrit 48.2 41.0 - 52.0 %    MCV 91 80 - 100 fL    MCH 29.8 26.0 - 34.0 pg    MCHC 32.8 32.0 - 36.0 g/dL    RDW 14.4 11.5 - 14.5 %    Platelets 175 150 - 450 x10*3/uL    Neutrophils % 53.6 40.0 - 80.0 %    Immature Granulocytes %, Automated 0.4 0.0 - 0.9 %    Lymphocytes % 40.2 13.0 - 44.0 %    Monocytes % 5.0 2.0 - 10.0 %    Eosinophils % 0.2 0.0 - 6.0 %    Basophils % 0.6 0.0 - 2.0 %    Neutrophils Absolute 2.78 1.20 - 7.70 x10*3/uL    Immature Granulocytes Absolute, Automated 0.02 0.00 - 0.70 x10*3/uL    Lymphocytes Absolute 2.08 1.20 - 4.80 x10*3/uL    Monocytes Absolute 0.26 0.10 - 1.00 x10*3/uL    Eosinophils Absolute 0.01 0.00 - 0.70 x10*3/uL    Basophils Absolute 0.03 0.00 - 0.10 x10*3/uL   Comprehensive Metabolic Panel   Result Value Ref Range    Glucose 142 (H) 74 - 99 mg/dL    Sodium 137 136 - 145 mmol/L    Potassium 3.7 3.5 - 5.3 mmol/L    Chloride 103 98 - 107 mmol/L    Bicarbonate 24 21 - 32 mmol/L    Anion Gap 14 10 - 20 mmol/L    Urea Nitrogen 15 6 - 23 mg/dL    Creatinine 0.74 0.50 - 1.30 mg/dL    eGFR >90 >60 mL/min/1.73m*2    Calcium 8.7 8.6 - 10.3 mg/dL    Albumin 3.7 3.4 - 5.0 g/dL    Alkaline Phosphatase 165 (H) 33 - 136 U/L    Total Protein 6.9 6.4 - 8.2 g/dL    AST 40 (H) 9 - 39 U/L    Bilirubin, Total 1.8 (H) 0.0 - 1.2 mg/dL    ALT 41 10 - 52 U/L   Brain Natriuretic Peptide   Result Value Ref Range    BNP 1,519 (H) 0 - 99 pg/mL   Troponin I, High Sensitivity, Initial   Result Value Ref Range    Troponin I, High Sensitivity 34 (H) 0 - 20 ng/L   Troponin, High Sensitivity, 1 Hour   Result Value Ref Range    Troponin I, High Sensitivity 36 (H) 0 - 20 ng/L      IMAGING:  CT angio chest for pulmonary embolism   Final Result   Cardiomegaly with a trace right pleural effusion.  No definite large   central  "pulmonary embolus identified.   Signed by Cornelio Cordoba      XR chest 2 views   Final Result   Pulmonary vascular congestion.             Signed by: Bertrand Carvajal 1/3/2024 10:35 AM   Dictation workstation:   [id number]             PHYSICAL EXAM  BP (!) 176/116   Pulse 91   Temp 36.9 °C (98.4 °F) (Oral)   Resp 25   Ht 1.8 m (5' 10.87\")   Wt 91.6 kg (202 lb)   SpO2 93%   BMI 28.28 kg/m²   PHYSICAL EXAM:  GENERAL: Alert, NAD, cooperative  SKIN: Warm and dry.  No suspicious lesions or rashes.  HEENT:  NCAT, PERRLA, EOMI, nonicteric sclera, MMM, neck supple, trachea midline  LUNGS: Unlabored, CTAB, no significant wheezing, rhonchi, or rales appreciated  CARDS: RRR, no m/g/r appreciated  GI: Soft, NTND, BS+, no rebound, no guarding   : no chakraborty, voids independently   MS/Extremities: 3+ pitting LE edema bilaterally, distal pulses intact  NEURO: A&Ox3, grossly nonfocal exam, speech fluent, follows commands, answers questions appropriately  PSYCH: mood and behavior appropriate    Assessment/Plan:   Principal Problem:    Acute on chronic combined systolic and diastolic hrt fail (CMS/HCC)  Active Problems:    Benign essential hypertension    Cerebral palsy (CMS/HCC)    Chronic combined systolic and diastolic heart failure, NYHA class 3 (CMS/HCC)    Type 2 diabetes mellitus, with long-term current use of insulin (CMS/HCC)    Mixed hyperlipidemia    History of DVT (deep vein thrombosis)    History of pulmonary embolus (PE)     CHF exacerbation  HFrEF (EF 10-15%)  HTN  -CBC: no leukocytosis, no acute anemia, no thrombocytopenia  -CMP: no acute electrolyte abnormalities  -Mg pending  -HS ERIC: elevated 36  -BNP: 1519  -CXR:  pulmonary vascular congestion  -CTA: cardiomegaly with a trace right pleural effusion.  No definite large  central pulmonary embolus identified  -monitor on tele  -maintain K > 4.0 and Mg > 2.0  --> replete PRN   -strict I/O, daily weights  -appreciate cardiology consult  -received IV Lasix 80mg in the " ER   -resume home meds as appropriate  -continue diuresis as per cardiology   -BPH, duonebs, anti-tussives, anti-mucolytics  -heart healthy/carb controlled diet       Type 2 DM  -hypoglycemia protocol, accuchecks ACHS, ISS coverage  -Most recent Hba1c- 8.4% on 8/21/23  -Resume home meds as appropriate        GI prophylaxis  -protonix, bowel regimen     History of DVT  History of PE  VTE prophylaxis  -Lovenox      Jossie Flores PA-S  Not finalized until co-signed

## 2024-01-04 NOTE — CONSULTS
Inpatient consult to Cardiology  Consult performed by: KIKE Kirby-CNP  Consult ordered by: Travis Payne PA-C  Reason for consult: HF with reduced EF          History Of Present Illness:    Delvis Ventura is a 63 y.o. male with a past medical h/o DMII, HTN, COVID 12/202,  cerebral palsy, wheelchair-bound, nonischemic cardiomyopathy (on C 8/2018 he had trivial CAD), HFrEF  On TTE 8/2018 which LVEF ~ 20-25%, he has been started on study medication and he previously had LV systolic recovery with LVEF 50-55% on TTE 2/2020 and was symptomatically improved, last TTE 8/23 EF 15-20%, s/p ICD ([employer]; 3/6/2023; by Dr. Mickey Torres at [facility]) c/b explantation  d/t infection with re-implantation of single chamber ICD (4/28/2023 at ). Recent hospitalization 11/7/2023 and received Lasix x 3 doses totaling 160 mg >optimal urinary response and s/s improvement> was discharged on Lasix 20 mg po daily. Presents with c/o lower extremity swelling  weight gain > reported dry weight is approximately 194 pounds and estimates he is 204 pounds currently. Cardiology consulted for HF with reduced EF.    ED course:   Arrival vital signs: 170/120, 96% on room air, 20, 109, 97.6  Current vital signs: 147/104, 99% on room air, 25, 86  Abnormal labs: Troponin 34, 36, BNP 1519 last 1857 on 11/7/23  Medications: Lasix 40 mg IV x 1    Evaluated patient today here at [facility], patient reports that he is here for further evaluation of chest pain, dyspnea, lower extremity edema and fluid retention x 6 days.  He reports that despite taking his CV medications and adhering to a NITISH heart healthy diet that he has been retaining fluid.  He describes chest pain as being constant for the last 6 days, center chest pressure feels like he is being hit in the chest with a osvaldo-hammer with occasional diaphoresis and no radiation.  He reports he has experienced this pain before during HF exac, but never this persistent. He denies associated  symptoms of dizziness, lightheadedness, nausea, vomiting, palpitations, falls or syncopal events.  Currently, patient has no supplemental oxygen requirements and is lying flat in bed in no respiratory distress.  He reports robust urinary response to the IV diuretic therapy he has received (40 mg Lasix IV x 1).     In addition, patient denies any past medical history of CABG or coronary percutaneous interventions.  He is followed by heart failure specialist Dr. Su Hernandez.    Home GDMT/cardiac meds: aspirin 81, atorvastatin 40, carvedilol 25 mg twice daily, dapagliflozin 10 mg once daily, sacubitril/valsartan 97/103 mg twice daily, spironolactone 25 mg once daily, hydralazine to 50 mg TID, isosorbide mononitrate to 120 mg once daily, lasix mg 20 daily, prazosin 2 mg at bedtime.    All other systems reviewed and negative unless as mentioned in HPI.    Past Cardiology Tests (Last 3 Years):  Echo 8/23:    CONCLUSIONS:  1. Left ventricular systolic function is severely decreased with a 15-20% estimated ejection fraction.  2. Spectral Doppler shows a pseudonormal pattern of left ventricular diastolic filling.  3. There is no evidence of left ventricular hypertrophy.  4. There is mildly reduced right ventricular systolic function.  5. The pulmonary artery is not well visualized.  6. There is global hypokinesis of the left ventricle with minor regional variations.    Nuclear stress Test 6/6/22:  Indication HFpEF  IMPRESSION:  1. Normal myocardial perfusion study without evidence of ischemia or  prior infarction.  2. Moderate to severe left ventricular enlargement and global LV wall  hypokinesis within LVEF estimated at 27%.  3. When compared to the prior SPECT CT MPI dated 06/12/2015, there  has been significant interval worsening of left ventricular  dilatation and decrease in LVEF.         Past Medical/ Surgical History:  Diabetes mellitus type 2  Hypertension  COVID-19  Cerebral palsy  Nonischemic cardiomyopathy  "(on [facility] 2018 he had trivial CAD)  Systolic heart failure  NICM s/p ICD ([employer]; 3/6/2023 c/b explantation  d/t infection with re-implantation of single chamber ICD (2023 at )      Social History:  Denies smoking, alcohol or illicit drug use    Family History:  Denies any past medical history of CAD or sudden cardiac death     Allergies:  Metformin    ROS:  10 point review of systems including (Constitutional, Eyes, ENMT, Respiratory, Cardiac, Gastrointestinal, Neurological, Psychiatric, and Hematologic) was performed and is otherwise negative.    Objective Data:  Last Recorded Vitals:  Vitals:    24 0600 24 0615 24 0630 24 0645   BP:    (!) 147/104   BP Location:       Pulse: 90 88 83 86   Resp: 18 (!) 27 24 25   Temp:       TempSrc:       SpO2:    99%   Weight:    91.6 kg (202 lb)   Height:    1.803 m (5' 11\")     Medical Gas Therapy: None (Room air)  Weight  Av.6 kg (202 lb)  Min: 91.6 kg (202 lb)  Max: 91.6 kg (202 lb)      LABS:  CMP:  Results from last 7 days   Lab Units 24  0604 24  1232   SODIUM mmol/L 138 137   POTASSIUM mmol/L 3.6 3.7   CHLORIDE mmol/L 102 103   CO2 mmol/L 27 24   ANION GAP mmol/L 13 14   BUN mg/dL 14 15   CREATININE mg/dL 0.79 0.74   EGFR mL/min/1.73m*2 >90 >90   MAGNESIUM mg/dL 1.70  --    ALBUMIN g/dL 3.6 3.7   ALT U/L 41 41   AST U/L 47* 40*   BILIRUBIN TOTAL mg/dL 1.8* 1.8*     CBC:  Results from last 7 days   Lab Units 24  0604 24  1232   WBC AUTO x10*3/uL 4.7 5.2   HEMOGLOBIN g/dL 15.3 15.8   HEMATOCRIT % 45.7 48.2   PLATELETS AUTO x10*3/uL 170 175   MCV fL 89 91     COAG:     ABO: No results found for: \"ABO\"  HEME/ENDO:     CARDIAC:   Results from last 7 days   Lab Units 24  1536 24  1232   TROPHS ng/L 36* 34*   BNP pg/mL  --  1,519*             Last I/O:  No intake or output data in the 24 hours ending 24 1021  Net IO Since Admission: No IO data has been entered for this period [24 " 1021]      Imaging Results:  CT angio chest for pulmonary embolism    Result Date: 1/4/2024  STUDY: CT Angiogram of the Chest; 01/03/2024 at 11:33 PM INDICATION: Pleuritic chest pain. Evaluate for pulmonary embolism. COMPARISON: XR chest of same date, 01/03/24. CT CAP 08/19/23. CT chest 04/21/23. ACCESSION NUMBER(S): [id number] ORDERING CLINICIAN: YANG URENA TECHNIQUE:  CTA of the chest was performed with intravenous contrast. Images are reviewed and processed at a workstation according to the CT angiogram protocol with 3-D and/or MIP post processing imaging generated.  Omnipaque-350 75 mL was administered intravenously. Automated mA/kV exposure control was utilized and patient examination was performed in strict accordance with principles of ALARA. FINDINGS: Pulmonary arteries are adequately opacified without large central acute or chronic filling defects.  Respiratory motion limits evaluation of the smaller pulmonary arteries.  Image quality is also suboptimal due to the patient's body habitus resulting in increased quantum mottle.  The thoracic aorta is normal in course and caliber without dissection or aneurysm.  Left subclavian approach AICD is seen with a single lead terminating in the right ventricle. The heart is enlarged in size without pericardial effusion.  Thoracic lymph nodes are not enlarged. There is no pleural thickening or or pneumothorax.  The airways are patent. Lungs are clear without consolidation, interstitial disease, or suspicious nodules.  There is a trace dependently layering right pleural effusion.  Respiratory motion limits pulmonary evaluation. Minimal atelectasis is seen at the right lung base. Upper abdomen demonstrates no acute pathology.  Reflux of contrast is seen into the hepatic veins, suggesting right heart dysfunction. There are no acute fractures.  No suspicious bony lesions.    Cardiomegaly with a trace right pleural effusion.  No definite large central pulmonary  embolus identified. Signed by Cornelio Cordoba    ECG 12 lead    Result Date: 1/3/2024  See ED provider note for full interpretation and clinical correlation Confirmed by Lisa Marshall ([id number]) on 1/3/2024 10:07:50 PM    XR chest 2 views    Result Date: 1/3/2024  Interpreted By:  Bertrand Carvajal, STUDY: XR CHEST 2 VIEWS;  1/3/2024 10:29 am   INDICATION: Signs/Symptoms:SOB.   COMPARISON: 11/09/2023   ACCESSION NUMBER(S): [id number]   ORDERING CLINICIAN: MATTHEW RODRIGUEZ   FINDINGS: Pacemaker in place. Exam limited due to body habitus. No definite new focal infiltrate or pleural effusion. Cardiomegaly suspected. Mild pulmonary vascular congestion.       Pulmonary vascular congestion.     Signed by: Bertrand Carvajal 1/3/2024 10:35 AM Dictation workstation:   [id number]      Inpatient Medications:  Scheduled medications   Medication Dose Route Frequency    enoxaparin  40 mg subcutaneous q24h    furosemide  40 mg intravenous 2 times per day    insulin lispro  0-5 Units subcutaneous Before meals & nightly    magnesium oxide  400 mg oral Daily    melatonin  3 mg oral Daily    pantoprazole  40 mg oral Daily before breakfast    Or    pantoprazole  40 mg intravenous Daily before breakfast    perflutren lipid microspheres  0.5-10 mL of dilution intravenous Once in imaging     PRN medications   Medication    acetaminophen    dextrose 10 % in water (D10W)    dextrose    docusate sodium    glucagon     Continuous Medications   Medication Dose Last Rate       Inpatient Medications:  Scheduled medications   Medication Dose Route Frequency    enoxaparin  40 mg subcutaneous q24h    furosemide  40 mg intravenous 2 times per day    insulin lispro  0-5 Units subcutaneous Before meals & nightly    magnesium oxide  400 mg oral Daily    melatonin  3 mg oral Daily    pantoprazole  40 mg oral Daily before breakfast    Or    pantoprazole  40 mg intravenous Daily before breakfast    perflutren lipid microspheres  0.5-10 mL of dilution intravenous Once  "in imaging     PRN medications   Medication    acetaminophen    dextrose 10 % in water (D10W)    dextrose    docusate sodium    glucagon     Continuous Medications   Medication Dose Last Rate     Outpatient Medications:  Current Outpatient Medications   Medication Instructions    amiodarone (Pacerone) 200 mg tablet Delvis Ventura    aspirin 81 mg, oral, Daily    atorvastatin (LIPITOR) 40 mg, oral, Daily RT    carvedilol (COREG) 25 mg, oral, 2 times daily    cetirizine (ZYRTEC) 10 mg, oral, Daily    dapagliflozin propanediol (FARXIGA) 10 mg, oral, Daily    Dexcom G4 platinum  (Dexcom G7 ) misc Use as instructed    Dexcom G7 Sensor device Please use to check your glucose before meals and at night.    Entresto  mg tablet 1 tablet, oral, 2 times daily    FeroSuL 325 mg, oral, 2 times daily    furosemide (LASIX) 20 mg, oral, Daily    hydrALAZINE (APRESOLINE) 50 mg, oral, Every 8 hours    insulin glargine (LANTUS) 40 Units, subcutaneous, Nightly, Take as directed per insulin instructions.    isosorbide mononitrate ER (IMDUR) 120 mg, oral, Daily, Do not crush or chew.    loperamide (Imodium) 2 mg capsule Take 2 capsules by mouth every 12 hours if needed for diarrhea.    meclizine (ANTIVERT) 25 mg, oral, 3 times daily PRN    MULTIVITAMIN ORAL 1 tablet, oral, Daily    pantoprazole (ProtoNix) 40 mg EC tablet Delvis Ventura    pen needle, diabetic 31 gauge x 5/16\" needle Use to inject insulin daily    prazosin (MINIPRESS) 2 mg, oral, Nightly    spironolactone (ALDACTONE) 25 mg, oral, Daily    zolpidem (AMBIEN) 5 mg, oral, Nightly PRN       Physical Exam:  General:  Obese male, NAD  HEENT:  Pupils equal and reactive.  Normocephalic.  Moist mucosa.    Neck:  No thyromegaly. + JVD  Cardiovascular:  Regular rate and rhythm.  Normal S1 and S2.  Pulmonary: Lungs diminished throughout  Abdomen:  Distended, soft, non- tender, Positive bowel sounds.  Lower Extremities:  2+ pedal pulses. Efrem Lower extremity edema " 1-2+ pitting  Neurologic:  Cranial nerves intact.  No focal deficit.   Skin: Skin warm and dry, normal skin turgor.   Psychiatric: Normal affect.     Assessment/Plan   Delvis Ventura is a 63 y.o. male with a past medical h/o DMII, HTN, COVID 12/202,  cerebral palsy, wheelchair-bound, Systolic HF, nonischemic cardiomyopathy (on [facility] 8/2018 he had trivial CAD), last TTE 8/23 EF 15-20%, s/p ICD ([employer]; 3/6/2023; by Dr. Mickey Torres at [facility]) c/b explantation  d/t infection with re-implantation of single chamber ICD (4/28/2023 at ). Recent hospitalization 11/7/2023 and received Lasix x 3 doses totaling 160 mg >optimal urinary response and s/s improvement> was discharged on Lasix 20 mg po daily. Presents with c/o lower extremity swelling  weight gain > reported dry weight is approximately 194 pounds and estimates he is 204 pounds currently. Cardiology consulted for HF with reduced EF.    I reviewed EKG, sinus rhythm with a first-degree AV block heart rate 99  I reviewed telemetry SR/ST rates 's, burst svt 125 nonsustained.    Acute systolic heart failure/NICM LVEF 15-20% (last TTE 8/23)  S/p [employer] ICD implant> stable by available metrics> no ICD discharges reported, 11/6/23 last device check longevity 9 years  Initial BNP 1519 last 1857 on 11/7/23  C/w IV diuretic therapy> monitor response    2.  Chest pain.  Reports it feels like he is being hit with a a osvaldo-hammer in center chest x 6 days. CP constant.  EKG reveals no new ischemia, troponins flat 34, 36. Heart score Low Level.   Last Nuclear Stress 6/22 was nonischemic  - Likely CP r/t HF exac, will check device    2.  Hypertension.  Suboptimal given current circumstances  Suspect improvement with diuretic therapy      Recommendations:  Continue IV diuretic therapy> monitor response  Daily weights, strict I's and O's, monitor renal function, keep K >4.0 and Mag >2.0.  C/w outpt GDMT  Device Interrogation         Code Status:  Full  Allyn Garcia, APRN-CNP    STAFF ADDENDUM:    Both the GRANT and I have had a face to face encounter with the patient today. I have examined the patient and edited the documented physical examination as necessary.  I personally reviewed the patient's vital signs, telemetry, recent labs, medications, orders, EKGs, and pertinent cardiac imaging/ echocardiography.  I have reviewed the GRANT's encounter note, approve the GRANT's documentation and have edited the note to reflect my diagnostic and therapeutic plan.      Wicho Villatoro, DO

## 2024-01-04 NOTE — PROGRESS NOTES
Transitional Care Coordination Progress Note:  Plan per Medical/Surgical team: treatment of CHF with IV lasix, ECHO pending  Status: observation  Payor source: [employer]  Discharge disposition: home alone  Potential Barriers: cerebral palsy wheelchair-bound, , /104, pedal edema  ADOD: 1/5/2024  AVERY Camp RN, BSN Transitional Care Coordinator ED# [phone]      01/04/24 075   Discharge Planning   Living Arrangements Alone   Support Systems Friends/neighbors   Assistance Needed cardio work up   Type of Residence Private residence   Number of Stairs to Enter Residence 0   Number of Stairs Within Residence 0   Do you have animals or pets at home? No   Home or Post Acute Services None   Patient expects to be discharged to: home alone   Does the patient need discharge transport arranged? Yes   RoundTrip coordination needed? Yes   Has discharge transport been arranged? No   Financial Resource Strain   How hard is it for you to pay for the very basics like food, housing, medical care, and heating? Not hard   Housing Stability   In the last 12 months, was there a time when you were not able to pay the mortgage or rent on time? N   In the last 12 months, how many places have you lived? 1   In the last 12 months, was there a time when you did not have a steady place to sleep or slept in a shelter (including now)? N   Transportation Needs   In the past 12 months, has lack of transportation kept you from medical appointments or from getting medications? no   In the past 12 months, has lack of transportation kept you from meetings, work, or from getting things needed for daily living? No

## 2024-01-04 NOTE — PROGRESS NOTES
Delvis Ventura is a 63 y.o. male on day 0 of admission presenting with Acute on chronic combined systolic and diastolic hrt fail (CMS/HCC).    Subjective   Patient lying back in bed, NAD. Reports sob improved somewhat with diuresis. Reports he is peeing a lot. Continues with midsternal chest pain that is not reproducible.        Objective     Physical Exam  Constitutional:       General: He is not in acute distress.     Appearance: He is not toxic-appearing.   HENT:      Head: Normocephalic and atraumatic.      Right Ear: External ear normal.      Left Ear: External ear normal.      Nose: Nose normal. No congestion.      Mouth/Throat:      Mouth: Mucous membranes are moist.      Pharynx: Oropharynx is clear.   Eyes:      General:         Right eye: No discharge.         Left eye: No discharge.      Conjunctiva/sclera: Conjunctivae normal.      Pupils: Pupils are equal, round, and reactive to light.   Cardiovascular:      Rate and Rhythm: Normal rate and regular rhythm.      Heart sounds: No murmur heard.     No gallop.   Pulmonary:      Effort: Pulmonary effort is normal.      Breath sounds: No wheezing or rales.      Comments: No TTP over chest area of concern  Abdominal:      General: Abdomen is flat. Bowel sounds are normal.      Palpations: Abdomen is soft.      Tenderness: There is no abdominal tenderness. There is no guarding or rebound.   Musculoskeletal:         General: No swelling or tenderness. Normal range of motion.      Right lower leg: Edema (2+) present.      Left lower leg: Edema (2+) present.   Skin:     General: Skin is warm and dry.      Findings: No erythema or rash.   Neurological:      General: No focal deficit present.      Mental Status: He is alert.      Cranial Nerves: No cranial nerve deficit.      Motor: No weakness.   Psychiatric:         Mood and Affect: Mood normal.         Thought Content: Thought content normal.         Last Recorded Vitals  Blood pressure (!) 147/104, pulse 86,  "temperature 36.9 °C (98.4 °F), temperature source Oral, resp. rate 25, height 1.803 m (5' 11\"), weight 91.6 kg (202 lb), SpO2 99 %.  Intake/Output last 3 Shifts:  No intake/output data recorded.    Relevant Results              Scheduled medications  amiodarone, 200 mg, oral, Daily  aspirin, 81 mg, oral, Daily  atorvastatin, 40 mg, oral, Daily  carvedilol, 25 mg, oral, BID  dapagliflozin propanediol, 10 mg, oral, Daily  enoxaparin, 40 mg, subcutaneous, q24h  furosemide, 40 mg, intravenous, 2 times per day  hydrALAZINE, 50 mg, oral, q8h  insulin lispro, 0-5 Units, subcutaneous, Before meals & nightly  isosorbide mononitrate ER, 120 mg, oral, Daily  magnesium oxide, 400 mg, oral, Daily  melatonin, 3 mg, oral, Daily  pantoprazole, 40 mg, oral, Daily before breakfast   Or  pantoprazole, 40 mg, intravenous, Daily before breakfast  prazosin, 2 mg, oral, Nightly  sacubitriL-valsartan, 1 tablet, oral, BID  spironolactone, 25 mg, oral, Daily      Continuous medications     PRN medications  PRN medications: acetaminophen, dextrose 10 % in water (D10W), dextrose, docusate sodium, glucagon    Results for orders placed or performed during the hospital encounter of 01/03/24 (from the past 24 hour(s))   CBC with Differential   Result Value Ref Range    WBC 5.2 4.4 - 11.3 x10*3/uL    nRBC 0.0 0.0 - 0.0 /100 WBCs    RBC 5.31 4.50 - 5.90 x10*6/uL    Hemoglobin 15.8 13.5 - 17.5 g/dL    Hematocrit 48.2 41.0 - 52.0 %    MCV 91 80 - 100 fL    MCH 29.8 26.0 - 34.0 pg    MCHC 32.8 32.0 - 36.0 g/dL    RDW 14.4 11.5 - 14.5 %    Platelets 175 150 - 450 x10*3/uL    Neutrophils % 53.6 40.0 - 80.0 %    Immature Granulocytes %, Automated 0.4 0.0 - 0.9 %    Lymphocytes % 40.2 13.0 - 44.0 %    Monocytes % 5.0 2.0 - 10.0 %    Eosinophils % 0.2 0.0 - 6.0 %    Basophils % 0.6 0.0 - 2.0 %    Neutrophils Absolute 2.78 1.20 - 7.70 x10*3/uL    Immature Granulocytes Absolute, Automated 0.02 0.00 - 0.70 x10*3/uL    Lymphocytes Absolute 2.08 1.20 - 4.80 " x10*3/uL    Monocytes Absolute 0.26 0.10 - 1.00 x10*3/uL    Eosinophils Absolute 0.01 0.00 - 0.70 x10*3/uL    Basophils Absolute 0.03 0.00 - 0.10 x10*3/uL   Comprehensive Metabolic Panel   Result Value Ref Range    Glucose 142 (H) 74 - 99 mg/dL    Sodium 137 136 - 145 mmol/L    Potassium 3.7 3.5 - 5.3 mmol/L    Chloride 103 98 - 107 mmol/L    Bicarbonate 24 21 - 32 mmol/L    Anion Gap 14 10 - 20 mmol/L    Urea Nitrogen 15 6 - 23 mg/dL    Creatinine 0.74 0.50 - 1.30 mg/dL    eGFR >90 >60 mL/min/1.73m*2    Calcium 8.7 8.6 - 10.3 mg/dL    Albumin 3.7 3.4 - 5.0 g/dL    Alkaline Phosphatase 165 (H) 33 - 136 U/L    Total Protein 6.9 6.4 - 8.2 g/dL    AST 40 (H) 9 - 39 U/L    Bilirubin, Total 1.8 (H) 0.0 - 1.2 mg/dL    ALT 41 10 - 52 U/L   Brain Natriuretic Peptide   Result Value Ref Range    BNP 1,519 (H) 0 - 99 pg/mL   Troponin I, High Sensitivity, Initial   Result Value Ref Range    Troponin I, High Sensitivity 34 (H) 0 - 20 ng/L   Troponin, High Sensitivity, 1 Hour   Result Value Ref Range    Troponin I, High Sensitivity 36 (H) 0 - 20 ng/L   Magnesium   Result Value Ref Range    Magnesium 1.70 1.60 - 2.40 mg/dL   CBC   Result Value Ref Range    WBC 4.7 4.4 - 11.3 x10*3/uL    nRBC 0.0 0.0 - 0.0 /100 WBCs    RBC 5.13 4.50 - 5.90 x10*6/uL    Hemoglobin 15.3 13.5 - 17.5 g/dL    Hematocrit 45.7 41.0 - 52.0 %    MCV 89 80 - 100 fL    MCH 29.8 26.0 - 34.0 pg    MCHC 33.5 32.0 - 36.0 g/dL    RDW 14.6 (H) 11.5 - 14.5 %    Platelets 170 150 - 450 x10*3/uL   Comprehensive metabolic panel   Result Value Ref Range    Glucose 121 (H) 74 - 99 mg/dL    Sodium 138 136 - 145 mmol/L    Potassium 3.6 3.5 - 5.3 mmol/L    Chloride 102 98 - 107 mmol/L    Bicarbonate 27 21 - 32 mmol/L    Anion Gap 13 10 - 20 mmol/L    Urea Nitrogen 14 6 - 23 mg/dL    Creatinine 0.79 0.50 - 1.30 mg/dL    eGFR >90 >60 mL/min/1.73m*2    Calcium 8.6 8.6 - 10.3 mg/dL    Albumin 3.6 3.4 - 5.0 g/dL    Alkaline Phosphatase 137 (H) 33 - 136 U/L    Total Protein 6.8  6.4 - 8.2 g/dL    AST 47 (H) 9 - 39 U/L    Bilirubin, Total 1.8 (H) 0.0 - 1.2 mg/dL    ALT 41 10 - 52 U/L   POCT GLUCOSE   Result Value Ref Range    POCT Glucose 108 (H) 74 - 99 mg/dL   Transthoracic Echo (TTE) Complete   Result Value Ref Range    BSA 2.14 m2     CT angio chest for pulmonary embolism    Result Date: 1/4/2024  STUDY: CT Angiogram of the Chest; 01/03/2024 at 11:33 PM INDICATION: Pleuritic chest pain. Evaluate for pulmonary embolism. COMPARISON: XR chest of same date, 01/03/24. CT CAP 08/19/23. CT chest 04/21/23. ACCESSION NUMBER(S): [id number] ORDERING CLINICIAN: YANG URENA TECHNIQUE:  CTA of the chest was performed with intravenous contrast. Images are reviewed and processed at a workstation according to the CT angiogram protocol with 3-D and/or MIP post processing imaging generated.  Omnipaque-350 75 mL was administered intravenously. Automated mA/kV exposure control was utilized and patient examination was performed in strict accordance with principles of ALARA. FINDINGS: Pulmonary arteries are adequately opacified without large central acute or chronic filling defects.  Respiratory motion limits evaluation of the smaller pulmonary arteries.  Image quality is also suboptimal due to the patient's body habitus resulting in increased quantum mottle.  The thoracic aorta is normal in course and caliber without dissection or aneurysm.  Left subclavian approach AICD is seen with a single lead terminating in the right ventricle. The heart is enlarged in size without pericardial effusion.  Thoracic lymph nodes are not enlarged. There is no pleural thickening or or pneumothorax.  The airways are patent. Lungs are clear without consolidation, interstitial disease, or suspicious nodules.  There is a trace dependently layering right pleural effusion.  Respiratory motion limits pulmonary evaluation. Minimal atelectasis is seen at the right lung base. Upper abdomen demonstrates no acute pathology.   Reflux of contrast is seen into the hepatic veins, suggesting right heart dysfunction. There are no acute fractures.  No suspicious bony lesions.    Cardiomegaly with a trace right pleural effusion.  No definite large central pulmonary embolus identified. Signed by Cornelio Cordoba    ECG 12 lead    Result Date: 1/3/2024  See ED provider note for full interpretation and clinical correlation Confirmed by Lisa Marshall ([id number]) on 1/3/2024 10:07:50 PM    XR chest 2 views    Result Date: 1/3/2024  Interpreted By:  Bertrand Carvajal, STUDY: XR CHEST 2 VIEWS;  1/3/2024 10:29 am   INDICATION: Signs/Symptoms:SOB.   COMPARISON: 11/09/2023   ACCESSION NUMBER(S): [id number]   ORDERING CLINICIAN: MATTHEW RODRIGUEZ   FINDINGS: Pacemaker in place. Exam limited due to body habitus. No definite new focal infiltrate or pleural effusion. Cardiomegaly suspected. Mild pulmonary vascular congestion.       Pulmonary vascular congestion.     Signed by: Bertrand Carvajal 1/3/2024 10:35 AM Dictation workstation:   [id number]              Assessment/Plan   Principal Problem:    Acute on chronic combined systolic and diastolic hrt fail (CMS/HCC)  Active Problems:    Benign essential hypertension    Cerebral palsy (CMS/HCC)    Chronic combined systolic and diastolic heart failure, NYHA class 3 (CMS/HCC)    Type 2 diabetes mellitus, with long-term current use of insulin (CMS/HCC)    Mixed hyperlipidemia    History of DVT (deep vein thrombosis)    History of pulmonary embolus (PE)  Delvis Ventura is a 63 y.o. male with PMH significant for HFrEF with EF 10 to 15% on 04/2023, type 2 DM, cerebral palsy wheelchair-bound who presented to the ED for lower extremity edema and recent 8lb weight gain. Pt also reports pleuritic chest pain, SOB, and dizziness for the past 6 days. Pt states these symptoms are similar to when he was hospitalized for CHF exacerbation 1 month ago and received Lasix x3 with good diuresis. Pt notes h/o DVT/PE 6 years ago and is not currently  taking any blood thinners.      ED course: EKG showed normal sinus rhythm, rate 99, poor R wave progression,  no ischemic ST changes. Lab work showed CBC with no leukocytosis, CMP with elevated alk phos 165, elevated AST 40. BNP was significantly elevated at 1519, suggestive of CHF exacerbation. Elevated HS troponin at 36. Pt's heart rate continued to increase while in the ED and was more tachycardic than baseline and he started having pleuritic chest pain. CXR showed pulmonary vascular congestion. CTA showed cardiomegaly with a trace right pleural effusion.  No definite large central pulmonary embolus identified. Pt is admitted for further evaluation and management.    CHF exacerbation  HFrEF with severely reduced EF(15-20%)  HTN  -CBC: no leukocytosis, no acute anemia, no thrombocytopenia  -CMP: no acute electrolyte abnormalities  -CXR:  pulmonary vascular congestion  -CTA: cardiomegaly with a trace right pleural effusion.  No definite large  central pulmonary embolus identified  -HS ERIC: elevated 36  -BNP: 1519 (~ 1800 4 weeks ago)  -mg 1.7, replete PRN  -maintain K > 4.0 and Mg > 2.0  --> replete PRN   -strict I/O, daily weights  -BPH, duonebs, anti-tussives, anti-mucolytics  -heart healthy/carb controlled diet   -tele  -received IV Lasix 80mg in the ER,  -cards consult appreciated     -continue diuresis as per cardiology      -consider stresstest    Type 2 DM  -hypoglycemia protocol, accuchecks ACHS, ISS coverage  -Most recent Hba1c- 8.4% on 8/21/23  -takes insuline glargine 40 U at night, will adjust to 10 U HS     GI prophylaxis  -protonix, bowel regimen      History of DVT  History of PE  VTE prophylaxis  -Lovenox       I spent 45 minutes in the professional and overall care of this patient.      Travis Payne PA-C

## 2024-01-04 NOTE — PROGRESS NOTES
Home alone     01/04/24 0756   Current Planned Discharge Disposition   Current Planned Discharge Disposition Home        [facility]  ENT CONSULT NOTE    REQUESTING PROVIDER:  Brii Shetty MD      CHIEF COMPLAINT:  Throat Problem (Referral from Dakota Sterling MD vocal cord motion)      SUBJECTIVE:  Dong Maldonado is a 54 year old male seen in consultation today at the request of Brii Shetty MD for follow-up of chronic cough in vocal cord dysfunction. Recall I saw patient in March 2016 and referred him to speech therapy for paradoxical vocal cord motion. Patient was noting symptoms of severe shortness of breath and wheezing with activity including hospitalizations frequently for shortness of breath. Patient's asthma has been difficult to manage area did note patient is a  and is freckling on the road say has not followed up with speech.    This winter did have upper respiratory infection hospitalization. Had been having significant problem with the cough after that. Recently put on some new nebulized breathing treatments which patient states her significantly helping with the cough    Recall he is a never smoker.    HISTORIES:  ALLERGIES:   Allergen Reactions   • Metformin DIARRHEA   • Penicillins Other (See Comments)     Unknown reaction     Current Outpatient Prescriptions   Medication   • lidocaine (XYLOCAINE) 4 % topical solution   • SOFTCLIX LANCETS Misc   • triamterene-hydrochlorothiazide (MAXZIDE) 75-50 MG per tablet   • atorvastatin (LIPITOR) 20 MG tablet   • glimepiride (AMARYL) 1 MG tablet   • traMADol (ULTRAM) 50 MG tablet   • exenatide (BYETTA 10 MCG PEN) 10 MCG/0.04ML pen-injector   • Insulin Pen Needle (PX CLICKFINE PEN NEEDLE) 31G X 8 MM Misc   • pantoprazole (PROTONIX) 40 MG tablet   • benzonatate (TESSALON PERLES) 200 MG capsule   • baclofen (LIORESAL) 10 MG tablet   • roflumilast (DALIRESP) 500 MCG Tab   • albuterol-ipratropium 2.5 mg/0.5 mg (DUONEB) 0.5-2.5 (3) MG/3ML nebulizer solution   • benzonatate (TESSALON PERLES) 100 MG capsule   • naproxen (NAPROSYN) 500 MG tablet   •  albuterol (VENTOLIN) (2.5 MG/3ML) 0.083% nebulizer solution   • gabapentin (NEURONTIN) 300 MG capsule   • budesonide-formoterol (SYMBICORT) 160-4.5 MCG/ACT inhaler   • tiotropium (SPIRIVA HANDIHALER) 18 MCG capsule for inhaler   • montelukast (SINGULAIR) 10 MG tablet   • albuterol (VENTOLIN HFA) 108 (90 Base) MCG/ACT inhaler   • fluticasone (FLONASE) 50 MCG/ACT nasal spray   • hydroCORTisone valerate (WEST-JUANA) 0.2 % ointment   • ketoconazole (NIZORAL) 2 % cream   • blood glucose test strip   • Blood Glucose Monitoring Suppl (ACCU-CHEK PAYAL CONNECT) W/DEVICE Kit   • DISPENSE   • Spacer/Aero Chamber Mouthpiece Misc   • aspirin 81 MG tablet     No current facility-administered medications for this visit.      Past Medical History:   Diagnosis Date   • Anxiety    • ASHD (arteriosclerotic heart disease)     Cath:8/12/10 ;1/29/16:Non Obs.EF-normal   • BPH (benign prostatic hypertrophy)    • Chronic HUNT (dyspnea on exertion)     About one half to one block walking   • Chronic low back pain    • Chronic Lower extremity edema    • Chronic pain     Back   • Chronic Venous stasis    • COPD (chronic obstructive pulmonary disease) (CMS/[facility])     emphysema last hosp. 1/21/16 bronch-NEG,CT e/nRx'd steroids   • Diabetes mellitus, type II (CMS/HCC)     12/28/15 A1c 6.1   • DJD Arthritis     back   • Fatty infiltration of liver 7/4/2016    With elevated LFTs   • Gastroesophageal reflux disease    • Hx Bronchitis    • Hx Lumbar radiculopathy     MRI11/12/13: Mod spondylotic DJD changes LS S/P left L5 hemilaminectomy. Mild SS L4 - L5.New/more prominent focal disc extrusion paracentrally  left at L5, which likely contacts/traversing left S1 nerve root.Varying NFNDr. Dagam rec TLIF L4-L5, L5-S1   • Hx Neurofibroma    • Hx Pneumonia    • Hx Viral meningitis     01/2011    • Hyperlipidemia    • Hypertension     1./25,/16.  Echo: EF 65%,DD   • Major depression 11/10/2014   • Multiple pulmonary nodules 7/14/2015    CT 1/21/16:No  PE.Noncalcified RUL nodules larges 4 mm.  CT 6/3/15  Stable 2 mm nodule,RML.3 mm RLL.    • Obesity    • Obstructive sleep apnea     wears CPAP   • RAD (reactive airway disease)    • Right bundle branch block    • Sleep apnea on CPAP      Past Surgical History:   Procedure Laterality Date   • Arthrodesis lumbr w min discectomy extracav  02/20/2012   • Back surgery     • Cardiac catherization  01/29/2016   • Colonoscopy  06/04/2014   • Coronary angiogram - cv  08/12/2010    Normal in August 2010   • Disc removal  09/09/2004   • Esophagogastroduodenoscopy transoral flex w/bx single or mult  09/07/2010   • Ir myelogram  07/16/2004   • Remove tonsils/adenoids,<13 y/o      T & A   • Spinal puncture,lumbar,diagnostic  01/18/2011   • Tonsillectomy and adenoidectomy       Social History     Social History   • Marital status:      Spouse name: N/A   • Number of children: N/A   • Years of education: N/A     Social History Main Topics   • Smoking status: Never Smoker   • Smokeless tobacco: Never Used   • Alcohol use Yes      Comment: rarely   • Drug use: No   • Sexual activity: Not Asked     Other Topics Concern   • None     Social History Narrative    SOCIAL HISTORY:    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  He is a nonsmoker , but has been exposed apparently to fumes as a .  Does  occasionally drink alcohol.  He is . They have 6 children. He does not spend time in the  woods.  He last  travel to the [place] area for 2 weeks until 7/1/16 .    MICAELA HERBERT (SPOUSE)-[phone]             Family History   Problem Relation Age of Onset   • Hypertension Mother    • Hypertension Father    • Hypertension Maternal Uncle    • Heart Maternal Grandfather      Myocardial infarction in late 50s       REVIEW OF SYSTEMS:  12 point review of system negative except:  As in HPI    PHYSICAL EXAM:  Vital Signs:   height is 5' 8\" (1.727 m) and weight is 100.7 kg. His temporal  artery temperature is 98.4 °F (36.9 °C). His blood pressure is 134/86 and his pulse is 91.     2/20/2018    Vitals:    02/12/18 1028   BP: 134/86   Pulse: 91   Temp: 98.4 °F (36.9 °C)       On exam, patient is alert, oriented x3, well-developed, well-nourished, in no apparent distress.  Patient is attentive and responds to questions appropriately.  Voice is not hoarse.    Overall appearance of the head and neck is normal.  Facial symmetry and movement are within normal limits bilaterally, and skin is warm and dry.    Extraocular movement is preserved.    Ears:  Normal pinnae, ear canals, and tympanic membranes bilaterally.  Normal motion of the TM (tympanic membrane) on pneumatic otoscopy.  No TMJ (temporomandibular joint) tenderness.  No mastoid process tenderness.  Speech reception within normal limits.    Nose:  Normal septum, bridge, and nasal mucosa.  No sinus tenderness on percussion.    Oral cavity:  Normal lips, gums, floor of mouth, buccal mucosa, tongue, and hard palate.    Oropharynx:  Normal soft palate, posterior pharyngeal wall, tonsillar fossae and tonsils.    Neck:  No abnormal masses, thyroid masses or enlargement, lymphadenopathy, crepitus or tenderness.  Trachea midline.    Chest:  No respiratory distress, stridor, or wheezing.    Cardiovascular:  No peripheral edema, normal peripheral perfusion, no pallor.    Neurologic:  Cranial nerve function grossly within normal limits.      LABORATORY DATA:  none    IMAGING STUDIES:  none    PROCEDURE:  Flexible Fiberoptic Laryngoscopy:  The patient/guardian gave verbal consent after being explained the indications and details of the procedure.  Face-to-face timeout was performed.  The nasal cavity was topically anesthetized with 4% lidocaine, vasoconstricted with Afrin, and the flexible scope was inserted into both nasal cavities.    Right Nasal cavity - Turbinates normal.  No masses, polyps or purulence noted.    Left Nasal cavity - Turbinates normal.  No  masses, polyps or purulence noted.    Nasopharynx - Normal, no masses, eustachian tube orifices clear.    Oropharynx -   Base of tongue, vallecula, soft palate, posterior pharyngeal wall normal.    Hypopharynx - Posterior pharyngeal wall, postcricoid region and pyriform sinuses normal.    Larynx -  Supraglottis - Epiglottis, aryepiglottic folds, false cords normal.    Glottis - True vocal cords with normal morphology and +pardoxical vocal cord motion after exertion, posterior commissure and arytenoids normal,    Subglottis - Immediately visible portion of subglottis seen from above vocal cords was normal.      ASSESSMENT:  1. Paradoxical vocal cord motion        Patient's Pradaxa vocal cord motion remains, he has not been treated by speech pathology is recommended. Discussed at length with patient that this is viral to improving his breathing problems and he needs to make time to fit this into his travel and work schedule.    PLAN:  Orders Placed This Encounter   • SERVICE TO SPEECH THERAPY     Return as needed      The patient indicated understanding of the diagnosis and agreed with the plan of care.

## 2024-01-04 NOTE — ED PROVIDER NOTES
HPI   Chief Complaint   Patient presents with    Chest Pain       64 yo man h/o   HFrEF with EF 10 to 15%  in April 2023 followed by Dr. Su Fountain, type 2 diabetes,  cerebral palsy wheelchair-bound here complaining of lower extremity edema and gaining about 8 pounds.  Patient reports his dry weight is approximately 194 pounds and he estimates he is 204 pounds currently.     patient had a recent hospitalization 11/7/2023 and received Lasix x 3 with excellent diuresis and was discharged home.                              No data recorded                Patient History   Past Medical History:   Diagnosis Date    Chronic systolic (congestive) heart failure (CMS/[facility]) 09/01/2020    Chronic systolic heart failure    Unspecified systolic (congestive) heart failure (CMS/[facility]) 06/01/2021    HFrEF (heart failure with reduced ejection fraction)     No past surgical history on file.  No family history on file.  Social History     Tobacco Use    Smoking status: Never     Passive exposure: Never    Smokeless tobacco: Never   Substance Use Topics    Alcohol use: Yes     Comment: ocassional beer    Drug use: Never       Physical Exam   ED Triage Vitals   Temp Heart Rate Resp BP   01/03/24 1003 01/03/24 1003 01/03/24 1003 01/03/24 1006   36.4 °C (97.6 °F) 109 20 (!) 170/120      SpO2 Temp Source Heart Rate Source Patient Position   01/03/24 1003 01/03/24 1711 01/03/24 1711 --   96 % Oral Monitor       BP Location FiO2 (%)     01/03/24 1711 --     Left arm        Physical Exam  Vitals and nursing note reviewed.   Constitutional:       General: He is not in acute distress.     Appearance: Normal appearance. He is not toxic-appearing.   HENT:      Head: Normocephalic.      Mouth/Throat:      Mouth: Mucous membranes are moist.   Eyes:      Conjunctiva/sclera: Conjunctivae normal.      Pupils: Pupils are equal, round, and reactive to light.   Cardiovascular:      Rate and Rhythm: Regular rhythm. Tachycardia present.      Pulses:            Radial pulses are 2+ on the right side and 2+ on the left side.      Heart sounds: Normal heart sounds.      Comments:   Symmetric 1+ lower extremity pedal edema  Pulmonary:      Effort: Pulmonary effort is normal. No respiratory distress.      Breath sounds: Normal breath sounds.   Abdominal:      General: Abdomen is flat.      Palpations: Abdomen is soft.      Tenderness: There is no abdominal tenderness. There is no guarding or rebound.   Musculoskeletal:      Cervical back: Normal range of motion and neck supple.      Right lower leg: No edema.      Left lower leg: No edema.   Skin:     General: Skin is warm.   Neurological:      Mental Status: He is alert and oriented to person, place, and time.   Psychiatric:         Mood and Affect: Mood normal.         ED Course & MDM   Diagnoses as of 01/03/24 2006   Acute on chronic congestive heart failure, unspecified heart failure type (CMS/HCC)       Medical Decision Making   EKG my interp normal sinus rhythm, heart rate 99,  poor R wave progression, no ST elevations or depressions.  Heart rate has increased from prior EKG     patient's BNP is significantly elevated consistent with CHF exacerbation.  He diuresed nicely in the past.  I think most likely the diagnosis is CHF exacerbation however he is more tachycardic than usual and is complaining of pleuritic chest pain so I will rule him out for PE as well.  Report has been called to [facility] for an OBS hospitalization.    Amount and/or Complexity of Data Reviewed  External Data Reviewed: labs, radiology and ECG.  Radiology: independent interpretation performed.     Details: chf        Procedure  Procedures     Shannon Long MD  01/03/24 2006       Shannon Long MD  01/03/24 2008

## 2024-01-04 NOTE — ED NOTES
CHW talked to patient regarding not having a primary care physician. Patient expressed that he does have a physician named Boo Gaitan. W updated patient chart with physician name in his chart.      [facility] Shaw [facility]  01/04/24 0904

## 2024-01-04 NOTE — PROGRESS NOTES
Pharmacy Medication History Review    Delvis Ventura is a 63 y.o. male admitted for Acute on chronic combined systolic and diastolic hrt fail (CMS/[facility]). Pharmacy reviewed the patient's [id number] medications and allergies for accuracy.    The list below reflectives the updated PTA list. Please review each medication in order reconciliation for additional clarification and justification.  Prior to Admission Medications   Prescriptions Last Dose Informant Patient Reported?    Dexcom G4 platinum  (Dexcom G7 ) misc   No    Sig: Use as instructed   Dexcom G7 Sensor device   No    Sig: Please use to check your glucose before meals and at night.   Entresto  mg tablet 1/1/2024  No    Sig: Take 1 tablet by mouth 2 times a day.   FeroSuL 325 mg (65 mg iron) tablet 1/1/2024  No    Sig: Take 1 tablet by mouth twice daily.   amiodarone (Pacerone) 200 mg tablet 1/1/2024  No    Sig: Delvis Ventura   Patient taking differently: Take 1 tablet (200 mg) by mouth once daily.   aspirin 81 mg chewable tablet 1/1/2024  No    Sig: Chew 1 tablet (81 mg) once daily.   atorvastatin (Lipitor) 40 mg tablet 1/1/2024  No    Sig: Take 1 tablet (40 mg) by mouth once daily.   carvedilol (Coreg) 25 mg tablet 1/1/2024  No    Sig: Take 1 tablet (25 mg) by mouth 2 times a day.   dapagliflozin propanediol (Farxiga) 10 mg 1/1/2024  No    Sig: Take 1 tablet (10 mg) by mouth once daily.   furosemide (Lasix) 20 mg tablet Not Taking  No    Sig: Take 1 tablet (20 mg) by mouth once daily.   Patient not taking: Reported on 1/4/2024   furosemide (Lasix) 20 mg tablet 1/1/2024  Yes    Sig: Take 1 tablet (20 mg) by mouth 2 times a day.   hydrALAZINE (Apresoline) 50 mg tablet 1/1/2024  No    Sig: Take 1 tablet (50 mg) by mouth every 8 hours.   insulin glargine (Lantus) 100 unit/mL (3 mL) pen   No    Sig: Inject 40 Units under the skin once daily at bedtime. Take as directed per insulin instructions.   isosorbide mononitrate ER (Imdur) 120  "mg 24 hr tablet 1/1/2024  No    Sig: Take 1 tablet (120 mg) by mouth once daily. Do not crush or chew.   loperamide (Imodium) 2 mg capsule 1/1/2024  No    Sig: Take 2 capsules by mouth every 12 hours if needed for diarrhea.   pen needle, diabetic 31 gauge x 5/16\" needle   No    Sig: Use to inject insulin daily   prazosin (Minipress) 2 mg capsule 1/1/2024  No    Sig: Take 1 capsule (2 mg) by mouth once daily at bedtime.   spironolactone (Aldactone) 25 mg tablet 1/1/2024  No    Sig: Take 1 tablet (25 mg) by mouth once daily.   Patient taking differently: Take 1 tablet (25 mg) by mouth 2 times a day.   zolpidem (Ambien) 5 mg tablet   No    Sig: Take 1 tablet (5 mg) by mouth as needed at bedtime for sleep.      Facility-Administered Medications: None      Spoke to the patient. Updated some directions and doses on some medications.  Patient took his meds on Monday       The list below reflectives the updated allergy list. Please review each documented allergy for additional clarification and justification.  Allergies  Reviewed by Janae Bermudez RN on 1/3/2024        Severity Reactions Comments    Metformin Not Specified Diarrhea             Below are additional concerns with the patient's PTA list.      Desirae Hilton CPhT    "

## 2024-01-05 ENCOUNTER — APPOINTMENT (OUTPATIENT)
Dept: CARDIOLOGY | Facility: HOSPITAL | Age: 64
DRG: 291 | End: 2024-01-05
Payer: COMMERCIAL

## 2024-01-05 LAB
ANION GAP SERPL CALC-SCNC: 13 MMOL/L (ref 10–20)
ANION GAP SERPL CALC-SCNC: 9 MMOL/L (ref 10–20)
BUN SERPL-MCNC: 15 MG/DL (ref 6–23)
BUN SERPL-MCNC: 16 MG/DL (ref 6–23)
CALCIUM SERPL-MCNC: 7.4 MG/DL (ref 8.6–10.3)
CALCIUM SERPL-MCNC: 8.5 MG/DL (ref 8.6–10.3)
CHLORIDE SERPL-SCNC: 104 MMOL/L (ref 98–107)
CHLORIDE SERPL-SCNC: 108 MMOL/L (ref 98–107)
CO2 SERPL-SCNC: 24 MMOL/L (ref 21–32)
CO2 SERPL-SCNC: 26 MMOL/L (ref 21–32)
CREAT SERPL-MCNC: 0.91 MG/DL (ref 0.5–1.3)
CREAT SERPL-MCNC: 0.93 MG/DL (ref 0.5–1.3)
ERYTHROCYTE [DISTWIDTH] IN BLOOD BY AUTOMATED COUNT: 14.3 % (ref 11.5–14.5)
ERYTHROCYTE [DISTWIDTH] IN BLOOD BY AUTOMATED COUNT: 14.5 % (ref 11.5–14.5)
GFR SERPL CREATININE-BSD FRML MDRD: >90 ML/MIN/1.73M*2
GFR SERPL CREATININE-BSD FRML MDRD: >90 ML/MIN/1.73M*2
GLUCOSE BLD MANUAL STRIP-MCNC: 162 MG/DL (ref 74–99)
GLUCOSE BLD MANUAL STRIP-MCNC: 194 MG/DL (ref 74–99)
GLUCOSE BLD MANUAL STRIP-MCNC: 252 MG/DL (ref 74–99)
GLUCOSE SERPL-MCNC: 146 MG/DL (ref 74–99)
GLUCOSE SERPL-MCNC: 245 MG/DL (ref 74–99)
HCT VFR BLD AUTO: 45.9 % (ref 41–52)
HCT VFR BLD AUTO: 48.2 % (ref 41–52)
HGB BLD-MCNC: 15.1 G/DL (ref 13.5–17.5)
HGB BLD-MCNC: 15.7 G/DL (ref 13.5–17.5)
MAGNESIUM SERPL-MCNC: 2 MG/DL (ref 1.6–2.4)
MCH RBC QN AUTO: 29.5 PG (ref 26–34)
MCH RBC QN AUTO: 29.7 PG (ref 26–34)
MCHC RBC AUTO-ENTMCNC: 32.6 G/DL (ref 32–36)
MCHC RBC AUTO-ENTMCNC: 32.9 G/DL (ref 32–36)
MCV RBC AUTO: 90 FL (ref 80–100)
MCV RBC AUTO: 91 FL (ref 80–100)
NRBC BLD-RTO: 0 /100 WBCS (ref 0–0)
NRBC BLD-RTO: 0 /100 WBCS (ref 0–0)
PLATELET # BLD AUTO: 184 X10*3/UL (ref 150–450)
PLATELET # BLD AUTO: 206 X10*3/UL (ref 150–450)
POTASSIUM SERPL-SCNC: 3.4 MMOL/L (ref 3.5–5.3)
POTASSIUM SERPL-SCNC: 3.5 MMOL/L (ref 3.5–5.3)
RBC # BLD AUTO: 5.11 X10*6/UL (ref 4.5–5.9)
RBC # BLD AUTO: 5.29 X10*6/UL (ref 4.5–5.9)
SODIUM SERPL-SCNC: 137 MMOL/L (ref 136–145)
SODIUM SERPL-SCNC: 140 MMOL/L (ref 136–145)
WBC # BLD AUTO: 5.4 X10*3/UL (ref 4.4–11.3)
WBC # BLD AUTO: 5.8 X10*3/UL (ref 4.4–11.3)

## 2024-01-05 PROCEDURE — 36415 COLL VENOUS BLD VENIPUNCTURE: CPT | Performed by: PHYSICIAN ASSISTANT

## 2024-01-05 PROCEDURE — 2500000001 HC RX 250 WO HCPCS SELF ADMINISTERED DRUGS (ALT 637 FOR MEDICARE OP): Performed by: PHYSICIAN ASSISTANT

## 2024-01-05 PROCEDURE — 80048 BASIC METABOLIC PNL TOTAL CA: CPT | Performed by: PHYSICIAN ASSISTANT

## 2024-01-05 PROCEDURE — 82947 ASSAY GLUCOSE BLOOD QUANT: CPT

## 2024-01-05 PROCEDURE — 2500000004 HC RX 250 GENERAL PHARMACY W/ HCPCS (ALT 636 FOR OP/ED): Performed by: PHYSICIAN ASSISTANT

## 2024-01-05 PROCEDURE — 83735 ASSAY OF MAGNESIUM: CPT | Performed by: PHYSICIAN ASSISTANT

## 2024-01-05 PROCEDURE — 1200000002 HC GENERAL ROOM WITH TELEMETRY DAILY

## 2024-01-05 PROCEDURE — 99232 SBSQ HOSP IP/OBS MODERATE 35: CPT

## 2024-01-05 PROCEDURE — 93005 ELECTROCARDIOGRAM TRACING: CPT

## 2024-01-05 PROCEDURE — 2500000002 HC RX 250 W HCPCS SELF ADMINISTERED DRUGS (ALT 637 FOR MEDICARE OP, ALT 636 FOR OP/ED): Performed by: PHYSICIAN ASSISTANT

## 2024-01-05 PROCEDURE — 85027 COMPLETE CBC AUTOMATED: CPT | Performed by: PHYSICIAN ASSISTANT

## 2024-01-05 PROCEDURE — 80048 BASIC METABOLIC PNL TOTAL CA: CPT | Performed by: INTERNAL MEDICINE

## 2024-01-05 PROCEDURE — 99233 SBSQ HOSP IP/OBS HIGH 50: CPT | Performed by: INTERNAL MEDICINE

## 2024-01-05 PROCEDURE — 36415 COLL VENOUS BLD VENIPUNCTURE: CPT | Performed by: INTERNAL MEDICINE

## 2024-01-05 PROCEDURE — 85027 COMPLETE CBC AUTOMATED: CPT | Performed by: INTERNAL MEDICINE

## 2024-01-05 RX ORDER — LOPERAMIDE HYDROCHLORIDE 2 MG/1
2 CAPSULE ORAL 3 TIMES DAILY PRN
Status: DISCONTINUED | OUTPATIENT
Start: 2024-01-05 | End: 2024-01-08 | Stop reason: HOSPADM

## 2024-01-05 RX ORDER — ZOLPIDEM TARTRATE 5 MG/1
5 TABLET ORAL NIGHTLY PRN
Status: DISCONTINUED | OUTPATIENT
Start: 2024-01-05 | End: 2024-01-08 | Stop reason: HOSPADM

## 2024-01-05 RX ORDER — MAGNESIUM SULFATE HEPTAHYDRATE 40 MG/ML
2 INJECTION, SOLUTION INTRAVENOUS ONCE
Status: COMPLETED | OUTPATIENT
Start: 2024-01-05 | End: 2024-01-05

## 2024-01-05 RX ADMIN — INSULIN LISPRO 3 UNITS: 100 INJECTION, SOLUTION INTRAVENOUS; SUBCUTANEOUS at 12:32

## 2024-01-05 RX ADMIN — CARVEDILOL 25 MG: 25 TABLET, FILM COATED ORAL at 20:55

## 2024-01-05 RX ADMIN — FUROSEMIDE 40 MG: 10 INJECTION, SOLUTION INTRAMUSCULAR; INTRAVENOUS at 20:55

## 2024-01-05 RX ADMIN — HYDRALAZINE HYDROCHLORIDE 50 MG: 25 TABLET ORAL at 20:56

## 2024-01-05 RX ADMIN — MAGNESIUM SULFATE HEPTAHYDRATE 2 G: 40 INJECTION, SOLUTION INTRAVENOUS at 00:42

## 2024-01-05 RX ADMIN — LOPERAMIDE HYDROCHLORIDE 2 MG: 2 CAPSULE ORAL at 00:42

## 2024-01-05 RX ADMIN — ISOSORBIDE MONONITRATE 120 MG: 30 TABLET, EXTENDED RELEASE ORAL at 09:00

## 2024-01-05 RX ADMIN — SPIRONOLACTONE 25 MG: 25 TABLET ORAL at 09:00

## 2024-01-05 RX ADMIN — SACUBITRIL AND VALSARTAN 2 TABLET: 49; 51 TABLET, FILM COATED ORAL at 09:00

## 2024-01-05 RX ADMIN — ASPIRIN 81 MG CHEWABLE TABLET 81 MG: 81 TABLET CHEWABLE at 09:16

## 2024-01-05 RX ADMIN — INSULIN LISPRO 1 UNITS: 100 INJECTION, SOLUTION INTRAVENOUS; SUBCUTANEOUS at 17:17

## 2024-01-05 RX ADMIN — CARVEDILOL 25 MG: 25 TABLET, FILM COATED ORAL at 09:00

## 2024-01-05 RX ADMIN — HYDRALAZINE HYDROCHLORIDE 50 MG: 25 TABLET ORAL at 12:32

## 2024-01-05 RX ADMIN — Medication 3 MG: at 20:56

## 2024-01-05 RX ADMIN — SACUBITRIL AND VALSARTAN 2 TABLET: 49; 51 TABLET, FILM COATED ORAL at 20:56

## 2024-01-05 RX ADMIN — LOPERAMIDE HYDROCHLORIDE 2 MG: 2 CAPSULE ORAL at 21:20

## 2024-01-05 RX ADMIN — ZOLPIDEM TARTRATE 5 MG: 5 TABLET, FILM COATED ORAL at 00:42

## 2024-01-05 RX ADMIN — ATORVASTATIN CALCIUM 40 MG: 40 TABLET, FILM COATED ORAL at 20:55

## 2024-01-05 RX ADMIN — ZOLPIDEM TARTRATE 5 MG: 5 TABLET, FILM COATED ORAL at 20:49

## 2024-01-05 RX ADMIN — INSULIN LISPRO 1 UNITS: 100 INJECTION, SOLUTION INTRAVENOUS; SUBCUTANEOUS at 09:17

## 2024-01-05 RX ADMIN — ENOXAPARIN SODIUM 40 MG: 40 INJECTION SUBCUTANEOUS at 09:18

## 2024-01-05 RX ADMIN — PANTOPRAZOLE SODIUM 40 MG: 40 TABLET, DELAYED RELEASE ORAL at 06:25

## 2024-01-05 RX ADMIN — INSULIN GLARGINE 20 UNITS: 100 INJECTION, SOLUTION SUBCUTANEOUS at 20:56

## 2024-01-05 RX ADMIN — FUROSEMIDE 40 MG: 10 INJECTION, SOLUTION INTRAMUSCULAR; INTRAVENOUS at 09:45

## 2024-01-05 RX ADMIN — PRAZOSIN HYDROCHLORIDE 2 MG: 1 CAPSULE ORAL at 20:56

## 2024-01-05 RX ADMIN — HYDRALAZINE HYDROCHLORIDE 50 MG: 25 TABLET ORAL at 04:45

## 2024-01-05 RX ADMIN — INSULIN LISPRO 3 UNITS: 100 INJECTION, SOLUTION INTRAVENOUS; SUBCUTANEOUS at 21:00

## 2024-01-05 ASSESSMENT — COGNITIVE AND FUNCTIONAL STATUS - GENERAL
TOILETING: A LITTLE
STANDING UP FROM CHAIR USING ARMS: A LITTLE
HELP NEEDED FOR BATHING: A LITTLE
DRESSING REGULAR UPPER BODY CLOTHING: A LITTLE
MOVING TO AND FROM BED TO CHAIR: A LITTLE
DRESSING REGULAR LOWER BODY CLOTHING: A LITTLE
MOBILITY SCORE: 16
DAILY ACTIVITIY SCORE: 20
WALKING IN HOSPITAL ROOM: TOTAL
CLIMB 3 TO 5 STEPS WITH RAILING: TOTAL

## 2024-01-05 ASSESSMENT — PAIN - FUNCTIONAL ASSESSMENT: PAIN_FUNCTIONAL_ASSESSMENT: 0-10

## 2024-01-05 ASSESSMENT — PAIN SCALES - GENERAL: PAINLEVEL_OUTOF10: 0 - NO PAIN

## 2024-01-05 NOTE — CARE PLAN
Problem: Pain  Goal: Takes deep breaths with improved pain control throughout the shift  1/4/2024 2310 by Myah Crockett RN  Outcome: Progressing  1/4/2024 2259 by Myah Crockett RN  Outcome: Progressing  Goal: Turns in bed with improved pain control throughout the shift  1/4/2024 2310 by Myah Crockett RN  Outcome: Progressing  1/4/2024 2259 by Myah Crockett RN  Outcome: Progressing  Goal: Walks with improved pain control throughout the shift  1/4/2024 2310 by Myah Crockett RN  Outcome: Progressing  1/4/2024 2259 by Myah Crockett RN  Outcome: Progressing  Goal: Performs ADL's with improved pain control throughout shift  1/4/2024 2310 by Myah Crockett RN  Outcome: Progressing  1/4/2024 2259 by Myah Crockett RN  Outcome: Progressing  Goal: Participates in PT with improved pain control throughout the shift  1/4/2024 2310 by Myah Crockett RN  Outcome: Progressing  1/4/2024 2259 by Myah Crockett RN  Outcome: Progressing  Goal: Free from opioid side effects throughout the shift  1/4/2024 2310 by Myah Crockett RN  Outcome: Progressing  1/4/2024 2259 by Myah Crockett RN  Outcome: Progressing  Goal: Free from acute confusion related to pain meds throughout the shift  1/4/2024 2310 by Myah Crockett RN  Outcome: Progressing  1/4/2024 2259 by Myah Crockett RN  Outcome: Progressing   The patient's goals for the shift include to be admitted    The clinical goals for the shift include Pt will be able to communicate when pt is in pain    Over the shift, the patient did not make progress toward the following goals. Goal met

## 2024-01-05 NOTE — PROGRESS NOTES
"Subjective Data:  Patient resting, lying flat in bed in no apparent distress.  He denies any complaints of chest pain, dyspnea, or palpitations at rest.  He reports robust urinary response to IV diuretic therapy  Currently he is requiring no supplemental oxygen    Overnight Events:    None reported     Objective Data:  Last Recorded Vitals:  Vitals:    24 2127 24 2309 24 0442 24 0759   BP: (!) 167/107 145/90 111/69 106/65   BP Location:  Right arm Left arm    Patient Position:  Lying Lying Lying   Pulse:  89 72 75   Resp:  20 20 20   Temp:  37.1 °C (98.8 °F) 36 °C (96.8 °F) 35.8 °C (96.4 °F)   TempSrc:  Tympanic Tympanic Temporal   SpO2:  98% 96% 94%   Weight:    95.3 kg (210 lb 1.6 oz)   Height:         Medical Gas Therapy: None (Room air)  Weight  Av.9 kg (204 lb 11.2 oz)  Min: 91.6 kg (202 lb)  Max: 95.3 kg (210 lb 1.6 oz)    LABS:  CMP:  Results from last 7 days   Lab Units 24  0513 24  0604 24  1232   SODIUM mmol/L 137 138 137   POTASSIUM mmol/L 3.5 3.6 3.7   CHLORIDE mmol/L 104 102 103   CO2 mmol/L 24 27 24   ANION GAP mmol/L 13 13 14   BUN mg/dL 16 14 15   CREATININE mg/dL 0.93 0.79 0.74   EGFR mL/min/1.73m*2 >90 >90 >90   MAGNESIUM mg/dL 2.00 1.70  --    ALBUMIN g/dL  --  3.6 3.7   ALT U/L  --  41 41   AST U/L  --  47* 40*   BILIRUBIN TOTAL mg/dL  --  1.8* 1.8*     CBC:  Results from last 7 days   Lab Units 24  0512 24  0604 24  1232   WBC AUTO x10*3/uL 5.4 4.7 5.2   HEMOGLOBIN g/dL 15.1 15.3 15.8   HEMATOCRIT % 45.9 45.7 48.2   PLATELETS AUTO x10*3/uL 184 170 175   MCV fL 90 89 91     COAG:     ABO: No results found for: \"ABO\"  HEME/ENDO:     CARDIAC:   Results from last 7 days   Lab Units 24  1536 24  1232   TROPHS ng/L 36* 34*   BNP pg/mL  --  1,519*             Last I/O:    Intake/Output Summary (Last 24 hours) at 2024 1005  Last data filed at 2024 0600  Gross per 24 hour   Intake 420 ml   Output 400 ml   Net 20 ml "     Net IO Since Admission: 20 mL [01/05/24 1005]      Imaging Results:  CT angio chest for pulmonary embolism    Result Date: 1/4/2024  STUDY: CT Angiogram of the Chest; 01/03/2024 at 11:33 PM INDICATION: Pleuritic chest pain. Evaluate for pulmonary embolism. COMPARISON: XR chest of same date, 01/03/24. CT CAP 08/19/23. CT chest 04/21/23. ACCESSION NUMBER(S): [id number] ORDERING CLINICIAN: YANG URENA TECHNIQUE:  CTA of the chest was performed with intravenous contrast. Images are reviewed and processed at a workstation according to the CT angiogram protocol with 3-D and/or MIP post processing imaging generated.  Omnipaque-350 75 mL was administered intravenously. Automated mA/kV exposure control was utilized and patient examination was performed in strict accordance with principles of ALARA. FINDINGS: Pulmonary arteries are adequately opacified without large central acute or chronic filling defects.  Respiratory motion limits evaluation of the smaller pulmonary arteries.  Image quality is also suboptimal due to the patient's body habitus resulting in increased quantum mottle.  The thoracic aorta is normal in course and caliber without dissection or aneurysm.  Left subclavian approach AICD is seen with a single lead terminating in the right ventricle. The heart is enlarged in size without pericardial effusion.  Thoracic lymph nodes are not enlarged. There is no pleural thickening or or pneumothorax.  The airways are patent. Lungs are clear without consolidation, interstitial disease, or suspicious nodules.  There is a trace dependently layering right pleural effusion.  Respiratory motion limits pulmonary evaluation. Minimal atelectasis is seen at the right lung base. Upper abdomen demonstrates no acute pathology.  Reflux of contrast is seen into the hepatic veins, suggesting right heart dysfunction. There are no acute fractures.  No suspicious bony lesions.    Cardiomegaly with a trace right pleural  effusion.  No definite large central pulmonary embolus identified. Signed by Cornelio Cordoba    ECG 12 lead    Result Date: 1/3/2024  See ED provider note for full interpretation and clinical correlation Confirmed by Lisa Marshall ([id number]) on 1/3/2024 10:07:50 PM    XR chest 2 views    Result Date: 1/3/2024  Interpreted By:  Bertrand Carvajal, STUDY: XR CHEST 2 VIEWS;  1/3/2024 10:29 am   INDICATION: Signs/Symptoms:SOB.   COMPARISON: 11/09/2023   ACCESSION NUMBER(S): [id number]   ORDERING CLINICIAN: MATTHEW RODRIGUEZ   FINDINGS: Pacemaker in place. Exam limited due to body habitus. No definite new focal infiltrate or pleural effusion. Cardiomegaly suspected. Mild pulmonary vascular congestion.       Pulmonary vascular congestion.     Signed by: Bertrand Carvajal 1/3/2024 10:35 AM Dictation workstation:   [id number]      Echo 1/3/24:  CONCLUSIONS:  1. Left ventricular systolic function is severely decreased with a 15-20% estimated ejection fraction.  2. No left ventricular thrombus visualized.  3. Spectral Doppler shows a restrictive pattern of left ventricular diastolic filling.  4. There is mildly reduced right ventricular systolic function.  5. Mildly elevated RVSP.  6. There is global hypokinesis of the left ventricle with minor regional variations.      Inpatient Medications:  Scheduled medications   Medication Dose Route Frequency    [Held by provider] amiodarone  200 mg oral Daily    aspirin  81 mg oral Daily    atorvastatin  40 mg oral Nightly    carvedilol  25 mg oral BID    enoxaparin  40 mg subcutaneous q24h    furosemide  40 mg intravenous 2 times per day    hydrALAZINE  50 mg oral q8h    insulin glargine  20 Units subcutaneous Nightly    insulin lispro  0-5 Units subcutaneous Before meals & nightly    isosorbide mononitrate ER  120 mg oral Daily    melatonin  3 mg oral Daily    pantoprazole  40 mg oral Daily before breakfast    Or    pantoprazole  40 mg intravenous Daily before breakfast    prazosin  2 mg oral  Nightly    sacubitriL-valsartan  2 tablet oral BID    spironolactone  25 mg oral Daily     PRN medications   Medication    acetaminophen    dextrose 10 % in water (D10W)    dextrose    docusate sodium    glucagon    loperamide    zolpidem     Continuous Medications   Medication Dose Last Rate   Physical Exam:  General:  Obese male, NAD  HEENT:  Pupils equal and reactive.  Normocephalic.  Moist mucosa.    Neck:  No thyromegaly. + JVD  Cardiovascular:  Regular rate and rhythm.  Normal S1 and S2.  Pulmonary: Lungs diminished throughout  Abdomen:  Distended, soft, non- tender, Positive bowel sounds.  Lower Extremities:  2+ pedal pulses. Efrem Lower extremity edema 1+ pitting  Neurologic:  Cranial nerves intact.  No focal deficit.   Skin: Skin warm and dry, normal skin turgor.   Psychiatric: Normal affect.     Assessment/Plan   Delvis Ventura is a 63 y.o. male with a past medical h/o DMII, HTN, COVID 12/202,  cerebral palsy, wheelchair-bound, Systolic HF, nonischemic cardiomyopathy (on [facility] 8/2018 he had trivial CAD), last TTE 8/23 EF 15-20%, s/p ICD (Therapydia; 3/6/2023; by Dr. Mickey Torres at [facility]) c/b explantation  d/t infection with re-implantation of single chamber ICD (4/28/2023 at ). Recent hospitalization 11/7/2023 and received Lasix x 3 doses totaling 160 mg >optimal urinary response and s/s improvement> was discharged on Lasix 20 mg po daily. Presents with c/o lower extremity swelling  weight gain > reported dry weight is approximately 194 pounds and estimates he is 204 pounds currently. Cardiology consulted for HF with reduced EF.     I reviewed EKG, sinus rhythm with a first-degree AV block heart rate 99  I reviewed telemetry SR/ST rates 's, short runs of NSVT> paced out of it appropriately> asymptomatic     Acute systolic heart failure/NICM LVEF 15-20% (last TTE 1/4/24, no sig change from previous TTE 8/23)  S/p [employer] ICD implant> stable by available metrics>  Device check 1/4/23> reveals  normal function device,  no ICD discharges reported, longevity 9 years  :<1%  Initial BNP 1519 last 1857 on 11/7/23  C/w IV diuretic therapy> monitor response     2.  Chest pain.  Reports it feels like he is being hit with a a osvaldo-hammer in center chest x 6 days. CP constant.  EKG reveals no new ischemia, troponins flat 34, 36. Heart score Low Level.   Last Nuclear Stress 6/22 was nonischemic  Improving with Diuresing  - Likely CP r/t HF exac, device check wnl (1/4/24)     2.  Hypertension.  Suboptimal given current circumstances  Suspect improvement with diuretic therapy        Recommendations:  Continue IV diuretic therapy> monitor response  Daily weights, strict I's and O's, monitor renal function, keep K >4.0 and Mag >2.0.  C/w outpt GDMT            Code Status:  Full Code    I spent 35 minutes in the professional and overall care of this patient.        Lisa Garcia, APRN-CNP

## 2024-01-05 NOTE — CARE PLAN
Problem: Pain  Goal: Takes deep breaths with improved pain control throughout the shift  Outcome: Progressing  Goal: Turns in bed with improved pain control throughout the shift  Outcome: Progressing  Goal: Walks with improved pain control throughout the shift  Outcome: Progressing  Goal: Performs ADL's with improved pain control throughout shift  Outcome: Progressing  Goal: Participates in PT with improved pain control throughout the shift  Outcome: Progressing  Goal: Free from opioid side effects throughout the shift  Outcome: Progressing  Goal: Free from acute confusion related to pain meds throughout the shift  Outcome: Progressing   The patient's goals for the shift include to be admitted    The clinical goals for the shift include Pt will be able to communicate when pt is in pain    Over the shift, the patient did  make progress toward the following goals.

## 2024-01-05 NOTE — PROGRESS NOTES
"Delvis Ventura is a 63 y.o. male on day 1 of admission presenting with Acute on chronic combined systolic and diastolic hrt fail (CMS/HCC).    Subjective   Doing well aware we still need to continue to diuresis, on RA no dyspnea       Objective     Physical Exam  Constitutional:       Appearance: Normal appearance.   HENT:      Head: Normocephalic.      Mouth/Throat:      Mouth: Mucous membranes are moist.      Pharynx: Oropharynx is clear.   Cardiovascular:      Rate and Rhythm: Normal rate and regular rhythm.      Pulses: Normal pulses.      Heart sounds: Normal heart sounds.   Pulmonary:      Comments: Bibasilar crackles    Abdominal:      General: Abdomen is flat. Bowel sounds are normal.      Palpations: Abdomen is soft.   Neurological:      Mental Status: He is alert.         Last Recorded Vitals  Blood pressure 112/71, pulse 74, temperature 35.9 °C (96.6 °F), temperature source Temporal, resp. rate 20, height 1.803 m (5' 11\"), weight 95.3 kg (210 lb 1.6 oz), SpO2 97 %.  Intake/Output last 3 Shifts:  I/O last 3 completed shifts:  In: 420 (4.6 mL/kg) [P.O.:360; I.V.:60 (0.7 mL/kg)]  Out: 400 (4.4 mL/kg) [Urine:400 (0.1 mL/kg/hr)]  Weight: 91.6 kg     Relevant Results  Results for orders placed or performed during the hospital encounter of 01/03/24 (from the past 24 hour(s))   POCT GLUCOSE   Result Value Ref Range    POCT Glucose 299 (H) 74 - 99 mg/dL   POCT GLUCOSE   Result Value Ref Range    POCT Glucose 270 (H) 74 - 99 mg/dL   CBC   Result Value Ref Range    WBC 5.4 4.4 - 11.3 x10*3/uL    nRBC 0.0 0.0 - 0.0 /100 WBCs    RBC 5.11 4.50 - 5.90 x10*6/uL    Hemoglobin 15.1 13.5 - 17.5 g/dL    Hematocrit 45.9 41.0 - 52.0 %    MCV 90 80 - 100 fL    MCH 29.5 26.0 - 34.0 pg    MCHC 32.9 32.0 - 36.0 g/dL    RDW 14.3 11.5 - 14.5 %    Platelets 184 150 - 450 x10*3/uL   Basic Metabolic Panel   Result Value Ref Range    Glucose 245 (H) 74 - 99 mg/dL    Sodium 137 136 - 145 mmol/L    Potassium 3.5 3.5 - 5.3 mmol/L    " Chloride 104 98 - 107 mmol/L    Bicarbonate 24 21 - 32 mmol/L    Anion Gap 13 10 - 20 mmol/L    Urea Nitrogen 16 6 - 23 mg/dL    Creatinine 0.93 0.50 - 1.30 mg/dL    eGFR >90 >60 mL/min/1.73m*2    Calcium 8.5 (L) 8.6 - 10.3 mg/dL   Magnesium   Result Value Ref Range    Magnesium 2.00 1.60 - 2.40 mg/dL   POCT GLUCOSE   Result Value Ref Range    POCT Glucose 194 (H) 74 - 99 mg/dL   POCT GLUCOSE   Result Value Ref Range    POCT Glucose 252 (H) 74 - 99 mg/dL       Imaging Results  Ct chest:  IMPRESSION:  Cardiomegaly with a trace right pleural effusion.  No definite large  central pulmonary embolus identified.      Medications:  aspirin, 81 mg, oral, Daily  atorvastatin, 40 mg, oral, Nightly  carvedilol, 25 mg, oral, BID  enoxaparin, 40 mg, subcutaneous, q24h  furosemide, 40 mg, intravenous, 2 times per day  hydrALAZINE, 50 mg, oral, q8h  insulin glargine, 20 Units, subcutaneous, Nightly  insulin lispro, 0-5 Units, subcutaneous, Before meals & nightly  isosorbide mononitrate ER, 120 mg, oral, Daily  melatonin, 3 mg, oral, Daily  pantoprazole, 40 mg, oral, Daily before breakfast   Or  pantoprazole, 40 mg, intravenous, Daily before breakfast  prazosin, 2 mg, oral, Nightly  sacubitriL-valsartan, 2 tablet, oral, BID  spironolactone, 25 mg, oral, Daily       PRN medications: acetaminophen, dextrose 10 % in water (D10W), dextrose, docusate sodium, glucagon, loperamide, zolpidem     Assessment/Plan     Acute on chronic systolic chf/NICM/15-20%  -device check normal  -iv lasix 40 mg IV q12hr  -imdur, asa and atorvastatin  -carvedilol, asa,hydralazine, entresto,aldactone    2. IDDM  -[place] and SSI    3.CP  -wheelchair bound    DVT Prophylaxis:  Lovenox chet Yanez MD  [facility]

## 2024-01-05 NOTE — NURSING NOTE
Pt welcomed to OBS room 17.  Pt educated on room, floor process, phone, tv, bed, and pt's rights and responsibilities.  Pt has no questions at this time.

## 2024-01-05 NOTE — CARE PLAN
The patient's goals for the shift include to be admitted    The clinical goals for the shift include Pt will be able to communicate when pt is in pain    Over the shift, the patient did not make progress toward the following goals. Goal met

## 2024-01-05 NOTE — PROGRESS NOTES
Patient is not medically ready for discharge.  Patient is continuing IV diuresis.  Cardiology following.  Patient is wheelchair bound.  Patient plans to return home upon discharge.  Will continue to follow for discharge planning needs.

## 2024-01-05 NOTE — CARE PLAN
The patient's goals for the shift include to be admitted    The clinical goals for the shift include Pt will be able to communicate when pt is in pain    Over the shift, the patient did not make progress toward the following goals.   Problem: Pain  Goal: Takes deep breaths with improved pain control throughout the shift  1/5/2024 0458 by Myah Crockett RN  Outcome: Progressing  1/4/2024 2355 by Myah Crockett RN  Outcome: Progressing  1/4/2024 2310 by Myah Crockett RN  Outcome: Progressing  1/4/2024 2259 by Myah Crockett RN  Outcome: Progressing  Goal: Turns in bed with improved pain control throughout the shift  1/5/2024 0458 by Myah Crockett RN  Outcome: Progressing  1/4/2024 2355 by Myah Crockett RN  Outcome: Progressing  1/4/2024 2310 by Myah Crockett RN  Outcome: Progressing  1/4/2024 2259 by Myah Crockett RN  Outcome: Progressing  Goal: Walks with improved pain control throughout the shift  1/5/2024 0458 by Mayh Crockett RN  Outcome: Progressing  1/4/2024 2355 by Myah Crockett RN  Outcome: Progressing  1/4/2024 2310 by Myah Crockett RN  Outcome: Progressing  1/4/2024 2259 by Myah Crockett RN  Outcome: Progressing  Goal: Performs ADL's with improved pain control throughout shift  1/5/2024 0458 by Myah Crockett RN  Outcome: Progressing  1/4/2024 2355 by Myah Crockett RN  Outcome: Progressing  1/4/2024 2310 by Myah Crockett RN  Outcome: Progressing  1/4/2024 2259 by Myah Crockett RN  Outcome: Progressing  Goal: Participates in PT with improved pain control throughout the shift  1/5/2024 0458 by Myah Crockett RN  Outcome: Progressing  1/4/2024 2355 by Myah Crockett RN  Outcome: Progressing  1/4/2024 2310 by Myah Crockett RN  Outcome: Progressing  1/4/2024 2259 by Myah Crockett RN  Outcome: Progressing  Goal: Free from opioid side effects throughout the shift  1/5/2024 0458 by Myah Crockett RN  Outcome: Progressing  1/4/2024 2355 by Myah Crockett RN  Outcome: Progressing  1/4/2024 2310 by Myah Crockett RN  Outcome:  Progressing  1/4/2024 2259 by Myah Crockett RN  Outcome: Progressing  Goal: Free from acute confusion related to pain meds throughout the shift  1/5/2024 0458 by Myah Crockett RN  Outcome: Progressing  1/4/2024 2355 by Myah Crockett RN  Outcome: Progressing  1/4/2024 2310 by Myah Crockett RN  Outcome: Progressing  1/4/2024 2259 by Myah Crockett RN  Outcome: Progressing     Problem: Skin  Goal: Decreased wound size/increased tissue granulation at next dressing change  1/5/2024 0458 by Mayh Crockett RN  Outcome: Progressing  1/4/2024 2355 by Myah Crockett RN  Outcome: Progressing

## 2024-01-06 LAB
ANION GAP SERPL CALC-SCNC: 11 MMOL/L (ref 10–20)
ANION GAP SERPL CALC-SCNC: 13 MMOL/L (ref 10–20)
BUN SERPL-MCNC: 15 MG/DL (ref 6–23)
BUN SERPL-MCNC: 19 MG/DL (ref 6–23)
CALCIUM SERPL-MCNC: 7.8 MG/DL (ref 8.6–10.3)
CALCIUM SERPL-MCNC: 8.2 MG/DL (ref 8.6–10.3)
CARDIAC TROPONIN I PNL SERPL HS: 18 NG/L (ref 0–20)
CHLORIDE SERPL-SCNC: 101 MMOL/L (ref 98–107)
CHLORIDE SERPL-SCNC: 101 MMOL/L (ref 98–107)
CO2 SERPL-SCNC: 26 MMOL/L (ref 21–32)
CO2 SERPL-SCNC: 27 MMOL/L (ref 21–32)
CREAT SERPL-MCNC: 0.84 MG/DL (ref 0.5–1.3)
CREAT SERPL-MCNC: 0.86 MG/DL (ref 0.5–1.3)
ERYTHROCYTE [DISTWIDTH] IN BLOOD BY AUTOMATED COUNT: 14.2 % (ref 11.5–14.5)
ERYTHROCYTE [DISTWIDTH] IN BLOOD BY AUTOMATED COUNT: 14.4 % (ref 11.5–14.5)
GFR SERPL CREATININE-BSD FRML MDRD: >90 ML/MIN/1.73M*2
GFR SERPL CREATININE-BSD FRML MDRD: >90 ML/MIN/1.73M*2
GLUCOSE BLD MANUAL STRIP-MCNC: 158 MG/DL (ref 74–99)
GLUCOSE BLD MANUAL STRIP-MCNC: 235 MG/DL (ref 74–99)
GLUCOSE BLD MANUAL STRIP-MCNC: 248 MG/DL (ref 74–99)
GLUCOSE BLD MANUAL STRIP-MCNC: 331 MG/DL (ref 74–99)
GLUCOSE SERPL-MCNC: 207 MG/DL (ref 74–99)
GLUCOSE SERPL-MCNC: 278 MG/DL (ref 74–99)
HCT VFR BLD AUTO: 46 % (ref 41–52)
HCT VFR BLD AUTO: 46 % (ref 41–52)
HGB BLD-MCNC: 15 G/DL (ref 13.5–17.5)
HGB BLD-MCNC: 15.5 G/DL (ref 13.5–17.5)
MCH RBC QN AUTO: 29.4 PG (ref 26–34)
MCH RBC QN AUTO: 30.2 PG (ref 26–34)
MCHC RBC AUTO-ENTMCNC: 32.6 G/DL (ref 32–36)
MCHC RBC AUTO-ENTMCNC: 33.7 G/DL (ref 32–36)
MCV RBC AUTO: 90 FL (ref 80–100)
MCV RBC AUTO: 90 FL (ref 80–100)
NRBC BLD-RTO: 0 /100 WBCS (ref 0–0)
NRBC BLD-RTO: 0 /100 WBCS (ref 0–0)
PLATELET # BLD AUTO: 186 X10*3/UL (ref 150–450)
PLATELET # BLD AUTO: 210 X10*3/UL (ref 150–450)
POTASSIUM SERPL-SCNC: 3.6 MMOL/L (ref 3.5–5.3)
POTASSIUM SERPL-SCNC: 4.5 MMOL/L (ref 3.5–5.3)
RBC # BLD AUTO: 5.11 X10*6/UL (ref 4.5–5.9)
RBC # BLD AUTO: 5.14 X10*6/UL (ref 4.5–5.9)
SODIUM SERPL-SCNC: 135 MMOL/L (ref 136–145)
SODIUM SERPL-SCNC: 135 MMOL/L (ref 136–145)
WBC # BLD AUTO: 5.6 X10*3/UL (ref 4.4–11.3)
WBC # BLD AUTO: 5.7 X10*3/UL (ref 4.4–11.3)

## 2024-01-06 PROCEDURE — 82552 ASSAY OF CPK IN BLOOD: CPT | Performed by: INTERNAL MEDICINE

## 2024-01-06 PROCEDURE — 80048 BASIC METABOLIC PNL TOTAL CA: CPT | Performed by: INTERNAL MEDICINE

## 2024-01-06 PROCEDURE — 1200000002 HC GENERAL ROOM WITH TELEMETRY DAILY

## 2024-01-06 PROCEDURE — 2500000004 HC RX 250 GENERAL PHARMACY W/ HCPCS (ALT 636 FOR OP/ED): Performed by: PHYSICIAN ASSISTANT

## 2024-01-06 PROCEDURE — 84484 ASSAY OF TROPONIN QUANT: CPT | Performed by: INTERNAL MEDICINE

## 2024-01-06 PROCEDURE — 2500000001 HC RX 250 WO HCPCS SELF ADMINISTERED DRUGS (ALT 637 FOR MEDICARE OP)

## 2024-01-06 PROCEDURE — 2500000004 HC RX 250 GENERAL PHARMACY W/ HCPCS (ALT 636 FOR OP/ED): Performed by: INTERNAL MEDICINE

## 2024-01-06 PROCEDURE — 2500000002 HC RX 250 W HCPCS SELF ADMINISTERED DRUGS (ALT 637 FOR MEDICARE OP, ALT 636 FOR OP/ED): Performed by: PHYSICIAN ASSISTANT

## 2024-01-06 PROCEDURE — 99232 SBSQ HOSP IP/OBS MODERATE 35: CPT | Performed by: INTERNAL MEDICINE

## 2024-01-06 PROCEDURE — 99233 SBSQ HOSP IP/OBS HIGH 50: CPT | Performed by: NURSE PRACTITIONER

## 2024-01-06 PROCEDURE — 2500000001 HC RX 250 WO HCPCS SELF ADMINISTERED DRUGS (ALT 637 FOR MEDICARE OP): Performed by: PHYSICIAN ASSISTANT

## 2024-01-06 PROCEDURE — 85027 COMPLETE CBC AUTOMATED: CPT | Performed by: INTERNAL MEDICINE

## 2024-01-06 PROCEDURE — 36415 COLL VENOUS BLD VENIPUNCTURE: CPT | Performed by: INTERNAL MEDICINE

## 2024-01-06 PROCEDURE — 82947 ASSAY GLUCOSE BLOOD QUANT: CPT

## 2024-01-06 RX ORDER — NITROGLYCERIN 0.4 MG/1
0.4 TABLET SUBLINGUAL EVERY 5 MIN PRN
Status: COMPLETED | OUTPATIENT
Start: 2024-01-06 | End: 2024-01-06

## 2024-01-06 RX ORDER — MORPHINE SULFATE 2 MG/ML
2 INJECTION, SOLUTION INTRAMUSCULAR; INTRAVENOUS ONCE
Status: COMPLETED | OUTPATIENT
Start: 2024-01-06 | End: 2024-01-06

## 2024-01-06 RX ORDER — NITROGLYCERIN 0.4 MG/1
TABLET SUBLINGUAL
Status: COMPLETED
Start: 2024-01-06 | End: 2024-01-06

## 2024-01-06 RX ADMIN — NITROGLYCERIN 0.4 MG: 0.4 TABLET SUBLINGUAL at 22:39

## 2024-01-06 RX ADMIN — ASPIRIN 81 MG CHEWABLE TABLET 81 MG: 81 TABLET CHEWABLE at 08:36

## 2024-01-06 RX ADMIN — HYDRALAZINE HYDROCHLORIDE 50 MG: 25 TABLET ORAL at 13:11

## 2024-01-06 RX ADMIN — CARVEDILOL 25 MG: 25 TABLET, FILM COATED ORAL at 08:37

## 2024-01-06 RX ADMIN — INSULIN GLARGINE 20 UNITS: 100 INJECTION, SOLUTION SUBCUTANEOUS at 23:09

## 2024-01-06 RX ADMIN — MORPHINE SULFATE 2 MG: 2 INJECTION, SOLUTION INTRAMUSCULAR; INTRAVENOUS at 22:41

## 2024-01-06 RX ADMIN — INSULIN LISPRO 4 UNITS: 100 INJECTION, SOLUTION INTRAVENOUS; SUBCUTANEOUS at 21:00

## 2024-01-06 RX ADMIN — ISOSORBIDE MONONITRATE 120 MG: 30 TABLET, EXTENDED RELEASE ORAL at 08:37

## 2024-01-06 RX ADMIN — SACUBITRIL AND VALSARTAN 2 TABLET: 49; 51 TABLET, FILM COATED ORAL at 08:37

## 2024-01-06 RX ADMIN — SPIRONOLACTONE 25 MG: 25 TABLET ORAL at 08:37

## 2024-01-06 RX ADMIN — LOPERAMIDE HYDROCHLORIDE 2 MG: 2 CAPSULE ORAL at 23:08

## 2024-01-06 RX ADMIN — PANTOPRAZOLE SODIUM 40 MG: 40 TABLET, DELAYED RELEASE ORAL at 08:40

## 2024-01-06 RX ADMIN — FUROSEMIDE 40 MG: 10 INJECTION, SOLUTION INTRAMUSCULAR; INTRAVENOUS at 16:37

## 2024-01-06 RX ADMIN — Medication 3 MG: at 23:07

## 2024-01-06 RX ADMIN — FUROSEMIDE 40 MG: 10 INJECTION, SOLUTION INTRAMUSCULAR; INTRAVENOUS at 11:25

## 2024-01-06 RX ADMIN — ENOXAPARIN SODIUM 40 MG: 40 INJECTION SUBCUTANEOUS at 08:36

## 2024-01-06 RX ADMIN — ATORVASTATIN CALCIUM 40 MG: 40 TABLET, FILM COATED ORAL at 23:07

## 2024-01-06 RX ADMIN — ZOLPIDEM TARTRATE 5 MG: 5 TABLET, FILM COATED ORAL at 23:08

## 2024-01-06 ASSESSMENT — COGNITIVE AND FUNCTIONAL STATUS - GENERAL
MOVING TO AND FROM BED TO CHAIR: A LITTLE
DRESSING REGULAR LOWER BODY CLOTHING: A LITTLE
WALKING IN HOSPITAL ROOM: TOTAL
DAILY ACTIVITIY SCORE: 20
MOBILITY SCORE: 16
TOILETING: A LITTLE
CLIMB 3 TO 5 STEPS WITH RAILING: TOTAL
HELP NEEDED FOR BATHING: A LITTLE
STANDING UP FROM CHAIR USING ARMS: A LITTLE
DRESSING REGULAR UPPER BODY CLOTHING: A LITTLE

## 2024-01-06 ASSESSMENT — PAIN SCALES - GENERAL
PAINLEVEL_OUTOF10: 0 - NO PAIN
PAINLEVEL_OUTOF10: 8
PAINLEVEL_OUTOF10: 3
PAINLEVEL_OUTOF10: 9
PAINLEVEL_OUTOF10: 0 - NO PAIN

## 2024-01-06 ASSESSMENT — PAIN - FUNCTIONAL ASSESSMENT
PAIN_FUNCTIONAL_ASSESSMENT: 0-10

## 2024-01-06 ASSESSMENT — PAIN DESCRIPTION - ORIENTATION: ORIENTATION: MID

## 2024-01-06 ASSESSMENT — PAIN DESCRIPTION - LOCATION: LOCATION: CHEST

## 2024-01-06 NOTE — PROGRESS NOTES
"Delvis Ventura is a 63 y.o. 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    Subjective   Slowly improving but not at his baseline       Objective     Physical Exam  Constitutional:       Appearance: Normal appearance.   HENT:      Head: Normocephalic.      Mouth/Throat:      Mouth: Mucous membranes are moist.      Pharynx: Oropharynx is clear.   Cardiovascular:      Rate and Rhythm: Normal rate and regular rhythm.      Pulses: Normal pulses.      Heart sounds: Normal heart sounds.   Pulmonary:      Comments: Bibasilar crackles    Abdominal:      General: Abdomen is flat. Bowel sounds are normal.      Palpations: Abdomen is soft.   Neurological:      Mental Status: He is alert.         Last Recorded Vitals  Blood pressure 126/77, pulse 63, temperature 36 °C (96.8 °F), temperature source Temporal, resp. rate 18, height 1.803 m (5' 11\"), weight 95.3 kg (210 lb 1.6 oz), SpO2 99 %.  Intake/Output last 3 Shifts:  I/O last 3 completed shifts:  In: 420 (4.4 mL/kg) [P.O.:360; I.V.:60 (0.6 mL/kg)]  Out: 3850 (40.4 mL/kg) [Urine:3850 (1.1 mL/kg/hr)]  Weight: 95.3 kg     Relevant Results  Results for orders placed or performed during the hospital encounter of 01/03/24 (from the past 24 hour(s))   CBC   Result Value Ref Range    WBC 5.8 4.4 - 11.3 x10*3/uL    nRBC 0.0 0.0 - 0.0 /100 WBCs    RBC 5.29 4.50 - 5.90 x10*6/uL    Hemoglobin 15.7 13.5 - 17.5 g/dL    Hematocrit 48.2 41.0 - 52.0 %    MCV 91 80 - 100 fL    MCH 29.7 26.0 - 34.0 pg    MCHC 32.6 32.0 - 36.0 g/dL    RDW 14.5 11.5 - 14.5 %    Platelets 206 150 - 450 x10*3/uL   Basic metabolic panel   Result Value Ref Range    Glucose 146 (H) 74 - 99 mg/dL    Sodium 140 136 - 145 mmol/L    Potassium 3.4 (L) 3.5 - 5.3 mmol/L    Chloride 108 (H) 98 - 107 mmol/L    Bicarbonate 26 21 - 32 mmol/L    Anion Gap 9 (L) 10 - 20 mmol/L    Urea Nitrogen 15 6 - 23 mg/dL    Creatinine 0.91 0.50 - 1.30 mg/dL    eGFR >90 >60 " mL/min/1.73m*2    Calcium 7.4 (L) 8.6 - 10.3 mg/dL   POCT GLUCOSE   Result Value Ref Range    POCT Glucose 162 (H) 74 - 99 mg/dL   CBC   Result Value Ref Range    WBC 5.7 4.4 - 11.3 x10*3/uL    nRBC 0.0 0.0 - 0.0 /100 WBCs    RBC 5.11 4.50 - 5.90 x10*6/uL    Hemoglobin 15.0 13.5 - 17.5 g/dL    Hematocrit 46.0 41.0 - 52.0 %    MCV 90 80 - 100 fL    MCH 29.4 26.0 - 34.0 pg    MCHC 32.6 32.0 - 36.0 g/dL    RDW 14.2 11.5 - 14.5 %    Platelets 186 150 - 450 x10*3/uL   Basic metabolic panel   Result Value Ref Range    Glucose 207 (H) 74 - 99 mg/dL    Sodium 135 (L) 136 - 145 mmol/L    Potassium 3.6 3.5 - 5.3 mmol/L    Chloride 101 98 - 107 mmol/L    Bicarbonate 27 21 - 32 mmol/L    Anion Gap 11 10 - 20 mmol/L    Urea Nitrogen 15 6 - 23 mg/dL    Creatinine 0.84 0.50 - 1.30 mg/dL    eGFR >90 >60 mL/min/1.73m*2    Calcium 8.2 (L) 8.6 - 10.3 mg/dL   POCT GLUCOSE   Result Value Ref Range    POCT Glucose 158 (H) 74 - 99 mg/dL       Imaging Results  Ct chest:  IMPRESSION:  Cardiomegaly with a trace right pleural effusion.  No definite large  central pulmonary embolus identified.      Medications:  aspirin, 81 mg, oral, Daily  atorvastatin, 40 mg, oral, Nightly  carvedilol, 25 mg, oral, BID  enoxaparin, 40 mg, subcutaneous, q24h  furosemide, 40 mg, intravenous, 2 times per day  hydrALAZINE, 50 mg, oral, q8h  insulin glargine, 20 Units, subcutaneous, Nightly  insulin lispro, 0-5 Units, subcutaneous, Before meals & nightly  isosorbide mononitrate ER, 120 mg, oral, Daily  melatonin, 3 mg, oral, Daily  pantoprazole, 40 mg, oral, Daily before breakfast   Or  pantoprazole, 40 mg, intravenous, Daily before breakfast  prazosin, 2 mg, oral, Nightly  sacubitriL-valsartan, 2 tablet, oral, BID  spironolactone, 25 mg, oral, Daily       PRN medications: acetaminophen, dextrose 10 % in water (D10W), dextrose, docusate sodium, glucagon, loperamide, zolpidem     Assessment/Plan     Acute on chronic systolic chf/NICM/15-20%  -device check  normal  -iv lasix 40 mg IV q12hr  -imdur, asa and atorvastatin  -carvedilol, asa,hydralazine, entresto,aldactone    2. IDDM  -Lantis and SSI    3.CP  -wheelchair bound    DVT Prophylaxis:  Lovenox chet Yanez MD  [facility]

## 2024-01-06 NOTE — PROGRESS NOTES
met with patient, Antony, and his mother, Saima, in BMT room to provide education on completing a HCD. Antony completed short form HCD. HCD notarized and sent to [facility]. Patient provided a copy as well.     COREY De La Cruz, [facility]    Pediatric Blood and Marrow Transplant  [email or web address]    Care Coordinator Note:  Plan: continue IV diuretic; Cardiology following   Patient is not medically ready today  Juany MANUELN, RN [facility]

## 2024-01-06 NOTE — CARE PLAN
The patient's goals for the shift include safety     The clinical goals for the shift include comfort     Over the shift, the patient did make progress toward the following goals.

## 2024-01-06 NOTE — PROGRESS NOTES
"Subjective Data:  Feels like breathing is not at his baseline but slowly improving   Reports good urinary output with IV Lasix.   Denies any chest pain     Telemetry showed SR PVCs NSVT (3 beats)    Overnight Events:    None reported     Objective Data:  Last Recorded Vitals:  Vitals:    24 1942 24 0214 24 0432 24 0832   BP: 128/76 93/59 107/78 126/77   BP Location:       Patient Position:       Pulse: 84 78 80 63   Resp: 19 17 17 18   Temp: 36.6 °C (97.9 °F) 36.2 °C (97.2 °F) 36.5 °C (97.7 °F) 36 °C (96.8 °F)   TempSrc:   Temporal Temporal   SpO2: 94% 95% 98% 99%   Weight:       Height:         Medical Gas Therapy: None (Room air)  Weight  Av.9 kg (204 lb 11.2 oz)  Min: 91.6 kg (202 lb)  Max: 95.3 kg (210 lb 1.6 oz)    LABS:  CMP:  Results from last 7 days   Lab Units 24  15124  0513 24  0604 24  1232   SODIUM mmol/L 135* 140 137 138 137   POTASSIUM mmol/L 3.6 3.4* 3.5 3.6 3.7   CHLORIDE mmol/L 101 108* 104 102 103   CO2 mmol/L 27 26 24 27 24   ANION GAP mmol/L 11 9* 13 13 14   BUN mg/dL 15 15 16 14 15   CREATININE mg/dL 0.84 0.91 0.93 0.79 0.74   EGFR mL/min/1.73m*2 >90 >90 >90 >90 >90   MAGNESIUM mg/dL  --   --  2.00 1.70  --    ALBUMIN g/dL  --   --   --  3.6 3.7   ALT U/L  --   --   --  41 41   AST U/L  --   --   --  47* 40*   BILIRUBIN TOTAL mg/dL  --   --   --  1.8* 1.8*       CBC:  Results from last 7 days   Lab Units 24  1517 24  0512 24  0604 24  1232   WBC AUTO x10*3/uL 5.7 5.8 5.4 4.7 5.2   HEMOGLOBIN g/dL 15.0 15.7 15.1 15.3 15.8   HEMATOCRIT % 46.0 48.2 45.9 45.7 48.2   PLATELETS AUTO x10*3/uL 186 206 184 170 175   MCV fL 90 91 90 89 91       COAG:     ABO: No results found for: \"ABO\"  HEME/ENDO:     CARDIAC:   Results from last 7 days   Lab Units 24  1536 24  1232   TROPHS ng/L 36* 34*   BNP pg/mL  --  1,519*               Last I/O:    Intake/Output Summary (Last 24 hours) at " 1/6/2024 0908  Last data filed at 1/6/2024 0831  Gross per 24 hour   Intake --   Output 2850 ml   Net -2850 ml       Net IO Since Admission: -4,030 mL [01/06/24 0908]      Imaging Results:  Cardiac Device Check - Inpatient         Transthoracic Echo (TTE) Complete   Final Result      CT angio chest for pulmonary embolism   Final Result   Cardiomegaly with a trace right pleural effusion.  No definite large   central pulmonary embolus identified.   Signed by Cornelio Cordoba      XR chest 2 views   Final Result   Pulmonary vascular congestion.             Signed by: Bertrand Carvajal 1/3/2024 10:35 AM   Dictation workstation:   [id number]           Echo 1/3/24:  CONCLUSIONS:  1. Left ventricular systolic function is severely decreased with a 15-20% estimated ejection fraction.  2. No left ventricular thrombus visualized.  3. Spectral Doppler shows a restrictive pattern of left ventricular diastolic filling.  4. There is mildly reduced right ventricular systolic function.  5. Mildly elevated RVSP.  6. There is global hypokinesis of the left ventricle with minor regional variations.      Inpatient Medications:  Scheduled medications   Medication Dose Route Frequency    aspirin  81 mg oral Daily    atorvastatin  40 mg oral Nightly    carvedilol  25 mg oral BID    enoxaparin  40 mg subcutaneous q24h    furosemide  40 mg intravenous 2 times per day    hydrALAZINE  50 mg oral q8h    insulin glargine  20 Units subcutaneous Nightly    insulin lispro  0-5 Units subcutaneous Before meals & nightly    isosorbide mononitrate ER  120 mg oral Daily    melatonin  3 mg oral Daily    pantoprazole  40 mg oral Daily before breakfast    Or    pantoprazole  40 mg intravenous Daily before breakfast    prazosin  2 mg oral Nightly    sacubitriL-valsartan  2 tablet oral BID    spironolactone  25 mg oral Daily     PRN medications   Medication    acetaminophen    dextrose 10 % in water (D10W)    dextrose    docusate sodium    glucagon    loperamide     zolpidem     Continuous Medications   Medication Dose Last Rate   Physical Exam:  General:  Obese male, NAD  HEENT:  Pupils equal and reactive.  Normocephalic.  Moist mucosa.    Neck:  No thyromegaly. + JVD  Cardiovascular:  Regular rate and rhythm.  Normal S1 and S2.  Pulmonary: Lungs diminished throughout  Abdomen:  Distended, soft, non- tender, Positive bowel sounds.  Lower Extremities:  2+ pedal pulses. Efrem Lower extremity edema Trace  Neurologic:  Cranial nerves intact.  No focal deficit.   Skin: Skin warm and dry, normal skin turgor.   Psychiatric: Normal affect.     Assessment/Plan   Delvis Ventura is a 63 y.o. male with a past medical h/o DMII, HTN, COVID 12/202,  cerebral palsy, wheelchair-bound, Systolic HF, nonischemic cardiomyopathy (on [facility] 8/2018 he had trivial CAD), last TTE 8/23 EF 15-20%, s/p ICD ([place]; 3/6/2023; by Dr. Mickey Torres at [facility]) c/b explantation  d/t infection with re-implantation of single chamber ICD (4/28/2023 at ). Recent hospitalization 11/7/2023 and received Lasix x 3 doses totaling 160 mg >optimal urinary response and s/s improvement> was discharged on Lasix 20 mg po daily. Presents with c/o lower extremity swelling  weight gain > reported dry weight is approximately 194 pounds and estimates he is 204 pounds currently. Cardiology consulted for HF with reduced EF.     Acute systolic heart failure/NICM LVEF 15-20% (last TTE 1/4/24, no sig change from previous TTE 8/23)  S/p [employer] ICD implant> stable by available metrics>  Device check 1/4/23> reveals normal function device,  no ICD discharges reported, longevity 9 years  :<1%  Initial BNP 1519 last 1857 on 11/7/23  C/w IV diuretic therapy> monitor response     2.  Chest pain.  Reports it feels like he is being hit with a a osvaldo-hammer in center chest x 6 days. CP constant.  EKG reveals no new ischemia, troponins flat 34, 36. Heart score Low Level.   Last Nuclear Stress 6/22 was nonischemic  Denies any chest pain  today   - Likely CP r/t HF exac, device check wnl (1/4/24)     2.  Hypertension- Improved with IV diuresis       Recommendations:  Continue IV diuretic therapy> monitor response  Daily weights, strict I's and O's, monitor renal function, keep K >4.0 and Mag >2.0.  C/w outpt GDMT        Code Status:  Full Code      Melchor Vazquez, APRN-CNP

## 2024-01-07 LAB
GLUCOSE BLD MANUAL STRIP-MCNC: 232 MG/DL (ref 74–99)
GLUCOSE BLD MANUAL STRIP-MCNC: 243 MG/DL (ref 74–99)
GLUCOSE BLD MANUAL STRIP-MCNC: 269 MG/DL (ref 74–99)
GLUCOSE BLD MANUAL STRIP-MCNC: 292 MG/DL (ref 74–99)

## 2024-01-07 PROCEDURE — 2500000001 HC RX 250 WO HCPCS SELF ADMINISTERED DRUGS (ALT 637 FOR MEDICARE OP): Performed by: PHYSICIAN ASSISTANT

## 2024-01-07 PROCEDURE — 2500000004 HC RX 250 GENERAL PHARMACY W/ HCPCS (ALT 636 FOR OP/ED): Performed by: PHYSICIAN ASSISTANT

## 2024-01-07 PROCEDURE — 2500000002 HC RX 250 W HCPCS SELF ADMINISTERED DRUGS (ALT 637 FOR MEDICARE OP, ALT 636 FOR OP/ED): Performed by: PHYSICIAN ASSISTANT

## 2024-01-07 PROCEDURE — 82947 ASSAY GLUCOSE BLOOD QUANT: CPT

## 2024-01-07 PROCEDURE — 1200000002 HC GENERAL ROOM WITH TELEMETRY DAILY

## 2024-01-07 PROCEDURE — 2500000004 HC RX 250 GENERAL PHARMACY W/ HCPCS (ALT 636 FOR OP/ED): Performed by: INTERNAL MEDICINE

## 2024-01-07 PROCEDURE — 99232 SBSQ HOSP IP/OBS MODERATE 35: CPT | Performed by: INTERNAL MEDICINE

## 2024-01-07 PROCEDURE — 2500000004 HC RX 250 GENERAL PHARMACY W/ HCPCS (ALT 636 FOR OP/ED)

## 2024-01-07 RX ORDER — GUAIFENESIN 600 MG/1
600 TABLET, EXTENDED RELEASE ORAL 2 TIMES DAILY PRN
Status: DISCONTINUED | OUTPATIENT
Start: 2024-01-07 | End: 2024-01-07

## 2024-01-07 RX ORDER — FUROSEMIDE 10 MG/ML
40 INJECTION INTRAMUSCULAR; INTRAVENOUS ONCE
Status: COMPLETED | OUTPATIENT
Start: 2024-01-07 | End: 2024-01-07

## 2024-01-07 RX ORDER — GUAIFENESIN 600 MG/1
600 TABLET, EXTENDED RELEASE ORAL EVERY 12 HOURS PRN
Status: DISCONTINUED | OUTPATIENT
Start: 2024-01-07 | End: 2024-01-08 | Stop reason: HOSPADM

## 2024-01-07 RX ADMIN — FUROSEMIDE 40 MG: 10 INJECTION, SOLUTION INTRAMUSCULAR; INTRAVENOUS at 13:45

## 2024-01-07 RX ADMIN — LOPERAMIDE HYDROCHLORIDE 2 MG: 2 CAPSULE ORAL at 22:41

## 2024-01-07 RX ADMIN — INSULIN LISPRO 2 UNITS: 100 INJECTION, SOLUTION INTRAVENOUS; SUBCUTANEOUS at 09:16

## 2024-01-07 RX ADMIN — SACUBITRIL AND VALSARTAN 2 TABLET: 49; 51 TABLET, FILM COATED ORAL at 09:16

## 2024-01-07 RX ADMIN — PANTOPRAZOLE SODIUM 40 MG: 40 TABLET, DELAYED RELEASE ORAL at 06:04

## 2024-01-07 RX ADMIN — CARVEDILOL 25 MG: 25 TABLET, FILM COATED ORAL at 09:16

## 2024-01-07 RX ADMIN — ATORVASTATIN CALCIUM 40 MG: 40 TABLET, FILM COATED ORAL at 22:41

## 2024-01-07 RX ADMIN — Medication 3 MG: at 22:41

## 2024-01-07 RX ADMIN — HYDRALAZINE HYDROCHLORIDE 50 MG: 25 TABLET ORAL at 06:04

## 2024-01-07 RX ADMIN — ZOLPIDEM TARTRATE 5 MG: 5 TABLET, FILM COATED ORAL at 22:41

## 2024-01-07 RX ADMIN — INSULIN LISPRO 3 UNITS: 100 INJECTION, SOLUTION INTRAVENOUS; SUBCUTANEOUS at 17:20

## 2024-01-07 RX ADMIN — INSULIN GLARGINE 20 UNITS: 100 INJECTION, SOLUTION SUBCUTANEOUS at 22:42

## 2024-01-07 RX ADMIN — HYDRALAZINE HYDROCHLORIDE 50 MG: 25 TABLET ORAL at 13:55

## 2024-01-07 RX ADMIN — ASPIRIN 81 MG CHEWABLE TABLET 81 MG: 81 TABLET CHEWABLE at 09:15

## 2024-01-07 RX ADMIN — SPIRONOLACTONE 25 MG: 25 TABLET ORAL at 09:14

## 2024-01-07 RX ADMIN — ISOSORBIDE MONONITRATE 120 MG: 30 TABLET, EXTENDED RELEASE ORAL at 09:15

## 2024-01-07 RX ADMIN — ENOXAPARIN SODIUM 40 MG: 40 INJECTION SUBCUTANEOUS at 09:16

## 2024-01-07 RX ADMIN — FUROSEMIDE 40 MG: 10 INJECTION, SOLUTION INTRAMUSCULAR; INTRAVENOUS at 16:05

## 2024-01-07 RX ADMIN — GUAIFENESIN 600 MG: 600 TABLET, EXTENDED RELEASE ORAL at 06:04

## 2024-01-07 ASSESSMENT — COGNITIVE AND FUNCTIONAL STATUS - GENERAL
MOBILITY SCORE: 16
DRESSING REGULAR UPPER BODY CLOTHING: A LITTLE
HELP NEEDED FOR BATHING: A LITTLE
DAILY ACTIVITIY SCORE: 20
WALKING IN HOSPITAL ROOM: TOTAL
STANDING UP FROM CHAIR USING ARMS: A LITTLE
MOVING TO AND FROM BED TO CHAIR: A LITTLE
CLIMB 3 TO 5 STEPS WITH RAILING: TOTAL
TOILETING: A LITTLE
DRESSING REGULAR LOWER BODY CLOTHING: A LITTLE

## 2024-01-07 ASSESSMENT — PAIN - FUNCTIONAL ASSESSMENT
PAIN_FUNCTIONAL_ASSESSMENT: 0-10
PAIN_FUNCTIONAL_ASSESSMENT: 0-10

## 2024-01-07 ASSESSMENT — PAIN SCALES - GENERAL
PAINLEVEL_OUTOF10: 0 - NO PAIN
PAINLEVEL_OUTOF10: 7

## 2024-01-07 NOTE — SIGNIFICANT EVENT
"Called to rapid response for chest pain, which awoke patient from sleep. Patient has an ICD and states this is not the same as when ICD has discharged previously.  Pain is 8/10, substernal, non-radiating, reproduced on palpation of sternum.     /81   Pulse 82   Temp 37.1 °C (98.7 °F)   Resp 18   Ht 1.803 m (5' 11\")   Wt 95.3 kg (210 lb 1.6 oz)   SpO2 98%   BMI 29.30 kg/m²       Patient is awake, alert, cooperative, NAD  No focal CN deficits   S1,s2  CTAB  Abd is soft  Ext well perfused      ECG show NSR with QTC of 478 msec       Plan:   - attempt trial of SL nitroglycerin if effective, will continue   - if SLN not effective, recommend morphine or roxicodone   - Hospitalist attending notified    "

## 2024-01-07 NOTE — PROGRESS NOTES
"Delvis Ventura is a 63 y.o. male on day 3 of admission presenting with Acute on chronic combined systolic and diastolic hrt fail (CMS/HCC).    Subjective    missed a morning dose this morning therefore I have placed a one-time order for this afternoon.  Patient overall doing very well looking forward to going home.  Still little volume overloaded       Objective     Physical Exam  Constitutional:       Appearance: Normal appearance.   HENT:      Head: Normocephalic.      Mouth/Throat:      Mouth: Mucous membranes are moist.      Pharynx: Oropharynx is clear.   Cardiovascular:      Rate and Rhythm: Normal rate and regular rhythm.      Pulses: Normal pulses.      Heart sounds: Normal heart sounds.   Pulmonary:      Comments: Bibasilar crackles    Abdominal:      General: Abdomen is flat. Bowel sounds are normal.      Palpations: Abdomen is soft.   Neurological:      Mental Status: He is alert.         Last Recorded Vitals  Blood pressure 131/80, pulse 92, temperature 36.5 °C (97.7 °F), temperature source Temporal, resp. rate 18, height 1.803 m (5' 11\"), weight 95.3 kg (210 lb 1.6 oz), SpO2 94 %.  Intake/Output last 3 Shifts:  I/O last 3 completed shifts:  In: 720 (7.6 mL/kg) [P.O.:720]  Out: 5750 (60.3 mL/kg) [Urine:5750 (1.7 mL/kg/hr)]  Weight: 95.3 kg     Relevant Results  Results for orders placed or performed during the hospital encounter of 01/03/24 (from the past 24 hour(s))   POCT GLUCOSE   Result Value Ref Range    POCT Glucose 248 (H) 74 - 99 mg/dL   POCT GLUCOSE   Result Value Ref Range    POCT Glucose 331 (H) 74 - 99 mg/dL   CBC   Result Value Ref Range    WBC 5.6 4.4 - 11.3 x10*3/uL    nRBC 0.0 0.0 - 0.0 /100 WBCs    RBC 5.14 4.50 - 5.90 x10*6/uL    Hemoglobin 15.5 13.5 - 17.5 g/dL    Hematocrit 46.0 41.0 - 52.0 %    MCV 90 80 - 100 fL    MCH 30.2 26.0 - 34.0 pg    MCHC 33.7 32.0 - 36.0 g/dL    RDW 14.4 11.5 - 14.5 %    Platelets 210 150 - 450 x10*3/uL   Basic metabolic panel   Result Value Ref Range    " Glucose 278 (H) 74 - 99 mg/dL    Sodium 135 (L) 136 - 145 mmol/L    Potassium 4.5 3.5 - 5.3 mmol/L    Chloride 101 98 - 107 mmol/L    Bicarbonate 26 21 - 32 mmol/L    Anion Gap 13 10 - 20 mmol/L    Urea Nitrogen 19 6 - 23 mg/dL    Creatinine 0.86 0.50 - 1.30 mg/dL    eGFR >90 >60 mL/min/1.73m*2    Calcium 7.8 (L) 8.6 - 10.3 mg/dL   Troponin I, High Sensitivity   Result Value Ref Range    Troponin I, High Sensitivity 18 0 - 20 ng/L   POCT GLUCOSE   Result Value Ref Range    POCT Glucose 232 (H) 74 - 99 mg/dL   POCT GLUCOSE   Result Value Ref Range    POCT Glucose 243 (H) 74 - 99 mg/dL       Imaging Results  Ct chest:  IMPRESSION:  Cardiomegaly with a trace right pleural effusion.  No definite large  central pulmonary embolus identified.      Medications:  aspirin, 81 mg, oral, Daily  atorvastatin, 40 mg, oral, Nightly  carvedilol, 25 mg, oral, BID  enoxaparin, 40 mg, subcutaneous, q24h  furosemide, 40 mg, intravenous, 2 times per day  furosemide, 40 mg, intravenous, Once  hydrALAZINE, 50 mg, oral, q8h  insulin glargine, 20 Units, subcutaneous, Nightly  insulin lispro, 0-5 Units, subcutaneous, Before meals & nightly  isosorbide mononitrate ER, 120 mg, oral, Daily  melatonin, 3 mg, oral, Daily  pantoprazole, 40 mg, oral, Daily before breakfast   Or  pantoprazole, 40 mg, intravenous, Daily before breakfast  prazosin, 2 mg, oral, Nightly  sacubitriL-valsartan, 2 tablet, oral, BID  spironolactone, 25 mg, oral, Daily       PRN medications: acetaminophen, dextrose 10 % in water (D10W), dextrose, docusate sodium, glucagon, guaiFENesin, loperamide, zolpidem     Assessment/Plan     Acute on chronic systolic chf/NICM/15-20%  -device check normal  -iv lasix 40 mg IV q12hr  -imdur, asa and atorvastatin  -carvedilol, asa,hydralazine, entresto,aldactone    2. IDDM  -Lantis and SSI    3.CP  -wheelchair bound    DVT Prophylaxis:  Lovenox subq        Tammy Yanez MD  [facility]

## 2024-01-08 VITALS
TEMPERATURE: 97.3 F | RESPIRATION RATE: 20 BRPM | HEART RATE: 91 BPM | WEIGHT: 210.1 LBS | BODY MASS INDEX: 29.41 KG/M2 | DIASTOLIC BLOOD PRESSURE: 89 MMHG | HEIGHT: 71 IN | OXYGEN SATURATION: 97 % | SYSTOLIC BLOOD PRESSURE: 150 MMHG

## 2024-01-08 LAB — GLUCOSE BLD MANUAL STRIP-MCNC: 233 MG/DL (ref 74–99)

## 2024-01-08 PROCEDURE — 2500000004 HC RX 250 GENERAL PHARMACY W/ HCPCS (ALT 636 FOR OP/ED): Performed by: PHYSICIAN ASSISTANT

## 2024-01-08 PROCEDURE — 2500000001 HC RX 250 WO HCPCS SELF ADMINISTERED DRUGS (ALT 637 FOR MEDICARE OP): Performed by: PHYSICIAN ASSISTANT

## 2024-01-08 PROCEDURE — 99232 SBSQ HOSP IP/OBS MODERATE 35: CPT | Performed by: INTERNAL MEDICINE

## 2024-01-08 PROCEDURE — 2500000001 HC RX 250 WO HCPCS SELF ADMINISTERED DRUGS (ALT 637 FOR MEDICARE OP): Performed by: INTERNAL MEDICINE

## 2024-01-08 PROCEDURE — C9113 INJ PANTOPRAZOLE SODIUM, VIA: HCPCS | Performed by: PHYSICIAN ASSISTANT

## 2024-01-08 PROCEDURE — 99239 HOSP IP/OBS DSCHRG MGMT >30: CPT | Performed by: INTERNAL MEDICINE

## 2024-01-08 PROCEDURE — 2500000002 HC RX 250 W HCPCS SELF ADMINISTERED DRUGS (ALT 637 FOR MEDICARE OP, ALT 636 FOR OP/ED): Performed by: PHYSICIAN ASSISTANT

## 2024-01-08 PROCEDURE — 82947 ASSAY GLUCOSE BLOOD QUANT: CPT

## 2024-01-08 RX ORDER — FUROSEMIDE 40 MG/1
40 TABLET ORAL 2 TIMES DAILY
Qty: 60 TABLET | Refills: 0 | Status: SHIPPED | OUTPATIENT
Start: 2024-01-08 | End: 2024-02-15 | Stop reason: HOSPADM

## 2024-01-08 RX ORDER — FUROSEMIDE 40 MG/1
40 TABLET ORAL DAILY
Status: DISCONTINUED | OUTPATIENT
Start: 2024-01-08 | End: 2024-01-08 | Stop reason: HOSPADM

## 2024-01-08 RX ORDER — DAPAGLIFLOZIN 10 MG/1
10 TABLET, FILM COATED ORAL DAILY
Status: DISCONTINUED | OUTPATIENT
Start: 2024-01-08 | End: 2024-01-08 | Stop reason: HOSPADM

## 2024-01-08 RX ADMIN — LOPERAMIDE HYDROCHLORIDE 2 MG: 2 CAPSULE ORAL at 10:30

## 2024-01-08 RX ADMIN — PANTOPRAZOLE SODIUM 40 MG: 40 INJECTION, POWDER, FOR SOLUTION INTRAVENOUS at 06:01

## 2024-01-08 RX ADMIN — SACUBITRIL AND VALSARTAN 2 TABLET: 49; 51 TABLET, FILM COATED ORAL at 10:09

## 2024-01-08 RX ADMIN — ENOXAPARIN SODIUM 40 MG: 40 INJECTION SUBCUTANEOUS at 10:09

## 2024-01-08 RX ADMIN — FUROSEMIDE 40 MG: 40 TABLET ORAL at 10:09

## 2024-01-08 RX ADMIN — HYDRALAZINE HYDROCHLORIDE 50 MG: 25 TABLET ORAL at 06:01

## 2024-01-08 RX ADMIN — HYDRALAZINE HYDROCHLORIDE 50 MG: 25 TABLET ORAL at 12:49

## 2024-01-08 RX ADMIN — ISOSORBIDE MONONITRATE 120 MG: 30 TABLET, EXTENDED RELEASE ORAL at 10:09

## 2024-01-08 RX ADMIN — DAPAGLIFLOZIN 10 MG: 10 TABLET, FILM COATED ORAL at 10:09

## 2024-01-08 RX ADMIN — CARVEDILOL 25 MG: 25 TABLET, FILM COATED ORAL at 10:09

## 2024-01-08 RX ADMIN — ASPIRIN 81 MG CHEWABLE TABLET 81 MG: 81 TABLET CHEWABLE at 10:09

## 2024-01-08 RX ADMIN — SPIRONOLACTONE 25 MG: 25 TABLET ORAL at 09:00

## 2024-01-08 RX ADMIN — INSULIN LISPRO 2 UNITS: 100 INJECTION, SOLUTION INTRAVENOUS; SUBCUTANEOUS at 10:09

## 2024-01-08 ASSESSMENT — COGNITIVE AND FUNCTIONAL STATUS - GENERAL
DRESSING REGULAR LOWER BODY CLOTHING: TOTAL
TOILETING: A LITTLE
DAILY ACTIVITIY SCORE: 16
HELP NEEDED FOR BATHING: A LOT
CLIMB 3 TO 5 STEPS WITH RAILING: TOTAL
MOVING TO AND FROM BED TO CHAIR: A LITTLE
WALKING IN HOSPITAL ROOM: TOTAL
PERSONAL GROOMING: A LITTLE
STANDING UP FROM CHAIR USING ARMS: A LOT
MOBILITY SCORE: 15
DRESSING REGULAR UPPER BODY CLOTHING: A LITTLE

## 2024-01-08 NOTE — DISCHARGE SUMMARY
Discharge Diagnosis  Gives systolic heart failure with nonischemic cardiomyopathy EF of 15 to 20%    Issues Requiring Follow-Up  pcp    Discharge Meds     Your medication list        CHANGE how you take these medications        Instructions Last Dose Given Next Dose Due   amiodarone 200 mg tablet  Commonly known as: Pacerone  What changed:   how much to take  how to take this  when to take this  additional instructions      Delvis Ventura       furosemide 20 mg tablet  Commonly known as: Lasix  What changed: Another medication with the same name was changed. Make sure you understand how and when to take each.      Take 1 tablet (20 mg) by mouth once daily.       furosemide 40 mg tablet  Commonly known as: Lasix  What changed:   medication strength  how much to take      Take 1 tablet (40 mg) by mouth 2 times a day.       spironolactone 25 mg tablet  Commonly known as: Aldactone  What changed: when to take this      Take 1 tablet (25 mg) by mouth once daily.              CONTINUE taking these medications        Instructions Last Dose Given Next Dose Due   aspirin 81 mg chewable tablet      Chew 1 tablet (81 mg) once daily.       atorvastatin 40 mg tablet  Commonly known as: Lipitor      Take 1 tablet (40 mg) by mouth once daily.       carvedilol 25 mg tablet  Commonly known as: Coreg      Take 1 tablet (25 mg) by mouth 2 times a day.       dapagliflozin propanediol 10 mg  Commonly known as: Farxiga      Take 1 tablet (10 mg) by mouth once daily.       Dexcom G7  misc  Generic drug: Dexcom G4 platinum       Use as instructed       Dexcom G7 Sensor device  Generic drug: blood-glucose sensor      Please use to check your glucose before meals and at night.       Entresto  mg tablet  Generic drug: sacubitriL-valsartan      Take 1 tablet by mouth 2 times a day.       FeroSuL tablet  Generic drug: ferrous sulfate (325 mg ferrous sulfate)      Take 1 tablet by mouth twice daily.       hydrALAZINE 50 mg  "tablet  Commonly known as: Apresoline      Take 1 tablet (50 mg) by mouth every 8 hours.       insulin glargine 100 unit/mL (3 mL) pen  Commonly known as: Lantus      Inject 40 Units under the skin once daily at bedtime. Take as directed per insulin instructions.       isosorbide mononitrate  mg 24 hr tablet  Commonly known as: Imdur      Take 1 tablet (120 mg) by mouth once daily. Do not crush or chew.       loperamide 2 mg capsule  Commonly known as: Imodium      Take 2 capsules by mouth every 12 hours if needed for diarrhea.       pen needle, diabetic 31 gauge x 5/16\" needle      Use to inject insulin daily       prazosin 2 mg capsule  Commonly known as: Minipress      Take 1 capsule (2 mg) by mouth once daily at bedtime.       zolpidem 5 mg tablet  Commonly known as: Ambien      Take 1 tablet (5 mg) by mouth as needed at bedtime for sleep.                 Where to Get Your Medications        These medications were sent to [facility] by [address], [facility] [address]      Hours: 24-hours Phone: [phone]   furosemide 40 mg tablet         Test Results Pending At Discharge  Pending Labs       Order Current Status    CK isoenzymes In process            Hospital Course   Patient is a very pleasant 63-year-old gentleman presented to the hospital with complaints of bilateral lower extremity edema as well as dyspnea.  Patient was found to be in acute on chronic heart failure with diuresis with IV Lasix once it was felt to be more euvolemic he was discharged home on increased dose of Lasix at 40 mg oral twice daily.  His Farxiga was also resumed.  Patient was stable at the time of discharge.    Pertinent Physical Exam At Time of Discharge  Physical Exam  Constitutional:       Appearance: Normal appearance.   HENT:      Head: Normocephalic.      Nose: Nose normal.      Mouth/Throat:      Mouth: Mucous membranes are dry.   Pulmonary:      Effort: " Pulmonary effort is normal.      Breath sounds: Normal breath sounds.   Abdominal:      General: Abdomen is flat. Bowel sounds are normal.      Palpations: Abdomen is soft.   Skin:     General: Skin is warm.      Capillary Refill: Capillary refill takes less than 2 seconds.   Neurological:      General: No focal deficit present.      Mental Status: He is alert.         Outpatient Follow-Up  No future appointments.      Tammy Yanez MD

## 2024-01-08 NOTE — PROGRESS NOTES
Per Dr Yanez-patient is ready for discharge.  He will need transportation home.  Transportation will be scheduled by Lucina, unit secretary.

## 2024-01-08 NOTE — CARE PLAN
Problem: Pain  Goal: Takes deep breaths with improved pain control throughout the shift  1/8/2024 1311 by Alexandra Ambrosio RN  Outcome: Adequate for Discharge  1/8/2024 1311 by Alexandra Ambrosio RN  Outcome: Progressing  Goal: Turns in bed with improved pain control throughout the shift  Outcome: Adequate for Discharge  Goal: Walks with improved pain control throughout the shift  Outcome: Adequate for Discharge  Goal: Performs ADL's with improved pain control throughout shift  Outcome: Adequate for Discharge  Goal: Participates in PT with improved pain control throughout the shift  Outcome: Adequate for Discharge  Goal: Free from opioid side effects throughout the shift  Outcome: Adequate for Discharge  Goal: Free from acute confusion related to pain meds throughout the shift  Outcome: Adequate for Discharge     Problem: Skin  Goal: Decreased wound size/increased tissue granulation at next dressing change  Outcome: Adequate for Discharge     Problem: Fall/Injury  Goal: Be free from injury by end of the shift  Outcome: Adequate for Discharge  Goal: Verbalize understanding of risk factor reduction measures to prevent injury from fall in the home  Outcome: Adequate for Discharge   The patient's goals for the shift include comfort

## 2024-01-08 NOTE — CARE PLAN
The patient's goals for the shift include going home    The clinical goals for the shift include free from falls        Problem: Pain  Goal: Takes deep breaths with improved pain control throughout the shift  Outcome: Progressing

## 2024-01-08 NOTE — PROGRESS NOTES
"Subjective Data:  Complaint of chest pain.  States same type of pain since \"they put in that first ICD device in me.\"  Dyspnea improved    Telemetry showed SR PVCs NSVT (3 beats)    Overnight Events:    None reported     Objective Data:  Last Recorded Vitals:  Vitals:    24 2027 24 0034 24 0359 24 0850   BP: 95/61 128/83 122/80 150/89   BP Location: Left arm   Left arm   Patient Position: Lying   Sitting   Pulse: 86 86 88 91   Resp: 20 18 20 20   Temp: 36.8 °C (98.2 °F) 36.6 °C (97.9 °F) 36.3 °C (97.3 °F) 36.3 °C (97.3 °F)   TempSrc: Temporal Temporal  Temporal   SpO2: 96% 94% 94% 97%   Weight:       Height:         Medical Gas Therapy: None (Room air)  Weight  Av.9 kg (204 lb 11.2 oz)  Min: 91.6 kg (202 lb)  Max: 95.3 kg (210 lb 1.6 oz)    LABS:  CMP:  Results from last 7 days   Lab Units 24  2253 24  0426 24  1517 24  0513 24  0604 24  1232   SODIUM mmol/L 135* 135* 140 137 138 137   POTASSIUM mmol/L 4.5 3.6 3.4* 3.5 3.6 3.7   CHLORIDE mmol/L 101 101 108* 104 102 103   CO2 mmol/L 26 27 26 24 27 24   ANION GAP mmol/L 13 11 9* 13 13 14   BUN mg/dL 19 15 15 16 14 15   CREATININE mg/dL 0.86 0.84 0.91 0.93 0.79 0.74   EGFR mL/min/1.73m*2 >90 >90 >90 >90 >90 >90   MAGNESIUM mg/dL  --   --   --  2.00 1.70  --    ALBUMIN g/dL  --   --   --   --  3.6 3.7   ALT U/L  --   --   --   --  41 41   AST U/L  --   --   --   --  47* 40*   BILIRUBIN TOTAL mg/dL  --   --   --   --  1.8* 1.8*       CBC:  Results from last 7 days   Lab Units 24  2253 24  0426 24  1517 24  0512 24  0604 24  1232   WBC AUTO x10*3/uL 5.6 5.7 5.8 5.4 4.7 5.2   HEMOGLOBIN g/dL 15.5 15.0 15.7 15.1 15.3 15.8   HEMATOCRIT % 46.0 46.0 48.2 45.9 45.7 48.2   PLATELETS AUTO x10*3/uL 210 186 206 184 170 175   MCV fL 90 90 91 90 89 91       COAG:     ABO: No results found for: \"ABO\"  HEME/ENDO:     CARDIAC:   Results from last 7 days   Lab Units 24 " 01/03/24  1536 01/03/24  1232   TROPHS ng/L 18 36* 34*   BNP pg/mL  --   --  1,519*               Last I/O:    Intake/Output Summary (Last 24 hours) at 1/8/2024 0855  Last data filed at 1/8/2024 0600  Gross per 24 hour   Intake 600 ml   Output 3900 ml   Net -3300 ml       Net IO Since Admission: -9,960 mL [01/08/24 0855]      Imaging Results:  Cardiac Device Check - Inpatient         Transthoracic Echo (TTE) Complete   Final Result      CT angio chest for pulmonary embolism   Final Result   Cardiomegaly with a trace right pleural effusion.  No definite large   central pulmonary embolus identified.   Signed by Cornelio Cordoba      XR chest 2 views   Final Result   Pulmonary vascular congestion.             Signed by: Bertrand Carvajal 1/3/2024 10:35 AM   Dictation workstation:   [id number]           Echo 1/3/24:  CONCLUSIONS:  1. Left ventricular systolic function is severely decreased with a 15-20% estimated ejection fraction.  2. No left ventricular thrombus visualized.  3. Spectral Doppler shows a restrictive pattern of left ventricular diastolic filling.  4. There is mildly reduced right ventricular systolic function.  5. Mildly elevated RVSP.  6. There is global hypokinesis of the left ventricle with minor regional variations.      Inpatient Medications:  Scheduled medications   Medication Dose Route Frequency    aspirin  81 mg oral Daily    atorvastatin  40 mg oral Nightly    carvedilol  25 mg oral BID    enoxaparin  40 mg subcutaneous q24h    furosemide  40 mg intravenous 2 times per day    hydrALAZINE  50 mg oral q8h    insulin glargine  20 Units subcutaneous Nightly    insulin lispro  0-5 Units subcutaneous Before meals & nightly    isosorbide mononitrate ER  120 mg oral Daily    melatonin  3 mg oral Daily    pantoprazole  40 mg oral Daily before breakfast    Or    pantoprazole  40 mg intravenous Daily before breakfast    prazosin  2 mg oral Nightly    sacubitriL-valsartan  2 tablet oral BID    spironolactone  25  mg oral Daily     PRN medications   Medication    acetaminophen    dextrose 10 % in water (D10W)    dextrose    docusate sodium    glucagon    guaiFENesin    loperamide    zolpidem     Continuous Medications   Medication Dose Last Rate   Physical Exam:  General:  Obese male, NAD  HEENT:  Pupils equal and reactive.  Normocephalic.  Moist mucosa.    Neck:  No thyromegaly. + JVD  Cardiovascular:  Regular rate and rhythm.  Normal S1 and S2.  Pulmonary: Lungs diminished throughout  Abdomen:  Distended, soft, non- tender, Positive bowel sounds.  Lower Extremities:  2+ pedal pulses. Efrem Lower extremity edema Trace  Neurologic:  Cranial nerves intact.  No focal deficit.   Skin: Skin warm and dry, normal skin turgor.   Psychiatric: Normal affect.     Assessment/Plan   Delvis Ventura is a 63 y.o. male with a past medical h/o DMII, HTN, COVID 12/202,  cerebral palsy, wheelchair-bound, Systolic HF, nonischemic cardiomyopathy (on [facility] 8/2018 he had trivial CAD), last TTE 8/23 EF 15-20%, s/p ICD (Independent IP; 3/6/2023; by Dr. Mickey Torres at [facility]) c/b explantation  d/t infection with re-implantation of single chamber ICD (4/28/2023 at ). Recent hospitalization 11/7/2023 and received Lasix x 3 doses totaling 160 mg >optimal urinary response and s/s improvement> was discharged on Lasix 20 mg po daily. Presents with c/o lower extremity swelling  weight gain > reported dry weight is approximately 194 pounds and estimates he is 204 pounds currently. Cardiology consulted for HF with reduced EF.     Acute systolic heart failure/NICM LVEF 15-20% (last TTE 1/4/24, no sig change from previous TTE 8/23)  S/p [employer] ICD implant> stable by available metrics>  Device check 1/4/23> reveals normal function device,  no ICD discharges reported, longevity 9 years  :<1%  Initial BNP 1519 last 1857 on 11/7/23  .  Euvolemic on exam.  Switch to oral furosemide  Continuing GDMT with carvedilol, hydralazine, Isordil, Entresto, spironolactone.  Resuming dapagliflozin 10 mg.     2.  Chest pain.  Reports it feels like he is being hit with a a osvaldo-hammer in center chest x 6 days. CP constant.  EKG reveals no new ischemia, troponins flat 34, 36. Heart score Low Level.   Last Nuclear Stress 6/22 was nonischemic  C/w MSK pain perhaps from ICD explantation. CT chest without acute process.   Pain control per primary team.     2.  Hypertension- Improved with IV diuresis       Recommendations:  Switch to oral furosemide 40 mg twice daily  Resume dapagliflozin 10 mg  Pain control per primary for musculoskeletal chest pain        Code Status:  Full Code      Wicho Villatoro DO

## 2024-01-10 LAB
CK BB CFR SERPL ELPH: 0 % (ref 0–0)
CK MACRO1 CFR SERPL: 0 % (ref 0–0)
CK MACRO2 CFR SERPL: 0 % (ref 0–0)
CK MB CFR SERPL ELPH: 0 % (ref 0–4)
CK MM CFR SERPL ELPH: 100 % (ref 96–100)
CK SERPL-CCNC: 81 U/L (ref 39–308)

## 2024-01-15 LAB — GLUCOSE BLD MANUAL STRIP-MCNC: 283 MG/DL (ref 74–99)

## 2024-01-18 NOTE — DOCUMENTATION CLARIFICATION NOTE
"    PATIENT:               OLIVIA PATHAK  ACCT #:                  [id number]  MRN:                       [id number]  :                       1960  ADMIT DATE:       1/3/2024 9:52 PM  DISCH DATE:        2024 1:28 PM  RESPONDING PROVIDER #:        [id number]          PROVIDER RESPONSE TEXT:    Cerebral palsy with hemiplegia    CDI QUERY TEXT:    [facility]_Functional Quadriplegia        Instruction:    Based on your assessment of the patient and the clinical information, please provide the requested documentation by clicking on the appropriate radio button and enter any additional information if prompted.    Question: Is there a diagnosis indicative of the patients degree of debility related to cerebral palsy?    When answering this query, please exercise your independent professional judgment. The fact that a question is being asked, does not imply that any particular answer is desired or expected.    The patient's clinical indicators include:  Clinical Information: History Of Present Illness  Olivia Pathak is a 63 y.o. male with PMH significant for HFrEF with EF 10 to 15% on 2023, type 2 DM, cerebral palsy wheelchair-bound who presented to the ED for lower extremity edema and recent 8lb weight gain. Pt also reports pleuritic chest pain, SOB, and dizziness for the past 6 days. Pt states these symptoms are similar to when he was hospitalized for CHF exacerbation 1 month ago and received Lasix x3 with good diuresis. Pt notes h/o DVT/PE 6 years ago and is not currently taking any blood thinners.      Clinical Indicators: Notes say he has \"cerebral palsy wheelchair bound\"    Treatment: Nurses assist with bathing, toileting. and transfers.    Risk Factors: Cerebral palsy  Options provided:  -- Cerebra palsy with hemiplegia  -- Cerebral palsy with quadriplegia  -- Cerebral palsy without hemiplegia or quadriplegia  -- Other - I will add my own diagnosis  -- Refer to Clinical Documentation Reviewer    Query created by: " Anais York on 1/15/2024 7:20 AM      Electronically signed by:  MARTHA SMALL MD 1/18/2024 2:56 PM

## 2024-01-22 ENCOUNTER — DOCUMENTATION (OUTPATIENT)
Dept: CARE COORDINATION | Facility: CLINIC | Age: 64
End: 2024-01-22
Payer: COMMERCIAL

## 2024-01-30 DIAGNOSIS — I50.42 CHRONIC COMBINED SYSTOLIC AND DIASTOLIC HEART FAILURE, NYHA CLASS 3 (MULTI): ICD-10-CM

## 2024-01-30 RX ORDER — ISOSORBIDE MONONITRATE 120 MG/1
120 TABLET, EXTENDED RELEASE ORAL DAILY
Qty: 30 TABLET | Refills: 2 | Status: SHIPPED | OUTPATIENT
Start: 2024-01-30 | End: 2024-05-16 | Stop reason: DRUGHIGH

## 2024-02-10 ENCOUNTER — APPOINTMENT (OUTPATIENT)
Dept: RADIOLOGY | Facility: HOSPITAL | Age: 64
DRG: 291 | End: 2024-02-10
Payer: COMMERCIAL

## 2024-02-10 ENCOUNTER — HOSPITAL ENCOUNTER (INPATIENT)
Facility: HOSPITAL | Age: 64
LOS: 5 days | Discharge: HOME | DRG: 291 | End: 2024-02-15
Attending: EMERGENCY MEDICINE | Admitting: INTERNAL MEDICINE
Payer: COMMERCIAL

## 2024-02-10 ENCOUNTER — CLINICAL SUPPORT (OUTPATIENT)
Dept: EMERGENCY MEDICINE | Facility: HOSPITAL | Age: 64
DRG: 291 | End: 2024-02-10
Payer: COMMERCIAL

## 2024-02-10 DIAGNOSIS — I50.9 HEART FAILURE, UNSPECIFIED HF CHRONICITY, UNSPECIFIED HEART FAILURE TYPE (MULTI): ICD-10-CM

## 2024-02-10 DIAGNOSIS — R00.0 TACHYCARDIA: ICD-10-CM

## 2024-02-10 DIAGNOSIS — I50.20 HFREF (HEART FAILURE WITH REDUCED EJECTION FRACTION) (MULTI): Primary | ICD-10-CM

## 2024-02-10 LAB
ALBUMIN SERPL BCP-MCNC: 4.1 G/DL (ref 3.4–5)
ALP SERPL-CCNC: 185 U/L (ref 33–136)
ALT SERPL W P-5'-P-CCNC: 36 U/L (ref 10–52)
ANION GAP SERPL CALC-SCNC: 13 MMOL/L (ref 10–20)
AST SERPL W P-5'-P-CCNC: 34 U/L (ref 9–39)
BASOPHILS # BLD AUTO: 0.02 X10*3/UL (ref 0–0.1)
BASOPHILS NFR BLD AUTO: 0.4 %
BILIRUB SERPL-MCNC: 1.4 MG/DL (ref 0–1.2)
BNP SERPL-MCNC: 1638 PG/ML (ref 0–99)
BUN SERPL-MCNC: 12 MG/DL (ref 6–23)
CALCIUM SERPL-MCNC: 9.5 MG/DL (ref 8.6–10.6)
CARDIAC TROPONIN I PNL SERPL HS: 86 NG/L (ref 0–53)
CARDIAC TROPONIN I PNL SERPL HS: 98 NG/L (ref 0–53)
CHLORIDE SERPL-SCNC: 105 MMOL/L (ref 98–107)
CO2 SERPL-SCNC: 25 MMOL/L (ref 21–32)
CREAT SERPL-MCNC: 0.8 MG/DL (ref 0.5–1.3)
EGFRCR SERPLBLD CKD-EPI 2021: >90 ML/MIN/1.73M*2
EOSINOPHIL # BLD AUTO: 0.03 X10*3/UL (ref 0–0.7)
EOSINOPHIL NFR BLD AUTO: 0.6 %
ERYTHROCYTE [DISTWIDTH] IN BLOOD BY AUTOMATED COUNT: 14.5 % (ref 11.5–14.5)
FLUAV RNA RESP QL NAA+PROBE: NOT DETECTED
FLUBV RNA RESP QL NAA+PROBE: NOT DETECTED
GLUCOSE BLD MANUAL STRIP-MCNC: 223 MG/DL (ref 74–99)
GLUCOSE SERPL-MCNC: 197 MG/DL (ref 74–99)
HCT VFR BLD AUTO: 47.5 % (ref 41–52)
HGB BLD-MCNC: 16.2 G/DL (ref 13.5–17.5)
IMM GRANULOCYTES # BLD AUTO: 0.01 X10*3/UL (ref 0–0.7)
IMM GRANULOCYTES NFR BLD AUTO: 0.2 % (ref 0–0.9)
LYMPHOCYTES # BLD AUTO: 1.41 X10*3/UL (ref 1.2–4.8)
LYMPHOCYTES NFR BLD AUTO: 30.2 %
MCH RBC QN AUTO: 29.2 PG (ref 26–34)
MCHC RBC AUTO-ENTMCNC: 34.1 G/DL (ref 32–36)
MCV RBC AUTO: 86 FL (ref 80–100)
MONOCYTES # BLD AUTO: 0.28 X10*3/UL (ref 0.1–1)
MONOCYTES NFR BLD AUTO: 6 %
NEUTROPHILS # BLD AUTO: 2.92 X10*3/UL (ref 1.2–7.7)
NEUTROPHILS NFR BLD AUTO: 62.6 %
NRBC BLD-RTO: 0 /100 WBCS (ref 0–0)
PLATELET # BLD AUTO: 143 X10*3/UL (ref 150–450)
POTASSIUM SERPL-SCNC: 4.2 MMOL/L (ref 3.5–5.3)
PROT SERPL-MCNC: 7.6 G/DL (ref 6.4–8.2)
RBC # BLD AUTO: 5.54 X10*6/UL (ref 4.5–5.9)
SARS-COV-2 RNA RESP QL NAA+PROBE: NOT DETECTED
SODIUM SERPL-SCNC: 139 MMOL/L (ref 136–145)
WBC # BLD AUTO: 4.7 X10*3/UL (ref 4.4–11.3)

## 2024-02-10 PROCEDURE — 96374 THER/PROPH/DIAG INJ IV PUSH: CPT | Mod: 59

## 2024-02-10 PROCEDURE — 36415 COLL VENOUS BLD VENIPUNCTURE: CPT | Performed by: EMERGENCY MEDICINE

## 2024-02-10 PROCEDURE — 83880 ASSAY OF NATRIURETIC PEPTIDE: CPT | Performed by: EMERGENCY MEDICINE

## 2024-02-10 PROCEDURE — 2500000004 HC RX 250 GENERAL PHARMACY W/ HCPCS (ALT 636 FOR OP/ED): Performed by: STUDENT IN AN ORGANIZED HEALTH CARE EDUCATION/TRAINING PROGRAM

## 2024-02-10 PROCEDURE — 73630 X-RAY EXAM OF FOOT: CPT | Mod: LEFT SIDE | Performed by: RADIOLOGY

## 2024-02-10 PROCEDURE — G0378 HOSPITAL OBSERVATION PER HR: HCPCS

## 2024-02-10 PROCEDURE — 87636 SARSCOV2 & INF A&B AMP PRB: CPT | Performed by: STUDENT IN AN ORGANIZED HEALTH CARE EDUCATION/TRAINING PROGRAM

## 2024-02-10 PROCEDURE — 2500000001 HC RX 250 WO HCPCS SELF ADMINISTERED DRUGS (ALT 637 FOR MEDICARE OP): Performed by: STUDENT IN AN ORGANIZED HEALTH CARE EDUCATION/TRAINING PROGRAM

## 2024-02-10 PROCEDURE — 84484 ASSAY OF TROPONIN QUANT: CPT | Performed by: EMERGENCY MEDICINE

## 2024-02-10 PROCEDURE — 73630 X-RAY EXAM OF FOOT: CPT | Mod: LT

## 2024-02-10 PROCEDURE — 28470 CLTX METATARSAL FX WO MNP EA: CPT | Performed by: ORTHOPAEDIC SURGERY

## 2024-02-10 PROCEDURE — 1100000001 HC PRIVATE ROOM DAILY

## 2024-02-10 PROCEDURE — 2500000002 HC RX 250 W HCPCS SELF ADMINISTERED DRUGS (ALT 637 FOR MEDICARE OP, ALT 636 FOR OP/ED): Performed by: STUDENT IN AN ORGANIZED HEALTH CARE EDUCATION/TRAINING PROGRAM

## 2024-02-10 PROCEDURE — 80053 COMPREHEN METABOLIC PANEL: CPT | Performed by: EMERGENCY MEDICINE

## 2024-02-10 PROCEDURE — 71045 X-RAY EXAM CHEST 1 VIEW: CPT

## 2024-02-10 PROCEDURE — 85025 COMPLETE CBC W/AUTO DIFF WBC: CPT | Performed by: EMERGENCY MEDICINE

## 2024-02-10 PROCEDURE — 82947 ASSAY GLUCOSE BLOOD QUANT: CPT

## 2024-02-10 PROCEDURE — 93010 ELECTROCARDIOGRAM REPORT: CPT | Performed by: EMERGENCY MEDICINE

## 2024-02-10 PROCEDURE — 73700 CT LOWER EXTREMITY W/O DYE: CPT | Mod: LT

## 2024-02-10 PROCEDURE — 73700 CT LOWER EXTREMITY W/O DYE: CPT | Mod: LEFT SIDE | Performed by: RADIOLOGY

## 2024-02-10 PROCEDURE — 99285 EMERGENCY DEPT VISIT HI MDM: CPT | Performed by: EMERGENCY MEDICINE

## 2024-02-10 PROCEDURE — 71045 X-RAY EXAM CHEST 1 VIEW: CPT | Performed by: RADIOLOGY

## 2024-02-10 PROCEDURE — 93005 ELECTROCARDIOGRAM TRACING: CPT

## 2024-02-10 PROCEDURE — 99285 EMERGENCY DEPT VISIT HI MDM: CPT | Mod: 25 | Performed by: EMERGENCY MEDICINE

## 2024-02-10 PROCEDURE — 99222 1ST HOSP IP/OBS MODERATE 55: CPT | Performed by: ORTHOPAEDIC SURGERY

## 2024-02-10 RX ORDER — SPIRONOLACTONE 25 MG/1
25 TABLET ORAL DAILY
Status: DISCONTINUED | OUTPATIENT
Start: 2024-02-11 | End: 2024-02-15 | Stop reason: HOSPADM

## 2024-02-10 RX ORDER — PRAZOSIN HYDROCHLORIDE 2 MG/1
2 CAPSULE ORAL NIGHTLY
Status: DISCONTINUED | OUTPATIENT
Start: 2024-02-10 | End: 2024-02-15 | Stop reason: HOSPADM

## 2024-02-10 RX ORDER — POLYETHYLENE GLYCOL 3350 17 G/17G
17 POWDER, FOR SOLUTION ORAL DAILY
Status: DISCONTINUED | OUTPATIENT
Start: 2024-02-11 | End: 2024-02-15 | Stop reason: HOSPADM

## 2024-02-10 RX ORDER — FUROSEMIDE 10 MG/ML
80 INJECTION INTRAMUSCULAR; INTRAVENOUS ONCE
Status: COMPLETED | OUTPATIENT
Start: 2024-02-10 | End: 2024-02-10

## 2024-02-10 RX ORDER — LOPERAMIDE HYDROCHLORIDE 2 MG/1
2 CAPSULE ORAL 3 TIMES DAILY PRN
Status: DISCONTINUED | OUTPATIENT
Start: 2024-02-10 | End: 2024-02-15 | Stop reason: HOSPADM

## 2024-02-10 RX ORDER — INSULIN GLARGINE 100 [IU]/ML
40 INJECTION, SOLUTION SUBCUTANEOUS NIGHTLY
Status: DISCONTINUED | OUTPATIENT
Start: 2024-02-10 | End: 2024-02-15 | Stop reason: HOSPADM

## 2024-02-10 RX ORDER — ISOSORBIDE MONONITRATE 60 MG/1
120 TABLET, EXTENDED RELEASE ORAL DAILY
Status: DISCONTINUED | OUTPATIENT
Start: 2024-02-11 | End: 2024-02-15 | Stop reason: HOSPADM

## 2024-02-10 RX ORDER — NAPROXEN SODIUM 220 MG/1
81 TABLET, FILM COATED ORAL DAILY
Status: DISCONTINUED | OUTPATIENT
Start: 2024-02-11 | End: 2024-02-15 | Stop reason: HOSPADM

## 2024-02-10 RX ORDER — ENOXAPARIN SODIUM 100 MG/ML
40 INJECTION SUBCUTANEOUS EVERY 24 HOURS
Status: DISCONTINUED | OUTPATIENT
Start: 2024-02-10 | End: 2024-02-15 | Stop reason: HOSPADM

## 2024-02-10 RX ORDER — DEXTROSE 50 % IN WATER (D50W) INTRAVENOUS SYRINGE
25
Status: DISCONTINUED | OUTPATIENT
Start: 2024-02-10 | End: 2024-02-15 | Stop reason: HOSPADM

## 2024-02-10 RX ORDER — ATORVASTATIN CALCIUM 40 MG/1
40 TABLET, FILM COATED ORAL
Status: DISCONTINUED | OUTPATIENT
Start: 2024-02-11 | End: 2024-02-15 | Stop reason: HOSPADM

## 2024-02-10 RX ORDER — HYDRALAZINE HYDROCHLORIDE 25 MG/1
50 TABLET, FILM COATED ORAL ONCE
Status: COMPLETED | OUTPATIENT
Start: 2024-02-10 | End: 2024-02-10

## 2024-02-10 RX ORDER — INSULIN LISPRO 100 [IU]/ML
0-5 INJECTION, SOLUTION INTRAVENOUS; SUBCUTANEOUS
Status: DISCONTINUED | OUTPATIENT
Start: 2024-02-11 | End: 2024-02-15 | Stop reason: HOSPADM

## 2024-02-10 RX ORDER — CARVEDILOL 12.5 MG/1
25 TABLET ORAL ONCE
Status: COMPLETED | OUTPATIENT
Start: 2024-02-10 | End: 2024-02-10

## 2024-02-10 RX ORDER — ZOLPIDEM TARTRATE 5 MG/1
5 TABLET ORAL NIGHTLY PRN
Status: DISCONTINUED | OUTPATIENT
Start: 2024-02-10 | End: 2024-02-15 | Stop reason: HOSPADM

## 2024-02-10 RX ORDER — AMIODARONE HYDROCHLORIDE 200 MG/1
200 TABLET ORAL DAILY
Status: DISCONTINUED | OUTPATIENT
Start: 2024-02-11 | End: 2024-02-15 | Stop reason: HOSPADM

## 2024-02-10 RX ORDER — CARVEDILOL 25 MG/1
25 TABLET ORAL 2 TIMES DAILY
Status: DISCONTINUED | OUTPATIENT
Start: 2024-02-10 | End: 2024-02-15 | Stop reason: HOSPADM

## 2024-02-10 RX ORDER — HYDRALAZINE HYDROCHLORIDE 25 MG/1
50 TABLET, FILM COATED ORAL EVERY 8 HOURS
Status: DISCONTINUED | OUTPATIENT
Start: 2024-02-10 | End: 2024-02-13

## 2024-02-10 RX ORDER — DAPAGLIFLOZIN 10 MG/1
10 TABLET, FILM COATED ORAL DAILY
Status: DISCONTINUED | OUTPATIENT
Start: 2024-02-11 | End: 2024-02-15 | Stop reason: HOSPADM

## 2024-02-10 RX ORDER — DEXTROSE MONOHYDRATE 100 MG/ML
0.3 INJECTION, SOLUTION INTRAVENOUS ONCE AS NEEDED
Status: DISCONTINUED | OUTPATIENT
Start: 2024-02-10 | End: 2024-02-15 | Stop reason: HOSPADM

## 2024-02-10 RX ORDER — FERROUS SULFATE 325(65) MG
1 TABLET ORAL 2 TIMES DAILY
Status: DISCONTINUED | OUTPATIENT
Start: 2024-02-10 | End: 2024-02-15 | Stop reason: HOSPADM

## 2024-02-10 RX ADMIN — SACUBITRIL AND VALSARTAN 1 TABLET: 97; 103 TABLET, FILM COATED ORAL at 22:32

## 2024-02-10 RX ADMIN — FERROUS SULFATE TAB 325 MG (65 MG ELEMENTAL FE) 1 TABLET: 325 (65 FE) TAB at 22:32

## 2024-02-10 RX ADMIN — PRAZOSIN HYDROCHLORIDE 2 MG: 2 CAPSULE ORAL at 22:32

## 2024-02-10 RX ADMIN — HYDRALAZINE HYDROCHLORIDE 50 MG: 25 TABLET, FILM COATED ORAL at 17:55

## 2024-02-10 RX ADMIN — INSULIN GLARGINE 40 UNITS: 100 INJECTION, SOLUTION SUBCUTANEOUS at 22:34

## 2024-02-10 RX ADMIN — FUROSEMIDE 80 MG: 10 INJECTION, SOLUTION INTRAVENOUS at 17:55

## 2024-02-10 RX ADMIN — CARVEDILOL 25 MG: 12.5 TABLET, FILM COATED ORAL at 17:55

## 2024-02-10 RX ADMIN — ENOXAPARIN SODIUM 40 MG: 100 INJECTION SUBCUTANEOUS at 22:32

## 2024-02-10 RX ADMIN — ZOLPIDEM TARTRATE 5 MG: 5 TABLET ORAL at 22:32

## 2024-02-10 SDOH — SOCIAL STABILITY: SOCIAL INSECURITY: ABUSE: ADULT

## 2024-02-10 SDOH — SOCIAL STABILITY: SOCIAL INSECURITY: ARE YOU OR HAVE YOU BEEN THREATENED OR ABUSED PHYSICALLY, EMOTIONALLY, OR SEXUALLY BY ANYONE?: NO

## 2024-02-10 SDOH — SOCIAL STABILITY: SOCIAL INSECURITY: DO YOU FEEL ANYONE HAS EXPLOITED OR TAKEN ADVANTAGE OF YOU FINANCIALLY OR OF YOUR PERSONAL PROPERTY?: NO

## 2024-02-10 SDOH — SOCIAL STABILITY: SOCIAL INSECURITY: ARE THERE ANY APPARENT SIGNS OF INJURIES/BEHAVIORS THAT COULD BE RELATED TO ABUSE/NEGLECT?: NO

## 2024-02-10 SDOH — SOCIAL STABILITY: SOCIAL INSECURITY: HAS ANYONE EVER THREATENED TO HURT YOUR FAMILY OR YOUR PETS?: NO

## 2024-02-10 SDOH — SOCIAL STABILITY: SOCIAL INSECURITY: WERE YOU ABLE TO COMPLETE ALL THE BEHAVIORAL HEALTH SCREENINGS?: YES

## 2024-02-10 SDOH — SOCIAL STABILITY: SOCIAL INSECURITY: HAVE YOU HAD THOUGHTS OF HARMING ANYONE ELSE?: NO

## 2024-02-10 SDOH — SOCIAL STABILITY: SOCIAL INSECURITY: DO YOU FEEL UNSAFE GOING BACK TO THE PLACE WHERE YOU ARE LIVING?: NO

## 2024-02-10 SDOH — SOCIAL STABILITY: SOCIAL INSECURITY: DOES ANYONE TRY TO KEEP YOU FROM HAVING/CONTACTING OTHER FRIENDS OR DOING THINGS OUTSIDE YOUR HOME?: NO

## 2024-02-10 ASSESSMENT — PATIENT HEALTH QUESTIONNAIRE - PHQ9
1. LITTLE INTEREST OR PLEASURE IN DOING THINGS: NOT AT ALL
2. FEELING DOWN, DEPRESSED OR HOPELESS: NOT AT ALL
SUM OF ALL RESPONSES TO PHQ9 QUESTIONS 1 & 2: 0

## 2024-02-10 ASSESSMENT — COGNITIVE AND FUNCTIONAL STATUS - GENERAL
PATIENT BASELINE BEDBOUND: NO
HELP NEEDED FOR BATHING: A LOT
CLIMB 3 TO 5 STEPS WITH RAILING: TOTAL
MOBILITY SCORE: 15
STANDING UP FROM CHAIR USING ARMS: A LITTLE
DAILY ACTIVITIY SCORE: 17
DRESSING REGULAR UPPER BODY CLOTHING: A LITTLE
WALKING IN HOSPITAL ROOM: TOTAL
PERSONAL GROOMING: A LITTLE
TOILETING: A LITTLE
DRESSING REGULAR LOWER BODY CLOTHING: A LOT
MOVING TO AND FROM BED TO CHAIR: A LITTLE
TURNING FROM BACK TO SIDE WHILE IN FLAT BAD: A LITTLE

## 2024-02-10 ASSESSMENT — ACTIVITIES OF DAILY LIVING (ADL)
TOILETING: INDEPENDENT
BATHING: INDEPENDENT
HEARING - RIGHT EAR: FUNCTIONAL
ADEQUATE_TO_COMPLETE_ADL: YES
GROOMING: INDEPENDENT
WALKS IN HOME: NEEDS ASSISTANCE
DRESSING YOURSELF: INDEPENDENT
ASSISTIVE_DEVICE: WHEELCHAIR
HEARING - LEFT EAR: FUNCTIONAL
PATIENT'S MEMORY ADEQUATE TO SAFELY COMPLETE DAILY ACTIVITIES?: YES
FEEDING YOURSELF: INDEPENDENT
JUDGMENT_ADEQUATE_SAFELY_COMPLETE_DAILY_ACTIVITIES: YES

## 2024-02-10 ASSESSMENT — LIFESTYLE VARIABLES
AUDIT-C TOTAL SCORE: 0
HOW OFTEN DO YOU HAVE A DRINK CONTAINING ALCOHOL: NEVER
AUDIT-C TOTAL SCORE: 0
SKIP TO QUESTIONS 9-10: 1
HOW OFTEN DO YOU HAVE 6 OR MORE DRINKS ON ONE OCCASION: NEVER
HOW MANY STANDARD DRINKS CONTAINING ALCOHOL DO YOU HAVE ON A TYPICAL DAY: PATIENT DOES NOT DRINK

## 2024-02-10 ASSESSMENT — PAIN - FUNCTIONAL ASSESSMENT: PAIN_FUNCTIONAL_ASSESSMENT: 0-10

## 2024-02-10 ASSESSMENT — PAIN DESCRIPTION - PROGRESSION: CLINICAL_PROGRESSION: OTHER (COMMENT)

## 2024-02-10 ASSESSMENT — PAIN SCALES - GENERAL: PAINLEVEL_OUTOF10: 5 - MODERATE PAIN

## 2024-02-10 NOTE — ED TRIAGE NOTES
Pt reports CP, SOB and left foot pain. Pt reports his left foot was in the elevator door yesterday. States he has had CP and SOB for 3 days. States he can't lie down because he is too short of breath.

## 2024-02-11 LAB
ANION GAP SERPL CALC-SCNC: 12 MMOL/L (ref 10–20)
ATRIAL RATE: 99 BPM
BUN SERPL-MCNC: 10 MG/DL (ref 6–23)
CALCIUM SERPL-MCNC: 9 MG/DL (ref 8.6–10.6)
CHLORIDE SERPL-SCNC: 108 MMOL/L (ref 98–107)
CHOLEST SERPL-MCNC: 127 MG/DL (ref 0–199)
CHOLESTEROL/HDL RATIO: 3.9
CO2 SERPL-SCNC: 24 MMOL/L (ref 21–32)
CREAT SERPL-MCNC: 0.65 MG/DL (ref 0.5–1.3)
EGFRCR SERPLBLD CKD-EPI 2021: >90 ML/MIN/1.73M*2
ERYTHROCYTE [DISTWIDTH] IN BLOOD BY AUTOMATED COUNT: 14.6 % (ref 11.5–14.5)
EST. AVERAGE GLUCOSE BLD GHB EST-MCNC: 237 MG/DL
GLUCOSE BLD MANUAL STRIP-MCNC: 125 MG/DL (ref 74–99)
GLUCOSE BLD MANUAL STRIP-MCNC: 168 MG/DL (ref 74–99)
GLUCOSE BLD MANUAL STRIP-MCNC: 200 MG/DL (ref 74–99)
GLUCOSE BLD MANUAL STRIP-MCNC: 206 MG/DL (ref 74–99)
GLUCOSE SERPL-MCNC: 111 MG/DL (ref 74–99)
HBA1C MFR BLD: 9.9 %
HCT VFR BLD AUTO: 45.2 % (ref 41–52)
HDLC SERPL-MCNC: 32.7 MG/DL
HGB BLD-MCNC: 15 G/DL (ref 13.5–17.5)
LDLC SERPL CALC-MCNC: 80 MG/DL
MAGNESIUM SERPL-MCNC: 1.6 MG/DL (ref 1.6–2.4)
MCH RBC QN AUTO: 29.2 PG (ref 26–34)
MCHC RBC AUTO-ENTMCNC: 33.2 G/DL (ref 32–36)
MCV RBC AUTO: 88 FL (ref 80–100)
NON HDL CHOLESTEROL: 94 MG/DL (ref 0–149)
NRBC BLD-RTO: 0 /100 WBCS (ref 0–0)
P AXIS: 63 DEGREES
P OFFSET: 186 MS
P ONSET: 125 MS
PLATELET # BLD AUTO: 164 X10*3/UL (ref 150–450)
POTASSIUM SERPL-SCNC: 3.2 MMOL/L (ref 3.5–5.3)
PR INTERVAL: 190 MS
Q ONSET: 220 MS
QRS COUNT: 16 BEATS
QRS DURATION: 106 MS
QT INTERVAL: 400 MS
QTC CALCULATION(BAZETT): 513 MS
QTC FREDERICIA: 472 MS
R AXIS: 132 DEGREES
RBC # BLD AUTO: 5.13 X10*6/UL (ref 4.5–5.9)
SODIUM SERPL-SCNC: 141 MMOL/L (ref 136–145)
T AXIS: 31 DEGREES
T OFFSET: 420 MS
TRIGL SERPL-MCNC: 74 MG/DL (ref 0–149)
TSH SERPL-ACNC: 0.92 MIU/L (ref 0.44–3.98)
VENTRICULAR RATE: 99 BPM
VLDL: 15 MG/DL (ref 0–40)
WBC # BLD AUTO: 5 X10*3/UL (ref 4.4–11.3)

## 2024-02-11 PROCEDURE — 82947 ASSAY GLUCOSE BLOOD QUANT: CPT

## 2024-02-11 PROCEDURE — 2500000002 HC RX 250 W HCPCS SELF ADMINISTERED DRUGS (ALT 637 FOR MEDICARE OP, ALT 636 FOR OP/ED): Performed by: NURSE PRACTITIONER

## 2024-02-11 PROCEDURE — 83735 ASSAY OF MAGNESIUM: CPT | Performed by: STUDENT IN AN ORGANIZED HEALTH CARE EDUCATION/TRAINING PROGRAM

## 2024-02-11 PROCEDURE — 2500000004 HC RX 250 GENERAL PHARMACY W/ HCPCS (ALT 636 FOR OP/ED): Performed by: NURSE PRACTITIONER

## 2024-02-11 PROCEDURE — 84443 ASSAY THYROID STIM HORMONE: CPT | Performed by: STUDENT IN AN ORGANIZED HEALTH CARE EDUCATION/TRAINING PROGRAM

## 2024-02-11 PROCEDURE — 36415 COLL VENOUS BLD VENIPUNCTURE: CPT | Performed by: STUDENT IN AN ORGANIZED HEALTH CARE EDUCATION/TRAINING PROGRAM

## 2024-02-11 PROCEDURE — 2500000004 HC RX 250 GENERAL PHARMACY W/ HCPCS (ALT 636 FOR OP/ED): Performed by: STUDENT IN AN ORGANIZED HEALTH CARE EDUCATION/TRAINING PROGRAM

## 2024-02-11 PROCEDURE — 1100000001 HC PRIVATE ROOM DAILY

## 2024-02-11 PROCEDURE — 2500000001 HC RX 250 WO HCPCS SELF ADMINISTERED DRUGS (ALT 637 FOR MEDICARE OP): Performed by: NURSE PRACTITIONER

## 2024-02-11 PROCEDURE — 2500000001 HC RX 250 WO HCPCS SELF ADMINISTERED DRUGS (ALT 637 FOR MEDICARE OP): Performed by: STUDENT IN AN ORGANIZED HEALTH CARE EDUCATION/TRAINING PROGRAM

## 2024-02-11 PROCEDURE — 99223 1ST HOSP IP/OBS HIGH 75: CPT | Performed by: INTERNAL MEDICINE

## 2024-02-11 PROCEDURE — 80048 BASIC METABOLIC PNL TOTAL CA: CPT | Performed by: STUDENT IN AN ORGANIZED HEALTH CARE EDUCATION/TRAINING PROGRAM

## 2024-02-11 PROCEDURE — 85027 COMPLETE CBC AUTOMATED: CPT | Performed by: STUDENT IN AN ORGANIZED HEALTH CARE EDUCATION/TRAINING PROGRAM

## 2024-02-11 PROCEDURE — 80061 LIPID PANEL: CPT | Performed by: STUDENT IN AN ORGANIZED HEALTH CARE EDUCATION/TRAINING PROGRAM

## 2024-02-11 PROCEDURE — G0378 HOSPITAL OBSERVATION PER HR: HCPCS

## 2024-02-11 PROCEDURE — 83036 HEMOGLOBIN GLYCOSYLATED A1C: CPT | Performed by: STUDENT IN AN ORGANIZED HEALTH CARE EDUCATION/TRAINING PROGRAM

## 2024-02-11 PROCEDURE — 2500000002 HC RX 250 W HCPCS SELF ADMINISTERED DRUGS (ALT 637 FOR MEDICARE OP, ALT 636 FOR OP/ED): Performed by: STUDENT IN AN ORGANIZED HEALTH CARE EDUCATION/TRAINING PROGRAM

## 2024-02-11 RX ORDER — ACETAMINOPHEN 325 MG/1
650 TABLET ORAL EVERY 6 HOURS PRN
Status: DISCONTINUED | OUTPATIENT
Start: 2024-02-11 | End: 2024-02-15 | Stop reason: HOSPADM

## 2024-02-11 RX ORDER — POTASSIUM CHLORIDE 20 MEQ/1
40 TABLET, EXTENDED RELEASE ORAL EVERY 4 HOURS
Status: COMPLETED | OUTPATIENT
Start: 2024-02-11 | End: 2024-02-11

## 2024-02-11 RX ORDER — FUROSEMIDE 10 MG/ML
80 INJECTION INTRAMUSCULAR; INTRAVENOUS EVERY 12 HOURS
Status: DISCONTINUED | OUTPATIENT
Start: 2024-02-11 | End: 2024-02-15 | Stop reason: HOSPADM

## 2024-02-11 RX ORDER — OXYCODONE HYDROCHLORIDE 5 MG/1
5 TABLET ORAL EVERY 8 HOURS PRN
Status: DISCONTINUED | OUTPATIENT
Start: 2024-02-11 | End: 2024-02-15 | Stop reason: HOSPADM

## 2024-02-11 RX ADMIN — CARVEDILOL 25 MG: 25 TABLET, FILM COATED ORAL at 20:42

## 2024-02-11 RX ADMIN — AMIODARONE HYDROCHLORIDE 200 MG: 200 TABLET ORAL at 09:06

## 2024-02-11 RX ADMIN — ATORVASTATIN CALCIUM 40 MG: 40 TABLET, FILM COATED ORAL at 05:32

## 2024-02-11 RX ADMIN — HYDRALAZINE HYDROCHLORIDE 50 MG: 25 TABLET ORAL at 14:20

## 2024-02-11 RX ADMIN — CARVEDILOL 25 MG: 25 TABLET, FILM COATED ORAL at 09:06

## 2024-02-11 RX ADMIN — SACUBITRIL AND VALSARTAN 1 TABLET: 97; 103 TABLET, FILM COATED ORAL at 09:07

## 2024-02-11 RX ADMIN — INSULIN GLARGINE 40 UNITS: 100 INJECTION, SOLUTION SUBCUTANEOUS at 20:40

## 2024-02-11 RX ADMIN — POTASSIUM CHLORIDE 40 MEQ: 1500 TABLET, EXTENDED RELEASE ORAL at 17:24

## 2024-02-11 RX ADMIN — PRAZOSIN HYDROCHLORIDE 2 MG: 2 CAPSULE ORAL at 20:42

## 2024-02-11 RX ADMIN — OXYCODONE HYDROCHLORIDE 5 MG: 5 TABLET ORAL at 20:42

## 2024-02-11 RX ADMIN — ISOSORBIDE MONONITRATE 120 MG: 60 TABLET, EXTENDED RELEASE ORAL at 09:06

## 2024-02-11 RX ADMIN — HYDRALAZINE HYDROCHLORIDE 50 MG: 25 TABLET ORAL at 05:32

## 2024-02-11 RX ADMIN — FERROUS SULFATE TAB 325 MG (65 MG ELEMENTAL FE) 1 TABLET: 325 (65 FE) TAB at 09:06

## 2024-02-11 RX ADMIN — LOPERAMIDE HYDROCHLORIDE 2 MG: 2 CAPSULE ORAL at 20:47

## 2024-02-11 RX ADMIN — POTASSIUM CHLORIDE 40 MEQ: 1500 TABLET, EXTENDED RELEASE ORAL at 14:20

## 2024-02-11 RX ADMIN — SPIRONOLACTONE 25 MG: 25 TABLET, FILM COATED ORAL at 09:07

## 2024-02-11 RX ADMIN — ENOXAPARIN SODIUM 40 MG: 100 INJECTION SUBCUTANEOUS at 20:41

## 2024-02-11 RX ADMIN — FERROUS SULFATE TAB 325 MG (65 MG ELEMENTAL FE) 1 TABLET: 325 (65 FE) TAB at 20:42

## 2024-02-11 RX ADMIN — DAPAGLIFLOZIN 10 MG: 10 TABLET, FILM COATED ORAL at 09:06

## 2024-02-11 RX ADMIN — FUROSEMIDE 80 MG: 10 INJECTION, SOLUTION INTRAMUSCULAR; INTRAVENOUS at 14:20

## 2024-02-11 RX ADMIN — ASPIRIN 81 MG CHEWABLE TABLET 81 MG: 81 TABLET CHEWABLE at 09:06

## 2024-02-11 RX ADMIN — HYDRALAZINE HYDROCHLORIDE 50 MG: 25 TABLET ORAL at 20:45

## 2024-02-11 RX ADMIN — SACUBITRIL AND VALSARTAN 1 TABLET: 97; 103 TABLET, FILM COATED ORAL at 20:42

## 2024-02-11 RX ADMIN — ACETAMINOPHEN 650 MG: 325 TABLET ORAL at 09:07

## 2024-02-11 SDOH — HEALTH STABILITY: MENTAL HEALTH: HOW OFTEN DO YOU HAVE A DRINK CONTAINING ALCOHOL?: NEVER

## 2024-02-11 SDOH — ECONOMIC STABILITY: INCOME INSECURITY: IN THE PAST 12 MONTHS, HAS THE ELECTRIC, GAS, OIL, OR WATER COMPANY THREATENED TO SHUT OFF SERVICE IN YOUR HOME?: NO

## 2024-02-11 SDOH — ECONOMIC STABILITY: HOUSING INSECURITY
IN THE LAST 12 MONTHS, WAS THERE A TIME WHEN YOU DID NOT HAVE A STEADY PLACE TO SLEEP OR SLEPT IN A SHELTER (INCLUDING NOW)?: NO

## 2024-02-11 SDOH — SOCIAL STABILITY: SOCIAL INSECURITY
WITHIN THE LAST YEAR, HAVE YOU BEEN KICKED, HIT, SLAPPED, OR OTHERWISE PHYSICALLY HURT BY YOUR PARTNER OR EX-PARTNER?: NO

## 2024-02-11 SDOH — SOCIAL STABILITY: SOCIAL NETWORK: IN A TYPICAL WEEK, HOW MANY TIMES DO YOU TALK ON THE PHONE WITH FAMILY, FRIENDS, OR NEIGHBORS?: NEVER

## 2024-02-11 SDOH — ECONOMIC STABILITY: INCOME INSECURITY: IN THE LAST 12 MONTHS, WAS THERE A TIME WHEN YOU WERE NOT ABLE TO PAY THE MORTGAGE OR RENT ON TIME?: NO

## 2024-02-11 SDOH — ECONOMIC STABILITY: HOUSING INSECURITY: IN THE LAST 12 MONTHS, HOW MANY PLACES HAVE YOU LIVED?: 1

## 2024-02-11 SDOH — SOCIAL STABILITY: SOCIAL INSECURITY
WITHIN THE LAST YEAR, HAVE TO BEEN RAPED OR FORCED TO HAVE ANY KIND OF SEXUAL ACTIVITY BY YOUR PARTNER OR EX-PARTNER?: NO

## 2024-02-11 SDOH — SOCIAL STABILITY: SOCIAL NETWORK
DO YOU BELONG TO ANY CLUBS OR ORGANIZATIONS SUCH AS CHURCH GROUPS UNIONS, FRATERNAL OR ATHLETIC GROUPS, OR SCHOOL GROUPS?: NO

## 2024-02-11 SDOH — ECONOMIC STABILITY: INCOME INSECURITY: HOW HARD IS IT FOR YOU TO PAY FOR THE VERY BASICS LIKE FOOD, HOUSING, MEDICAL CARE, AND HEATING?: NOT VERY HARD

## 2024-02-11 SDOH — ECONOMIC STABILITY: FOOD INSECURITY: WITHIN THE PAST 12 MONTHS, THE FOOD YOU BOUGHT JUST DIDN'T LAST AND YOU DIDN'T HAVE MONEY TO GET MORE.: NEVER TRUE

## 2024-02-11 SDOH — SOCIAL STABILITY: SOCIAL INSECURITY: WITHIN THE LAST YEAR, HAVE YOU BEEN HUMILIATED OR EMOTIONALLY ABUSED IN OTHER WAYS BY YOUR PARTNER OR EX-PARTNER?: NO

## 2024-02-11 SDOH — SOCIAL STABILITY: SOCIAL NETWORK: HOW OFTEN DO YOU ATTENT MEETINGS OF THE CLUB OR ORGANIZATION YOU BELONG TO?: NEVER

## 2024-02-11 SDOH — SOCIAL STABILITY: SOCIAL NETWORK: HOW OFTEN DO YOU ATTEND CHURCH OR RELIGIOUS SERVICES?: 1 TO 4 TIMES PER YEAR

## 2024-02-11 SDOH — ECONOMIC STABILITY: TRANSPORTATION INSECURITY
IN THE PAST 12 MONTHS, HAS LACK OF TRANSPORTATION KEPT YOU FROM MEETINGS, WORK, OR FROM GETTING THINGS NEEDED FOR DAILY LIVING?: NO

## 2024-02-11 SDOH — SOCIAL STABILITY: SOCIAL NETWORK: HOW OFTEN DO YOU GET TOGETHER WITH FRIENDS OR RELATIVES?: NEVER

## 2024-02-11 SDOH — HEALTH STABILITY: MENTAL HEALTH
STRESS IS WHEN SOMEONE FEELS TENSE, NERVOUS, ANXIOUS, OR CAN'T SLEEP AT NIGHT BECAUSE THEIR MIND IS TROUBLED. HOW STRESSED ARE YOU?: NOT AT ALL

## 2024-02-11 SDOH — HEALTH STABILITY: PHYSICAL HEALTH: ON AVERAGE, HOW MANY MINUTES DO YOU ENGAGE IN EXERCISE AT THIS LEVEL?: 0 MIN

## 2024-02-11 SDOH — HEALTH STABILITY: MENTAL HEALTH: HOW OFTEN DO YOU HAVE 6 OR MORE DRINKS ON ONE OCCASION?: NEVER

## 2024-02-11 SDOH — SOCIAL STABILITY: SOCIAL INSECURITY: WITHIN THE LAST YEAR, HAVE YOU BEEN AFRAID OF YOUR PARTNER OR EX-PARTNER?: NO

## 2024-02-11 SDOH — HEALTH STABILITY: PHYSICAL HEALTH: ON AVERAGE, HOW MANY DAYS PER WEEK DO YOU ENGAGE IN MODERATE TO STRENUOUS EXERCISE (LIKE A BRISK WALK)?: 0 DAYS

## 2024-02-11 SDOH — ECONOMIC STABILITY: FOOD INSECURITY: WITHIN THE PAST 12 MONTHS, YOU WORRIED THAT YOUR FOOD WOULD RUN OUT BEFORE YOU GOT MONEY TO BUY MORE.: NEVER TRUE

## 2024-02-11 SDOH — ECONOMIC STABILITY: TRANSPORTATION INSECURITY
IN THE PAST 12 MONTHS, HAS THE LACK OF TRANSPORTATION KEPT YOU FROM MEDICAL APPOINTMENTS OR FROM GETTING MEDICATIONS?: NO

## 2024-02-11 SDOH — HEALTH STABILITY: MENTAL HEALTH: HOW MANY STANDARD DRINKS CONTAINING ALCOHOL DO YOU HAVE ON A TYPICAL DAY?: PATIENT DOES NOT DRINK

## 2024-02-11 ASSESSMENT — COGNITIVE AND FUNCTIONAL STATUS - GENERAL
STANDING UP FROM CHAIR USING ARMS: A LITTLE
CLIMB 3 TO 5 STEPS WITH RAILING: TOTAL
WALKING IN HOSPITAL ROOM: TOTAL
MOVING TO AND FROM BED TO CHAIR: A LITTLE
TURNING FROM BACK TO SIDE WHILE IN FLAT BAD: A LITTLE
MOBILITY SCORE: 15

## 2024-02-11 ASSESSMENT — PAIN SCALES - GENERAL
PAINLEVEL_OUTOF10: 3
PAINLEVEL_OUTOF10: 0 - NO PAIN
PAINLEVEL_OUTOF10: 6
PAINLEVEL_OUTOF10: 8

## 2024-02-11 ASSESSMENT — ACTIVITIES OF DAILY LIVING (ADL)
LACK_OF_TRANSPORTATION: NO
LACK_OF_TRANSPORTATION: NO

## 2024-02-11 ASSESSMENT — PAIN - FUNCTIONAL ASSESSMENT
PAIN_FUNCTIONAL_ASSESSMENT: 0-10

## 2024-02-11 ASSESSMENT — PAIN DESCRIPTION - LOCATION: LOCATION: FOOT

## 2024-02-11 ASSESSMENT — PAIN DESCRIPTION - ORIENTATION: ORIENTATION: LEFT

## 2024-02-11 ASSESSMENT — LIFESTYLE VARIABLES
AUDIT-C TOTAL SCORE: 0
SKIP TO QUESTIONS 9-10: 1

## 2024-02-11 NOTE — H&P
History Of Present Illness:    Delvis Ventura is a 63 y.o. male presenting with SOB and foot pain.    Patient is a 63-year-old gentleman with a history of diabetes, cerebral palsy, nonischemic cardiomyopathy (coronary angio 8/2018 with trivial CAD), who previously followed Dr. Hernandez is admitted for decompensated heart failure and metatarsal fracture.    He has history also of an ICD with a history of device infection requiring explant and most recently with a single-chamber ICD implanted last on 4/28/2023.  At baseline he wheelchair-bound due to cerebral palsy.    He also has had care at [facility], chest pain was a chronic issue. He was on GDMT and also amiodarone for hx of VT/NSVT although he has not not been socked.    Past medical issues include nonischemic cardiopathy, hypertension, diabetes, cerebral palsy  Past surgical history includes ICD insertion and removal as mentioned    Patient was admitted to the emergency room due to shortness with concern for heart failure exacerbation over the last 3 days.  In the emergency room his vitals are stable, patient was slightly hypertensive which improved with carvedilol.  He also was given 80 of IV Lasix.  Labs obtained showed an elevated BNP of 1638, high-sensitivity troponin 98 down trended to 86.  No leukocytosis.  Creatinine 0.8.  Normal LFTs.  Flu and coronavirus negative.  EKG shows sinus tachycardia with an incomplete right bundle branch block with bilateral atrial enlargement pattern.  His last echocardiogram was on 11/4/2024 which showed an EF of 15 to 20% with no LV thrombus.  He also had a SPECT on 1/9/2023 with dilated LV and low EF, but with with normal perfusion otherwise and no inducible ischemia.  X-ray showed a fourth metatarsal acute fracture and orthopedics was consulted.  Chest x-ray with some pulmonary venous congestion.    Most recent medications include amiodarone 200 daily, Lasix 40 twice daily, spironolactone 25 daily, aspirin 81, atorvastatin 40,  Coreg 25 twice daily, jardiance 10 mg daily, Entresto  twice daily, ferrous sulfate 325 daily, hydralazine 50 every 8 hours, insulin glargine 40 at bedtime, Imdur 120 daily, loperamide as needed for diarrhea, prasozisin 2 mg at bedtime, zolpidem 5 mg as needed at bedtime.    Otherwise he feels better ater his lasix he already got in the unit.  He endorses medication compliance and limits snacks, salt, fluids.           Last Recorded Vitals:  Vitals:    02/10/24 1804 02/10/24 1906 02/10/24 1954 02/10/24 2027   BP: (!) 196/108 117/71 120/76 140/90   BP Location: Left arm Left arm     Patient Position: Lying Lying     Pulse: 93 74 71 74   Resp: 18 18 18 18   Temp:       TempSrc:       SpO2: 98% 94% 95% 94%   Weight:       Height:           Last Labs:  CBC - 2/10/2024:  4:32 PM  4.7 16.2 143    47.5      CMP - 2/10/2024:  4:32 PM  9.5 7.6 34 --- 1.4   3.2 4.1 36 185      PTT - No results in last year.  1.1   13.2 _     Troponin I, High Sensitivity   Date/Time Value Ref Range Status   02/10/2024 05:20 PM 86 (H) 0 - 53 ng/L Final   02/10/2024 04:32 PM 98 (H) 0 - 53 ng/L Final   01/06/2024 10:53 PM 18 0 - 20 ng/L Final     BNP   Date/Time Value Ref Range Status   02/10/2024 04:32 PM 1,638 (H) 0 - 99 pg/mL Final   01/03/2024 12:32 PM 1,519 (H) 0 - 99 pg/mL Final     Hemoglobin A1C   Date/Time Value Ref Range Status   08/21/2023 02:17 AM 8.4 (A) % Final     Comment:          Diagnosis of Diabetes-Adults   Non-Diabetic: < or = 5.6%   Increased risk for developing diabetes: 5.7-6.4%   Diagnostic of diabetes: > or = 6.5%  .       Monitoring of Diabetes                Age (y)     Therapeutic Goal (%)   Adults:          >18           <7.0   Pediatrics:    13-18           <7.5                   7-12           <8.0                   0- 6            7.5-8.5   American Diabetes Association. Diabetes Care 33(S1), Jan 2010.     04/22/2023 08:11 AM 7.6 (A) % Final     Comment:          Diagnosis of Diabetes-Adults   Non-Diabetic:  < or = 5.6%   Increased risk for developing diabetes: 5.7-6.4%   Diagnostic of diabetes: > or = 6.5%  .       Monitoring of Diabetes                Age (y)     Therapeutic Goal (%)   Adults:          >18           <7.0   Pediatrics:    13-18           <7.5                   7-12           <8.0                   0- 6            7.5-8.5   American Diabetes Association. Diabetes Care 33(S1), Jan 2010.     01/08/2023 04:35 PM 10.3 (H) 4.3 - 5.6 % Final     Comment:     American Diabetes Association guidelines indicate that patients with HgbA1c in the range 5.7-6.4% are at increased risk for development of diabetes, and intervention by lifestyle modification may be beneficial. HgbA1c greater or equal to 6.5% is considered diagnostic of diabetes.     LDL Calculated   Date/Time Value Ref Range Status   09/30/2022 04:38 AM 90 65 - 130 MG/DL Final     VLDL   Date/Time Value Ref Range Status   11/04/2022 02:14 PM 20 0 - 40 mg/dL Final   10/28/2021 07:11 PM 13 0 - 40 mg/dL Final   10/17/2018 11:43 AM 20 0 - 40 mg/dL Final      Last I/O:  No intake/output data recorded.  Echo:  Transthoracic Echo (TTE) Complete 01/04/2024    Ejection Fractions:  EF   Date/Time Value Ref Range Status   01/04/2024 10:37 AM 15       Cath:  No results found for this or any previous visit from the past 1095 days.    Stress Test:  NUCLEAR STRESS TEST 01/09/2023      Nuclear Stress Test 06/06/2022      NUCLEAR STRESS TEST 06/30/2021      Past Medical History:  He has a past medical history of Benign essential hypertension (02/19/2016), Cerebral palsy (CMS/HCC) (02/19/2016), Chronic combined systolic and diastolic heart failure, NYHA class 3 (CMS/HCC) (09/25/2023), Chronic systolic (congestive) heart failure (CMS/HCC) (09/01/2020), History of DVT (deep vein thrombosis) (01/03/2024), History of pulmonary embolus (PE) (01/03/2024), and Mixed hyperlipidemia (02/19/2016).    Past Surgical History:  He has no past surgical history on file.      Social  History:  He reports that he has never smoked. He has never been exposed to tobacco smoke. He has never used smokeless tobacco. He reports current alcohol use. He reports that he does not use drugs.    Family History:  No family history on file.     Allergies:  Metformin    Inpatient Medications:  Scheduled medications   Medication Dose Route Frequency    [START ON 2/11/2024] amiodarone  200 mg oral Daily    [START ON 2/11/2024] aspirin  81 mg oral Daily    [START ON 2/11/2024] atorvastatin  40 mg oral Daily    carvedilol  25 mg oral BID    [START ON 2/11/2024] dapagliflozin propanediol  10 mg oral Daily    enoxaparin  40 mg subcutaneous q24h    ferrous sulfate (325 mg ferrous sulfate)  1 tablet oral BID    hydrALAZINE  50 mg oral q8h    insulin glargine  40 Units subcutaneous Nightly    [START ON 2/11/2024] insulin lispro  0-5 Units subcutaneous TID with meals    [START ON 2/11/2024] isosorbide mononitrate ER  120 mg oral Daily    [START ON 2/11/2024] polyethylene glycol  17 g oral Daily    prazosin  2 mg oral Nightly    sacubitriL-valsartan  1 tablet oral BID    [START ON 2/11/2024] spironolactone  25 mg oral Daily     PRN medications   Medication    dextrose 10 % in water (D10W)    dextrose 10 % in water (D10W)    dextrose    dextrose    glucagon    glucagon     Continuous Medications   Medication Dose Last Rate     Outpatient Medications:  Current Outpatient Medications   Medication Instructions    amiodarone (Pacerone) 200 mg tablet Delvis Ventura    aspirin 81 mg, oral, Daily    atorvastatin (LIPITOR) 40 mg, oral, Daily RT    carvedilol (COREG) 25 mg, oral, 2 times daily    dapagliflozin propanediol (FARXIGA) 10 mg, oral, Daily    Dexcom G4 platinum  (Dexcom G7 ) misc Use as instructed    Dexcom G7 Sensor device Please use to check your glucose before meals and at night.    Entresto  mg tablet 1 tablet, oral, 2 times daily    FeroSuL 325 mg, oral, 2 times daily    furosemide (LASIX) 20 mg,  "oral, Daily    furosemide (LASIX) 40 mg, oral, 2 times daily    hydrALAZINE (APRESOLINE) 50 mg, oral, Every 8 hours    insulin glargine (LANTUS) 40 Units, subcutaneous, Nightly, Take as directed per insulin instructions.    isosorbide mononitrate ER (IMDUR) 120 mg, oral, Daily, DO NOT CRUSH OR CHEW    loperamide (Imodium) 2 mg capsule Take 2 capsules by mouth every 12 hours if needed for diarrhea.    pen needle, diabetic 31 gauge x 5/16\" needle Use to inject insulin daily    prazosin (MINIPRESS) 2 mg, oral, Nightly    spironolactone (ALDACTONE) 25 mg, oral, Daily    zolpidem (AMBIEN) 5 mg, oral, Nightly PRN       Physical Exam:    GEN: NAD  HEENT: ATNC,  anicteric, JVD up to mid neck at 45 degrees  CV: RRR, no r/g/m  Pulm: basilar crackles  Abdomen: NTND  Ext: warm, 1+ bilateral pitting edema  Neuro: A+Ox3  Psych: appropriate         Assessment/Plan     Patient is a 63-year-old gentleman with a history of diabetes, cerebral palsy, nonischemic cardiomyopathy (coronary angio 8/2018 with trivial CAD), who previously followed Dr. Hernandez is admitted for decompensated heart failure and metatarsal fracture.      #ADHF  #NICM  #non obstructive CAD  -ASA 81  -atorvastatin 40  -GDMT: continue coreg 25 BID, jardiance 10, aldactone 25, entresto  BID  -imdur 120, hydralazine 50 TID  -s/p lasix 80 IV overnight with good UOP  -redose lasix tomorrow   -follow up Dr Hernandez on dc     #HTN  -quite hypertensive on admission, resume GDMT as above    #Hx of NSVT/VT  -cotninue amiodarone 200 daily  -single chamber ICD with hx of prior device infection and removal  -check TSH      #Metatarsal fracture  - ortho consulted  - CT foot pending per ortho  - WBAT in postop shoe for transfers only  - DVT ppx per primary  - ABX not indicated  - Patient to follow up in clinic with Dr. Shepherd in 1-2 weeks.  Please call [phone] to schedule appointment after discharge.      #DM  -lantus 40 units at night  -ldssi  -jardiance as for " gdmt above    #insomnia  -takes prazasosin and zolpidem prn    #hx of diarrhea  -loperamide prn    Code status: full  Dispo: inpt  DVT PPX: lovenox  Diet: cardiac 2L FR  Lines/tubes: pIV      Daniel Jean MD

## 2024-02-11 NOTE — CONSULTS
ORTHOPAEDIC CONSULTATION     History Of Present Illness  63M (HFrEF, pacemaker, HTN, nonambulatory CP) p/a foot caught in door. Closed, NVI.    Orthopaedic Problems/Injuries:  L 4th MT neck fx    Location: Painful at site of injury  Duration: Pain has been persistent since yesterday  Severity: 4 /10  Worsened by movement/Palpation, improved with rest and pain medication  Open/Closed: Closed, NVI: yes  Associated symptoms: no associated numbness/tingling/weakness    Other Injuries: None    Past medical history: per HPI; no history of blood clots  Past surgical history: per HPI, rest reviewed in EMR  Allergies: per EMR  Medications:  per EMR  Social History: denies smoking, denies drinking, denies IVDU  Family History:  Non-contributory to this patient's acute surgical issue.    Review of Systems: 12 point ROS negative unless stated in HPI    Past Medical History  He has a past medical history of Benign essential hypertension (02/19/2016), Cerebral palsy (CMS/HCC) (02/19/2016), Chronic combined systolic and diastolic heart failure, NYHA class 3 (CMS/[facility]) (09/25/2023), Chronic systolic (congestive) heart failure (CMS/HCC) (09/01/2020), History of DVT (deep vein thrombosis) (01/03/2024), History of pulmonary embolus (PE) (01/03/2024), and Mixed hyperlipidemia (02/19/2016).    Surgical History  He has no past surgical history on file.     Social History  He reports that he has never smoked. He has never been exposed to tobacco smoke. He has never used smokeless tobacco. He reports current alcohol use. He reports that he does not use drugs.    Family History  No family history on file.     Allergies  Metformin    Review of Systems  12 point ROS negative unless stated in HPI     Physical Exam  GEN - NAD, resting comfortably in hospital bed  HEENT - MMM, EOMI, NCAT  CV - RRR by peripheral palpation, limbs wwp  PULM - NWOB on RA  NEURO - SOSA spontaneously, CNs II - XII grossly intact  PSYCH - Appropriate mood and  "affect    LLE:   - skin intact, no deformity, significant swelling. Tender at site of injury with painful ROM.  -Motor intact in DF/PF/EHL/FHL  -SILT in saph/sural/SPN/DPN distributions  -Foot wwp, 2+ DP/PT pulse, brisk cap refill  -Compartments soft and compressible, no pain with passive dorsiflexion       Last Recorded Vitals  Blood pressure 120/76, pulse 71, temperature 36.9 °C (98.4 °F), temperature source Temporal, resp. rate 18, height 1.778 m (5' 10\"), weight 91.6 kg (202 lb), SpO2 95 %.    Relevant Results  Results for orders placed or performed during the hospital encounter of 02/10/24 (from the past 24 hour(s))   CBC with Differential   Result Value Ref Range    WBC 4.7 4.4 - 11.3 x10*3/uL    nRBC 0.0 0.0 - 0.0 /100 WBCs    RBC 5.54 4.50 - 5.90 x10*6/uL    Hemoglobin 16.2 13.5 - 17.5 g/dL    Hematocrit 47.5 41.0 - 52.0 %    MCV 86 80 - 100 fL    MCH 29.2 26.0 - 34.0 pg    MCHC 34.1 32.0 - 36.0 g/dL    RDW 14.5 11.5 - 14.5 %    Platelets 143 (L) 150 - 450 x10*3/uL    Neutrophils % 62.6 40.0 - 80.0 %    Immature Granulocytes %, Automated 0.2 0.0 - 0.9 %    Lymphocytes % 30.2 13.0 - 44.0 %    Monocytes % 6.0 2.0 - 10.0 %    Eosinophils % 0.6 0.0 - 6.0 %    Basophils % 0.4 0.0 - 2.0 %    Neutrophils Absolute 2.92 1.20 - 7.70 x10*3/uL    Immature Granulocytes Absolute, Automated 0.01 0.00 - 0.70 x10*3/uL    Lymphocytes Absolute 1.41 1.20 - 4.80 x10*3/uL    Monocytes Absolute 0.28 0.10 - 1.00 x10*3/uL    Eosinophils Absolute 0.03 0.00 - 0.70 x10*3/uL    Basophils Absolute 0.02 0.00 - 0.10 x10*3/uL   Comprehensive Metabolic Panel   Result Value Ref Range    Glucose 197 (H) 74 - 99 mg/dL    Sodium 139 136 - 145 mmol/L    Potassium 4.2 3.5 - 5.3 mmol/L    Chloride 105 98 - 107 mmol/L    Bicarbonate 25 21 - 32 mmol/L    Anion Gap 13 10 - 20 mmol/L    Urea Nitrogen 12 6 - 23 mg/dL    Creatinine 0.80 0.50 - 1.30 mg/dL    eGFR >90 >60 mL/min/1.73m*2    Calcium 9.5 8.6 - 10.6 mg/dL    Albumin 4.1 3.4 - 5.0 g/dL    " Alkaline Phosphatase 185 (H) 33 - 136 U/L    Total Protein 7.6 6.4 - 8.2 g/dL    AST 34 9 - 39 U/L    Bilirubin, Total 1.4 (H) 0.0 - 1.2 mg/dL    ALT 36 10 - 52 U/L   Brain Natriuretic Peptide   Result Value Ref Range    BNP 1,638 (H) 0 - 99 pg/mL   Troponin I, High Sensitivity   Result Value Ref Range    Troponin I, High Sensitivity 98 (H) 0 - 53 ng/L   Troponin I, High Sensitivity, Initial   Result Value Ref Range    Troponin I, High Sensitivity 86 (H) 0 - 53 ng/L   Sars-CoV-2 and Influenza A/B PCR   Result Value Ref Range    Flu A Result Not Detected Not Detected    Flu B Result Not Detected Not Detected    Coronavirus 2019, PCR Not Detected Not Detected       Imaging:  AP and lateral radiographs of the L foot display a fracture of the left 4th metatarsal neck.       Assessment/Plan   63M (HFrEF, pacemaker, HTN, nonambulatory CP) p/a foot caught in door. Closed, NVI.    Plan:  - No acute orthopaedic surgical intervention  - WBAT in postop shoe for transfers only  - DVT ppx per primary  - ABX not indicated  - Patient to follow up in clinic with Dr. Shepherd in 1-2 weeks.  Please call [phone] to schedule appointment after discharge.    Consult seen and staffed within 30 minutes of notification.    Consult to be discussed with attending, Dr. Stacia Escobar MD, PGY-1  Orthopaedic Surgery   Available via Epic Chat

## 2024-02-11 NOTE — ED PROVIDER NOTES
CC: Shortness of Breath, Chest Pain, and Foot Injury     HPI:  Patient is a 63-year-old male with PMH of cerebral palsy, DM, nonischemic cardiomyopathy and heart failure with ICD, presenting to the ED with shortness of breath and foot pain.  States he has had 3 days of progressive shortness of breath with exertion and when laying flat consistent with previous heart failure exacerbations per his report.  Patient states he has been compliant with his Lasix but still feels that he is retaining fluid.  Denies any new cough, fever or chills.  No chest pain.  Patient also states that he recently closed his left foot in the door and is having pain in his toes.  States he is concerned he broke his foot.    Limitations to History: None    Additional History Obtained from: Documentation    Records Reviewed: Recent available ED and inpatient notes reviewed in EMR.    PMHx/PSHx:  Per HPI.   - has a past medical history of Benign essential hypertension (02/19/2016), Cerebral palsy (CMS/[facility]) (02/19/2016), Chronic combined systolic and diastolic heart failure, NYHA class 3 (CMS/[facility]) (09/25/2023), Chronic systolic (congestive) heart failure (CMS/[facility]) (09/01/2020), History of DVT (deep vein thrombosis) (01/03/2024), History of pulmonary embolus (PE) (01/03/2024), and Mixed hyperlipidemia (02/19/2016).  - has no past surgical history on file.    Medications: Reviewed in EMR. See EMR for complete list of medications and doses.    Allergies:  Metformin    Social History:  - Tobacco:  reports that he has never smoked. He has never been exposed to tobacco smoke. He has never used smokeless tobacco.   - Alcohol:  reports current alcohol use.   - Illicit Drugs:  reports no history of drug use.   ???????????????????????????????????????????????????????????????  Triage Vitals:  T 36.9 °C (98.4 °F)  HR (!) 112  BP (!) 170/104  RR 16  O2 95 % None (Room air)    PHYSICAL EXAM:   VS: As documented in the triage note and EMR flowsheet from  this visit were reviewed.  Gen: Elderly male resting in bed, not in acute distress  Eyes: PERRL, EOMI. Clear scerla.  HENT: NC/AT, Mucosal membranes moist.   Neck: Supple, no cervical LAD  Resp: Non-labored breathing on RA, diminished bases, no significant wheezes or crackles  CV: tachycardic, RR, nl S1, S2, extremities perfused  Abd: Soft, non-tender, no rebound or guarding  Ext: Bilateral LE edema, pulses full and equal  Skin: WWP. No systemic rashes or lesions.  MSK: Tenderness to palpation over left foot without obvious deformity  Neuro:  AAOx3, speech fluent, MAEx4, no focal deficit  Psych: Maintains eye contact, Appropriate mood and affect  ???????????????????????????????????????????????????????????????    ED Labs/Imaging:   Labs Reviewed   CBC WITH AUTO DIFFERENTIAL - Abnormal       Result Value    WBC 4.7      nRBC 0.0      RBC 5.54      Hemoglobin 16.2      Hematocrit 47.5      MCV 86      MCH 29.2      MCHC 34.1      RDW 14.5      Platelets 143 (*)     Neutrophils % 62.6      Immature Granulocytes %, Automated 0.2      Lymphocytes % 30.2      Monocytes % 6.0      Eosinophils % 0.6      Basophils % 0.4      Neutrophils Absolute 2.92      Immature Granulocytes Absolute, Automated 0.01      Lymphocytes Absolute 1.41      Monocytes Absolute 0.28      Eosinophils Absolute 0.03      Basophils Absolute 0.02     COMPREHENSIVE METABOLIC PANEL - Abnormal    Glucose 197 (*)     Sodium 139      Potassium 4.2      Chloride 105      Bicarbonate 25      Anion Gap 13      Urea Nitrogen 12      Creatinine 0.80      eGFR >90      Calcium 9.5      Albumin 4.1      Alkaline Phosphatase 185 (*)     Total Protein 7.6      AST 34      Bilirubin, Total 1.4 (*)     ALT 36     B-TYPE NATRIURETIC PEPTIDE - Abnormal    BNP 1,638 (*)     Narrative:        <100 pg/mL - Heart failure unlikely  100-299 pg/mL - Intermediate probability of acute heart                  failure exacerbation. Correlate with clinical                  context  and patient history.    >=300 pg/mL - Heart Failure likely. Correlate with clinical                  context and patient history.     Biotin interference may cause falsely decreased results. Patients taking a Biotin dose of up to 5 mg/day should refrain from taking Biotin for 24 hours before sample  collection. Providers may contact their local laboratory for further information.   TROPONIN I, HIGH SENSITIVITY - Abnormal    Troponin I, High Sensitivity 98 (*)     Narrative:     Less than 99th percentile of normal range cutoff-  Female and children under 18 years old <35 ng/L; Male <54 ng/L: Negative  Repeat testing should be performed if clinically indicated.     Female and children under 18 years old  ng/L; Male  ng/L:  Consistent with possible cardiac damage and possible increased clinical   risk. Serial measurements may help to assess extent of myocardial damage.     >120 ng/L: Consistent with cardiac damage, increased clinical risk and  myocardial infarction. Serial measurements may help assess extent of   myocardial damage.      NOTE: Children less than 1 year old may have higher baseline troponin   levels and results should be interpreted in conjunction with the overall   clinical context.    NOTE: Troponin I testing is performed using a different   testing methodology at [facility] than at other   [facility]. Direct result comparisons should only   be made within the same method.     SERIAL TROPONIN-INITIAL - Abnormal    Troponin I, High Sensitivity 86 (*)     Narrative:     Less than 99th percentile of normal range cutoff-  Female and children under 18 years old <35 ng/L; Male <54 ng/L: Negative  Repeat testing should be performed if clinically indicated.     Female and children under 18 years old  ng/L; Male  ng/L:  Consistent with possible cardiac damage and possible increased clinical   risk. Serial measurements may help to assess extent of myocardial damage.      >120 ng/L: Consistent with cardiac damage, increased clinical risk and  myocardial infarction. Serial measurements may help assess extent of   myocardial damage.      NOTE: Children less than 1 year old may have higher baseline troponin   levels and results should be interpreted in conjunction with the overall   clinical context.    NOTE: Troponin I testing is performed using a different   testing methodology at [facility] than at other   [facility]. Direct result comparisons should only   be made within the same method.     CBC - Abnormal    WBC 5.0      nRBC 0.0      RBC 5.13      Hemoglobin 15.0      Hematocrit 45.2      MCV 88      MCH 29.2      MCHC 33.2      RDW 14.6 (*)     Platelets 164     BASIC METABOLIC PANEL - Abnormal    Glucose 111 (*)     Sodium 141      Potassium 3.2 (*)     Chloride 108 (*)     Bicarbonate 24      Anion Gap 12      Urea Nitrogen 10      Creatinine 0.65      eGFR >90      Calcium 9.0     HEMOGLOBIN A1C - Abnormal    Hemoglobin A1C 9.9 (*)     Estimated Average Glucose 237      Narrative:     Diagnosis of Diabetes-Adults  Non-Diabetic: < or = 5.6%  Increased risk for developing diabetes: 5.7-6.4%  Diagnostic of diabetes: > or = 6.5%    Monitoring of Diabetes  Age (y)....................... Therapeutic Goal (%)  Adults: >18.........................<7.0  Pediatrics: 13-18...................<7.5  Pediatrics: 7-12....................<8.0  Pediatrics: 0-6..................... 7.5-8.5    American Diabetes Association. Diabetes Care 33(S1), Jan 2010       CBC - Abnormal    WBC 5.4      nRBC 0.0      RBC 5.48      Hemoglobin 15.8      Hematocrit 48.1      MCV 88      MCH 28.8      MCHC 32.8      RDW 14.8 (*)     Platelets 199     RENAL FUNCTION PANEL - Abnormal    Glucose 90      Sodium 140      Potassium 3.4 (*)     Chloride 105      Bicarbonate 24      Anion Gap 14      Urea Nitrogen 13      Creatinine 0.95      eGFR 90      Calcium 9.5      Phosphorus 3.8       Albumin 3.4     POCT GLUCOSE - Abnormal    POCT Glucose 223 (*)    POCT GLUCOSE - Abnormal    POCT Glucose 125 (*)    POCT GLUCOSE - Abnormal    POCT Glucose 168 (*)    POCT GLUCOSE - Abnormal    POCT Glucose 200 (*)    POCT GLUCOSE - Abnormal    POCT Glucose 206 (*)    POCT GLUCOSE - Abnormal    POCT Glucose 109 (*)    POCT GLUCOSE - Abnormal    POCT Glucose 206 (*)    POCT GLUCOSE - Abnormal    POCT Glucose 190 (*)    POCT GLUCOSE - Abnormal    POCT Glucose 270 (*)    SARS-COV-2 AND INFLUENZA A/B PCR - Normal    Flu A Result Not Detected      Flu B Result Not Detected      Coronavirus 2019, PCR Not Detected      Narrative:     This assay has received FDA Emergency Use Authorization (EUA) and  is only authorized for the duration of time that circumstances exist to justify the authorization of the emergency use of in vitro diagnostic tests for the detection of SARS-CoV-2 virus and/or diagnosis of COVID-19 infection under section 564(b)(1) of the Act, 21 U.S.C. 360bbb-3(b)(1). Testing for SARS-CoV-2 is only recommended for patients who meet current clinical and/or epidemiological criteria as defined by federal, state, or local public health directives. This assay is an in vitro diagnostic nucleic acid amplification test for the qualitative detection of SARS-CoV-2, Influenza A, and Influenza B from nasopharyngeal specimens and has been validated for use at [facility]. Negative results do not preclude COVID-19 infections or Influenza A/B infections, and should not be used as the sole basis for diagnosis, treatment, or other management decisions. If Influenza A/B and RSV PCR results are negative, testing for Parainfluenza virus, Adenovirus and Metapneumovirus is routinely performed for [facility] pediatric oncology and intensive care inpatients, and is available on other patients by placing an add-on request.    TSH WITH REFLEX TO FREE T4 IF ABNORMAL - Normal    Thyroid Stimulating Hormone 0.92       Narrative:     TSH testing is performed using different testing methodology at [facility] than at other [facility]. Direct result comparisons should only be made within the same method.     MAGNESIUM - Normal    Magnesium 1.60     MAGNESIUM - Normal    Magnesium 1.81     TROPONIN SERIES- (INITIAL, 1 HR)    Narrative:     The following orders were created for panel order Troponin Series, (0, 1 HR).  Procedure                               Abnormality         Status                     ---------                               -----------         ------                     Troponin I, High Sensiti...[[id number]]  Abnormal            Final result               Troponin, High Sensitivi...[[id number]]                                                   Please view results for these tests on the individual orders.   LIPID PANEL    Cholesterol 127      HDL-Cholesterol 32.7      Cholesterol/HDL Ratio 3.9      LDL Calculated 80      VLDL 15      Triglycerides 74      Non HDL Cholesterol 94     CBC   RENAL FUNCTION PANEL   MAGNESIUM   POCT GLUCOSE   POCT GLUCOSE   POCT GLUCOSE   POCT GLUCOSE   POCT GLUCOSE   POCT GLUCOSE   POCT GLUCOSE   POCT GLUCOSE   POCT GLUCOSE   POCT GLUCOSE   POCT GLUCOSE   POCT GLUCOSE   POCT GLUCOSE   POCT GLUCOSE   POCT GLUCOSE   POCT GLUCOSE   POCT GLUCOSE   POCT GLUCOSE     CT foot left wo IV contrast   Final Result   1. Redemonstration of acute mildly displaced fracture of the 4th   metatarsal neck.   2. Acute minimally displaced fracture of the medial 1st distal   phalanx.   3. Marked diffuse soft tissue edema of the visualized left lower   extremity.   4. Degenerative changes and osseous demineralization as above.        I personally reviewed the images/study and I agree with the findings   as stated above by resident physician, Anastacio Ayoub MD. This study   was interpreted at [facility],   [place], [place].        MACRO:   None.        Signed by:  Vilma Small 2/11/2024 2:37 PM   Dictation workstation:   [id number]      XR foot left 3+ views   Final Result   Acute mildly displaced fracture of the 4th metatarsal neck.  Bones   are osteopenic, limiting the sensitivity for detection of   nondisplaced fractures. Marked soft tissue swelling noted.             MACRO:   None        Signed by: Nehemiah Hilario 2/10/2024 7:10 PM   Dictation workstation:   [id number]      XR chest 1 view   Final Result   1. Cardiomegaly with patchy opacities of the lung bases which may   represent pulmonary edema with pneumonia not excluded. Correlate   clinically.        MACRO:   None.        Signed by: Vilma Small 2/10/2024 3:21 PM   Dictation workstation:   [id number]          ED Course & MDM   Diagnoses as of 02/12/24 2212   HFrEF (heart failure with reduced ejection fraction) (CMS/[facility])       Medical Decision Making:  This is a 63-year-old male with history of nonischemic cardiomyopathy and heart failure with ICD presenting to the ED with shortness of breath and foot pain.  Patient arrives hemodynamically stable not in acute distress on room air.  Patient workup initiated in triage to include an EKG which was reviewed by me which shows sinus tachycardia with incomplete right bundle branch block, no signs of acute ischemic change.  Cardiac/heart failure workup initiated.  Troponin at baseline, do not suspect ACS.  Patient does have elevated BNP and chest x-ray with some pulmonary venous congestion.  No fever, new cough, or infiltrates to suggest pneumonia.  Unlikely PE given symptoms consistent with heart failure exacerbation.  X-ray was obtained of the foot given traumatic injury was notable for fourth metatarsal fracture, for which orthopedic surgery was consulted.  After labs resulted, patient was given IV Lasix.  Hypertension improved with home BP medication and diuresis. Patient will be admitted to the hospital for further management of heart failure exacerbation and  metatarsal fracture.  Patient agreeable to plan was admitted in stable condition.    Social Determinants Limiting Care:  Transportation difficulties    Disposition:  As a result of their workup, the patient will require admission to the hospital.  The patient was informed of their diagnosis.  Patient was given the opportunity to ask questions and answered them.  Patient agreed to be admitted to the hospital.    Patient seen and discussed with attending physician.    Alis Etienne MD PGY3  Emergency Medicine      Procedures ? Smart[employer] last updated 2/12/2024 10:12 PM          Alis Etienne MD  Resident  02/12/24 7940

## 2024-02-11 NOTE — PROGRESS NOTES
[facility]  [facility] NP/PA CARDIOLOGY PROGRESS NOTE    Delvis Ventura/[id number]  Admit Date: 2/10/2024  Hospital Length of Stay: 1   Current Attending: Sangita Berg MD   Outpatient Cardiologist:     INTERVAL EVENTS / PERTINENT ROS:   - admit overnight for ADHF  - will continue diuresis with 80mg IV lasix BID today  - pain 2/2 metatarsal fracture, add oxy for now, wean prior to dc     MEDICATIONS  Infusions:     Scheduled:  amiodarone, 200 mg, Daily  aspirin, 81 mg, Daily  atorvastatin, 40 mg, Daily  carvedilol, 25 mg, BID  dapagliflozin propanediol, 10 mg, Daily  enoxaparin, 40 mg, q24h  ferrous sulfate (325 mg ferrous sulfate), 1 tablet, BID  furosemide, 80 mg, q12h  hydrALAZINE, 50 mg, q8h  insulin glargine, 40 Units, Nightly  insulin lispro, 0-5 Units, TID with meals  isosorbide mononitrate ER, 120 mg, Daily  polyethylene glycol, 17 g, Daily  prazosin, 2 mg, Nightly  sacubitriL-valsartan, 1 tablet, BID  spironolactone, 25 mg, Daily      PRN:   acetaminophen, 650 mg, q6h PRN  dextrose 10 % in water (D10W), 0.3 g/kg/hr, Once PRN  dextrose 10 % in water (D10W), 0.3 g/kg/hr, Once PRN  dextrose, 25 g, q15 min PRN  dextrose, 25 g, q15 min PRN  glucagon, 1 mg, q15 min PRN  glucagon, 1 mg, q15 min PRN  loperamide, 2 mg, TID PRN  oxyCODONE, 5 mg, q8h PRN  zolpidem, 5 mg, Nightly PRN      Prior to Admission Meds:  Medications Prior to Admission   Medication Sig Dispense Refill Last Dose    amiodarone (Pacerone) 200 mg tablet Delvis Ventura (Patient taking differently: Take 1 tablet (200 mg) by mouth once daily.) 30 tablet 3 Unknown    aspirin 81 mg chewable tablet Chew 1 tablet (81 mg) once daily. 30 tablet 3 Unknown    atorvastatin (Lipitor) 40 mg tablet Take 1 tablet (40 mg) by mouth once daily. 30 tablet 3 Unknown    carvedilol (Coreg) 25 mg tablet Take 1 tablet (25 mg) by mouth 2 times a day. 60 tablet 2 2/10/2024    dapagliflozin propanediol (Farxiga) 10 mg Take 1 tablet (10 mg) by mouth once  "daily. 30 tablet 3 2/10/2024    Dexcom G4 platinum  (Dexcom G7 ) misc Use as instructed 1 each 3     Dexcom G7 Sensor device Please use to check your glucose before meals and at night. 1 each 0     Entresto  mg tablet Take 1 tablet by mouth 2 times a day. 60 tablet 3 2/10/2024    FeroSuL 325 mg (65 mg iron) tablet Take 1 tablet by mouth twice daily. 60 tablet 3 2/10/2024    furosemide (Lasix) 20 mg tablet Take 1 tablet (20 mg) by mouth once daily. (Patient not taking: Reported on 1/4/2024) 30 tablet 0     furosemide (Lasix) 40 mg tablet Take 1 tablet (40 mg) by mouth 2 times a day. 60 tablet 0     hydrALAZINE (Apresoline) 50 mg tablet Take 1 tablet (50 mg) by mouth every 8 hours. 90 tablet 2 2/10/2024    insulin glargine (Lantus) 100 unit/mL (3 mL) pen Inject 40 Units under the skin once daily at bedtime. Take as directed per insulin instructions. 36 mL 3 2/9/2024    isosorbide mononitrate ER (Imdur) 120 mg 24 hr tablet Take 1 tablet by mouth once daily. Do not crush or chew. 30 tablet 2 2/10/2024    loperamide (Imodium) 2 mg capsule Take 2 capsules by mouth every 12 hours if needed for diarrhea. 30 capsule 2 Unknown    pen needle, diabetic 31 gauge x 5/16\" needle Use to inject insulin daily 100 each 11 Unknown    prazosin (Minipress) 2 mg capsule Take 1 capsule (2 mg) by mouth once daily at bedtime. 30 capsule 3 2/9/2024    spironolactone (Aldactone) 25 mg tablet Take 1 tablet (25 mg) by mouth once daily. 30 tablet 3 Unknown    zolpidem (Ambien) 5 mg tablet Take 1 tablet (5 mg) by mouth as needed at bedtime for sleep. 14 tablet 0 Unknown       Vitals/Physical:      2/11/2024    11:59 AM 2/11/2024     8:25 AM 2/11/2024     5:55 AM 2/11/2024     5:32 AM 2/10/2024    11:35 PM 2/10/2024     9:20 PM 2/10/2024     8:27 PM   Vitals   Systolic 117 126 142 142 148 142 140   Diastolic 72 74 80 81 87 87 90   Heart Rate 74 94 80 80 79 88 74   Temp 36.8 °C (98.2 °F) 36.8 °C (98.2 °F) 36.8 °C (98.2 °F)  " "36.8 °C (98.2 °F)     Resp 20 20 20  18 18 18   Height (in)      1.778 m (5' 10\")    Weight (lb)   213.63   214.07    BMI   30.65 kg/m2   30.72 kg/m2    BSA (m2)   2.19 m2   2.19 m2      Wt Readings from Last 5 Encounters:   02/11/24 96.9 kg (213 lb 10 oz)   01/05/24 95.3 kg (210 lb 1.6 oz)   11/07/23 91.6 kg (202 lb)   10/13/23 91.6 kg (202 lb)   07/07/23 90.3 kg (199 lb)     INTAKE/OUTPUT:  I/O last 3 completed shifts:  In: - (0 mL/kg)   Out: 3200 (33 mL/kg) [Urine:3200 (0.9 mL/kg/hr)]  Weight: 96.9 kg      Physical Exam     GEN: NAD  HEENT: ATNC,  anicteric, JVD up to mid neck at 45 degrees  CV: RRR, no r/g/m  Pulm: basilar crackles  Abdomen: NTND  Ext: warm, 1+ bilateral pitting edema  Neuro: A+Ox3  Psych: appropriate    Labs:  CMP:  Recent Labs     02/11/24  0724 02/10/24  1632 01/06/24  2253 01/06/24  0426 01/05/24  1517 01/05/24  0513 01/04/24  0604 01/03/24  1232 11/13/23  0830 11/11/23  0710    139 135* 135* 140 137 138   < > 133* 138   K 3.2* 4.2 4.5 3.6 3.4* 3.5 3.6   < > 4.5 4.0   * 105 101 101 108* 104 102   < > 105 105   CO2 24 25 26 27 26 24 27   < > 20* 24   ANIONGAP 12 13 13 11 9* 13 13   < > 13 13   BUN 10 12 19 15 15 16 14   < > 19 17   CREATININE 0.65 0.80 0.86 0.84 0.91 0.93 0.79   < > 0.93 0.93   EGFR >90 >90 >90 >90 >90 >90 >90   < > >90 >90   MG 1.60  --   --   --   --  2.00 1.70  --  2.02 2.11    < > = values in this interval not displayed.     Recent Labs     02/10/24  1632 01/04/24  0604 01/03/24  1232 11/13/23  0830 11/11/23  0710 11/08/23  0857 11/07/23  1506 08/26/23  0724 08/25/23  0838 08/20/23  1426 08/19/23  1902 05/28/22  0953 05/27/22  1147 03/06/22  1230 03/05/22  0841 09/22/21  1923 12/08/20  1626 02/19/20  0630 02/19/20  0023   ALBUMIN 4.1 3.6 3.7 3.4 3.0*   < > 4.0   < > 3.7  3.7   < > 4.2   < > 4.1   < > 3.6   < > 4.5   < >  --    ALKPHOS 185* 137* 165*  --   --   --  153*  --  102   < > 160*   < > 78  --  158*   < > 121  --   --    ALT 36 41 41  --   --   --  46 "  --  21   < > 30   < > 18  --  31   < > 12  --   --    AST 34 47* 40*  --   --   --  29  --  27   < > 30   < > 24  --  28   < > 12  --   --    BILITOT 1.4* 1.8* 1.8*  --   --   --  1.4*  --  0.7   < > 1.7*   < > 0.7  --  0.8   < > 0.5  --   --    LIPASE  --   --   --   --   --   --   --   --   --   --  <3*  --  8*  --  3*  --  3*  --  6*    < > = values in this interval not displayed.     CBC:  Recent Labs     02/11/24  0724 02/10/24  1632 01/06/24  2253 01/06/24  0426 01/05/24  1517   WBC 5.0 4.7 5.6 5.7 5.8   HGB 15.0 16.2 15.5 15.0 15.7   HCT 45.2 47.5 46.0 46.0 48.2    143* 210 186 206   MCV 88 86 90 90 91     COAG:   Recent Labs     04/21/23  2106 09/29/22  2340 03/05/22  0841 12/16/21  1342 12/13/21  0457   INR 1.1 1.1 1.3*   < > 1.1   DDIMERVTE  --   --   --   --  333    < > = values in this interval not displayed.     ABO:   Recent Labs     03/05/22  0908   ABO O     HEME/ENDO:   Recent Labs     02/11/24  0724 08/21/23  0217 04/22/23  0811 01/08/23  1635 12/02/22  1209 09/30/22  0438 05/30/22  0601 05/29/22  0743 12/13/21  0458   FERRITIN  --   --   --   --  134  --  179  --   --    IRONSAT  --  9*  --   --  13*  --   --   --   --    TSH 0.92 0.67  --   --   --   --   --   --  1.35   HGBA1C 9.9* 8.4* 7.6*   < >  --    < >  --    < >  --     < > = values in this interval not displayed.     CARDIAC:   Recent Labs     02/10/24  1720 02/10/24  1632 01/06/24  2253 01/03/24  1536 01/03/24  1232 11/09/23  1956 11/09/23  0808 11/08/23  1949 11/07/23  1506 11/07/23  1454 05/28/22  1355 05/28/22  0254   LDH  --   --   --   --   --   --   --   --   --   --   --  718*   TROPHS 86* 98* 18 36* 34* 29 38 42   < > 42   < > 26   BNP  --  1,638*  --   --  1,519*  --   --   --   --  1,857*   < >  --     < > = values in this interval not displayed.     Recent Labs     02/11/24  0724 11/04/22  1414 09/30/22  0438 10/28/21  1911 10/17/18  1143   CHOL 127 185 140 111 159   LDLF  --  114*  --  64 92   LDLCALC 80  --  90  --   " --    HDL 32.7 51.8 32* 33.2* 47.3   TRIG 74 98 88 67 99     TOX:No results for input(s): \"AMPHETAMINE\", \"BARBSCRNUR\", \"BENZO\", \"CANNABINOID\", \"COCAI\", \"FENTANYL\", \"OPIATE\", \"OXYCODONE\", \"PCP\" in the last 17092 hours.  MICRO:   Recent Labs     04/22/23  0811 05/30/22  0601   CRP 0.89 0.18     No results found for the last 90 days.      Pertinent Cardiology Imaging:  EKG:  Encounter Date: 02/10/24   ECG 12 lead   Result Value    Ventricular Rate 99    Atrial Rate 99    LA Interval 190    QRS Duration 106    QT Interval 400    QTC Calculation(Bazett) 513    P Axis 63    R Axis 132    T Axis 31    QRS Count 16    Q Onset 220    P Onset 125    P Offset 186    T Offset 420    QTC Fredericia 472    Narrative    Normal sinus rhythm  Possible Left atrial enlargement  Right axis deviation  Incomplete right bundle branch block  Anterior infarct , age undetermined  Prolonged QT  Abnormal ECG  When compared with ECG of 06-JAN-2024 22:18,  Significant changes have occurred  See ED provider note for full interpretation and clinical correlation  Confirmed by Estefania Corea ([id number]) on 2/11/2024 4:03:52 AM       Echo:  Transthoracic Echo (TTE) Complete    Result Date: 1/4/2024   [facility], [address] [place] [address]              Tel [phone] and Fax [phone] TRANSTHORACIC ECHOCARDIOGRAM REPORT  Patient Name:      OLIVIA Quiñonez Physician:    [id number] Wicho Villatoro DO Study Date:        1/4/2024             Ordering Provider:    [id number] ROSA ISELA MAC MRN/PID:           [id number]             Fellow: Accession#:        [id number]         Nurse: Date of Birth/Age: 1960 / 63 years Sonographer:          Vanessa Montgomery [facility] Gender:            M                    Additional Staff: Height:            180.34 cm            Admit Date:           1/3/2024 Weight:            " 91.63 kg             Admission Status:     Inpatient -                                                               Acutely Life                                                               threatening BSA:               2.12 m2              Encounter#:           [id number]                                         Department Location:  [facility] ED Blood Pressure: 147 /104 mmHg Study Type:    TRANSTHORACIC ECHO (TTE) COMPLETE Diagnosis/ICD: Acute on chronic systolic (congestive) heart failure (CHF)-I50.23 Indication:    Systolic Heart Failure CPT Code:      Echo Complete w Full [id number] Patient History: Diabetes:          Yes Pertinent History: HTN, Chest Pain, Cardiomyopathy, CHF, Hyperlipidemia,                    Previous DVT, PE, Palpitations and Dyspnea. Cerebral Palsy. Study Detail: The following Echo studies were performed: 2D, M-Mode, Doppler and               color flow. Technically challenging study due to poor acoustic               windows and prominent lung artifact. Definity used as a contrast               agent for endocardial border definition. Total contrast used for               this procedure was 3 mL via IV push. Patient has a defibrillator.  PHYSICIAN INTERPRETATION: Left Ventricle: The left ventricular systolic function is severely decreased, with an estimated ejection fraction of 15-20%. There is global hypokinesis of the left ventricle with minor regional variations. The left ventricular cavity size is normal. Spectral Doppler shows a restrictive pattern of left ventricular diastolic filling. There is no definite left ventricular thrombus visualized. Left Atrium: The left atrium is normal in size. Right Ventricle: The right ventricle is normal in size. There is mildly reduced right ventricular systolic function. Right Atrium: The right atrium is normal in size. Aortic Valve: The aortic valve appears structurally normal. There is no evidence of aortic valve regurgitation. The peak  instantaneous gradient of the aortic valve is 3.8 mmHg. The mean gradient of the aortic valve is 2.0 mmHg. Mitral Valve: The mitral valve is normal in structure. There is trace mitral valve regurgitation. Tricuspid Valve: The tricuspid valve is structurally normal. There is trace tricuspid regurgitation. The Doppler estimated RVSP is mildly elevated at 34.0 mmHg. Pulmonic Valve: The pulmonic valve is structurally normal. There is no indication of pulmonic valve regurgitation. Pericardium: There is no pericardial effusion noted. Aorta: The aortic root is normal. Systemic Veins: The inferior vena cava appears dilated. There is less than 50% IVC collapse with inspiration. In comparison to the previous echocardiogram(s): Compared with study from 8/21/2023, no significant change.  CONCLUSIONS:  1. Left ventricular systolic function is severely decreased with a 15-20% estimated ejection fraction.  2. No left ventricular thrombus visualized.  3. Spectral Doppler shows a restrictive pattern of left ventricular diastolic filling.  4. There is mildly reduced right ventricular systolic function.  5. Mildly elevated RVSP.  6. There is global hypokinesis of the left ventricle with minor regional variations. QUANTITATIVE DATA SUMMARY: 2D MEASUREMENTS:                           Normal Ranges: Ao Root d:     3.00 cm    (2.0-3.7cm) LAs:           4.30 cm    (2.7-4.0cm) IVSd:          1.20 cm    (0.6-1.1cm) LVPWd:         1.00 cm    (0.6-1.1cm) LVIDd:         5.40 cm    (3.9-5.9cm) LVIDs:         5.00 cm LV Mass Index: 110.9 g/m2 LV % FS        7.4 % LA VOLUME:                               Normal Ranges: LA Vol A4C:        70.6 ml    (22+/-6mL/m2) LA Vol A2C:        59.0 ml LA Vol BP:         65.0 ml LA Vol Index A4C:  33.3ml/m2 LA Vol Index A2C:  27.9 ml/m2 LA Vol Index BP:   30.7 ml/m2 LA Area A4C:       21.3 cm2 LA Area A2C:       19.6 cm2 LA Major Axis A4C: 5.5 cm LA Major Axis A2C: 5.5 cm LA Volume Index:   30.7 ml/m2 RA VOLUME  BY A/L METHOD:                               Normal Ranges: RA Vol A4C:        72.1 ml    (8.3-19.5ml) RA Vol Index A4C:  34.1 ml/m2 RA Area A4C:       22.3 cm2 RA Major Axis A4C: 5.9 cm M-MODE MEASUREMENTS:                  Normal Ranges: Ao Root: 3.20 cm (2.0-3.7cm) LAs:     5.00 cm (2.7-4.0cm) AORTA MEASUREMENTS:                      Normal Ranges: Ao Sinus, d: 3.00 cm (2.1-3.5cm) Ao STJ, d:   2.89 cm (1.7-3.4cm) Asc Ao, d:   2.70 cm (2.1-3.4cm) LV SYSTOLIC FUNCTION BY 2D PLANIMETRY (MOD):                     Normal Ranges: EF-A4C View: 15.5 % (>=55%) EF-A2C View: 15.3 % EF-Biplane:  14.7 % LV DIASTOLIC FUNCTION:                              Normal Ranges: MV Peak E:        1.01 m/s   (0.7-1.2 m/s) MV Peak A:        0.46 m/s   (0.42-0.7 m/s) E/A Ratio:        2.18       (1.0-2.2) MV e'             0.04 m/s   (>8.0) MV lateral e'     0.04 m/s MV medial e'      0.04 m/s MV A Dur:         98.00 msec E/e' Ratio:       25.25      (<8.0) a'                0.04 m/s PulmV Sys Kaleb:    24.70 cm/s PulmV Sandhu Kaleb:   41.60 cm/s PulmV S/D Kaleb:    0.60 PulmV A Revs Kaleb: 15.80 cm/s PulmV A Revs Dur: 87.00 msec MITRAL VALVE:                 Normal Ranges: MV DT: 114 msec (150-240msec) AORTIC VALVE:                                   Normal Ranges: AoV Vmax:                0.97 m/s (<=1.7m/s) AoV Peak PG:             3.8 mmHg (<20mmHg) AoV Mean P.0 mmHg (1.7-11.5mmHg) LVOT Max Kaleb:            0.60 m/s (<=1.1m/s) AoV VTI:                 16.00 cm (18-25cm) LVOT VTI:                8.20 cm LVOT Diameter:           2.00 cm  (1.8-2.4cm) AoV Area, VTI:           1.61 cm2 (2.5-5.5cm2) AoV Area,Vmax:           1.95 cm2 (2.5-4.5cm2) AoV Dimensionless Index: 0.51  RIGHT VENTRICLE: RV Basal 4.83 cm RV Mid   3.17 cm RV Major 8.8 cm TAPSE:   11.2 mm RV s'    0.08 m/s TRICUSPID VALVE/RVSP:                             Normal Ranges: Peak TR Velocity: 2.55 m/s Est. RA Pressure: 8 mmHg RV Syst Pressure: 34.0 mmHg (< 30mmHg) IVC  Diam:         2.63 cm PULMONIC VALVE:                         Normal Ranges: PV Accel Time: 55 msec  (>120ms) PV Max Kaleb:    0.6 m/s  (0.6-0.9m/s) PV Max P.4 mmHg Pulmonary Veins: PulmV A Revs Dur: 87.00 msec PulmV A Revs Kaleb: 15.80 cm/s PulmV Sandhu Kaleb:   41.60 cm/s PulmV S/D Kaleb:    0.60 PulmV Sys Kaleb:    24.70 cm/s  [id number] Wichoparas Villatoro DO Electronically signed on 2024 at 12:57:21 PM  ** Final **    No results found for this or any previous visit.   No results found for this or any previous visit from the past 1095 days.      Cath:    Stress Test:    MRI:  Echocardiogram     [facility], [address] [place] [address]  Tel [phone] and Fax [phone]    TRANSTHORACIC ECHOCARDIOGRAM REPORT      Patient Name:     OLIVIA Quiñonez Physician:  [id number] Lukas Sutton MD  Study Date:       2023          Referring           [facility]  Physician:  MRN/PID:          [id number]           PCP:  Accession/Order#: [id number]          [address]  Location:  YOB: 1960          Fellow:  Gender:           M                  Nurse:  Admit Date:       2023          Sonographer:        Jolly Freedman [facility]  Admission Status: Inpatient -        Additional Staff:  Routine  Height:           177.80 cm          CC Report to:       [facility]  Weight:           87.09 kg           Study Type:         Echocardiogram  BSA:              2.05 m2  Blood Pressure: 111 /71 mmHg    Diagnosis/ICD: I50.20-Unspecified systolic (congestive) heart failure (CHF)  Indication:    HFrEF  Procedure/CPT: Echo Complete w Full [id number]    Patient History:  Pertinent History: HFrEF (10*15% 2023), s/p ICD placement, NICM, HTN,  cerebral palsey, DM II, CP, SOB, Hep C.    Study Detail: The following Echo studies were performed: 2D, M-Mode, Doppler and  color flow. Technically challenging study due to body habitus and  patient lying in  supine position.      PHYSICIAN INTERPRETATION:  Left Ventricle: The left ventricular systolic function is severely decreased, with an estimated ejection fraction of 15-20%. There is global hypokinesis of the left ventricle with minor regional variations. The left ventricular cavity size is mildly dilated. There is no evidence of left ventricular hypertrophy. Spectral Doppler shows a pseudonormal pattern of left ventricular diastolic filling.  Left Atrium: The left atrium was not well visualized.  Right Ventricle: The right ventricle is normal in size. There is mildly reduced right ventricular systolic function. A device is visualized in the right ventricle.  Right Atrium: The right atrium is normal in size. There is a device visualized in the right atrium.  Aortic Valve: The aortic valve is trileaflet. There is no evidence of aortic valve regurgitation. The peak instantaneous gradient of the aortic valve is 5.6 mmHg. The mean gradient of the aortic valve is 3.0 mmHg.  Mitral Valve: The mitral valve is mildly thickened. There is trace mitral valve regurgitation.  Tricuspid Valve: The tricuspid valve is structurally normal. There is trace tricuspid regurgitation. The right ventricular systolic pressure is unable to be estimated.  Pulmonic Valve: The pulmonic valve is not well visualized. There is physiologic pulmonic valve regurgitation.  Pericardium: There is a trivial pericardial effusion.  Aorta: The aortic root is normal.  Pulmonary Artery: The pulmonary artery is not well visualized.  Systemic Veins: The inferior vena cava appears to be of normal size. There is IVC inspiratory collapse greater than 50%.  In comparison to the previous echocardiogram(s): Compared with study from 4/13/2023, no significant change.      CONCLUSIONS:  1. Left ventricular systolic function is severely decreased with a 15-20% estimated ejection fraction.  2. Spectral Doppler shows a pseudonormal pattern of left ventricular diastolic  filling.  3. There is no evidence of left ventricular hypertrophy.  4. There is mildly reduced right ventricular systolic function.  5. The pulmonary artery is not well visualized.  6. There is global hypokinesis of the left ventricle with minor regional variations.    QUANTITATIVE DATA SUMMARY:  2D MEASUREMENTS:  Normal Ranges:  Ao Root d:     3.30 cm   (2.0-3.7cm)  LAs:           4.30 cm   (2.7-4.0cm)  IVSd:          1.00 cm   (0.6-1.1cm)  LVPWd:         1.00 cm   (0.6-1.1cm)  LVIDd:         5.10 cm   (3.9-5.9cm)  LVIDs:         4.60 cm  LV Mass Index: 91.7 g/m2  LV % FS        9.8 %    AORTA MEASUREMENTS:  Normal Ranges:  Asc Ao, d: 2.90 cm (2.1-3.4cm)    LV DIASTOLIC FUNCTION:  Normal Ranges:  MV Peak E:    0.73 m/s    (0.7-1.2 m/s)  MV Peak A:    0.71 m/s    (0.42-0.7 m/s)  E/A Ratio:    1.03        (1.0-2.2)  MV e'         0.04 m/s    (>8.0)  MV lateral e' 0.04 m/s  MV medial e'  0.05 m/s  MV A Dur:     158.00 msec  E/e' Ratio:   16.31       (<8.0)  a'            0.04 m/s  MV DT:        236 msec    (150-240 msec)    MITRAL VALVE:  Normal Ranges:  MV DT: 236 msec (150-240msec)    AORTIC VALVE:  Normal Ranges:  AoV Vmax:                1.18 m/s (<=1.7m/s)  AoV Peak P.6 mmHg (<20mmHg)  AoV Mean PG:             3.0 mmHg (1.7-11.5mmHg)  LVOT Max Kaleb:            0.83 m/s (<=1.1m/s)  AoV VTI:                 21.90 cm (18-25cm)  LVOT VTI:                13.70 cm  LVOT Diameter:           2.00 cm  (1.8-2.4cm)  AoV Area, VTI:           1.97 cm2 (2.5-5.5cm2)  AoV Area,Vmax:           2.20 cm2 (2.5-4.5cm2)  AoV Dimensionless Index: 0.63      RIGHT VENTRICLE:  TAPSE: 19.7 mm  RV s'  0.08 m/s    TRICUSPID VALVE/RVSP:  Normal Ranges:  Est. RA Pressure: 3 mmHg  IVC Diam:         1.90 cm    PULMONIC VALVE:  Normal Ranges:  PV Accel Time: 135 msec (>120ms)  PV Max Kaleb:    0.7 m/s  (0.6-0.9m/s)  PV Max P.9 mmHg      [id number] Lukas Sutton MD  Electronically signed on 2023 at 12:03:02 PM            NM  CARDIAC STRESS REST (MYOCARDIAL PERFUSION MIBI) 06/06/2022    Narrative  MRN: [id number]  Patient Name: OLIVIA PATHAK    STUDY:  CARDIAC STRESS/REST INJECTION; PART 2 STRESS OR REST (NO CHARGE);  CARDIAC STRESS/REST (MYOCARDIAL PERFUSION/MIBI);  6/6/2022 11:38 am    INDICATION:  HF  I50.30: (HFpEF) heart failure with preserved ejection fraction.    COMPARISON:  SPECT CT MPI 06/12/2015    ACCESSION NUMBER(S):  [id number]; [id number]; [id number]    ORDERING CLINICIAN:  SHAYLA JOYCE    TECHNIQUE:  DIVISION OF NUCLEAR MEDICINE  PHARMACOLOGIC STRESS MYOCARDIAL PERFUSION SCAN, ONE DAY PROTOCOL    The patient received an intravenous dose of  11.0 mCi of Tc-99m  Myoview and resting emission tomographic (SPECT) images of the  myocardium were acquired. The patient then received an intravenous  infusion of 0.4mg regadenoson (Lexiscan)  followed by an additional  dose of  35.0 mCi of Tc-99m  Myoview. Stress phase SPECT images of  the myocardium were then acquired. These included ECG-gated images to  assess and quantify ventricular function.  A low-dose, nondiagnostic  regional CT was utilized for attenuation correction purposes.    FINDINGS:  Both stress and rest studies demonstrate grossly normal perfusion  throughout the left ventricle.    There is moderate to severe enlargement of the left ventricle with an  estimated end-diastolic volume of 179 mL, increased from 107 mL  previously measured on the prior SPECT CT MPI dated 06/12/2015.    Gated images demonstrate worsening global LV wall hypokinesis with an  LV EF estimated at 27% at stress and post-stress rest, decreased from  57% previously measured on the prior SPECT CT MPI.    Attenuation correction CT images demonstrate cardiomegaly.    Impression  1. Normal myocardial perfusion study without evidence of ischemia or  prior infarction.  2. Moderate to severe left ventricular enlargement and global LV wall  hypokinesis within LVEF estimated at 27%.  3. When compared to the  prior SPECT CT MPI dated 06/12/2015, there  has been significant interval worsening of left ventricular  dilatation and decrease in LVEF.    I personally reviewed the images/study and I agree with the findings  as stated. This study was interpreted at [facility], [place], [place].    No results found for this or any previous visit from the past 1825 days.    No results found for this or any previous visit from the past 3650 days.     No results found for this or any previous visit from the past 1825 days.    No results found for this or any previous visit from the past 1825 days.    No results found for this or any previous visit from the past 1825 days.        Assessment/Plan   Patient is a 63-year-old gentleman with a history of diabetes, cerebral palsy, nonischemic cardiomyopathy (coronary angio 8/2018 with trivial CAD), who previously followed Dr. Hernandez is admitted for decompensated heart failure and metatarsal fracture.     Acute on chronic systolic and diastolic heart failure with reduced EF  ADHF  NICM  non obstructive CAD  - ASA 81  - atorvastatin 40  - GDMT: continue coreg 25 BID, jardiance 10, aldactone 25, entresto  BID  - imdur 120, hydralazine 50 TID  - s/p lasix 80 IV overnight with good UOP  - c/w lasix 80mg IV BID  - follow up Dr Hernandez on dc      HTN  -quite hypertensive on admission, resume GDMT as above  - 170-190 SBP on admit  - now 110s-120s     Hx of NSVT/VT  -cotninue amiodarone 200 daily  -single chamber ICD with hx of prior device infection and removal  -check TSH      Metatarsal fracture  - ortho consulted  - CT foot pending per ortho  - WBAT in postop shoe for transfers only  - DVT ppx per primary  - ABX not indicated  - Patient to follow up in clinic with Dr. Shepherd in 1-2 weeks.  Please call [phone] to schedule appointment after discharge.     DM  -lantus 40 units at night  -ldssi  -jardiance as for gdmt above     insomnia  -takes  prazasosin and zolpidem prn     hx of diarrhea  -loperamide prn           LDA:   NUTRITION: Adult diet Cardiac; 70 gm fat; 2 - 3 grams Sodium; 2000 mL fluid  EMERGENCY CONTACT: No emergency contact information on file.  CODE STATUS: Full Code  DISPO:   Home: [facility] [id number]  AMPAC: Daily Activity - Total Score: 17  Discharge Planning  Living Arrangements: Alone  Support Systems: None  Assistance Needed: none  Type of Residence: Private residence  Number of Stairs to Enter Residence: 0  Number of Stairs Within Residence: 0  Do you have animals or pets at home?: No  Who is requesting discharge planning?: Provider  Home or Post Acute Services: None  Patient expects to be discharged to:: home  Does the patient need discharge transport arranged?: No  FOLLOWUP: No future appointments.     Patient seen and discussed with Dr. Sangita Berg MD      ________________________________________________  KIKE Leos-CNP, DNP

## 2024-02-12 LAB
ALBUMIN SERPL BCP-MCNC: 3.4 G/DL (ref 3.4–5)
ANION GAP SERPL CALC-SCNC: 14 MMOL/L (ref 10–20)
BUN SERPL-MCNC: 13 MG/DL (ref 6–23)
CALCIUM SERPL-MCNC: 9.5 MG/DL (ref 8.6–10.6)
CHLORIDE SERPL-SCNC: 105 MMOL/L (ref 98–107)
CO2 SERPL-SCNC: 24 MMOL/L (ref 21–32)
CREAT SERPL-MCNC: 0.95 MG/DL (ref 0.5–1.3)
EGFRCR SERPLBLD CKD-EPI 2021: 90 ML/MIN/1.73M*2
ERYTHROCYTE [DISTWIDTH] IN BLOOD BY AUTOMATED COUNT: 14.8 % (ref 11.5–14.5)
GLUCOSE BLD MANUAL STRIP-MCNC: 109 MG/DL (ref 74–99)
GLUCOSE BLD MANUAL STRIP-MCNC: 190 MG/DL (ref 74–99)
GLUCOSE BLD MANUAL STRIP-MCNC: 206 MG/DL (ref 74–99)
GLUCOSE BLD MANUAL STRIP-MCNC: 270 MG/DL (ref 74–99)
GLUCOSE SERPL-MCNC: 90 MG/DL (ref 74–99)
HCT VFR BLD AUTO: 48.1 % (ref 41–52)
HGB BLD-MCNC: 15.8 G/DL (ref 13.5–17.5)
MAGNESIUM SERPL-MCNC: 1.81 MG/DL (ref 1.6–2.4)
MCH RBC QN AUTO: 28.8 PG (ref 26–34)
MCHC RBC AUTO-ENTMCNC: 32.8 G/DL (ref 32–36)
MCV RBC AUTO: 88 FL (ref 80–100)
NRBC BLD-RTO: 0 /100 WBCS (ref 0–0)
PHOSPHATE SERPL-MCNC: 3.8 MG/DL (ref 2.5–4.9)
PLATELET # BLD AUTO: 199 X10*3/UL (ref 150–450)
POTASSIUM SERPL-SCNC: 3.4 MMOL/L (ref 3.5–5.3)
RBC # BLD AUTO: 5.48 X10*6/UL (ref 4.5–5.9)
SODIUM SERPL-SCNC: 140 MMOL/L (ref 136–145)
WBC # BLD AUTO: 5.4 X10*3/UL (ref 4.4–11.3)

## 2024-02-12 PROCEDURE — 80069 RENAL FUNCTION PANEL: CPT | Performed by: NURSE PRACTITIONER

## 2024-02-12 PROCEDURE — 82947 ASSAY GLUCOSE BLOOD QUANT: CPT

## 2024-02-12 PROCEDURE — 2500000001 HC RX 250 WO HCPCS SELF ADMINISTERED DRUGS (ALT 637 FOR MEDICARE OP): Performed by: STUDENT IN AN ORGANIZED HEALTH CARE EDUCATION/TRAINING PROGRAM

## 2024-02-12 PROCEDURE — 2500000002 HC RX 250 W HCPCS SELF ADMINISTERED DRUGS (ALT 637 FOR MEDICARE OP, ALT 636 FOR OP/ED): Performed by: NURSE PRACTITIONER

## 2024-02-12 PROCEDURE — 85027 COMPLETE CBC AUTOMATED: CPT | Performed by: NURSE PRACTITIONER

## 2024-02-12 PROCEDURE — 36415 COLL VENOUS BLD VENIPUNCTURE: CPT | Performed by: NURSE PRACTITIONER

## 2024-02-12 PROCEDURE — G0378 HOSPITAL OBSERVATION PER HR: HCPCS

## 2024-02-12 PROCEDURE — 2500000002 HC RX 250 W HCPCS SELF ADMINISTERED DRUGS (ALT 637 FOR MEDICARE OP, ALT 636 FOR OP/ED): Performed by: STUDENT IN AN ORGANIZED HEALTH CARE EDUCATION/TRAINING PROGRAM

## 2024-02-12 PROCEDURE — 2500000004 HC RX 250 GENERAL PHARMACY W/ HCPCS (ALT 636 FOR OP/ED): Performed by: STUDENT IN AN ORGANIZED HEALTH CARE EDUCATION/TRAINING PROGRAM

## 2024-02-12 PROCEDURE — 83735 ASSAY OF MAGNESIUM: CPT | Performed by: NURSE PRACTITIONER

## 2024-02-12 PROCEDURE — 2500000004 HC RX 250 GENERAL PHARMACY W/ HCPCS (ALT 636 FOR OP/ED): Performed by: NURSE PRACTITIONER

## 2024-02-12 PROCEDURE — 2500000001 HC RX 250 WO HCPCS SELF ADMINISTERED DRUGS (ALT 637 FOR MEDICARE OP): Performed by: NURSE PRACTITIONER

## 2024-02-12 PROCEDURE — 99232 SBSQ HOSP IP/OBS MODERATE 35: CPT | Performed by: INTERNAL MEDICINE

## 2024-02-12 PROCEDURE — 1100000001 HC PRIVATE ROOM DAILY

## 2024-02-12 RX ORDER — POTASSIUM CHLORIDE 20 MEQ/1
40 TABLET, EXTENDED RELEASE ORAL EVERY 4 HOURS
Status: COMPLETED | OUTPATIENT
Start: 2024-02-12 | End: 2024-02-12

## 2024-02-12 RX ORDER — FUROSEMIDE 40 MG/1
80 TABLET ORAL DAILY
Status: DISCONTINUED | OUTPATIENT
Start: 2024-02-12 | End: 2024-02-13

## 2024-02-12 RX ADMIN — DAPAGLIFLOZIN 10 MG: 10 TABLET, FILM COATED ORAL at 08:33

## 2024-02-12 RX ADMIN — POTASSIUM CHLORIDE 40 MEQ: 1500 TABLET, EXTENDED RELEASE ORAL at 18:56

## 2024-02-12 RX ADMIN — FERROUS SULFATE TAB 325 MG (65 MG ELEMENTAL FE) 1 TABLET: 325 (65 FE) TAB at 21:22

## 2024-02-12 RX ADMIN — CARVEDILOL 25 MG: 25 TABLET, FILM COATED ORAL at 21:22

## 2024-02-12 RX ADMIN — OXYCODONE HYDROCHLORIDE 5 MG: 5 TABLET ORAL at 21:46

## 2024-02-12 RX ADMIN — FUROSEMIDE 80 MG: 40 TABLET ORAL at 11:22

## 2024-02-12 RX ADMIN — ATORVASTATIN CALCIUM 40 MG: 40 TABLET, FILM COATED ORAL at 05:00

## 2024-02-12 RX ADMIN — ZOLPIDEM TARTRATE 5 MG: 5 TABLET ORAL at 21:22

## 2024-02-12 RX ADMIN — INSULIN GLARGINE 40 UNITS: 100 INJECTION, SOLUTION SUBCUTANEOUS at 21:00

## 2024-02-12 RX ADMIN — PRAZOSIN HYDROCHLORIDE 2 MG: 2 CAPSULE ORAL at 21:22

## 2024-02-12 RX ADMIN — ISOSORBIDE MONONITRATE 120 MG: 60 TABLET, EXTENDED RELEASE ORAL at 08:33

## 2024-02-12 RX ADMIN — SACUBITRIL AND VALSARTAN 1 TABLET: 97; 103 TABLET, FILM COATED ORAL at 21:22

## 2024-02-12 RX ADMIN — HYDRALAZINE HYDROCHLORIDE 50 MG: 25 TABLET ORAL at 21:22

## 2024-02-12 RX ADMIN — HYDRALAZINE HYDROCHLORIDE 50 MG: 25 TABLET ORAL at 13:21

## 2024-02-12 RX ADMIN — CARVEDILOL 25 MG: 25 TABLET, FILM COATED ORAL at 08:33

## 2024-02-12 RX ADMIN — HYDRALAZINE HYDROCHLORIDE 50 MG: 25 TABLET ORAL at 05:00

## 2024-02-12 RX ADMIN — SPIRONOLACTONE 25 MG: 25 TABLET, FILM COATED ORAL at 08:33

## 2024-02-12 RX ADMIN — FERROUS SULFATE TAB 325 MG (65 MG ELEMENTAL FE) 1 TABLET: 325 (65 FE) TAB at 08:33

## 2024-02-12 RX ADMIN — ASPIRIN 81 MG CHEWABLE TABLET 81 MG: 81 TABLET CHEWABLE at 08:33

## 2024-02-12 RX ADMIN — OXYCODONE HYDROCHLORIDE 5 MG: 5 TABLET ORAL at 08:33

## 2024-02-12 RX ADMIN — FUROSEMIDE 80 MG: 10 INJECTION, SOLUTION INTRAMUSCULAR; INTRAVENOUS at 00:57

## 2024-02-12 RX ADMIN — POTASSIUM CHLORIDE 40 MEQ: 1500 TABLET, EXTENDED RELEASE ORAL at 15:38

## 2024-02-12 RX ADMIN — LOPERAMIDE HYDROCHLORIDE 2 MG: 2 CAPSULE ORAL at 08:36

## 2024-02-12 RX ADMIN — OXYCODONE HYDROCHLORIDE 5 MG: 5 TABLET ORAL at 15:46

## 2024-02-12 RX ADMIN — AMIODARONE HYDROCHLORIDE 200 MG: 200 TABLET ORAL at 08:34

## 2024-02-12 RX ADMIN — LOPERAMIDE HYDROCHLORIDE 2 MG: 2 CAPSULE ORAL at 21:26

## 2024-02-12 RX ADMIN — ENOXAPARIN SODIUM 40 MG: 100 INJECTION SUBCUTANEOUS at 21:22

## 2024-02-12 RX ADMIN — SACUBITRIL AND VALSARTAN 1 TABLET: 97; 103 TABLET, FILM COATED ORAL at 08:33

## 2024-02-12 ASSESSMENT — COGNITIVE AND FUNCTIONAL STATUS - GENERAL
PERSONAL GROOMING: A LITTLE
MOBILITY SCORE: 15
PERSONAL GROOMING: A LITTLE
DRESSING REGULAR LOWER BODY CLOTHING: A LOT
WALKING IN HOSPITAL ROOM: TOTAL
DAILY ACTIVITIY SCORE: 17
CLIMB 3 TO 5 STEPS WITH RAILING: TOTAL
STANDING UP FROM CHAIR USING ARMS: A LITTLE
DAILY ACTIVITIY SCORE: 17
HELP NEEDED FOR BATHING: A LOT
DRESSING REGULAR UPPER BODY CLOTHING: A LITTLE
STANDING UP FROM CHAIR USING ARMS: A LITTLE
CLIMB 3 TO 5 STEPS WITH RAILING: TOTAL
TURNING FROM BACK TO SIDE WHILE IN FLAT BAD: A LITTLE
WALKING IN HOSPITAL ROOM: TOTAL
HELP NEEDED FOR BATHING: A LOT
TOILETING: A LITTLE
DRESSING REGULAR UPPER BODY CLOTHING: A LITTLE
DRESSING REGULAR LOWER BODY CLOTHING: A LOT
TOILETING: A LITTLE
MOVING TO AND FROM BED TO CHAIR: A LITTLE
MOVING TO AND FROM BED TO CHAIR: A LITTLE
TURNING FROM BACK TO SIDE WHILE IN FLAT BAD: A LITTLE
MOBILITY SCORE: 15

## 2024-02-12 ASSESSMENT — PAIN SCALES - GENERAL
PAINLEVEL_OUTOF10: 9
PAINLEVEL_OUTOF10: 7
PAINLEVEL_OUTOF10: 9

## 2024-02-12 ASSESSMENT — PAIN - FUNCTIONAL ASSESSMENT
PAIN_FUNCTIONAL_ASSESSMENT: 0-10

## 2024-02-12 ASSESSMENT — PAIN DESCRIPTION - ORIENTATION: ORIENTATION: LEFT

## 2024-02-12 ASSESSMENT — PAIN DESCRIPTION - LOCATION
LOCATION: FOOT
LOCATION: FOOT

## 2024-02-12 NOTE — PROGRESS NOTES
Delvis Ventura is a 63 y.o. male on day 1 of admission presenting with HFrEF (heart failure with reduced ejection fraction) (CMS/[facility]).    Transitional Care Coordination Progress Note:   Patient discussed during interdisciplinary rounds.   Team members present: MD/TCC  Plan per Medical/Surgical team: Medically Ready   Discharge disposition: home NN  Status: Observation  Payer: Medicare Advantage   Potential Barriers:   ADOD: 2/13/24

## 2024-02-12 NOTE — HOSPITAL COURSE
Delvis Ventura is a 63 y.o. male presenting with SOB and foot pain.     Patient is a 63-year-old gentleman with a history of diabetes, cerebral palsy, nonischemic cardiomyopathy (coronary angio 8/2018 with trivial CAD), who previously followed Dr. Hernandez is admitted for decompensated heart failure and metatarsal fracture.     He has history also of an ICD with a history of device infection requiring explant and most recently with a single-chamber ICD implanted last on 4/28/2023.  At baseline he wheelchair-bound due to cerebral palsy.     He also has had [facility] at [facility], chest pain was a chronic issue. He was on GDMT and also amiodarone for hx of VT/NSVT although he has not not been socked.     Past medical issues include nonischemic cardiopathy, hypertension, diabetes, cerebral palsy  Past surgical history includes ICD insertion and removal as mentioned     Patient was admitted to the emergency room due to shortness with concern for heart failure exacerbation over the last 3 days.  In the emergency room his vitals are stable, patient was slightly hypertensive which improved with carvedilol.  He also was given 80 of IV Lasix.  Labs obtained showed an elevated BNP of 1638, high-sensitivity troponin 98 down trended to 86.  No leukocytosis.  Creatinine 0.8.  Normal LFTs.  Flu and coronavirus negative.  EKG shows sinus tachycardia with an incomplete right bundle branch block with bilateral atrial enlargement pattern.  His last echocardiogram was on 11/4/2024 which showed an EF of 15 to 20% with no LV thrombus.  He also had a SPECT on 1/9/2023 with dilated LV and low EF, but with with normal perfusion otherwise and no inducible ischemia.  X-ray showed a fourth metatarsal acute fracture and orthopedics was consulted.  Chest x-ray with some pulmonary venous congestion.     Most recent medications include amiodarone 200 daily, Lasix 40 twice daily, spironolactone 25 daily, aspirin 81, atorvastatin 40, Coreg 25 twice daily,  jardiance 10 mg daily, Entresto  twice daily, ferrous sulfate 325 daily, hydralazine 50 every 8 hours, insulin glargine 40 at bedtime, Imdur 120 daily, loperamide as needed for diarrhea, prasozisin 2 mg at bedtime, zolpidem 5 mg as needed at bedtime.     Otherwise he feels better ater his lasix he already got in the unit.  He endorses medication compliance and limits snacks, salt, fluids.    Floor course:  Diuresed with IV lasix and returned to PO lasix once satisfactory diuresis. Switched to single day dosing to encourage compliance. ##  Gdmt adjusted after episode of hypotension, resolved with holding GDMT and gentle rehydration##   Complaining of pain in L foot 2/2 metatarsal fracture, short course of ## for discharge analgesia  Will follow up with Sulema Farrell CNP and Dr. Hernandez for heart failure##    Discharge weight: ### kg    After all labs and VS were reviewed the decision was made that the patient was medically stable for discharge.  The patient was discharged in satisfactory condition.    More than 60 minutes were spent in coordinating patient discharge.

## 2024-02-12 NOTE — PROGRESS NOTES
[facility]  [facility] NP/PA CARDIOLOGY PROGRESS NOTE    Delvis Ventura/[id number]  Admit Date: 2/10/2024  Hospital Length of Stay: 1   Current Attending: Familia Schwarz MD MPH   Outpatient Cardiologist:     INTERVAL EVENTS / PERTINENT ROS:     - improvement in lower extremity edema s/p IV lasix 80 mg BID, will discontinue and start PO diuretic  - start PO lasix 80 mg once a day for ease of compliance   - plan for dc tomorrow   - pt/ot to see    Pt evaluated at bedside this AM. No acute overnight events. Reports improvement in symptoms since admission.     MEDICATIONS  Infusions:     Scheduled:  amiodarone, 200 mg, Daily  aspirin, 81 mg, Daily  atorvastatin, 40 mg, Daily  carvedilol, 25 mg, BID  dapagliflozin propanediol, 10 mg, Daily  enoxaparin, 40 mg, q24h  ferrous sulfate (325 mg ferrous sulfate), 1 tablet, BID  [Held by provider] furosemide, 80 mg, q12h  furosemide, 80 mg, Daily  hydrALAZINE, 50 mg, q8h  insulin glargine, 40 Units, Nightly  insulin lispro, 0-5 Units, TID with meals  isosorbide mononitrate ER, 120 mg, Daily  polyethylene glycol, 17 g, Daily  prazosin, 2 mg, Nightly  sacubitriL-valsartan, 1 tablet, BID  spironolactone, 25 mg, Daily      PRN:   acetaminophen, 650 mg, q6h PRN  dextrose 10 % in water (D10W), 0.3 g/kg/hr, Once PRN  dextrose 10 % in water (D10W), 0.3 g/kg/hr, Once PRN  dextrose, 25 g, q15 min PRN  dextrose, 25 g, q15 min PRN  glucagon, 1 mg, q15 min PRN  glucagon, 1 mg, q15 min PRN  loperamide, 2 mg, TID PRN  oxyCODONE, 5 mg, q8h PRN  zolpidem, 5 mg, Nightly PRN      Prior to Admission Meds:  Medications Prior to Admission   Medication Sig Dispense Refill Last Dose    amiodarone (Pacerone) 200 mg tablet Delvis Ventura (Patient taking differently: Take 1 tablet (200 mg) by mouth once daily.) 30 tablet 3 Unknown    aspirin 81 mg chewable tablet Chew 1 tablet (81 mg) once daily. 30 tablet 3 Unknown    atorvastatin (Lipitor) 40 mg tablet Take 1 tablet (40 mg) by mouth  "once daily. 30 tablet 3 Unknown    carvedilol (Coreg) 25 mg tablet Take 1 tablet (25 mg) by mouth 2 times a day. 60 tablet 2 2/10/2024    dapagliflozin propanediol (Farxiga) 10 mg Take 1 tablet (10 mg) by mouth once daily. 30 tablet 3 2/10/2024    Dexcom G4 platinum  (Dexcom G7 ) misc Use as instructed 1 each 3     Dexcom G7 Sensor device Please use to check your glucose before meals and at night. 1 each 0     Entresto  mg tablet Take 1 tablet by mouth 2 times a day. 60 tablet 3 2/10/2024    FeroSuL 325 mg (65 mg iron) tablet Take 1 tablet by mouth twice daily. 60 tablet 3 2/10/2024    furosemide (Lasix) 20 mg tablet Take 1 tablet (20 mg) by mouth once daily. (Patient not taking: Reported on 1/4/2024) 30 tablet 0     furosemide (Lasix) 40 mg tablet Take 1 tablet (40 mg) by mouth 2 times a day. 60 tablet 0     hydrALAZINE (Apresoline) 50 mg tablet Take 1 tablet (50 mg) by mouth every 8 hours. 90 tablet 2 2/10/2024    insulin glargine (Lantus) 100 unit/mL (3 mL) pen Inject 40 Units under the skin once daily at bedtime. Take as directed per insulin instructions. 36 mL 3 2/9/2024    isosorbide mononitrate ER (Imdur) 120 mg 24 hr tablet Take 1 tablet by mouth once daily. Do not crush or chew. 30 tablet 2 2/10/2024    loperamide (Imodium) 2 mg capsule Take 2 capsules by mouth every 12 hours if needed for diarrhea. 30 capsule 2 Unknown    pen needle, diabetic 31 gauge x 5/16\" needle Use to inject insulin daily 100 each 11 Unknown    prazosin (Minipress) 2 mg capsule Take 1 capsule (2 mg) by mouth once daily at bedtime. 30 capsule 3 2/9/2024    spironolactone (Aldactone) 25 mg tablet Take 1 tablet (25 mg) by mouth once daily. 30 tablet 3 Unknown    zolpidem (Ambien) 5 mg tablet Take 1 tablet (5 mg) by mouth as needed at bedtime for sleep. 14 tablet 0 Unknown       Vitals/Physical:      2/12/2024     1:00 PM 2/12/2024    11:00 AM 2/12/2024     8:27 AM 2/12/2024     4:45 AM 2/12/2024    12:27 AM " 2/11/2024     7:41 PM 2/11/2024     3:50 PM   Vitals   Systolic 108 94 120 109 94 107 106   Diastolic 62 61 74 62 56 68 72   Heart Rate 76 66 81 75 66 67 72   Temp 35.1 °C (95.1 °F) 36.5 °C (97.7 °F) 36.6 °C (97.9 °F) 37.2 °C (99 °F) 37.1 °C (98.8 °F) 37.3 °C (99.1 °F) 36.9 °C (98.4 °F)   Resp 18 18 18 22 20 19 20   Weight (lb)    202.38      BMI    29.04 kg/m2      BSA (m2)    2.13 m2        Wt Readings from Last 5 Encounters:   02/12/24 91.8 kg (202 lb 6.1 oz)   01/05/24 95.3 kg (210 lb 1.6 oz)   11/07/23 91.6 kg (202 lb)   10/13/23 91.6 kg (202 lb)   07/07/23 90.3 kg (199 lb)     INTAKE/OUTPUT:  I/O last 3 completed shifts:  In: 1600 (17.4 mL/kg) [P.O.:1600]  Out: 8700 (94.8 mL/kg) [Urine:8700 (2.6 mL/kg/hr)]  Weight: 91.8 kg      Vitals reviewed.   Constitutional:       General: Not in acute distress.  Pulmonary:      Effort: Pulmonary effort is normal.      Breath sounds: Normal breath sounds.   Cardiovascular:      Normal rate. Regular rhythm. Normal S1. Normal S2.       Comments: Lower extremity edema 1+, improved from 2-3+ on admit   Edema:     Peripheral edema present.  Abdominal:      General: There is no distension.      Tenderness: There is no abdominal tenderness.   Skin:     General: Skin is dry.   Psychiatric:         Behavior: Behavior is cooperative.          GEN: NAD  HEENT: ATNC,  anicteric, JVD up to mid neck at 45 degrees  CV: RRR, no r/g/m  Pulm: basilar crackles  Abdomen: NTND  Ext: warm, 1+ bilateral pitting edema  Neuro: A+Ox3  Psych: appropriate    Labs:  CMP:  Recent Labs     02/12/24  0710 02/11/24  0724 02/10/24  1632 01/06/24  2253 01/06/24  0426 01/05/24  1517 01/05/24  0513 01/04/24  0604 01/03/24  1232 11/13/23  0830    141 139 135* 135*   < > 137 138   < > 133*   K 3.4* 3.2* 4.2 4.5 3.6   < > 3.5 3.6   < > 4.5    108* 105 101 101   < > 104 102   < > 105   CO2 24 24 25 26 27   < > 24 27   < > 20*   ANIONGAP 14 12 13 13 11   < > 13 13   < > 13   BUN 13 10 12 19 15   < > 16  14   < > 19   CREATININE 0.95 0.65 0.80 0.86 0.84   < > 0.93 0.79   < > 0.93   EGFR 90 >90 >90 >90 >90   < > >90 >90   < > >90   MG 1.81 1.60  --   --   --   --  2.00 1.70  --  2.02    < > = values in this interval not displayed.       Recent Labs     02/12/24  0710 02/10/24  1632 01/04/24  0604 01/03/24  1232 11/13/23  0830 11/08/23  0857 11/07/23  1506 08/26/23  0724 08/25/23  0838 08/20/23  1426 08/19/23  1902 05/28/22  0953 05/27/22  1147 03/06/22  1230 03/05/22  0841 09/22/21  1923 12/08/20  1626 02/19/20  0630 02/19/20  0023   ALBUMIN 3.4 4.1 3.6 3.7 3.4   < > 4.0   < > 3.7  3.7   < > 4.2   < > 4.1   < > 3.6   < > 4.5   < >  --    ALKPHOS  --  185* 137* 165*  --   --  153*  --  102   < > 160*   < > 78  --  158*   < > 121  --   --    ALT  --  36 41 41  --   --  46  --  21   < > 30   < > 18  --  31   < > 12  --   --    AST  --  34 47* 40*  --   --  29  --  27   < > 30   < > 24  --  28   < > 12  --   --    BILITOT  --  1.4* 1.8* 1.8*  --   --  1.4*  --  0.7   < > 1.7*   < > 0.7  --  0.8   < > 0.5  --   --    LIPASE  --   --   --   --   --   --   --   --   --   --  <3*  --  8*  --  3*  --  3*  --  6*    < > = values in this interval not displayed.       CBC:  Recent Labs     02/12/24 0710 02/11/24  0724 02/10/24  1632 01/06/24  2253 01/06/24  0426   WBC 5.4 5.0 4.7 5.6 5.7   HGB 15.8 15.0 16.2 15.5 15.0   HCT 48.1 45.2 47.5 46.0 46.0    164 143* 210 186   MCV 88 88 86 90 90       COAG:   Recent Labs     04/21/23  2106 09/29/22  2340 03/05/22  0841 12/16/21  1342 12/13/21  0457   INR 1.1 1.1 1.3*   < > 1.1   DDIMERVTE  --   --   --   --  333    < > = values in this interval not displayed.       ABO:   Recent Labs     03/05/22  0908   ABO O       HEME/ENDO:   Recent Labs     02/11/24  0724 08/21/23  0217 04/22/23  0811 01/08/23  1635 12/02/22  1209 09/30/22  0438 05/30/22  0601 05/29/22  0743 12/13/21  0458   FERRITIN  --   --   --   --  134  --  179  --   --    IRONSAT  --  9*  --   --  13*  --   --   --    "--    TSH 0.92 0.67  --   --   --   --   --   --  1.35   HGBA1C 9.9* 8.4* 7.6*   < >  --    < >  --    < >  --     < > = values in this interval not displayed.       CARDIAC:   Recent Labs     02/10/24  1720 02/10/24  1632 01/06/24  2253 01/03/24  1536 01/03/24  1232 11/09/23  1956 11/09/23  0808 11/08/23  1949 11/07/23  1506 11/07/23  1454 05/28/22  1355 05/28/22  0254   LDH  --   --   --   --   --   --   --   --   --   --   --  718*   TROPHS 86* 98* 18 36* 34* 29 38 42   < > 42   < > 26   BNP  --  1,638*  --   --  1,519*  --   --   --   --  1,857*   < >  --     < > = values in this interval not displayed.       Recent Labs     02/11/24  0724 11/04/22  1414 09/30/22  0438 10/28/21  1911 10/17/18  1143   CHOL 127 185 140 111 159   LDLF  --  114*  --  64 92   LDLCALC 80  --  90  --   --    HDL 32.7 51.8 32* 33.2* 47.3   TRIG 74 98 88 67 99       TOX:No results for input(s): \"AMPHETAMINE\", \"BARBSCRNUR\", \"BENZO\", \"CANNABINOID\", \"COCAI\", \"FENTANYL\", \"OPIATE\", \"OXYCODONE\", \"PCP\" in the last 29639 hours.  MICRO:   Recent Labs     04/22/23  0811 05/30/22  0601   CRP 0.89 0.18       No results found for the last 90 days.      Pertinent Cardiology Imaging:  EKG:  Encounter Date: 02/10/24   ECG 12 lead   Result Value    Ventricular Rate 99    Atrial Rate 99    OH Interval 190    QRS Duration 106    QT Interval 400    QTC Calculation(Bazett) 513    P Axis 63    R Axis 132    T Axis 31    QRS Count 16    Q Onset 220    P Onset 125    P Offset 186    T Offset 420    QTC Fredericia 472    Narrative    Normal sinus rhythm  Possible Left atrial enlargement  Right axis deviation  Incomplete right bundle branch block  Anterior infarct , age undetermined  Prolonged QT  Abnormal ECG  When compared with ECG of 06-JAN-2024 22:18,  Significant changes have occurred  See ED provider note for full interpretation and clinical correlation  Confirmed by Estefania Corea ([id number]) on 2/11/2024 4:03:52 AM       Echo:  Transthoracic Echo (TTE) " Complete    Result Date: 1/4/2024   [facility], [address] [place] [address]              Tel [phone] and Fax [phone] TRANSTHORACIC ECHOCARDIOGRAM REPORT  Patient Name:      OLIVIA PATHAK        Reading Physician:    [id number] iWcho Villatoro DO Study Date:        1/4/2024             Ordering Provider:    [id number] ROSA ISELA MAC MRN/PID:           [id number]             Fellow: Accession#:        [id number]         Nurse: Date of Birth/Age: 1960 / 63 years Sonographer:          Vanessa Montgomery RDCS Gender:            M                    Additional Staff: Height:            180.34 cm            Admit Date:           1/3/2024 Weight:            91.63 kg             Admission Status:     Inpatient -                                                               Acutely Life                                                               threatening BSA:               2.12 m2              Encounter#:           [id number]                                         Department Location:  [facility] ED Blood Pressure: 147 /104 mmHg Study Type:    TRANSTHORACIC ECHO (TTE) COMPLETE Diagnosis/ICD: Acute on chronic systolic (congestive) heart failure (CHF)-I50.23 Indication:    Systolic Heart Failure CPT Code:      Echo Complete w Full Doppler-[id number] Patient History: Diabetes:          Yes Pertinent History: HTN, Chest Pain, Cardiomyopathy, CHF, Hyperlipidemia,                    Previous DVT, PE, Palpitations and Dyspnea. Cerebral Palsy. Study Detail: The following Echo studies were performed: 2D, M-Mode, Doppler and               color flow. Technically challenging study due to poor acoustic               windows and prominent lung artifact. Definity used as a contrast               agent for endocardial border definition. Total contrast used for               this procedure was 3 mL via  IV push. Patient has a defibrillator.  PHYSICIAN INTERPRETATION: Left Ventricle: The left ventricular systolic function is severely decreased, with an estimated ejection fraction of 15-20%. There is global hypokinesis of the left ventricle with minor regional variations. The left ventricular cavity size is normal. Spectral Doppler shows a restrictive pattern of left ventricular diastolic filling. There is no definite left ventricular thrombus visualized. Left Atrium: The left atrium is normal in size. Right Ventricle: The right ventricle is normal in size. There is mildly reduced right ventricular systolic function. Right Atrium: The right atrium is normal in size. Aortic Valve: The aortic valve appears structurally normal. There is no evidence of aortic valve regurgitation. The peak instantaneous gradient of the aortic valve is 3.8 mmHg. The mean gradient of the aortic valve is 2.0 mmHg. Mitral Valve: The mitral valve is normal in structure. There is trace mitral valve regurgitation. Tricuspid Valve: The tricuspid valve is structurally normal. There is trace tricuspid regurgitation. The Doppler estimated RVSP is mildly elevated at 34.0 mmHg. Pulmonic Valve: The pulmonic valve is structurally normal. There is no indication of pulmonic valve regurgitation. Pericardium: There is no pericardial effusion noted. Aorta: The aortic root is normal. Systemic Veins: The inferior vena cava appears dilated. There is less than 50% IVC collapse with inspiration. In comparison to the previous echocardiogram(s): Compared with study from 8/21/2023, no significant change.  CONCLUSIONS:  1. Left ventricular systolic function is severely decreased with a 15-20% estimated ejection fraction.  2. No left ventricular thrombus visualized.  3. Spectral Doppler shows a restrictive pattern of left ventricular diastolic filling.  4. There is mildly reduced right ventricular systolic function.  5. Mildly elevated RVSP.  6. There is global  hypokinesis of the left ventricle with minor regional variations. QUANTITATIVE DATA SUMMARY: 2D MEASUREMENTS:                           Normal Ranges: Ao Root d:     3.00 cm    (2.0-3.7cm) LAs:           4.30 cm    (2.7-4.0cm) IVSd:          1.20 cm    (0.6-1.1cm) LVPWd:         1.00 cm    (0.6-1.1cm) LVIDd:         5.40 cm    (3.9-5.9cm) LVIDs:         5.00 cm LV Mass Index: 110.9 g/m2 LV % FS        7.4 % LA VOLUME:                               Normal Ranges: LA Vol A4C:        70.6 ml    (22+/-6mL/m2) LA Vol A2C:        59.0 ml LA Vol BP:         65.0 ml LA Vol Index A4C:  33.3ml/m2 LA Vol Index A2C:  27.9 ml/m2 LA Vol Index BP:   30.7 ml/m2 LA Area A4C:       21.3 cm2 LA Area A2C:       19.6 cm2 LA Major Axis A4C: 5.5 cm LA Major Axis A2C: 5.5 cm LA Volume Index:   30.7 ml/m2 RA VOLUME BY A/L METHOD:                               Normal Ranges: RA Vol A4C:        72.1 ml    (8.3-19.5ml) RA Vol Index A4C:  34.1 ml/m2 RA Area A4C:       22.3 cm2 RA Major Axis A4C: 5.9 cm M-MODE MEASUREMENTS:                  Normal Ranges: Ao Root: 3.20 cm (2.0-3.7cm) LAs:     5.00 cm (2.7-4.0cm) AORTA MEASUREMENTS:                      Normal Ranges: Ao Sinus, d: 3.00 cm (2.1-3.5cm) Ao STJ, d:   2.89 cm (1.7-3.4cm) Asc Ao, d:   2.70 cm (2.1-3.4cm) LV SYSTOLIC FUNCTION BY 2D PLANIMETRY (MOD):                     Normal Ranges: EF-A4C View: 15.5 % (>=55%) EF-A2C View: 15.3 % EF-Biplane:  14.7 % LV DIASTOLIC FUNCTION:                              Normal Ranges: MV Peak E:        1.01 m/s   (0.7-1.2 m/s) MV Peak A:        0.46 m/s   (0.42-0.7 m/s) E/A Ratio:        2.18       (1.0-2.2) MV e'             0.04 m/s   (>8.0) MV lateral e'     0.04 m/s MV medial e'      0.04 m/s MV A Dur:         98.00 msec E/e' Ratio:       25.25      (<8.0) a'                0.04 m/s PulmV Sys Kaleb:    24.70 cm/s PulmV Sandhu Kaleb:   41.60 cm/s PulmV S/D Kaleb:    0.60 PulmV A Revs Kaleb: 15.80 cm/s PulmV A Revs Dur: 87.00 msec MITRAL VALVE:                  Normal Ranges: MV DT: 114 msec (150-240msec) AORTIC VALVE:                                   Normal Ranges: AoV Vmax:                0.97 m/s (<=1.7m/s) AoV Peak PG:             3.8 mmHg (<20mmHg) AoV Mean P.0 mmHg (1.7-11.5mmHg) LVOT Max Kaleb:            0.60 m/s (<=1.1m/s) AoV VTI:                 16.00 cm (18-25cm) LVOT VTI:                8.20 cm LVOT Diameter:           2.00 cm  (1.8-2.4cm) AoV Area, VTI:           1.61 cm2 (2.5-5.5cm2) AoV Area,Vmax:           1.95 cm2 (2.5-4.5cm2) AoV Dimensionless Index: 0.51  RIGHT VENTRICLE: RV Basal 4.83 cm RV Mid   3.17 cm RV Major 8.8 cm TAPSE:   11.2 mm RV s'    0.08 m/s TRICUSPID VALVE/RVSP:                             Normal Ranges: Peak TR Velocity: 2.55 m/s Est. RA Pressure: 8 mmHg RV Syst Pressure: 34.0 mmHg (< 30mmHg) IVC Diam:         2.63 cm PULMONIC VALVE:                         Normal Ranges: PV Accel Time: 55 msec  (>120ms) PV Max Kaleb:    0.6 m/s  (0.6-0.9m/s) PV Max P.4 mmHg Pulmonary Veins: PulmV A Revs Dur: 87.00 msec PulmV A Revs Kaleb: 15.80 cm/s PulmV Sandhu Kaleb:   41.60 cm/s PulmV S/D Kaleb:    0.60 PulmV Sys Kaleb:    24.70 cm/s  [id number] Wicho Villatoro DO Electronically signed on 2024 at 12:57:21 PM  ** Final **    No results found for this or any previous visit.   No results found for this or any previous visit from the past 1095 days.      Cath:    Stress Test:    MRI:  Echocardiogram     [facility], [address] [place] [address]  Tel [phone] and Fax [phone]    TRANSTHORACIC ECHOCARDIOGRAM REPORT      Patient Name:     OLIVIA Quiñonez Physician:  [id number] Lukas Sutton MD  Study Date:       2023          Referring           OC ROCHE  Physician:  MRN/PID:          [id number]           PCP:  Accession/Order#: [id number]          Department          [facility]  Location:  YOB: 1960          Fellow:  Gender:           M                  Nurse:  Admit  Date:       8/19/2023          Sonographer:        Jolly Freedman [facility]  Admission Status: Inpatient -        Additional Staff:  Routine  Height:           177.80 cm          CC Report to:       AWILDA Gonzalez  Weight:           87.09 kg           Study Type:         Echocardiogram  BSA:              2.05 m2  Blood Pressure: 111 /71 mmHg    Diagnosis/ICD: I50.20-Unspecified systolic (congestive) heart failure (CHF)  Indication:    HFrEF  Procedure/CPT: Echo Complete w Full Doppler-[id number]    Patient History:  Pertinent History: HFrEF (10*15% 04/2023), s/p ICD placement, NICM, HTN,  cerebral palsey, DM II, CP, SOB, Hep C.    Study Detail: The following Echo studies were performed: 2D, M-Mode, Doppler and  color flow. Technically challenging study due to body habitus and  patient lying in supine position.      PHYSICIAN INTERPRETATION:  Left Ventricle: The left ventricular systolic function is severely decreased, with an estimated ejection fraction of 15-20%. There is global hypokinesis of the left ventricle with minor regional variations. The left ventricular cavity size is mildly dilated. There is no evidence of left ventricular hypertrophy. Spectral Doppler shows a pseudonormal pattern of left ventricular diastolic filling.  Left Atrium: The left atrium was not well visualized.  Right Ventricle: The right ventricle is normal in size. There is mildly reduced right ventricular systolic function. A device is visualized in the right ventricle.  Right Atrium: The right atrium is normal in size. There is a device visualized in the right atrium.  Aortic Valve: The aortic valve is trileaflet. There is no evidence of aortic valve regurgitation. The peak instantaneous gradient of the aortic valve is 5.6 mmHg. The mean gradient of the aortic valve is 3.0 mmHg.  Mitral Valve: The mitral valve is mildly thickened. There is trace mitral valve regurgitation.  Tricuspid Valve: The tricuspid valve is structurally normal. There is  trace tricuspid regurgitation. The right ventricular systolic pressure is unable to be estimated.  Pulmonic Valve: The pulmonic valve is not well visualized. There is physiologic pulmonic valve regurgitation.  Pericardium: There is a trivial pericardial effusion.  Aorta: The aortic root is normal.  Pulmonary Artery: The pulmonary artery is not well visualized.  Systemic Veins: The inferior vena cava appears to be of normal size. There is IVC inspiratory collapse greater than 50%.  In comparison to the previous echocardiogram(s): Compared with study from 4/13/2023, no significant change.      CONCLUSIONS:  1. Left ventricular systolic function is severely decreased with a 15-20% estimated ejection fraction.  2. Spectral Doppler shows a pseudonormal pattern of left ventricular diastolic filling.  3. There is no evidence of left ventricular hypertrophy.  4. There is mildly reduced right ventricular systolic function.  5. The pulmonary artery is not well visualized.  6. There is global hypokinesis of the left ventricle with minor regional variations.    QUANTITATIVE DATA SUMMARY:  2D MEASUREMENTS:  Normal Ranges:  Ao Root d:     3.30 cm   (2.0-3.7cm)  LAs:           4.30 cm   (2.7-4.0cm)  IVSd:          1.00 cm   (0.6-1.1cm)  LVPWd:         1.00 cm   (0.6-1.1cm)  LVIDd:         5.10 cm   (3.9-5.9cm)  LVIDs:         4.60 cm  LV Mass Index: 91.7 g/m2  LV % FS        9.8 %    AORTA MEASUREMENTS:  Normal Ranges:  Asc Ao, d: 2.90 cm (2.1-3.4cm)    LV DIASTOLIC FUNCTION:  Normal Ranges:  MV Peak E:    0.73 m/s    (0.7-1.2 m/s)  MV Peak A:    0.71 m/s    (0.42-0.7 m/s)  E/A Ratio:    1.03        (1.0-2.2)  MV e'         0.04 m/s    (>8.0)  MV lateral e' 0.04 m/s  MV medial e'  0.05 m/s  MV A Dur:     158.00 msec  E/e' Ratio:   16.31       (<8.0)  a'            0.04 m/s  MV DT:        236 msec    (150-240 msec)    MITRAL VALVE:  Normal Ranges:  MV DT: 236 msec (150-240msec)    AORTIC VALVE:  Normal Ranges:  AoV Vmax:                 1.18 m/s (<=1.7m/s)  AoV Peak P.6 mmHg (<20mmHg)  AoV Mean PG:             3.0 mmHg (1.7-11.5mmHg)  LVOT Max Kaleb:            0.83 m/s (<=1.1m/s)  AoV VTI:                 21.90 cm (18-25cm)  LVOT VTI:                13.70 cm  LVOT Diameter:           2.00 cm  (1.8-2.4cm)  AoV Area, VTI:           1.97 cm2 (2.5-5.5cm2)  AoV Area,Vmax:           2.20 cm2 (2.5-4.5cm2)  AoV Dimensionless Index: 0.63      RIGHT VENTRICLE:  TAPSE: 19.7 mm  RV s'  0.08 m/s    TRICUSPID VALVE/RVSP:  Normal Ranges:  Est. RA Pressure: 3 mmHg  IVC Diam:         1.90 cm    PULMONIC VALVE:  Normal Ranges:  PV Accel Time: 135 msec (>120ms)  PV Max Kaleb:    0.7 m/s  (0.6-0.9m/s)  PV Max P.9 mmHg      [id number] Lukas Sutton MD  Electronically signed on 2023 at 12:03:02 PM            NM CARDIAC STRESS REST (MYOCARDIAL PERFUSION MIBI) 2022    Narrative  MRN: [id number]  Patient Name: OLIVIA PATHAK    STUDY:  CARDIAC STRESS/REST INJECTION; PART 2 STRESS OR REST (NO CHARGE);  CARDIAC STRESS/REST (MYOCARDIAL PERFUSION/MIBI);  2022 11:38 am    INDICATION:  HF  I50.30: (HFpEF) heart failure with preserved ejection fraction.    COMPARISON:  SPECT CT MPI 2015    ACCESSION NUMBER(S):  [id number]; [id number]; [id number]    ORDERING CLINICIAN:  SHAYLA JOYCE    TECHNIQUE:  DIVISION OF NUCLEAR MEDICINE  PHARMACOLOGIC STRESS MYOCARDIAL PERFUSION SCAN, ONE DAY PROTOCOL    The patient received an intravenous dose of  11.0 mCi of Tc-99m  Myoview and resting emission tomographic (SPECT) images of the  myocardium were acquired. The patient then received an intravenous  infusion of 0.4mg regadenoson (Lexiscan)  followed by an additional  dose of  35.0 mCi of Tc-99m  Myoview. Stress phase SPECT images of  the myocardium were then acquired. These included ECG-gated images to  assess and quantify ventricular function.  A low-dose, nondiagnostic  regional CT was utilized for attenuation correction purposes.    FINDINGS:  Both  stress and rest studies demonstrate grossly normal perfusion  throughout the left ventricle.    There is moderate to severe enlargement of the left ventricle with an  estimated end-diastolic volume of 179 mL, increased from 107 mL  previously measured on the prior SPECT CT MPI dated 06/12/2015.    Gated images demonstrate worsening global LV wall hypokinesis with an  LV EF estimated at 27% at stress and post-stress rest, decreased from  57% previously measured on the prior SPECT CT MPI.    Attenuation correction CT images demonstrate cardiomegaly.    Impression  1. Normal myocardial perfusion study without evidence of ischemia or  prior infarction.  2. Moderate to severe left ventricular enlargement and global LV wall  hypokinesis within LVEF estimated at 27%.  3. When compared to the prior SPECT CT MPI dated 06/12/2015, there  has been significant interval worsening of left ventricular  dilatation and decrease in LVEF.    I personally reviewed the images/study and I agree with the findings  as stated. This study was interpreted at [facility], [place], [place].    No results found for this or any previous visit from the past 1825 days.    No results found for this or any previous visit from the past 3650 days.     No results found for this or any previous visit from the past 1825 days.    No results found for this or any previous visit from the past 1825 days.    No results found for this or any previous visit from the past 1825 days.    Assessment/Plan   Patient is a 63-year-old gentleman with a history of diabetes, cerebral palsy, nonischemic cardiomyopathy (coronary angio 8/2018 with trivial CAD), who previously followed Dr. Hernandez is admitted for decompensated heart failure and metatarsal fracture.     Acute on chronic systolic and diastolic heart failure with reduced EF  ADHF  NICM  non obstructive CAD  - ASA 81  - atorvastatin 40  - GDMT: continue coreg 25 BID, jardiance  10, aldactone 25, entresto  BID  - imdur 120, hydralazine 50 TID  - s/p lasix 80 IV overnight with good UOP  - 2/11: c/w lasix 80mg BID   - pt with improvement in symptoms and improved lower extremity edema (1+ today,  2-3+ on admit) s/p lasix 80mg BID    - 2/12: start PO lasix 80 mg once a day (switching from home lasix 40 mg BID dosing to facilitate compliance)    - 2/11: I/O -5500   - Cr 0.95, BUN 13, Mg 1.81, K 3.4 (ordered repletion this afternoon)   - follow up Dr Hernandez on dc      HTN  -quite hypertensive on admission, resume GDMT as above  - 170-190 SBP on admit  - today SBP range       Hx of NSVT/VT  - continue amiodarone 200 daily  - single chamber ICD with hx of prior device infection and removal  - TSH 0.92 (WNL)       Metatarsal fracture  - ortho consulted  - CT foot pending per ortho  - WBAT in postop shoe for transfers only  - DVT ppx per primary  - ABX not indicated  - Patient to follow up in clinic with Dr. Shepherd in 1-2 weeks.  Please call [phone] to schedule appointment after discharge.  - oxycodone currently PRN, wean prior to discharge     DM  -lantus 40 units at night  -ldssi  -jardiance as for gdmt above     insomnia  -takes prazasosin and zolpidem prn     hx of diarrhea  -loperamide prn     LDA:   NUTRITION: Adult diet Cardiac; 70 gm fat; 2 - 3 grams Sodium; 2000 mL fluid  EMERGENCY CONTACT: No emergency contact information on file.  CODE STATUS: Full Code  DISPO:  plan for dc tomorrow     Home: [facility] [address]  [facility]: Daily Activity - Total Score: 17  Discharge Planning  Living Arrangements: Alone  Support Systems: None  Assistance Needed: none  Type of Residence: Private residence  Number of Stairs to Enter Residence: 0  Number of Stairs Within Residence: 0  Do you have animals or pets at home?: No  Who is requesting discharge planning?: Provider  Home or Post Acute Services: None  Patient expects to be discharged to:: home  Does the patient need discharge  transport arranged?: No  FOLLOWUP:   Future Appointments   Date Time Provider Department Center   3/1/2024  2:00 PM KIKE Omalley-CNP [id number] [facility]   3/21/2024 10:40 AM Su Hernandez MD PhD [id number] Academic        Patient seen and discussed with Dr. Familia Schwarz MD MPH    Lucio Ac DNP, KIKE-CNP    ________________________________________________  Alexa Gayle, PA-S2

## 2024-02-13 LAB
ALBUMIN SERPL BCP-MCNC: 3.4 G/DL (ref 3.4–5)
ANION GAP SERPL CALC-SCNC: 12 MMOL/L (ref 10–20)
BUN SERPL-MCNC: 16 MG/DL (ref 6–23)
CALCIUM SERPL-MCNC: 9.5 MG/DL (ref 8.6–10.6)
CHLORIDE SERPL-SCNC: 103 MMOL/L (ref 98–107)
CO2 SERPL-SCNC: 27 MMOL/L (ref 21–32)
CREAT SERPL-MCNC: 0.97 MG/DL (ref 0.5–1.3)
EGFRCR SERPLBLD CKD-EPI 2021: 88 ML/MIN/1.73M*2
ERYTHROCYTE [DISTWIDTH] IN BLOOD BY AUTOMATED COUNT: 15.1 % (ref 11.5–14.5)
GLUCOSE BLD MANUAL STRIP-MCNC: 141 MG/DL (ref 74–99)
GLUCOSE BLD MANUAL STRIP-MCNC: 214 MG/DL (ref 74–99)
GLUCOSE BLD MANUAL STRIP-MCNC: 217 MG/DL (ref 74–99)
GLUCOSE BLD MANUAL STRIP-MCNC: 229 MG/DL (ref 74–99)
GLUCOSE BLD MANUAL STRIP-MCNC: 240 MG/DL (ref 74–99)
GLUCOSE SERPL-MCNC: 115 MG/DL (ref 74–99)
HCT VFR BLD AUTO: 51.1 % (ref 41–52)
HGB BLD-MCNC: 16.9 G/DL (ref 13.5–17.5)
MAGNESIUM SERPL-MCNC: 1.84 MG/DL (ref 1.6–2.4)
MCH RBC QN AUTO: 29.7 PG (ref 26–34)
MCHC RBC AUTO-ENTMCNC: 33.1 G/DL (ref 32–36)
MCV RBC AUTO: 90 FL (ref 80–100)
NRBC BLD-RTO: 0 /100 WBCS (ref 0–0)
PHOSPHATE SERPL-MCNC: 4.2 MG/DL (ref 2.5–4.9)
PLATELET # BLD AUTO: 226 X10*3/UL (ref 150–450)
POTASSIUM SERPL-SCNC: 4.1 MMOL/L (ref 3.5–5.3)
RBC # BLD AUTO: 5.69 X10*6/UL (ref 4.5–5.9)
SODIUM SERPL-SCNC: 138 MMOL/L (ref 136–145)
WBC # BLD AUTO: 5.5 X10*3/UL (ref 4.4–11.3)

## 2024-02-13 PROCEDURE — 97165 OT EVAL LOW COMPLEX 30 MIN: CPT | Mod: GO

## 2024-02-13 PROCEDURE — 97162 PT EVAL MOD COMPLEX 30 MIN: CPT | Mod: GP

## 2024-02-13 PROCEDURE — 2500000002 HC RX 250 W HCPCS SELF ADMINISTERED DRUGS (ALT 637 FOR MEDICARE OP, ALT 636 FOR OP/ED): Performed by: STUDENT IN AN ORGANIZED HEALTH CARE EDUCATION/TRAINING PROGRAM

## 2024-02-13 PROCEDURE — 2500000001 HC RX 250 WO HCPCS SELF ADMINISTERED DRUGS (ALT 637 FOR MEDICARE OP): Performed by: STUDENT IN AN ORGANIZED HEALTH CARE EDUCATION/TRAINING PROGRAM

## 2024-02-13 PROCEDURE — 2500000001 HC RX 250 WO HCPCS SELF ADMINISTERED DRUGS (ALT 637 FOR MEDICARE OP): Performed by: NURSE PRACTITIONER

## 2024-02-13 PROCEDURE — 85027 COMPLETE CBC AUTOMATED: CPT | Performed by: NURSE PRACTITIONER

## 2024-02-13 PROCEDURE — 99232 SBSQ HOSP IP/OBS MODERATE 35: CPT | Performed by: INTERNAL MEDICINE

## 2024-02-13 PROCEDURE — 83735 ASSAY OF MAGNESIUM: CPT | Performed by: NURSE PRACTITIONER

## 2024-02-13 PROCEDURE — 1100000001 HC PRIVATE ROOM DAILY

## 2024-02-13 PROCEDURE — 36415 COLL VENOUS BLD VENIPUNCTURE: CPT | Performed by: NURSE PRACTITIONER

## 2024-02-13 PROCEDURE — 2500000004 HC RX 250 GENERAL PHARMACY W/ HCPCS (ALT 636 FOR OP/ED): Performed by: STUDENT IN AN ORGANIZED HEALTH CARE EDUCATION/TRAINING PROGRAM

## 2024-02-13 PROCEDURE — 82947 ASSAY GLUCOSE BLOOD QUANT: CPT

## 2024-02-13 PROCEDURE — 80069 RENAL FUNCTION PANEL: CPT | Performed by: NURSE PRACTITIONER

## 2024-02-13 RX ORDER — HYDRALAZINE HYDROCHLORIDE 25 MG/1
25 TABLET, FILM COATED ORAL EVERY 8 HOURS
Status: DISCONTINUED | OUTPATIENT
Start: 2024-02-13 | End: 2024-02-15 | Stop reason: HOSPADM

## 2024-02-13 RX ORDER — FUROSEMIDE 40 MG/1
60 TABLET ORAL DAILY
Status: DISCONTINUED | OUTPATIENT
Start: 2024-02-14 | End: 2024-02-15 | Stop reason: HOSPADM

## 2024-02-13 RX ORDER — FUROSEMIDE 80 MG/1
80 TABLET ORAL DAILY
Qty: 30 TABLET | Refills: 0 | Status: CANCELLED | OUTPATIENT
Start: 2024-02-14 | End: 2024-03-15

## 2024-02-13 RX ADMIN — ATORVASTATIN CALCIUM 40 MG: 40 TABLET, FILM COATED ORAL at 06:08

## 2024-02-13 RX ADMIN — INSULIN LISPRO 2 UNITS: 100 INJECTION, SOLUTION INTRAVENOUS; SUBCUTANEOUS at 13:04

## 2024-02-13 RX ADMIN — ENOXAPARIN SODIUM 40 MG: 100 INJECTION SUBCUTANEOUS at 20:50

## 2024-02-13 RX ADMIN — ISOSORBIDE MONONITRATE 120 MG: 60 TABLET, EXTENDED RELEASE ORAL at 09:14

## 2024-02-13 RX ADMIN — SACUBITRIL AND VALSARTAN 1 TABLET: 97; 103 TABLET, FILM COATED ORAL at 09:14

## 2024-02-13 RX ADMIN — AMIODARONE HYDROCHLORIDE 200 MG: 200 TABLET ORAL at 09:14

## 2024-02-13 RX ADMIN — SODIUM CHLORIDE 500 ML: 9 INJECTION, SOLUTION INTRAVENOUS at 13:43

## 2024-02-13 RX ADMIN — OXYCODONE HYDROCHLORIDE 5 MG: 5 TABLET ORAL at 10:09

## 2024-02-13 RX ADMIN — LOPERAMIDE HYDROCHLORIDE 2 MG: 2 CAPSULE ORAL at 20:51

## 2024-02-13 RX ADMIN — SPIRONOLACTONE 25 MG: 25 TABLET, FILM COATED ORAL at 09:14

## 2024-02-13 RX ADMIN — FUROSEMIDE 80 MG: 40 TABLET ORAL at 09:14

## 2024-02-13 RX ADMIN — DAPAGLIFLOZIN 10 MG: 10 TABLET, FILM COATED ORAL at 09:14

## 2024-02-13 RX ADMIN — OXYCODONE HYDROCHLORIDE 5 MG: 5 TABLET ORAL at 17:45

## 2024-02-13 RX ADMIN — SACUBITRIL AND VALSARTAN 1 TABLET: 97; 103 TABLET, FILM COATED ORAL at 20:50

## 2024-02-13 RX ADMIN — CARVEDILOL 25 MG: 25 TABLET, FILM COATED ORAL at 20:50

## 2024-02-13 RX ADMIN — INSULIN GLARGINE 40 UNITS: 100 INJECTION, SOLUTION SUBCUTANEOUS at 20:51

## 2024-02-13 RX ADMIN — LOPERAMIDE HYDROCHLORIDE 2 MG: 2 CAPSULE ORAL at 09:20

## 2024-02-13 RX ADMIN — CARVEDILOL 25 MG: 25 TABLET, FILM COATED ORAL at 09:14

## 2024-02-13 RX ADMIN — FERROUS SULFATE TAB 325 MG (65 MG ELEMENTAL FE) 1 TABLET: 325 (65 FE) TAB at 20:49

## 2024-02-13 RX ADMIN — FERROUS SULFATE TAB 325 MG (65 MG ELEMENTAL FE) 1 TABLET: 325 (65 FE) TAB at 09:14

## 2024-02-13 RX ADMIN — HYDRALAZINE HYDROCHLORIDE 50 MG: 25 TABLET ORAL at 06:08

## 2024-02-13 RX ADMIN — OXYCODONE HYDROCHLORIDE 5 MG: 5 TABLET ORAL at 06:08

## 2024-02-13 RX ADMIN — ASPIRIN 81 MG CHEWABLE TABLET 81 MG: 81 TABLET CHEWABLE at 09:14

## 2024-02-13 RX ADMIN — PRAZOSIN HYDROCHLORIDE 2 MG: 2 CAPSULE ORAL at 20:50

## 2024-02-13 ASSESSMENT — COGNITIVE AND FUNCTIONAL STATUS - GENERAL
DAILY ACTIVITIY SCORE: 20
CLIMB 3 TO 5 STEPS WITH RAILING: TOTAL
CLIMB 3 TO 5 STEPS WITH RAILING: TOTAL
STANDING UP FROM CHAIR USING ARMS: A LITTLE
TURNING FROM BACK TO SIDE WHILE IN FLAT BAD: A LITTLE
MOBILITY SCORE: 16
CLIMB 3 TO 5 STEPS WITH RAILING: TOTAL
HELP NEEDED FOR BATHING: A LITTLE
MOBILITY SCORE: 16
DRESSING REGULAR LOWER BODY CLOTHING: A LITTLE
DAILY ACTIVITIY SCORE: 17
DRESSING REGULAR UPPER BODY CLOTHING: A LITTLE
HELP NEEDED FOR BATHING: A LITTLE
MOVING TO AND FROM BED TO CHAIR: A LITTLE
PERSONAL GROOMING: A LITTLE
MOBILITY SCORE: 15
TOILETING: A LITTLE
WALKING IN HOSPITAL ROOM: TOTAL
DRESSING REGULAR UPPER BODY CLOTHING: A LITTLE
STANDING UP FROM CHAIR USING ARMS: A LITTLE
TOILETING: A LITTLE
TOILETING: A LITTLE
MOVING TO AND FROM BED TO CHAIR: A LITTLE
DRESSING REGULAR LOWER BODY CLOTHING: A LOT
DAILY ACTIVITIY SCORE: 20
DRESSING REGULAR UPPER BODY CLOTHING: A LITTLE
WALKING IN HOSPITAL ROOM: TOTAL
DRESSING REGULAR LOWER BODY CLOTHING: A LITTLE
WALKING IN HOSPITAL ROOM: TOTAL
MOVING TO AND FROM BED TO CHAIR: A LITTLE
HELP NEEDED FOR BATHING: A LOT
STANDING UP FROM CHAIR USING ARMS: A LITTLE

## 2024-02-13 ASSESSMENT — PAIN - FUNCTIONAL ASSESSMENT
PAIN_FUNCTIONAL_ASSESSMENT: 0-10

## 2024-02-13 ASSESSMENT — PAIN SCALES - GENERAL
PAINLEVEL_OUTOF10: 9
PAINLEVEL_OUTOF10: 8
PAINLEVEL_OUTOF10: 0 - NO PAIN
PAINLEVEL_OUTOF10: 9
PAINLEVEL_OUTOF10: 0 - NO PAIN
PAINLEVEL_OUTOF10: 0 - NO PAIN
PAINLEVEL_OUTOF10: 8
PAINLEVEL_OUTOF10: 0 - NO PAIN
PAINLEVEL_OUTOF10: 5 - MODERATE PAIN
PAINLEVEL_OUTOF10: 6

## 2024-02-13 ASSESSMENT — PAIN DESCRIPTION - ORIENTATION
ORIENTATION: LEFT

## 2024-02-13 ASSESSMENT — PAIN DESCRIPTION - LOCATION
LOCATION: FOOT

## 2024-02-13 ASSESSMENT — ACTIVITIES OF DAILY LIVING (ADL)
ADL_ASSISTANCE: INDEPENDENT
ADL_ASSISTANCE: INDEPENDENT

## 2024-02-13 NOTE — PROGRESS NOTES
Patient lace score over 78 but declined to have us set up follow up appointments stated he had appointments scheduled

## 2024-02-13 NOTE — CARE PLAN
Problem: Safety  Goal: Patient will be injury free during hospitalization  Outcome: Progressing  Goal: I will remain free of falls  Outcome: Progressing     Problem: Daily Care  Goal: Daily care needs are met  Outcome: Progressing     Problem: Skin  Goal: Decreased wound size/increased tissue granulation at next dressing change  Outcome: Progressing  Goal: Participates in plan/prevention/treatment measures  Outcome: Progressing  Goal: Prevent/manage excess moisture  Outcome: Progressing  Goal: Prevent/minimize sheer/friction injuries  Outcome: Progressing  Goal: Promote/optimize nutrition  Outcome: Progressing  Goal: Promote skin healing  Outcome: Progressing

## 2024-02-13 NOTE — SIGNIFICANT EVENT
Was paged for Low BP's.     C/o LH/dizziness, nauseous.   VS BP 60s/40s.   HR 70s.     I/O -3600 urine /24 hrs and -1.1L this AM on 80 Lasix daily   On Hydral 50 and entresto full dose, Imdur 120 (home regimen).     Plan:  - possible rapid diuresis.   - 500 ml IV NS x 1 hr, once. Recheck vitals in an hr.   - reduced hydral 25 mg TID <50 TID.   - patient not comfortable with idea of discharge. Discharge discontinued.   - no further lasix today. Decrease to 60 AM

## 2024-02-13 NOTE — PROGRESS NOTES
Occupational Therapy    Evaluation    Patient Name: Delvis Ventura  MRN: [id number]  Today's Date: 2/13/2024  Time Calculation  Start Time: 1047  Stop Time: 1106  Time Calculation (min): 19 min    Assessment  IP OT Assessment  End of Session Communication: Bedside nurse  End of Session Patient Position:  (Power wheelchair)  Plan:  OT Frequency: 2 times per week  OT Discharge Recommendations: Low intensity level of continued care (If pt agreeable to maximize safety and carryover of WBAT with Left LE in surgical shoe.)  Equipment Recommended upon Discharge:  (tub bench)  OT - OK to Discharge:  (OT Evaluation completed.)    Subjective   Current Problem:  1. HFrEF (heart failure with reduced ejection fraction) (CMS/[facility])          General:  Reason for Referral: decompensated HF, L foot pain with new 4th MT fx  Past Medical History Relevant to Rehab: iabetes, cerebral palsy, nonischemic cardiomyopathy (coronary angio 8/2018 with trivial CAD), HF  Co-Treatment: PT  Co-Treatment Reason: maximize pt safety and function  Patient Position Received: Bed, 3 rail up  General Comment: Pt received in supine, agreeable to therapy and requesting assist for OOB to Power Wheelchair.   Precautions:  LE Weight Bearing Status:  (LLE WBAT in post op shoe)  Medical Precautions: Fall precautions  Vital Signs:     Pain:  Pain Assessment  Pain Assessment: 0-10  Pain Score: 0 - No pain       Objective   Cognition:  Overall Cognitive Status: Within Functional Limits  Orientation Level: Oriented X4    Home Living:  Type of Home: Apartment  Lives With: Alone  Home Adaptive Equipment: Wheelchair-power  Home Layout: One level  Home Access: Elevator  Bathroom Shower/Tub: Tub/shower unit   Prior Function:  Level of [place]: Independent with ADLs and functional transfers, Independent with homemaking with wheelchair  ADL Assistance: Independent  Homemaking Assistance: Independent  Prior Function Comments: Pt reports his bed at home is low to the ground  "and that he typically \"gets onto all fours on the ground\" and then uses B/L UE's to lift self into w/c from floor position independently.  IADL History:     ADL:  LE Dressing Assistance:  (Pt required assist to don pants over B/L feet 2' presence of non-skid socks while seated in power wheelchair, anticipate SBA if pt with bare feet. Pt requested assist to pull pants over hips while WB thru B/L UE's on arms of w/c to conserve energy.)  ADL Comments: Pt reports he can pull up own pants but requested assist for E/C at this time, TBE further.  Activity Tolerance:  Endurance: Endurance does not limit participation in activity  Balance:  Static Sitting Balance  Static Sitting-Level of Assistance: Independent  Bed Mobility/Transfers: Bed Mobility  Bed Mobility: Yes  Bed Mobility 1  Bed Mobility 1: Supine to sitting  Level of Assistance 1:  (SBA)   and Transfers  Transfer: Yes  Transfer 1  Transfer From 1: Bed to  Transfer to 1: Wheelchair  Technique 1: Lateral (to power wheelchair)  Transfer Level of Assistance 1:  (SBA)  Trials/Comments 1: Pt scooted from EOB to power w/c, declined donning post of shoe LLE despite recommendation, stated, \"It just doesn't feel right\". Pt noted to maintain LLE heel WB during transfer.       Vision: Vision - Basic Assessment  Current Vision: No visual deficits   and    Sensation:  Light Touch: No apparent deficits  Strength:  Strength Comments: Grossly WFL for basic ADL's and transfers as observed during functional tasks in [facility].      Hand Function:  Hand Function  Gross Grasp: Functional  Extremities: RUE   RUE : Within Functional Limits, LUE   LUE: Within Functional Limits,  , and      Outcome Measures: [facility] Daily Activity  Putting on and taking off regular lower body clothing: A little  Bathing (including washing, rinsing, drying): A little  Putting on and taking off regular upper body clothing: A little  Toileting, which includes using toilet, bedpan or urinal: A little  Taking care " of personal grooming such as brushing teeth: None  Eating Meals: None  Daily Activity - Total Score: 20         ,     OT Adult Other Outcome Measures  4AT: 0    Education Documentation  Precautions, taught by Ana Lewis OT at 2/13/2024  2:40 PM.  Learner: Patient  Readiness: Acceptance  Method: Explanation  Response: Needs Reinforcement    ADL Training, taught by Ana Lewis OT at 2/13/2024  2:40 PM.  Learner: Patient  Readiness: Acceptance  Method: Explanation  Response: Needs Reinforcement    Education Comments  No comments found.        Goals:   Encounter Problems       Encounter Problems (Active)       ADLs       Patient will perform UB and LB bathing  with modified independent level of assistance and extended tub bench.       Start:  02/13/24    Expected End:  02/27/24            Patient with complete lower body dressing with modified independent level of assistance donning and doffing all LE clothes  with PRN adaptive equipment while edge of bed  or in power wheelchair per pt's preference.        Start:  02/13/24    Expected End:  02/27/24            Patient will complete toileting including hygiene clothing management/hygiene with modified independent level of assistance and raised toilet seat, grab bars.       Start:  02/13/24    Expected End:  02/27/24               TRANSFERS       Patient will complete functional transfer to all surfaces with least restrictive device with modified independent level of assistance in Left surgical shoe (WBAT in surgical shoe per ortho).       Start:  02/13/24    Expected End:  02/27/24 02/13/24 at 2:41 PM   Ana Lewis OT   Rehab Office: [phone]

## 2024-02-13 NOTE — DISCHARGE SUMMARY
Discharge Diagnosis  HFrEF (heart failure with reduced ejection fraction) (CMS/[facility])  Acute decompensated HF.     Issues Requiring Follow-Up  Follow up with  on 3/21/2024.   Follow up with Sulema Farrell on 3/1/2024.   Follow up with OP orthopedic surgery on 2/20/2024 at [facility] at 3:00 pm for metatarsal fracture of Left foot.   WBAT in post op shoe for transfers.     Test Results Pending At Discharge  None.     Hospital Course  Delvis Ventura is a 63 y.o. male presenting with SOB and foot pain.     Delvis Ventura is a 63 y.o. male with PMHx of DM2, cerebral palsy,  NICM/HFrEF(coronary angio 8/2018 with trivial CAD), s/p ICD c/b infection requiring explant and subsequent sICD (4/28/2023), HTN, VT/NSVT on amiodarone, who previously followed Dr. Hernandez is admitted for decompensated heart failure and metatarsal fracture.    At baseline he wheelchair-bound due to cerebral palsy.      ED course: admitted with concerns for ADHF over the last 3 days.   Hemodynamically stable. patient was slightly hypertensive which improved with carvedilol.  He also was given 80 of IV Lasix.    Labs :BNP of 1638, HST 98 >>>86.  No leukocytosis.  Creatinine 0.8.  Normal LFTs.  Flu and coronavirus negative.  EKG shows sinus tachycardia with an incomplete right bundle branch block with bilateral atrial enlargement pattern.  TTE 11/4/2024 which showed an EF of 15 to 20% with no LV thrombus.  He also had a SPECT on 1/9/2023 with dilated LV and low EF, but with with normal perfusion otherwise and no inducible ischemia.  X-ray showed a fourth metatarsal acute fracture and orthopedics was consulted.    Chest x-ray with some pulmonary venous congestion.     Home meds at admission: amiodarone 200 daily, Lasix 40 twice daily, spironolactone 25 daily, aspirin 81, atorvastatin 40, Coreg 25 twice daily, jardiance 10 mg daily, Entresto  twice daily, ferrous sulfate 325 daily, hydralazine 50 every 8 hours, insulin glargine 40 at  bedtime, Imdur 120 daily, loperamide as needed for diarrhea, prasozisin 2 mg at bedtime, zolpidem 5 mg as needed at bedtime.     Floor course:  ADHF/NICM/HFrEF  Hx of NSVT/VT  Continued on home amiodarone 200 daily, spironolactone 25 daily, aspirin 81, atorvastatin 40, Coreg 25 twice daily, jardiance 10 mg daily, Entresto  twice daily, hydralazine 50 every 8 hours,  Imdur 120 daily,   Diuresed with IV lasix until euvolemic and returned to PO lasix once satisfactory diuresis.   Switched to single day dosing to encourage compliance. (80 PO furosemide daily)  Blood pressure at discharge well within goals fir HFrEF   Crea/eGFR stable 0.97/88  Discharge ion 92.2 kgs.   Tele: NSR.     Will follow up with Sulema Farrell CNP and Dr. Hernandez for heart failure    Metatarsal fracture:   Ortho consulted   X-ray showed a fourth metatarsal acute fracture and orthopedics was consulted.    CT left foot : 2/10 Redemonstration of acute mildly displaced fracture of the 4th metatarsal neck. Acute minimally displaced fracture of the medial 1st distal phalanx. Marked diffuse soft tissue edema of the visualized left lower extremity.  WBAT with surgical shoe.   Complaining of pain in L foot 2/2 metatarsal fracture, short course of oxycodone (5 days TID PRN) for discharge analgesia    Will follow up with  on 2/20 at 3:[facility] pm [facility].     After all labs and VS were reviewed the decision was made that the patient was medically stable for discharge.  The patient was discharged in satisfactory condition.    More than 60 minutes were spent in coordinating patient discharge.     Pertinent Physical Exam At Time of Discharge  Physical Exam  Constitutional:       General: He is not in acute distress.     Appearance: Normal appearance. He is normal weight.   Cardiovascular:      Rate and Rhythm: Normal rate and regular rhythm.      Pulses: Normal pulses.      Heart sounds: No murmur heard.     Comments: No JVD   Pulmonary:  "     Effort: Pulmonary effort is normal. No respiratory distress.   Abdominal:      General: Bowel sounds are normal. There is no distension.      Palpations: Abdomen is soft.      Tenderness: There is no abdominal tenderness.   Musculoskeletal:      Right lower leg: Edema (trace) present.      Left lower leg: Edema (trace) present.   Skin:     General: Skin is warm.   Neurological:      Mental Status: He is alert and oriented to person, place, and time. Mental status is at baseline.   Psychiatric:         Mood and Affect: Mood normal.         Home Medications     Medication List      CONTINUE taking these medications     Dexcom G7  misc; Generic drug: blood-glucose meter,continuous;   Use as instructed   Dexcom G7 Sensor device; Generic drug: blood-glucose sensor; Please use   to check your glucose before meals and at night.   pen needle, diabetic 31 gauge x 5/16\" needle; Use to inject insulin   daily     ASK your doctor about these medications     amiodarone 200 mg tablet; Commonly known as: Pacerone; Delvis Ventura   aspirin 81 mg chewable tablet; Chew 1 tablet (81 mg) once daily.   atorvastatin 40 mg tablet; Commonly known as: Lipitor; Take 1 tablet (40   mg) by mouth once daily.   carvedilol 25 mg tablet; Commonly known as: Coreg; Take 1 tablet (25 mg)   by mouth 2 times a day.   dapagliflozin propanediol 10 mg; Commonly known as: Farxiga; Take 1   tablet (10 mg) by mouth once daily.   Entresto  mg tablet; Generic drug: sacubitriL-valsartan; Take 1   tablet by mouth 2 times a day.   FeroSuL tablet; Generic drug: ferrous sulfate (325 mg ferrous sulfate);   Take 1 tablet by mouth twice daily.   * furosemide 20 mg tablet; Commonly known as: Lasix; Take 1 tablet (20   mg) by mouth once daily.   * furosemide 40 mg tablet; Commonly known as: Lasix; Take 1 tablet (40   mg) by mouth 2 times a day.   hydrALAZINE 50 mg tablet; Commonly known as: Apresoline; Take 1 tablet   (50 mg) by mouth every 8 hours.   " insulin glargine 100 unit/mL (3 mL) pen; Commonly known as: Lantus;   Inject 40 Units under the skin once daily at bedtime. Take as directed per   insulin instructions.   isosorbide mononitrate  mg 24 hr tablet; Commonly known as: Imdur;   Take 1 tablet by mouth once daily. Do not crush or chew.   loperamide 2 mg capsule; Commonly known as: Imodium; Take 2 capsules by   mouth every 12 hours if needed for diarrhea.   prazosin 2 mg capsule; Commonly known as: Minipress; Take 1 capsule (2   mg) by mouth once daily at bedtime.   spironolactone 25 mg tablet; Commonly known as: Aldactone; Take 1 tablet   (25 mg) by mouth once daily.   zolpidem 5 mg tablet; Commonly known as: Ambien; Take 1 tablet (5 mg) by   mouth as needed at bedtime for sleep.  * This list has 2 medication(s) that are the same as other medications   prescribed for you. Read the directions carefully, and ask your doctor or   other care provider to review them with you.       Outpatient Follow-Up  Future Appointments   Date Time Provider Department Center   2/20/2024  3:00 PM Dhaval Shepherd MD [id number] [facility]   3/1/2024  2:00 PM KIKE Omalley-CNP [id number] [facility]   3/21/2024 10:40 AM Su Hernandez MD PhD [id number] [facility]       Atiya Petersen MD

## 2024-02-13 NOTE — PROGRESS NOTES
Physical Therapy    Physical Therapy Evaluation    Patient Name: Delvis Ventura  MRN: [id number]  Today's Date: 2/13/2024   Time Calculation  Start Time: 1046  Stop Time: 1105  Time Calculation (min): 19 min    Assessment/Plan   PT Assessment  Medical Staff Made Aware: Yes  End of Session Communication: Bedside nurse  Assessment Comment: Patient is a 64yo M presenting with L foot pain and SOB. Patient is modif indep at baseline, using a power chair. Patient required SBA for bed > power chair transfer. no further acute PT needs, could benefit from assistance for household duties at home.  End of Session Patient Position:  (in power chair)  IP OR SWING BED PT PLAN  Inpatient or Swing Bed: Inpatient  PT Plan  PT Plan: PT Eval only  PT Eval Only Reason: At baseline function  PT Frequency: PT eval only  PT Discharge Recommendations: No further acute PT  PT - OK to Discharge: Yes      Subjective   General Visit Information:  General  Reason for Referral: decompensated HF, L foot pain with new 4th MT fx  Past Medical History Relevant to Rehab: iabetes, cerebral palsy, nonischemic cardiomyopathy (coronary angio 8/2018 with trivial CAD), HF  Co-Treatment: OT  Co-Treatment Reason: maximize pt safety and function  Prior to Session Communication: Bedside nurse  Patient Position Received: Bed, 3 rail up  General Comment: pt supine in bed, agreeable to PT. Patient pleasant and cooperative  Home Living:  Home Living  Type of Home: Apartment  Lives With: Alone  Home Adaptive Equipment: Wheelchair-power  Home Layout: One level  Home Access: Elevator  Bathroom Shower/Tub: Tub/shower unit  Prior Level of Function:  Prior Function Per Pt/Caregiver Report  Level of [place]: Independent with ADLs and functional transfers, Independent with homemaking with wheelchair  ADL Assistance: Independent  Homemaking Assistance: Independent  Ambulatory Assistance:  (power chair, patient is non ambulatory at baseline)  Precautions:  Precautions  LE  Weight Bearing Status:  (WBAT LLE in post-op shoe)  Medical Precautions: Fall precautions      Objective   Pain:  Pain Assessment  Pain Assessment: 0-10  Pain Score: 0 - No pain  Pain Interventions:  (reports receiving pain medication)  Cognition:  Cognition  Overall Cognitive Status: Within Functional Limits  Orientation Level: Oriented X4    General Assessments:     Activity Tolerance  Endurance: Endurance does not limit participation in activity    Sensation  Light Touch: No apparent deficits  Functional Assessments:  Bed Mobility  Bed Mobility: Yes  Bed Mobility 1  Bed Mobility 1: Supine to sitting  Level of Assistance 1:  (SBA)    Transfers  Transfer: Yes  Transfer 1  Transfer From 1: Bed to  Technique 1:  (power chair)  Transfer Level of Assistance 1:  (SBA)  Trials/Comments 1: pt scooted from EOB to power chair, pt declined to put on post-op shoe but was using BUEs and RLE for transfer and not putting weight through LLE foot. pt also demonstrated partial stands from chair for don/doff pants  Extremity/Trunk Assessments:  RLE   RLE :  (3-/5)  LLE   LLE :  (3-/5)  Outcome Measures:  [facility] Basic Mobility  Turning from your back to your side while in a flat bed without using bedrails: None  Moving from lying on your back to sitting on the side of a flat bed without using bedrails: None  Moving to and from bed to chair (including a wheelchair): A little  Standing up from a chair using your arms (e.g. wheelchair or bedside chair): A little  To walk in hospital room: Total  Climbing 3-5 steps with railing: Total  Basic Mobility - Total Score: 16    Encounter Problems       Encounter Problems (Active)       Pain - Adult              Education Documentation  Precautions, taught by Makenna Ardon PT at 2/13/2024 12:04 PM.  Learner: Patient  Readiness: Acceptance  Method: Explanation  Response: Verbalizes Understanding  Comment: edu on role of PT, dc planning    Mobility Training, taught by Makenna Ardon PT at  2/13/2024 12:04 PM.  Learner: Patient  Readiness: Acceptance  Method: Explanation  Response: Verbalizes Understanding  Comment: edu on role of PT, dc planning    Education Comments  No comments found.

## 2024-02-13 NOTE — SIGNIFICANT EVENT
"Rapid Response Note     02/13/24 1228   Onset Documentation   Rapid Response Initiated By RN   [place] [facility] [place]ager Time 1228   Arrival Time 1234   Event End Time 1248   Level II Called No   Primary Reason for Call SBP less than or equal to 90 mmHg     Patient was getting ready for discharge, up in motorized wheel chair.  VS were completed by Nursing Assistant and patient noted to \"sleepy\".  Rapid Response was paged for hypotension.  Upon arrival, patient in Trendelenburg position.  Patient awake and conversant.  Patient identifies, \"I was not able to control myself, I kept falling asleep.  I was not in control of my own body\".  Patient with improved VS, but continues to remain hypotensive.  DACR and Primary Team MD, Dr. Petersen, at bedside.  Patient given 500 mL LR bolus as per order.  Patient continues to remain alert and oriented.  Conversant and appropriate.  Patient's discharge postponed.  RN encouraged to page additional Rapid Response if further concern in patient condition arises.  "

## 2024-02-13 NOTE — NURSING NOTE
HF Clinical Navigator    Met with patient at bedside to confirm location for HF appointments. Patient stated he sees Sulema Farrell CNP and Dr. Hernandez in [place].   Patient appointments rescheduled with both providers to reflect correct location.     No further HF needs at this time.    Elsa MANUELN, RN  Clinical Nurse Navigator- [facility]  [phone]

## 2024-02-14 LAB
ALBUMIN SERPL BCP-MCNC: 3.5 G/DL (ref 3.4–5)
ANION GAP SERPL CALC-SCNC: 14 MMOL/L (ref 10–20)
BUN SERPL-MCNC: 18 MG/DL (ref 6–23)
CALCIUM SERPL-MCNC: 9 MG/DL (ref 8.6–10.6)
CHLORIDE SERPL-SCNC: 104 MMOL/L (ref 98–107)
CO2 SERPL-SCNC: 22 MMOL/L (ref 21–32)
CREAT SERPL-MCNC: 0.96 MG/DL (ref 0.5–1.3)
EGFRCR SERPLBLD CKD-EPI 2021: 89 ML/MIN/1.73M*2
ERYTHROCYTE [DISTWIDTH] IN BLOOD BY AUTOMATED COUNT: 14.7 % (ref 11.5–14.5)
GLUCOSE BLD MANUAL STRIP-MCNC: 114 MG/DL (ref 74–99)
GLUCOSE BLD MANUAL STRIP-MCNC: 158 MG/DL (ref 74–99)
GLUCOSE BLD MANUAL STRIP-MCNC: 227 MG/DL (ref 74–99)
GLUCOSE BLD MANUAL STRIP-MCNC: 257 MG/DL (ref 74–99)
GLUCOSE SERPL-MCNC: 99 MG/DL (ref 74–99)
HCT VFR BLD AUTO: 49.7 % (ref 41–52)
HGB BLD-MCNC: 16.2 G/DL (ref 13.5–17.5)
MAGNESIUM SERPL-MCNC: 1.98 MG/DL (ref 1.6–2.4)
MCH RBC QN AUTO: 29.1 PG (ref 26–34)
MCHC RBC AUTO-ENTMCNC: 32.6 G/DL (ref 32–36)
MCV RBC AUTO: 89 FL (ref 80–100)
NRBC BLD-RTO: 0 /100 WBCS (ref 0–0)
PHOSPHATE SERPL-MCNC: 4.3 MG/DL (ref 2.5–4.9)
PLATELET # BLD AUTO: 204 X10*3/UL (ref 150–450)
POTASSIUM SERPL-SCNC: 4 MMOL/L (ref 3.5–5.3)
RBC # BLD AUTO: 5.56 X10*6/UL (ref 4.5–5.9)
SODIUM SERPL-SCNC: 136 MMOL/L (ref 136–145)
WBC # BLD AUTO: 5.4 X10*3/UL (ref 4.4–11.3)

## 2024-02-14 PROCEDURE — 1100000001 HC PRIVATE ROOM DAILY

## 2024-02-14 PROCEDURE — 82947 ASSAY GLUCOSE BLOOD QUANT: CPT

## 2024-02-14 PROCEDURE — 80069 RENAL FUNCTION PANEL: CPT | Performed by: NURSE PRACTITIONER

## 2024-02-14 PROCEDURE — 2500000001 HC RX 250 WO HCPCS SELF ADMINISTERED DRUGS (ALT 637 FOR MEDICARE OP): Performed by: STUDENT IN AN ORGANIZED HEALTH CARE EDUCATION/TRAINING PROGRAM

## 2024-02-14 PROCEDURE — 2500000002 HC RX 250 W HCPCS SELF ADMINISTERED DRUGS (ALT 637 FOR MEDICARE OP, ALT 636 FOR OP/ED): Performed by: STUDENT IN AN ORGANIZED HEALTH CARE EDUCATION/TRAINING PROGRAM

## 2024-02-14 PROCEDURE — 36415 COLL VENOUS BLD VENIPUNCTURE: CPT | Performed by: NURSE PRACTITIONER

## 2024-02-14 PROCEDURE — 85027 COMPLETE CBC AUTOMATED: CPT | Performed by: NURSE PRACTITIONER

## 2024-02-14 PROCEDURE — 2500000004 HC RX 250 GENERAL PHARMACY W/ HCPCS (ALT 636 FOR OP/ED): Performed by: STUDENT IN AN ORGANIZED HEALTH CARE EDUCATION/TRAINING PROGRAM

## 2024-02-14 PROCEDURE — 2500000001 HC RX 250 WO HCPCS SELF ADMINISTERED DRUGS (ALT 637 FOR MEDICARE OP): Performed by: NURSE PRACTITIONER

## 2024-02-14 PROCEDURE — 83735 ASSAY OF MAGNESIUM: CPT | Performed by: NURSE PRACTITIONER

## 2024-02-14 RX ADMIN — FERROUS SULFATE TAB 325 MG (65 MG ELEMENTAL FE) 1 TABLET: 325 (65 FE) TAB at 22:15

## 2024-02-14 RX ADMIN — OXYCODONE HYDROCHLORIDE 5 MG: 5 TABLET ORAL at 06:23

## 2024-02-14 RX ADMIN — ASPIRIN 81 MG CHEWABLE TABLET 81 MG: 81 TABLET CHEWABLE at 09:01

## 2024-02-14 RX ADMIN — AMIODARONE HYDROCHLORIDE 200 MG: 200 TABLET ORAL at 09:01

## 2024-02-14 RX ADMIN — OXYCODONE HYDROCHLORIDE 5 MG: 5 TABLET ORAL at 16:56

## 2024-02-14 RX ADMIN — HYDRALAZINE HYDROCHLORIDE 25 MG: 25 TABLET ORAL at 06:23

## 2024-02-14 RX ADMIN — SACUBITRIL AND VALSARTAN 1 TABLET: 97; 103 TABLET, FILM COATED ORAL at 22:16

## 2024-02-14 RX ADMIN — HYDRALAZINE HYDROCHLORIDE 25 MG: 25 TABLET ORAL at 13:39

## 2024-02-14 RX ADMIN — SACUBITRIL AND VALSARTAN 1 TABLET: 97; 103 TABLET, FILM COATED ORAL at 10:30

## 2024-02-14 RX ADMIN — HYDRALAZINE HYDROCHLORIDE 25 MG: 25 TABLET ORAL at 22:15

## 2024-02-14 RX ADMIN — FERROUS SULFATE TAB 325 MG (65 MG ELEMENTAL FE) 1 TABLET: 325 (65 FE) TAB at 09:01

## 2024-02-14 RX ADMIN — ATORVASTATIN CALCIUM 40 MG: 40 TABLET, FILM COATED ORAL at 06:23

## 2024-02-14 RX ADMIN — LOPERAMIDE HYDROCHLORIDE 2 MG: 2 CAPSULE ORAL at 22:21

## 2024-02-14 RX ADMIN — LOPERAMIDE HYDROCHLORIDE 2 MG: 2 CAPSULE ORAL at 09:01

## 2024-02-14 RX ADMIN — INSULIN GLARGINE 40 UNITS: 100 INJECTION, SOLUTION SUBCUTANEOUS at 22:05

## 2024-02-14 RX ADMIN — PRAZOSIN HYDROCHLORIDE 2 MG: 2 CAPSULE ORAL at 22:15

## 2024-02-14 RX ADMIN — CARVEDILOL 25 MG: 25 TABLET, FILM COATED ORAL at 22:22

## 2024-02-14 RX ADMIN — ENOXAPARIN SODIUM 40 MG: 100 INJECTION SUBCUTANEOUS at 22:14

## 2024-02-14 RX ADMIN — CARVEDILOL 25 MG: 25 TABLET, FILM COATED ORAL at 10:30

## 2024-02-14 ASSESSMENT — PAIN SCALES - GENERAL
PAINLEVEL_OUTOF10: 0 - NO PAIN
PAINLEVEL_OUTOF10: 0 - NO PAIN
PAINLEVEL_OUTOF10: 5 - MODERATE PAIN
PAINLEVEL_OUTOF10: 6
PAINLEVEL_OUTOF10: 8
PAINLEVEL_OUTOF10: 6
PAINLEVEL_OUTOF10: 8

## 2024-02-14 ASSESSMENT — COGNITIVE AND FUNCTIONAL STATUS - GENERAL
WALKING IN HOSPITAL ROOM: TOTAL
HELP NEEDED FOR BATHING: A LITTLE
MOBILITY SCORE: 16
DAILY ACTIVITIY SCORE: 20
CLIMB 3 TO 5 STEPS WITH RAILING: TOTAL
MOBILITY SCORE: 16
DRESSING REGULAR UPPER BODY CLOTHING: A LITTLE
DRESSING REGULAR LOWER BODY CLOTHING: A LITTLE
TOILETING: A LITTLE
STANDING UP FROM CHAIR USING ARMS: A LITTLE
DAILY ACTIVITIY SCORE: 20
WALKING IN HOSPITAL ROOM: TOTAL
DRESSING REGULAR LOWER BODY CLOTHING: A LITTLE
TOILETING: A LITTLE
MOVING TO AND FROM BED TO CHAIR: A LITTLE
STANDING UP FROM CHAIR USING ARMS: A LITTLE
DRESSING REGULAR UPPER BODY CLOTHING: A LITTLE
HELP NEEDED FOR BATHING: A LITTLE
CLIMB 3 TO 5 STEPS WITH RAILING: TOTAL
MOVING TO AND FROM BED TO CHAIR: A LITTLE

## 2024-02-14 ASSESSMENT — PAIN DESCRIPTION - ORIENTATION: ORIENTATION: LEFT

## 2024-02-14 ASSESSMENT — PAIN - FUNCTIONAL ASSESSMENT
PAIN_FUNCTIONAL_ASSESSMENT: 0-10

## 2024-02-14 ASSESSMENT — PAIN DESCRIPTION - LOCATION: LOCATION: FOOT

## 2024-02-14 NOTE — CARE PLAN
The patient's goals for the shift include remain HDS    The clinical goals for the shift include pt will have decreased pain through shift    Over the shift, the patient made progress toward the following goals.       Problem: Pain  Goal: My pain/discomfort is manageable  Outcome: Progressing     Problem: Safety  Goal: Patient will be injury free during hospitalization  Outcome: Progressing  Goal: I will remain free of falls  Outcome: Progressing     Problem: Daily Care  Goal: Daily care needs are met  Outcome: Progressing     Problem: Skin  Goal: Decreased wound size/increased tissue granulation at next dressing change  Outcome: Progressing  Goal: Participates in plan/prevention/treatment measures  Outcome: Progressing  Goal: Prevent/manage excess moisture  Outcome: Progressing  Goal: Prevent/minimize sheer/friction injuries  Outcome: Progressing  Goal: Promote/optimize nutrition  Outcome: Progressing  Goal: Promote skin healing  Outcome: Progressing     Problem: Diabetes  Goal: Achieve decreasing blood glucose levels by end of shift  Outcome: Progressing  Goal: Increase stability of blood glucose readings by end of shift  Outcome: Progressing  Goal: Decrease in ketones present in urine by end of shift  Outcome: Progressing  Goal: Maintain electrolyte levels within acceptable range throughout shift  Outcome: Progressing  Goal: Maintain glucose levels >70mg/dl to <250mg/dl throughout shift  Outcome: Progressing  Goal: No changes in neurological exam by end of shift  Outcome: Progressing  Goal: Learn about and adhere to nutrition recommendations by end of shift  Outcome: Progressing  Goal: Vital signs within normal range for age by end of shift  Outcome: Progressing  Goal: Increase self care and/or family involovement by end of shift  Outcome: Progressing  Goal: Receive DSME education by end of shift  Outcome: Progressing     Problem: Heart Failure  Goal: Improved gas exchange this shift  Outcome: Progressing  Goal:  Improved urinary output this shift  Outcome: Progressing  Goal: Reduction in peripheral edema within 24 hours  Outcome: Progressing  Goal: Report improvement of dyspnea/breathlessness this shift  Outcome: Progressing  Goal: Weight from fluid excess reduced over 2-3 days, then stabilize  Outcome: Progressing  Goal: Increase self care and/or family involvement in 24 hours  Outcome: Progressing     Problem: Pain  Goal: Takes deep breaths with improved pain control throughout the shift  Outcome: Progressing  Goal: Turns in bed with improved pain control throughout the shift  Outcome: Progressing  Goal: Walks with improved pain control throughout the shift  Outcome: Progressing  Goal: Performs ADL's with improved pain control throughout shift  Outcome: Progressing  Goal: Participates in PT with improved pain control throughout the shift  Outcome: Progressing  Goal: Free from opioid side effects throughout the shift  Outcome: Progressing  Goal: Free from acute confusion related to pain meds throughout the shift  Outcome: Progressing     Problem: Pain - Adult  Goal: Verbalizes/displays adequate comfort level or baseline comfort level  Outcome: Progressing     Problem: Safety - Adult  Goal: Free from fall injury  Outcome: Progressing     Problem: Discharge Planning  Goal: Discharge to home or other facility with appropriate resources  Outcome: Progressing     Problem: Chronic Conditions and Co-morbidities  Goal: Patient's chronic conditions and co-morbidity symptoms are monitored and maintained or improved  Outcome: Progressing

## 2024-02-14 NOTE — CARE PLAN
Pt remained HDS and free of injury this shift. Pt orthostatic positive. Pt's medication regimen being adjusted d/t low BP's 2/13. Pt's BP's had improvement today and pt asymptomatic to any dizziness, lightheadedness or fatigue. Pt's plan to be discharged home tomorrow.    Problem: Pain  Goal: My pain/discomfort is manageable  Outcome: Progressing     Problem: Safety  Goal: Patient will be injury free during hospitalization  Outcome: Progressing  Goal: I will remain free of falls  Outcome: Progressing     Problem: Daily Care  Goal: Daily care needs are met  Outcome: Progressing     Problem: Skin  Goal: Decreased wound size/increased tissue granulation at next dressing change  Outcome: Progressing  Goal: Participates in plan/prevention/treatment measures  Outcome: Progressing  Goal: Prevent/manage excess moisture  Outcome: Progressing  Goal: Prevent/minimize sheer/friction injuries  Outcome: Progressing  Goal: Promote/optimize nutrition  Outcome: Progressing  Goal: Promote skin healing  Outcome: Progressing     Problem: Diabetes  Goal: Achieve decreasing blood glucose levels by end of shift  Outcome: Progressing  Goal: Increase stability of blood glucose readings by end of shift  Outcome: Progressing  Goal: Decrease in ketones present in urine by end of shift  Outcome: Progressing  Goal: Maintain electrolyte levels within acceptable range throughout shift  Outcome: Progressing  Goal: Maintain glucose levels >70mg/dl to <250mg/dl throughout shift  Outcome: Progressing  Goal: No changes in neurological exam by end of shift  Outcome: Progressing  Goal: Learn about and adhere to nutrition recommendations by end of shift  Outcome: Progressing  Goal: Vital signs within normal range for age by end of shift  Outcome: Progressing  Goal: Increase self care and/or family involovement by end of shift  Outcome: Progressing  Goal: Receive DSME education by end of shift  Outcome: Progressing     Problem: Heart Failure  Goal:  Improved gas exchange this shift  Outcome: Progressing  Goal: Improved urinary output this shift  Outcome: Progressing  Goal: Reduction in peripheral edema within 24 hours  Outcome: Progressing  Goal: Report improvement of dyspnea/breathlessness this shift  Outcome: Progressing  Goal: Weight from fluid excess reduced over 2-3 days, then stabilize  Outcome: Progressing  Goal: Increase self care and/or family involvement in 24 hours  Outcome: Progressing     Problem: Pain  Goal: Takes deep breaths with improved pain control throughout the shift  Outcome: Progressing  Goal: Turns in bed with improved pain control throughout the shift  Outcome: Progressing  Goal: Walks with improved pain control throughout the shift  Outcome: Progressing  Goal: Performs ADL's with improved pain control throughout shift  Outcome: Progressing  Goal: Participates in PT with improved pain control throughout the shift  Outcome: Progressing  Goal: Free from opioid side effects throughout the shift  Outcome: Progressing  Goal: Free from acute confusion related to pain meds throughout the shift  Outcome: Progressing     Problem: Pain - Adult  Goal: Verbalizes/displays adequate comfort level or baseline comfort level  Outcome: Progressing     Problem: Safety - Adult  Goal: Free from fall injury  Outcome: Progressing     Problem: Discharge Planning  Goal: Discharge to home or other facility with appropriate resources  Outcome: Progressing     Problem: Chronic Conditions and Co-morbidities  Goal: Patient's chronic conditions and co-morbidity symptoms are monitored and maintained or improved  Outcome: Progressing

## 2024-02-14 NOTE — PROGRESS NOTES
[facility]  [facility]VI CARDIOLOGY PROGRESS NOTE    Delvis Ventura/[id number]  Admit Date: 2/10/2024  Hospital Length of Stay: 3  Current Attending: Familia Schwarz MD MPH   Outpatient Cardiologist: David Devi MD    INTERVAL EVENTS / PERTINENT ROS:   - was transitioned to PO lasxi 80mg once daily yesterday am  - dizziness & syncopal episode in the afternoon to the 60s,   - hold lasix, empa, davina, imdur today and trend BP    - euvolemic on exam/leg swelling much improved since admission   - orthos negative today     Pt evaluated at bedside this AM. States improvement in leg swelling. Denies CP and SOB.     MEDICATIONS  Infusions:     Scheduled:  amiodarone, 200 mg, Daily  aspirin, 81 mg, Daily  atorvastatin, 40 mg, Daily  carvedilol, 25 mg, BID  dapagliflozin propanediol, 10 mg, Daily  enoxaparin, 40 mg, q24h  ferrous sulfate (325 mg ferrous sulfate), 1 tablet, BID  [Held by provider] furosemide, 80 mg, q12h  [Held by provider] furosemide, 60 mg, Daily  hydrALAZINE, 25 mg, q8h  insulin glargine, 40 Units, Nightly  insulin lispro, 0-5 Units, TID with meals  isosorbide mononitrate ER, 120 mg, Daily  polyethylene glycol, 17 g, Daily  prazosin, 2 mg, Nightly  sacubitriL-valsartan, 1 tablet, BID  spironolactone, 25 mg, Daily      PRN:   acetaminophen, 650 mg, q6h PRN  dextrose 10 % in water (D10W), 0.3 g/kg/hr, Once PRN  dextrose 10 % in water (D10W), 0.3 g/kg/hr, Once PRN  dextrose, 25 g, q15 min PRN  dextrose, 25 g, q15 min PRN  glucagon, 1 mg, q15 min PRN  glucagon, 1 mg, q15 min PRN  loperamide, 2 mg, TID PRN  oxyCODONE, 5 mg, q8h PRN  zolpidem, 5 mg, Nightly PRN      Prior to Admission Meds:  Medications Prior to Admission   Medication Sig Dispense Refill Last Dose    amiodarone (Pacerone) 200 mg tablet Delvis Ventura (Patient taking differently: Take 1 tablet (200 mg) by mouth once daily.) 30 tablet 3 Unknown    aspirin 81 mg chewable tablet Chew 1 tablet (81 mg) once daily. 30 tablet 3  "Unknown    atorvastatin (Lipitor) 40 mg tablet Take 1 tablet (40 mg) by mouth once daily. 30 tablet 3 Unknown    carvedilol (Coreg) 25 mg tablet Take 1 tablet (25 mg) by mouth 2 times a day. 60 tablet 2 2/10/2024    dapagliflozin propanediol (Farxiga) 10 mg Take 1 tablet (10 mg) by mouth once daily. 30 tablet 3 2/10/2024    Dexcom G4 platinum  (Dexcom G7 ) misc Use as instructed 1 each 3     Dexcom G7 Sensor device Please use to check your glucose before meals and at night. 1 each 0     Entresto  mg tablet Take 1 tablet by mouth 2 times a day. 60 tablet 3 2/10/2024    FeroSuL 325 mg (65 mg iron) tablet Take 1 tablet by mouth twice daily. 60 tablet 3 2/10/2024    furosemide (Lasix) 20 mg tablet Take 1 tablet (20 mg) by mouth once daily. (Patient not taking: Reported on 1/4/2024) 30 tablet 0     furosemide (Lasix) 40 mg tablet Take 1 tablet (40 mg) by mouth 2 times a day. 60 tablet 0     hydrALAZINE (Apresoline) 50 mg tablet Take 1 tablet (50 mg) by mouth every 8 hours. 90 tablet 2 2/10/2024    insulin glargine (Lantus) 100 unit/mL (3 mL) pen Inject 40 Units under the skin once daily at bedtime. Take as directed per insulin instructions. 36 mL 3 2/9/2024    isosorbide mononitrate ER (Imdur) 120 mg 24 hr tablet Take 1 tablet by mouth once daily. Do not crush or chew. 30 tablet 2 2/10/2024    loperamide (Imodium) 2 mg capsule Take 2 capsules by mouth every 12 hours if needed for diarrhea. 30 capsule 2 Unknown    pen needle, diabetic 31 gauge x 5/16\" needle Use to inject insulin daily 100 each 11 Unknown    prazosin (Minipress) 2 mg capsule Take 1 capsule (2 mg) by mouth once daily at bedtime. 30 capsule 3 2/9/2024    spironolactone (Aldactone) 25 mg tablet Take 1 tablet (25 mg) by mouth once daily. 30 tablet 3 Unknown    zolpidem (Ambien) 5 mg tablet Take 1 tablet (5 mg) by mouth as needed at bedtime for sleep. 14 tablet 0 Unknown       Vitals/Physical:      2/14/2024    11:19 AM 2/14/2024     9:01 " AM 2/14/2024     9:00 AM 2/14/2024     7:26 AM 2/14/2024     4:00 AM 2/14/2024    12:25 AM 2/13/2024     8:06 PM   Vitals   Systolic 124 90 103 136 114 138 112   Diastolic 76 62 57 53 67 80 72   Heart Rate 75   71 65 80 77   Temp 36.2 °C (97.2 °F)   36.2 °C (97.2 °F) 36.1 °C (97 °F) 36 °C (96.8 °F) 36 °C (96.8 °F)   Resp 18   18 18 18 18   Weight (lb)     206.13     BMI     29.58 kg/m2     BSA (m2)     2.15 m2       Wt Readings from Last 5 Encounters:   02/14/24 93.5 kg (206 lb 2.1 oz)   01/05/24 95.3 kg (210 lb 1.6 oz)   11/07/23 91.6 kg (202 lb)   10/13/23 91.6 kg (202 lb)   07/07/23 90.3 kg (199 lb)     INTAKE/OUTPUT:  I/O last 3 completed shifts:  In: 2060 (22 mL/kg) [P.O.:1560; IV Piggyback:500]  Out: 4625 (49.5 mL/kg) [Urine:4625 (1.4 mL/kg/hr)]  Weight: 93.5 kg    Constitutional:       Appearance: Not in distress.   Pulmonary:      Effort: Pulmonary effort is normal.      Breath sounds: Normal breath sounds.   Cardiovascular:      PMI at left midclavicular line. Normal rate. Regular rhythm.   Abdominal:      General: Bowel sounds are normal. There is no distension.      Palpations: Abdomen is soft.      Tenderness: There is no abdominal tenderness.   Musculoskeletal:         General: Tenderness (L foot.) present. Skin:     General: Skin is warm.   Neurological:      Mental Status: Alert and oriented to person, place and time.     Labs:  CMP:  Recent Labs     02/14/24  0634 02/13/24  0823 02/12/24  0710 02/11/24  0724 02/10/24  1632 01/05/24  1517 01/05/24  0513    138 140 141 139   < > 137   K 4.0 4.1 3.4* 3.2* 4.2   < > 3.5    103 105 108* 105   < > 104   CO2 22 27 24 24 25   < > 24   ANIONGAP 14 12 14 12 13   < > 13   BUN 18 16 13 10 12   < > 16   CREATININE 0.96 0.97 0.95 0.65 0.80   < > 0.93   EGFR 89 88 90 >90 >90   < > >90   MG 1.98 1.84 1.81 1.60  --   --  2.00    < > = values in this interval not displayed.       Recent Labs     02/14/24  0634 02/13/24  0823 02/12/24  0710 02/10/24  1632  01/04/24  0604 01/03/24  1232 11/08/23  0857 11/07/23  1506 08/26/23  0724 08/25/23  0838 08/20/23  1426 08/19/23  1902 05/28/22  0953 05/27/22  1147 03/06/22  1230 03/05/22  0841 09/22/21  1923 12/08/20  1626 02/19/20  0630 02/19/20  0023   ALBUMIN 3.5 3.4 3.4 4.1 3.6 3.7   < > 4.0   < > 3.7  3.7   < > 4.2   < > 4.1   < > 3.6   < > 4.5   < >  --    ALKPHOS  --   --   --  185* 137* 165*  --  153*  --  102   < > 160*   < > 78  --  158*   < > 121  --   --    ALT  --   --   --  36 41 41  --  46  --  21   < > 30   < > 18  --  31   < > 12  --   --    AST  --   --   --  34 47* 40*  --  29  --  27   < > 30   < > 24  --  28   < > 12  --   --    BILITOT  --   --   --  1.4* 1.8* 1.8*  --  1.4*  --  0.7   < > 1.7*   < > 0.7  --  0.8   < > 0.5  --   --    LIPASE  --   --   --   --   --   --   --   --   --   --   --  <3*  --  8*  --  3*  --  3*  --  6*    < > = values in this interval not displayed.       CBC:  Recent Labs     02/14/24  0634 02/13/24  0823 02/12/24  0710 02/11/24  0724 02/10/24  1632   WBC 5.4 5.5 5.4 5.0 4.7   HGB 16.2 16.9 15.8 15.0 16.2   HCT 49.7 51.1 48.1 45.2 47.5    226 199 164 143*   MCV 89 90 88 88 86       COAG:   Recent Labs     04/21/23  2106 09/29/22  2340 03/05/22  0841 12/16/21  1342 12/13/21  0457   INR 1.1 1.1 1.3*   < > 1.1   DDIMERVTE  --   --   --   --  333    < > = values in this interval not displayed.       ABO:   Recent Labs     03/05/22  0908   ABO O       HEME/ENDO:   Recent Labs     02/11/24  0724 08/21/23  0217 04/22/23  0811 01/08/23  1635 12/02/22  1209 09/30/22  0438 05/30/22  0601 05/29/22  0743 12/13/21  0458   FERRITIN  --   --   --   --  134  --  179  --   --    IRONSAT  --  9*  --   --  13*  --   --   --   --    TSH 0.92 0.67  --   --   --   --   --   --  1.35   HGBA1C 9.9* 8.4* 7.6*   < >  --    < >  --    < >  --     < > = values in this interval not displayed.       CARDIAC:   Recent Labs     02/10/24  1720 02/10/24  1632 01/06/24  2253 01/03/24  1536 01/03/24  1232  "11/09/23  1956 11/09/23  0808 11/08/23  1949 11/07/23  1506 11/07/23  1454 05/28/22  1355 05/28/22  0254   LDH  --   --   --   --   --   --   --   --   --   --   --  718*   TROPHS 86* 98* 18 36* 34* 29 38 42   < > 42   < > 26   BNP  --  1,638*  --   --  1,519*  --   --   --   --  1,857*   < >  --     < > = values in this interval not displayed.       Recent Labs     02/11/24  0724 11/04/22  1414 09/30/22  0438 10/28/21  1911 10/17/18  1143   CHOL 127 185 140 111 159   LDLF  --  114*  --  64 92   LDLCALC 80  --  90  --   --    HDL 32.7 51.8 32* 33.2* 47.3   TRIG 74 98 88 67 99       TOX:No results for input(s): \"AMPHETAMINE\", \"BARBSCRNUR\", \"BENZO\", \"CANNABINOID\", \"COCAI\", \"FENTANYL\", \"OPIATE\", \"OXYCODONE\", \"PCP\" in the last 70729 hours.  MICRO:   Recent Labs     04/22/23  0811 05/30/22  0601   CRP 0.89 0.18       No results found for the last 90 days.      Pertinent Cardiology Imaging:  EKG:  Encounter Date: 02/10/24   ECG 12 lead   Result Value    Ventricular Rate 99    Atrial Rate 99    MO Interval 190    QRS Duration 106    QT Interval 400    QTC Calculation(Bazett) 513    P Axis 63    R Axis 132    T Axis 31    QRS Count 16    Q Onset 220    P Onset 125    P Offset 186    T Offset 420    QTC Fredericia 472    Narrative    Normal sinus rhythm  Possible Left atrial enlargement  Right axis deviation  Incomplete right bundle branch block  Anterior infarct , age undetermined  Prolonged QT  Abnormal ECG  When compared with ECG of 06-JAN-2024 22:18,  Significant changes have occurred  See ED provider note for full interpretation and clinical correlation  Confirmed by Estefania Corea ([id number]) on 2/11/2024 4:03:52 AM       Echo:  Transthoracic Echo (TTE) Complete 01/04/2024   Transthoracic Echo (TTE) Complete    Result Date: 1/4/2024   [facility], [address] [place] [address]              Tel [phone] and Fax [phone] TRANSTHORACIC ECHOCARDIOGRAM REPORT  Patient Name:      OLIVIA PATHAK        " Reading Physician:    [id number] Wicho Villatoro DO Study Date:        1/4/2024             Ordering Provider:    [id number] ROSA ISELANIRAV MAC MRN/PID:           [id number]             Fellow: Accession#:        [id number]         Nurse: Date of Birth/Age: 1960 / 63 years Sonographer:          Vanessa Montgomery RDCS Gender:            M                    Additional Staff: Height:            180.34 cm            Admit Date:           1/3/2024 Weight:            91.63 kg             Admission Status:     Inpatient -                                                               Acut[facility] Life                                                               threatening BSA:               2.12 m2              Encounter#:           [id number]                                         Department Location:  [facility] ED Blood Pressure: 147 /104 mmHg Study Type:    TRANSTHORACIC ECHO (TTE) COMPLETE Diagnosis/ICD: Acute on chronic systolic (congestive) heart failure (CHF)-I50.23 Indication:    Systolic Heart Failure CPT Code:      Echo Complete w Full Doppler-72829 Patient History: Diabetes:          Yes Pertinent History: HTN, Chest Pain, Cardiomyopathy, CHF, Hyperlipidemia,                    Previous DVT, PE, Palpitations and Dyspnea. Cerebral Palsy. Study Detail: The following Echo studies were performed: 2D, M-Mode, Doppler and               color flow. Technically challenging study due to poor acoustic               windows and prominent lung artifact. Definity used as a contrast               agent for endocardial border definition. Total contrast used for               this procedure was 3 mL via IV push. Patient has a defibrillator.  PHYSICIAN INTERPRETATION: Left Ventricle: The left ventricular systolic function is severely decreased, with an estimated ejection fraction of 15-20%. There is global hypokinesis of the left  ventricle with minor regional variations. The left ventricular cavity size is normal. Spectral Doppler shows a restrictive pattern of left ventricular diastolic filling. There is no definite left ventricular thrombus visualized. Left Atrium: The left atrium is normal in size. Right Ventricle: The right ventricle is normal in size. There is mildly reduced right ventricular systolic function. Right Atrium: The right atrium is normal in size. Aortic Valve: The aortic valve appears structurally normal. There is no evidence of aortic valve regurgitation. The peak instantaneous gradient of the aortic valve is 3.8 mmHg. The mean gradient of the aortic valve is 2.0 mmHg. Mitral Valve: The mitral valve is normal in structure. There is trace mitral valve regurgitation. Tricuspid Valve: The tricuspid valve is structurally normal. There is trace tricuspid regurgitation. The Doppler estimated RVSP is mildly elevated at 34.0 mmHg. Pulmonic Valve: The pulmonic valve is structurally normal. There is no indication of pulmonic valve regurgitation. Pericardium: There is no pericardial effusion noted. Aorta: The aortic root is normal. Systemic Veins: The inferior vena cava appears dilated. There is less than 50% IVC collapse with inspiration. In comparison to the previous echocardiogram(s): Compared with study from 8/21/2023, no significant change.  CONCLUSIONS:  1. Left ventricular systolic function is severely decreased with a 15-20% estimated ejection fraction.  2. No left ventricular thrombus visualized.  3. Spectral Doppler shows a restrictive pattern of left ventricular diastolic filling.  4. There is mildly reduced right ventricular systolic function.  5. Mildly elevated RVSP.  6. There is global hypokinesis of the left ventricle with minor regional variations. QUANTITATIVE DATA SUMMARY: 2D MEASUREMENTS:                           Normal Ranges: Ao Root d:     3.00 cm    (2.0-3.7cm) LAs:           4.30 cm    (2.7-4.0cm) IVSd:           1.20 cm    (0.6-1.1cm) LVPWd:         1.00 cm    (0.6-1.1cm) LVIDd:         5.40 cm    (3.9-5.9cm) LVIDs:         5.00 cm LV Mass Index: 110.9 g/m2 LV % FS        7.4 % LA VOLUME:                               Normal Ranges: LA Vol A4C:        70.6 ml    (22+/-6mL/m2) LA Vol A2C:        59.0 ml LA Vol BP:         65.0 ml LA Vol Index A4C:  33.3ml/m2 LA Vol Index A2C:  27.9 ml/m2 LA Vol Index BP:   30.7 ml/m2 LA Area A4C:       21.3 cm2 LA Area A2C:       19.6 cm2 LA Major Axis A4C: 5.5 cm LA Major Axis A2C: 5.5 cm LA Volume Index:   30.7 ml/m2 RA VOLUME BY A/L METHOD:                               Normal Ranges: RA Vol A4C:        72.1 ml    (8.3-19.5ml) RA Vol Index A4C:  34.1 ml/m2 RA Area A4C:       22.3 cm2 RA Major Axis A4C: 5.9 cm M-MODE MEASUREMENTS:                  Normal Ranges: Ao Root: 3.20 cm (2.0-3.7cm) LAs:     5.00 cm (2.7-4.0cm) AORTA MEASUREMENTS:                      Normal Ranges: Ao Sinus, d: 3.00 cm (2.1-3.5cm) Ao STJ, d:   2.89 cm (1.7-3.4cm) Asc Ao, d:   2.70 cm (2.1-3.4cm) LV SYSTOLIC FUNCTION BY 2D PLANIMETRY (MOD):                     Normal Ranges: EF-A4C View: 15.5 % (>=55%) EF-A2C View: 15.3 % EF-Biplane:  14.7 % LV DIASTOLIC FUNCTION:                              Normal Ranges: MV Peak E:        1.01 m/s   (0.7-1.2 m/s) MV Peak A:        0.46 m/s   (0.42-0.7 m/s) E/A Ratio:        2.18       (1.0-2.2) MV e'             0.04 m/s   (>8.0) MV lateral e'     0.04 m/s MV medial e'      0.04 m/s MV A Dur:         98.00 msec E/e' Ratio:       25.25      (<8.0) a'                0.04 m/s PulmV Sys Kaleb:    24.70 cm/s PulmV Sandhu Kaleb:   41.60 cm/s PulmV S/D Kaleb:    0.60 PulmV A Revs Kaleb: 15.80 cm/s PulmV A Revs Dur: 87.00 msec MITRAL VALVE:                 Normal Ranges: MV DT: 114 msec (150-240msec) AORTIC VALVE:                                   Normal Ranges: AoV Vmax:                0.97 m/s (<=1.7m/s) AoV Peak PG:             3.8 mmHg (<20mmHg) AoV Mean P.0 mmHg  (1.7-11.5mmHg) LVOT Max Kaleb:            0.60 m/s (<=1.1m/s) AoV VTI:                 16.00 cm (18-25cm) LVOT VTI:                8.20 cm LVOT Diameter:           2.00 cm  (1.8-2.4cm) AoV Area, VTI:           1.61 cm2 (2.5-5.5cm2) AoV Area,Vmax:           1.95 cm2 (2.5-4.5cm2) AoV Dimensionless Index: 0.51  RIGHT VENTRICLE: RV Basal 4.83 cm RV Mid   3.17 cm RV Major 8.8 cm TAPSE:   11.2 mm RV s'    0.08 m/s TRICUSPID VALVE/RVSP:                             Normal Ranges: Peak TR Velocity: 2.55 m/s Est. RA Pressure: 8 mmHg RV Syst Pressure: 34.0 mmHg (< 30mmHg) IVC Diam:         2.63 cm PULMONIC VALVE:                         Normal Ranges: PV Accel Time: 55 msec  (>120ms) PV Max Kaleb:    0.6 m/s  (0.6-0.9m/s) PV Max P.4 mmHg Pulmonary Veins: PulmV A Revs Dur: 87.00 msec PulmV A Revs Kaleb: 15.80 cm/s PulmV Sandhu Kaleb:   41.60 cm/s PulmV S/D Kaleb:    0.60 PulmV Sys Kaleb:    24.70 cm/s  [id number] Wicho Villatoro DO Electronically signed on 2024 at 12:57:21 PM  ** Final **    No results found for this or any previous visit.   No results found for this or any previous visit from the past 1095 days.    Echocardiogram   TRANSTHORACIC ECHOCARDIOGRAM REPORT  CONCLUSIONS:  1. Left ventricular systolic function is severely decreased with a 15-20% estimated ejection fraction.  2. Spectral Doppler shows a pseudonormal pattern of left ventricular diastolic filling.  3. There is no evidence of left ventricular hypertrophy.  4. There is mildly reduced right ventricular systolic function.  5. The pulmonary artery is not well visualized.  6. There is global hypokinesis of the left ventricle with minor regional variations.  [id number] Lukas Sutton MD  Electronically signed on 2023 at 12:03:02 PM  Final     NM CARDIAC STRESS REST (MYOCARDIAL PERFUSION MIBI) 2022  FINDINGS:  Both stress and rest studies demonstrate grossly normal perfusion  throughout the left ventricle.    There is moderate to severe enlargement of the left  ventricle with an  estimated end-diastolic volume of 179 mL, increased from 107 mL  previously measured on the prior SPECT CT MPI dated 06/12/2015.    Gated images demonstrate worsening global LV wall hypokinesis with an  LV EF estimated at 27% at stress and post-stress rest, decreased from  57% previously measured on the prior SPECT CT MPI.    Attenuation correction CT images demonstrate cardiomegaly.    Impression  1. Normal myocardial perfusion study without evidence of ischemia or  prior infarction.  2. Moderate to severe left ventricular enlargement and global LV wall  hypokinesis within LVEF estimated at 27%.  3. When compared to the prior SPECT CT MPI dated 06/12/2015, there  has been significant interval worsening of left ventricular  dilatation and decrease in LVEF.    Assessment/Plan     Delvis Ventura is a 63 y.o. male with PMHx DMII, HTN, COVID 12/202,  cerebral palsy, wheelchair-bound, Systolic HF, nonischemic cardiomyopathy (on [facility] 8/2018 he had trivial CAD), last TTE 8/23 EF 15-20%, s/p ICD ([place]; 3/6/2023; by Dr. Mickey Torres at [facility]) c/b explantation  d/t infection with re-implantation of single chamber ICD (4/28/2023 at ) who previously followed  is admitted for ADHF/and metatarsal fracture.     Recent hospitalizations:  11/7/2023 and received Lasix x 3 doses totaling 160 mg >optimal urinary response and s/s improvement> was discharged on Lasix 20 mg po daily. Presents with c/o lower extremity swelling  weight gain > reported dry weight is approximately 194 pounds and estimates he is 204 pounds.     1/25/2024: for Acute on chronic HF   presented to the hospital with complaints of bilateral lower extremity edema as well as dyspnea. Patient was found to be in acute on chronic heart failure with diuresis with IV Lasix once it was felt to be more euvolemic he was discharged home on increased dose of Lasix at 40 mg oral twice daily.     Represented this 2/10/2024:  Acute  systolic heart failure/NICM/HFrEF  Recurrent hospitalizations for ADHF   BNP of 1638, prior BNPs. <<1519 (OP) <<<1857 on 11/7/23  HST 98<<86.    No leukocytosis.  Creatinine 0.8.  Normal LFTs.  Flu and coronavirus negative.  EKG shows sinus tachycardia with an incomplete right bundle branch block with bilateral atrial enlargement pattern.  TTE 1/4/2024 which showed an EF of 15 to 20% with no LV thrombus. no sig change from previous TTE 8/23  SPECT on 1/9/2023 with dilated LV and low EF, but with with normal perfusion otherwise and no inducible ischemia.  Chest x-ray with some pulmonary venous congestion.  S/p [employer] ICD implant> stable by available metrics>Device check 1/4/23> reveals normal function device,  no ICD discharges reported, longevity 9 years :<1%    Home meds: carvedilol 25 mg, hydralazine 50, Isordil/imdur 120, Entresto full dose, spironolactone 25 and dapagliflozin 10 mg and furosemide 40 mg twice daily.     Plan:   Continue ASA 81 and atorvastatin 40  - GDMT: continue coreg 25 BID, jardiance 10, aldactone 25, entresto  BID  - imdur 120, hydralazine 50 TID (decreased to 25 mg TID) for recent hypotension.  - no further lasix today, reassess AM , hesitant to decrease lasix given multiple decompensated admissions.   - prior HX of VT and NSVT on amiodarone. (Continue)  - - was transitioned to PO lasxi 80mg once daily yesterday am  - 2/13: hypotensive to 60s/40s symptomatic with lethargy, LH/dizziness, improved w/p 500 mL IV NS bolus infusion w/ dizziness and syncope   - hypotensive likely 2/2 overdiuresis  - hold PO lasix 60 mg and trend BP   - hold lasix, empa, davina, imdur today and trend BP    - euvolemic on exam/leg swelling much improved since admission   - consider restarting or PRN lasix on discharge (likely tomorrow if BP normalizes and pt still euvolemic)   - orthos negative (103/57 supine, 90/62 upright)   - meds to beds for 80 Lasix and oxycodone completed.   - follow up with   David and Sulema Farrell arranged.     Metatarsal fracture  X-ray showed a fourth metatarsal acute fracture  orthopedics was consulted.   CT foot showing: Redemonstration of acute mildly displaced fracture of the 4th  metatarsal neck. Acute minimally displaced fracture of the medial 1st distal  phalanx. Marked diffuse soft tissue edema of the visualized left lower  extremity.  - WBAT in postop shoe for transfers only  - Patient to follow up in clinic with Dr. Shepherd in 1-2 weeks. Scheduled for 2/20/2024 at [facility] at 3:00 pm   - oxycodone currently PRN, Q8 hour PRN for pain     DM  -lantus 40 units at night  -ldssi  -jardiance as for gdmt above    LDA:   NUTRITION: Adult diet Cardiac; 70 gm fat; 2 - 3 grams Sodium; 2000 mL fluid  EMERGENCY CONTACT: No emergency contact information on file.  CODE STATUS: Full Code  DISPO:   Home: [facility] [id number]  AMPAC: Daily Activity - Total Score: 20  Discharge Planning  Living Arrangements: Alone  Support Systems: None  Assistance Needed: none  Type of Residence: Private residence  Number of Stairs to Enter Residence: 0  Number of Stairs Within Residence: 0  Do you have animals or pets at home?: No  Who is requesting discharge planning?: Provider  Home or Post Acute Services: None  Patient expects to be discharged to:: home  Does the patient need discharge transport arranged?: No  FOLLOWUP:   Future Appointments   Date Time Provider Department Center   2/20/2024  3:00 PM Dhaval Shepherd MD [id number] [facility]   2/21/2024  2:20 PM Sulema Farrell APRN-CNP [id number] [facility]   3/22/2024 11:00 AM Su Hernandez MD PhD [id number] Academic        Patient seen and discussed with Dr. Familia Schwarz MD MPH    Lucio Ac [facility], APRN-CNP  ________________________________________________  Alexa ABREU-JACIEL

## 2024-02-15 ENCOUNTER — PHARMACY VISIT (OUTPATIENT)
Dept: PHARMACY | Facility: CLINIC | Age: 64
End: 2024-02-15
Payer: COMMERCIAL

## 2024-02-15 VITALS
OXYGEN SATURATION: 97 % | HEIGHT: 70 IN | TEMPERATURE: 97.5 F | SYSTOLIC BLOOD PRESSURE: 146 MMHG | BODY MASS INDEX: 29.76 KG/M2 | RESPIRATION RATE: 18 BRPM | HEART RATE: 69 BPM | WEIGHT: 207.89 LBS | DIASTOLIC BLOOD PRESSURE: 66 MMHG

## 2024-02-15 LAB
ALBUMIN SERPL BCP-MCNC: 3.9 G/DL (ref 3.4–5)
ANION GAP SERPL CALC-SCNC: 14 MMOL/L (ref 10–20)
BUN SERPL-MCNC: 16 MG/DL (ref 6–23)
CALCIUM SERPL-MCNC: 9.2 MG/DL (ref 8.6–10.6)
CHLORIDE SERPL-SCNC: 100 MMOL/L (ref 98–107)
CO2 SERPL-SCNC: 23 MMOL/L (ref 21–32)
CREAT SERPL-MCNC: 0.91 MG/DL (ref 0.5–1.3)
EGFRCR SERPLBLD CKD-EPI 2021: >90 ML/MIN/1.73M*2
ERYTHROCYTE [DISTWIDTH] IN BLOOD BY AUTOMATED COUNT: 14.8 % (ref 11.5–14.5)
GLUCOSE BLD MANUAL STRIP-MCNC: 148 MG/DL (ref 74–99)
GLUCOSE SERPL-MCNC: 119 MG/DL (ref 74–99)
HCT VFR BLD AUTO: 53.3 % (ref 41–52)
HGB BLD-MCNC: 17.5 G/DL (ref 13.5–17.5)
MAGNESIUM SERPL-MCNC: 2.15 MG/DL (ref 1.6–2.4)
MCH RBC QN AUTO: 29.8 PG (ref 26–34)
MCHC RBC AUTO-ENTMCNC: 32.8 G/DL (ref 32–36)
MCV RBC AUTO: 91 FL (ref 80–100)
NRBC BLD-RTO: 0 /100 WBCS (ref 0–0)
PHOSPHATE SERPL-MCNC: 3.5 MG/DL (ref 2.5–4.9)
PLATELET # BLD AUTO: 224 X10*3/UL (ref 150–450)
POTASSIUM SERPL-SCNC: 4 MMOL/L (ref 3.5–5.3)
RBC # BLD AUTO: 5.87 X10*6/UL (ref 4.5–5.9)
SODIUM SERPL-SCNC: 133 MMOL/L (ref 136–145)
WBC # BLD AUTO: 5.7 X10*3/UL (ref 4.4–11.3)

## 2024-02-15 PROCEDURE — 2500000001 HC RX 250 WO HCPCS SELF ADMINISTERED DRUGS (ALT 637 FOR MEDICARE OP): Performed by: STUDENT IN AN ORGANIZED HEALTH CARE EDUCATION/TRAINING PROGRAM

## 2024-02-15 PROCEDURE — 83735 ASSAY OF MAGNESIUM: CPT | Performed by: NURSE PRACTITIONER

## 2024-02-15 PROCEDURE — 99239 HOSP IP/OBS DSCHRG MGMT >30: CPT | Performed by: INTERNAL MEDICINE

## 2024-02-15 PROCEDURE — 85027 COMPLETE CBC AUTOMATED: CPT | Performed by: NURSE PRACTITIONER

## 2024-02-15 PROCEDURE — 2500000002 HC RX 250 W HCPCS SELF ADMINISTERED DRUGS (ALT 637 FOR MEDICARE OP, ALT 636 FOR OP/ED): Performed by: STUDENT IN AN ORGANIZED HEALTH CARE EDUCATION/TRAINING PROGRAM

## 2024-02-15 PROCEDURE — 80069 RENAL FUNCTION PANEL: CPT | Performed by: NURSE PRACTITIONER

## 2024-02-15 PROCEDURE — 36415 COLL VENOUS BLD VENIPUNCTURE: CPT | Performed by: NURSE PRACTITIONER

## 2024-02-15 PROCEDURE — 2500000001 HC RX 250 WO HCPCS SELF ADMINISTERED DRUGS (ALT 637 FOR MEDICARE OP): Performed by: NURSE PRACTITIONER

## 2024-02-15 PROCEDURE — 82947 ASSAY GLUCOSE BLOOD QUANT: CPT

## 2024-02-15 PROCEDURE — RXMED WILLOW AMBULATORY MEDICATION CHARGE

## 2024-02-15 RX ORDER — AMIODARONE HYDROCHLORIDE 200 MG/1
200 TABLET ORAL DAILY
Qty: 90 EACH | Refills: 0 | OUTPATIENT
Start: 2024-02-15 | End: 2024-05-15

## 2024-02-15 RX ORDER — FUROSEMIDE 20 MG/1
20 TABLET ORAL DAILY PRN
Qty: 30 TABLET | Refills: 0 | Status: SHIPPED | OUTPATIENT
Start: 2024-02-15 | End: 2024-05-16

## 2024-02-15 RX ORDER — HYDRALAZINE HYDROCHLORIDE 50 MG/1
25 TABLET, FILM COATED ORAL EVERY 8 HOURS
Qty: 135 TABLET | Refills: 0 | OUTPATIENT
Start: 2024-02-15 | End: 2024-05-15

## 2024-02-15 RX ORDER — FUROSEMIDE 40 MG/1
TABLET ORAL
Refills: 0 | OUTPATIENT
Start: 2024-02-15

## 2024-02-15 RX ORDER — OXYCODONE HYDROCHLORIDE 5 MG/1
5 TABLET ORAL EVERY 8 HOURS PRN
Qty: 15 TABLET | Refills: 0 | OUTPATIENT
Start: 2024-02-15 | End: 2024-02-20

## 2024-02-15 RX ORDER — HYDRALAZINE HYDROCHLORIDE 25 MG/1
25 TABLET, FILM COATED ORAL 3 TIMES DAILY
Qty: 270 TABLET | Refills: 0 | Status: SHIPPED | OUTPATIENT
Start: 2024-02-15 | End: 2024-05-16

## 2024-02-15 RX ORDER — OXYCODONE HYDROCHLORIDE 5 MG/1
5 TABLET ORAL EVERY 8 HOURS PRN
Qty: 15 TABLET | Refills: 0 | Status: SHIPPED | OUTPATIENT
Start: 2024-02-15 | End: 2024-02-20

## 2024-02-15 RX ORDER — AMIODARONE HYDROCHLORIDE 200 MG/1
200 TABLET ORAL DAILY
Qty: 90 TABLET | Refills: 0 | Status: SHIPPED | OUTPATIENT
Start: 2024-02-16 | End: 2024-05-16

## 2024-02-15 RX ORDER — GUAIFENESIN, PSEUDOEPHEDRINE HYDROCHLORIDE 600; 60 MG/1; MG/1
1 TABLET, EXTENDED RELEASE ORAL EVERY 12 HOURS
Qty: 60 TABLET | Refills: 0 | OUTPATIENT
Start: 2024-02-15 | End: 2024-03-16

## 2024-02-15 RX ORDER — GUAIFENESIN, PSEUDOEPHEDRINE HYDROCHLORIDE 600; 60 MG/1; MG/1
1 TABLET, EXTENDED RELEASE ORAL EVERY 12 HOURS
Qty: 60 TABLET | Refills: 0 | Status: SHIPPED | OUTPATIENT
Start: 2024-02-15 | End: 2024-03-16

## 2024-02-15 RX ADMIN — ASPIRIN 81 MG CHEWABLE TABLET 81 MG: 81 TABLET CHEWABLE at 08:53

## 2024-02-15 RX ADMIN — SPIRONOLACTONE 25 MG: 25 TABLET, FILM COATED ORAL at 08:53

## 2024-02-15 RX ADMIN — ATORVASTATIN CALCIUM 40 MG: 40 TABLET, FILM COATED ORAL at 06:39

## 2024-02-15 RX ADMIN — DAPAGLIFLOZIN 10 MG: 10 TABLET, FILM COATED ORAL at 08:53

## 2024-02-15 RX ADMIN — HYDRALAZINE HYDROCHLORIDE 25 MG: 25 TABLET ORAL at 14:23

## 2024-02-15 RX ADMIN — CARVEDILOL 25 MG: 25 TABLET, FILM COATED ORAL at 08:53

## 2024-02-15 RX ADMIN — OXYCODONE HYDROCHLORIDE 5 MG: 5 TABLET ORAL at 05:46

## 2024-02-15 RX ADMIN — FERROUS SULFATE TAB 325 MG (65 MG ELEMENTAL FE) 1 TABLET: 325 (65 FE) TAB at 08:53

## 2024-02-15 RX ADMIN — AMIODARONE HYDROCHLORIDE 200 MG: 200 TABLET ORAL at 08:53

## 2024-02-15 RX ADMIN — SACUBITRIL AND VALSARTAN 1 TABLET: 97; 103 TABLET, FILM COATED ORAL at 08:53

## 2024-02-15 RX ADMIN — HYDRALAZINE HYDROCHLORIDE 25 MG: 25 TABLET ORAL at 05:46

## 2024-02-15 RX ADMIN — ISOSORBIDE MONONITRATE 120 MG: 60 TABLET, EXTENDED RELEASE ORAL at 08:53

## 2024-02-15 ASSESSMENT — COGNITIVE AND FUNCTIONAL STATUS - GENERAL
MOVING TO AND FROM BED TO CHAIR: A LITTLE
CLIMB 3 TO 5 STEPS WITH RAILING: TOTAL
CLIMB 3 TO 5 STEPS WITH RAILING: TOTAL
DRESSING REGULAR LOWER BODY CLOTHING: A LITTLE
HELP NEEDED FOR BATHING: A LITTLE
DRESSING REGULAR LOWER BODY CLOTHING: A LITTLE
DRESSING REGULAR UPPER BODY CLOTHING: A LITTLE
WALKING IN HOSPITAL ROOM: TOTAL
HELP NEEDED FOR BATHING: A LITTLE
MOVING TO AND FROM BED TO CHAIR: A LITTLE
TOILETING: A LITTLE
WALKING IN HOSPITAL ROOM: TOTAL
DAILY ACTIVITIY SCORE: 20
TOILETING: A LITTLE
MOBILITY SCORE: 16
STANDING UP FROM CHAIR USING ARMS: A LITTLE
DRESSING REGULAR UPPER BODY CLOTHING: A LITTLE
STANDING UP FROM CHAIR USING ARMS: A LITTLE

## 2024-02-15 ASSESSMENT — PAIN - FUNCTIONAL ASSESSMENT
PAIN_FUNCTIONAL_ASSESSMENT: 0-10
PAIN_FUNCTIONAL_ASSESSMENT: 0-10

## 2024-02-15 ASSESSMENT — PAIN SCALES - GENERAL
PAINLEVEL_OUTOF10: 8
PAINLEVEL_OUTOF10: 0 - NO PAIN

## 2024-02-15 ASSESSMENT — PAIN DESCRIPTION - ORIENTATION: ORIENTATION: LEFT

## 2024-02-15 ASSESSMENT — PAIN DESCRIPTION - LOCATION: LOCATION: FOOT

## 2024-02-15 NOTE — CARE PLAN
The patient's goals for the shift include to get some sleep    The clinical goals for the shift include Patient will maintain SBP >90 throughout shift    Over the shift, the patient SBPs remained >90 throughout shift. No c/o of dizziness. Oxycodone IR given for c/o left foot pain 7/10 this morning, no other complaints of pain/discomfort expressed. Will continue to monitor.

## 2024-02-15 NOTE — DISCHARGE SUMMARY
Discharge Diagnosis  HFrEF (heart failure with reduced ejection fraction) (CMS/Formerly [facility])  Acute decompensated HF     Issues Requiring Follow-Up  Follow up with Dr. Dhaval Shepherd for metatarsal fracture on 2/20 at 3:00 pm at [facility].   Surgical shoe for WBAT/transfers.   Oxycodone for pain management with ambulation (5 days).   Follow up with HF clinic Sulema Farrell 2/21 2:20 om [facility]  Follow up with [facility] Heart failure provider Dr. Su Hernandez on 3/22 11:00 AM at [facility].     Test Results Pending At Discharge  Pending Labs       No current pending labs.          Hospital Course  Delvis Ventura is a 63 y.o. male presenting with SOB and foot pain.     Delvis Ventura is a 63 y.o. male with PMHx DMII, HTN, COVID 12/202,  cerebral palsy, wheelchair-bound, Systolic HF, nonischemic cardiomyopathy (on [facility] 8/2018 he had trivial CAD), last TTE 8/23 EF 15-20%, s/p ICD ([employer]; 3/6/2023; by Dr. Mickey Torres at [facility]) c/b explantation  d/t infection with re-implantation of single chamber ICD (4/28/2023 at ) who previously followed  is admitted for ADHF/and metatarsal fracture.      Recent hospitalizations:  11/7/2023 and received Lasix x 3 doses totaling 160 mg >optimal urinary response and s/s improvement> was discharged on Lasix 20 mg po daily. Presents with c/o lower extremity swelling  weight gain > reported dry weight is approximately 194 pounds and estimates he is 204 pounds.      1/25/2024: for Acute on chronic HF   presented to the hospital with complaints of bilateral lower extremity edema as well as dyspnea. Patient was found to be in acute on chronic heart failure with diuresis with IV Lasix once it was felt to be more euvolemic he was discharged home on increased dose of Lasix at 40 mg oral twice daily.      Represented this 2/10/2024:  At baseline he is wheelchair-bound due to cerebral palsy.    ED course:  Admitted with concerns of ADHF over the last 3 days.  Hemodynamically  stable , although with hypertensive on admission, improved with Coreg.  And 80 of IV Lasix.     Labs:  BNP of 1638, HST 98 down to 86.  No leukocytosis creatinine 0.8, normal LFTs, flu and coronavirus negative.  EKG showed sinus tachycardia with incomplete RBBB with bilateral left atrial enlargement.  TTE 11/4/2024 showed EF of 15 to 20% with no LV thrombus.  Prior SPECT stress test on 1/9/2023 with dilated LV and low EF likely normal perfusion and otherwise no inducible ischemia.  X-ray foot showed fourth metatarsal acute fracture and orthopedics are consulted.  Chest x-ray showed some pulmonary venous congestion.    Home meds: Amiodarone 200, Lasix 40 twice daily, spironolactone 25 daily, aspirin 81 daily, atorvastatin 40, Coreg 25 twice daily, Jardiance 10 mg daily, Entresto full dose twice daily  Hydralazine 50 3 times daily, Imdur 120 daily,.    Floor course:  ADHF/NICM/HFrEF  History of NSVT/VT  Recurrent hospitalizations for ADHF    BNP of 1638, prior BNPs. <<1519 (OP) <<<1857 on 11/7/23  HST 98<<86.    EKG shows sinus tachycardia with an incomplete right bundle branch block with bilateral atrial enlargement pattern.  TTE 1/4/2024 which showed an EF of 15 to 20% with no LV thrombus. no sig change from previous TTE 8/23  SPECT on 1/9/2023 with dilated LV and low EF, but with with normal perfusion otherwise and no inducible ischemia.  Chest x-ray with some pulmonary venous congestion.  S/p [employer] ICD implant> stable by available metrics>Device check 1/4/23> reveals normal function device,  no ICD discharges reported, longevity 9 years :<1%     Home meds: carvedilol 25 mg, hydralazine 50, Isordil/imdur 120, Entresto full dose, spironolactone 25 and dapagliflozin 10 mg and furosemide 40 mg twice daily.     Upon achieving euvolemic state patient could not tolerate home GDMT regimen and standing lasix >> patient had recurrent intermittent episodes of hypotension requiring down titration of anti hypertensive's  and PRN lasix at discharge      Plan:   Continue ASA 81 and atorvastatin 40  - GDMT: continue coreg 25 BID, jardiance 10, aldactone 25, entresto  BID  - imdur 120 discontinued   - Hydralazine 50 TID>>decreased to 25 mg TID for hypotenison.   - PRN lasix at discharge.   - prior HX of VT and NSVT on amiodarone. (Continue)  - follow up with  and Sulema Farrell arranged.      Discharge wt: 94.3 kgs  Discharge Crea/eGFR 0.91/>90    Metatarsal fracture  X-ray showed a fourth metatarsal acute fracture  orthopedics was consulted.   CT foot showing: Redemonstration of acute mildly displaced fracture of the 4th  metatarsal neck. Acute minimally displaced fracture of the medial 1st distal  phalanx. Marked diffuse soft tissue edema of the visualized left lower  extremity.  - WBAT in postop shoe for transfers only  - Patient to follow up in clinic with Dr. Shepherd in 1-2 weeks. Scheduled for 2/20/2024 at [facility] at 3:00 pm   - oxycodone currently PRN, Q8 hour PRN for pain at discharge.      DM  -lantus 40 units at night/ldssi(no changes in diabetic regimen at discharge)  -jardiance.     After all labs and VS were reviewed the decision was made that the patient was medically stable for discharge.  The patient was discharged in satisfactory condition.    More than 60 minutes were spent in coordinating patient discharge.     Pertinent Physical Exam At Time of Discharge  Physical Exam  Vitals and nursing note reviewed.   Constitutional:       Appearance: Normal appearance. He is normal weight.   Cardiovascular:      Rate and Rhythm: Normal rate and regular rhythm.      Pulses: Normal pulses.      Heart sounds: No murmur heard.  Pulmonary:      Effort: Pulmonary effort is normal. No respiratory distress.      Breath sounds: Normal breath sounds.   Abdominal:      General: Bowel sounds are normal. There is no distension.      Palpations: Abdomen is soft.      Tenderness: There is no abdominal tenderness.  "  Musculoskeletal:      Right lower leg: No edema.      Left lower leg: Edema (at the site of fracture 2+) present.   Skin:     General: Skin is warm.   Neurological:      Mental Status: He is alert and oriented to person, place, and time. Mental status is at baseline.   Psychiatric:         Mood and Affect: Mood normal.         Home Medications     Medication List      CONTINUE taking these medications     Dexcom G7  misc; Generic drug: blood-glucose meter,continuous;   Use as instructed   Dexcom G7 Sensor device; Generic drug: blood-glucose sensor; Please use   to check your glucose before meals and at night.   pen needle, diabetic 31 gauge x 5/16\" needle; Use to inject insulin   daily     ASK your doctor about these medications     amiodarone 200 mg tablet; Commonly known as: PaceroneYeimi Ventura   aspirin 81 mg chewable tablet; Chew 1 tablet (81 mg) once daily.   atorvastatin 40 mg tablet; Commonly known as: Lipitor; Take 1 tablet (40   mg) by mouth once daily.   carvedilol 25 mg tablet; Commonly known as: Coreg; Take 1 tablet (25 mg)   by mouth 2 times a day.   dapagliflozin propanediol 10 mg; Commonly known as: Farxiga; Take 1   tablet (10 mg) by mouth once daily.   Entresto  mg tablet; Generic drug: sacubitriL-valsartan; Take 1   tablet by mouth 2 times a day.   FeroSuL tablet; Generic drug: ferrous sulfate (325 mg ferrous sulfate);   Take 1 tablet by mouth twice daily.   * furosemide 20 mg tablet; Commonly known as: Lasix; Take 1 tablet (20   mg) by mouth once daily.   * furosemide 40 mg tablet; Commonly known as: Lasix; Take 1 tablet (40   mg) by mouth 2 times a day.   hydrALAZINE 50 mg tablet; Commonly known as: Apresoline; Take 1 tablet   (50 mg) by mouth every 8 hours.   insulin glargine 100 unit/mL (3 mL) pen; Commonly known as: Lantus;   Inject 40 Units under the skin once daily at bedtime. Take as directed per   insulin instructions.   isosorbide mononitrate  mg 24 hr tablet; " Commonly known as: Imdur;   Take 1 tablet by mouth once daily. Do not crush or chew.   loperamide 2 mg capsule; Commonly known as: Imodium; Take 2 capsules by   mouth every 12 hours if needed for diarrhea.   prazosin 2 mg capsule; Commonly known as: Minipress; Take 1 capsule (2   mg) by mouth once daily at bedtime.   spironolactone 25 mg tablet; Commonly known as: Aldactone; Take 1 tablet   (25 mg) by mouth once daily.   zolpidem 5 mg tablet; Commonly known as: Ambien; Take 1 tablet (5 mg) by   mouth as needed at bedtime for sleep.  * This list has 2 medication(s) that are the same as other medications   prescribed for you. Read the directions carefully, and ask your doctor or   other care provider to review them with you.       Outpatient Follow-Up  Future Appointments   Date Time Provider Department Center   2/20/2024  3:00 PM Dhaval Shepherd MD [id number] Academic   2/21/2024  2:20 PM KIKE Omalley-CNP [id number] Academic   3/22/2024 11:00 AM Su Hernandez MD PhD [id number] Academic       Atiya Petersen MD

## 2024-02-15 NOTE — PROGRESS NOTES
02/15/24        Transitional Care Coordination Progress Note:   Patient discussed during interdisciplinary rounds.   Team members present: OFELIA SALINAS MD   Plan per Medical/Surgical team: diabetes, cerebral palsy, nonischemic cardiomyopathy    Discharge disposition: Home being transported by community care @430pm 2.15/2024   Status-Inpatient   Payer- [facility] [employer] PLANS   Potential Barriers: None   ADOD: 2/15/2024         Herbert Andersen RN [facility] [phone]

## 2024-02-15 NOTE — CARE PLAN
The patient's goals for the shift include to get some sleep    The clinical goals for the shift include Patient will maintain SBP >90 throughout shift    Over the shift, the patient's SBPs remained > 90 throughout shift. Stated tht he rested well, just ready to go home. PRN Oxycodone IR given this morning for the first c/o pain all shift. Will continue to monitor.

## 2024-02-22 NOTE — DOCUMENTATION CLARIFICATION NOTE
"    PATIENT:               OLIVIA PATHAK  ACCT #:                  [id number]  MRN:                       [id number]  :                       1960  ADMIT DATE:       2/10/2024 5:25 PM  DISCH DATE:        2/15/2024 4:32 PM  RESPONDING PROVIDER #:        [id number]          PROVIDER RESPONSE TEXT:    No evidence of JOHN or CKD    CDI QUERY TEXT:    UH_AKI    Instruction:    Based on your assessment of the patient and the clinical information, please provide the requested documentation by clicking on the appropriate radio button and enter any additional information if prompted.    Question: Please clarify a diagnosis for the patient renal status    When answering this query, please exercise your independent professional judgment. The fact that a question is being asked, does not imply that any particular answer is desired or expected.    The patient's clinical indicators include:  Clinical Information: Progress notes indicate patient is a 63 yr old male admitted for acute decompensated heart failure and metatarsal fracture.    Clinical Indicators:  Creatine trended, 2/10-2/15 as: 0.80, 0.65, 0.95, 0.97, 0.96, 0.91    Cardiology Progress Note,  shows:  \"He has normal renal function and we will begin diuresis with IV Lasix 80 mg twice daily and titrate according to response.\"    Per Cardiology Progress Note, : \"Later this AM, hypotensive to 60/40's symptomatic with Lethargy, LH/Dizziness, improved with 500 ml IV NS bolus infusion.\"    Cardiology Progress Note,  suggests patient \"hypotensive likely 2/2 overdiuresis\"    DC Summary shows: \"patient had recurrent intermittent episodes of hypotension requiring down titration of anti hypertensive's and PRN lasix at discharge\"    Treatment: Daily Renal Function Panel, Medication adjustment, Monitoring I/Os, IV Fluid Bolus    Risk Factors: IV diuresis, Hypotension  Options provided:  -- Acute Kidney Injury  -- Chronic Kidney Disease, Please specify stage below  -- " Other - I will add my own diagnosis  -- Refer to Clinical Documentation Reviewer    Query created by: Judy Morrison on 2/20/2024 10:07 AM      Electronically signed by:  MARLA FERRER MD MPH 2/22/2024 3:18 PM

## 2024-02-29 DIAGNOSIS — I10 BENIGN ESSENTIAL HYPERTENSION: ICD-10-CM

## 2024-03-01 ENCOUNTER — APPOINTMENT (OUTPATIENT)
Dept: CARDIOLOGY | Facility: CLINIC | Age: 64
End: 2024-03-01
Payer: COMMERCIAL

## 2024-03-03 RX ORDER — NAPROXEN SODIUM 220 MG/1
81 TABLET, FILM COATED ORAL DAILY
Qty: 30 TABLET | Refills: 2 | Status: SHIPPED | OUTPATIENT
Start: 2024-03-03

## 2024-03-21 ENCOUNTER — APPOINTMENT (OUTPATIENT)
Dept: CARDIOLOGY | Facility: HOSPITAL | Age: 64
End: 2024-03-21
Payer: COMMERCIAL

## 2024-03-22 ENCOUNTER — APPOINTMENT (OUTPATIENT)
Dept: CARDIOLOGY | Facility: HOSPITAL | Age: 64
End: 2024-03-22
Payer: COMMERCIAL

## 2024-05-16 ENCOUNTER — OFFICE VISIT (OUTPATIENT)
Dept: CARDIOLOGY | Facility: HOSPITAL | Age: 64
End: 2024-05-16
Payer: COMMERCIAL

## 2024-05-16 VITALS — SYSTOLIC BLOOD PRESSURE: 167 MMHG | HEART RATE: 76 BPM | OXYGEN SATURATION: 96 % | DIASTOLIC BLOOD PRESSURE: 89 MMHG

## 2024-05-16 DIAGNOSIS — I42.0 DILATED CARDIOMYOPATHY (MULTI): Primary | ICD-10-CM

## 2024-05-16 DIAGNOSIS — I50.42 CHRONIC COMBINED SYSTOLIC AND DIASTOLIC HEART FAILURE, NYHA CLASS 3 (MULTI): ICD-10-CM

## 2024-05-16 PROCEDURE — 99215 OFFICE O/P EST HI 40 MIN: CPT | Performed by: STUDENT IN AN ORGANIZED HEALTH CARE EDUCATION/TRAINING PROGRAM

## 2024-05-16 PROCEDURE — 1036F TOBACCO NON-USER: CPT | Performed by: STUDENT IN AN ORGANIZED HEALTH CARE EDUCATION/TRAINING PROGRAM

## 2024-05-16 PROCEDURE — 3008F BODY MASS INDEX DOCD: CPT | Performed by: STUDENT IN AN ORGANIZED HEALTH CARE EDUCATION/TRAINING PROGRAM

## 2024-05-16 PROCEDURE — 3046F HEMOGLOBIN A1C LEVEL >9.0%: CPT | Performed by: STUDENT IN AN ORGANIZED HEALTH CARE EDUCATION/TRAINING PROGRAM

## 2024-05-16 PROCEDURE — 3077F SYST BP >= 140 MM HG: CPT | Performed by: STUDENT IN AN ORGANIZED HEALTH CARE EDUCATION/TRAINING PROGRAM

## 2024-05-16 PROCEDURE — 3079F DIAST BP 80-89 MM HG: CPT | Performed by: STUDENT IN AN ORGANIZED HEALTH CARE EDUCATION/TRAINING PROGRAM

## 2024-05-16 PROCEDURE — 3048F LDL-C <100 MG/DL: CPT | Performed by: STUDENT IN AN ORGANIZED HEALTH CARE EDUCATION/TRAINING PROGRAM

## 2024-05-16 RX ORDER — ISOSORBIDE MONONITRATE 30 MG/1
30 TABLET, EXTENDED RELEASE ORAL DAILY
Qty: 90 TABLET | Refills: 3 | Status: SHIPPED | OUTPATIENT
Start: 2024-05-16 | End: 2025-05-16

## 2024-05-16 ASSESSMENT — ENCOUNTER SYMPTOMS
VOMITING: 0
ORTHOPNEA: 1
DYSPNEA ON EXERTION: 0
FEVER: 0
NERVOUS/ANXIOUS: 0
COUGH: 0
NAUSEA: 0
FALLS: 1
PND: 0
BACK PAIN: 0
ABDOMINAL PAIN: 0
BLURRED VISION: 1
WEIGHT GAIN: 0
LIGHT-HEADEDNESS: 0
DIZZINESS: 0
SORE THROAT: 0
SHORTNESS OF BREATH: 0
COLOR CHANGE: 0
DECREASED APPETITE: 0
DYSURIA: 0
WEIGHT LOSS: 0
DEPRESSION: 0
CHANGE IN BOWEL HABIT: 0
DOUBLE VISION: 0
PALPITATIONS: 0
HEADACHES: 0

## 2024-05-16 NOTE — PATIENT INSTRUCTIONS
Thank you for coming in today. If you have any questions or concerns, you may call the Heart Failure Office at [phone] option [phone], or [phone].  You may also contact our heart failure nursing team via email on [email or web address].    For quicker results set-up your  [employer] account to receive results and other correspondence directly to your phone.    Please bring all your pills/medications to your Cardiology appointments.    **  - Please gave your PCP to refer you to in center physical therapy    -Please monitor your blood pressure, and keep a record of your readings.  Please have this blood pressure log available for future visits    -We will renew your heart medications today    - Please make the following medication changes:  1.  START isosorbide mononitrate 30 mg once daily    - Please have the following tests done:  1.Blood tests just before your next visit (RFP, BNP, CBC, iron, TIBC, ferritin)    - Please make an appointment to be seen in:  4 weeks (VIRTUAL)  4 months (IN OFFICE)

## 2024-05-16 NOTE — PROGRESS NOTES
Accompanied by:alone    Chief Complaint  Ambulatory heart failure care    History of Present Illness    63 y.o. former / known to have DM, cerebral palsy, nonischemic cardiomyopathy (on [facility] 8/2018 he had trivial CAD), HFrEF previously considered recovered who has completed enrollment in the GALACTIC -HF trial (Omecantic Mecarbil v/s placebo). On TTE 8/2018 which LVEF ~ 20-25%, he has been started on study medication and he previously had LV systolic recovery with LVEF 50-55% on TTE 2/2020 and was symptomatically improved. He had COVID 12/2021.  He was hospitalized that the [facility] 10/2021 and reports that he was found to have depressed ejection fraction. On TTE 10/2021 LVEF 15 -20% LVIDD 5.1 cm with decreased right ventricular systolic function. Normal nuclear pharmacological stress test 6/2022.  He is s/p S-ICD ([facility]; 3/6/2023; Dr. Mickey Torres at [facility]) and was admitted 4/2023 with non healing wound from ICD site c/f device infection. He is s/p ICD explant on 4/24/2023 from left lateral chest wall and epigastric region, . 4/24 cultures from lead tip, subxiphoid incision, left lateral chest wall incision all positive Staph aureas treat with antibiotics. He is s/p single chamber ICD re-implantation 4/28/2023   Mr. Ventura was hospitalized 2/2024 with metatarsal fracture and mild hyperkalemia, ADHF.  During his hospitalization, following diuresis he was hypotensive and isosorbide mononitrate was discontinued, and hydralazine dose was reduced by half.  He presents today for continued care.HF GDMT is being re-tiitrated.    He has had falls 2/2 leg weakness, no longer getting home PT as he found them ineffectual    Symptomatically, he denies fevers, chills, chest pain, dyspnea at rest, exertional dyspnea, orthopnea, PND, syncope, palpitations or leg swelling.  He reports that he has been diuresing well on furosemide.    He has been fully adherent with  prescribed therapies.  He has not had any defibrillator shocks.    Functionally, he is wheelchair bound due to cerebral palsy but is able to get around in his apartment without exertional symptoms.    He was considered for CardioMEMS implantation, but for logistical reasons was unable to be implanted.    His most recent heart failure hospitalization was 2/2024.    The electronic medical record has been reviewed by me for salient history. All cardiovascular imaging and testing available in the electronic medical record, and [employer] has been reviewed.   Medication Documentation Review Audit       Reviewed by Su Hernandez MD PhD (Physician) on 05/16/24 at 1227      Medication Order Taking? Sig Documenting Provider Last Dose Status   amiodarone (Pacerone) 200 mg tablet [id number] Yes Take 1 tablet (200 mg) by mouth once daily. Do not start before February 16, 2024. Atiya Petersen MD Taking Active   aspirin 81 mg chewable tablet [id number] Yes Chew and swallow 1 tablet by mouth daily. Andres Covarrubias MD Taking Active   atorvastatin (Lipitor) 40 mg tablet [id number] Yes Take 1 tablet (40 mg) by mouth once daily. Aniya Zeng MD Taking Active   carvedilol (Coreg) 25 mg tablet [id number] Yes Take 1 tablet (25 mg) by mouth 2 times a day. Cheryl White MD Taking Active   dapagliflozin propanediol (Farxiga) 10 mg [id number] Yes Take 1 tablet (10 mg) by mouth once daily. Aniya Zeng MD Taking Active   Dexcom G4 platinum  (Dexcom G7 ) misc [id number] Yes Use as instructed Aniya Zeng MD Taking Active   Dexcom G7 Sensor device [id number] Yes Please use to check your glucose before meals and at night. Aniya Zneg MD Taking Active   Entresto  mg tablet [id number] Yes Take 1 tablet by mouth 2 times a day. Aniya Zeng MD Taking Active   FeroSuL 325 mg (65 mg iron) tablet [id number] Yes Take 1 tablet by mouth twice daily. Su Hernandez MD PhD Taking Active   furosemide (Lasix) 20 mg tablet  "[id number] Yes Take 1 tablet (20 mg) by mouth once daily as needed (for weight gain >3 lbs in 3 days, Lower extremity edema/ shortness of breath) Atiya Petersen MD Taking Active   hydrALAZINE (Apresoline) 25 mg tablet [id number] No Take 1 tablet (25 mg) by mouth 3 times a day.   Patient not taking: Reported on 5/16/2024    Atiya Petersen MD Not Taking Active   insulin glargine (Lantus) 100 unit/mL (3 mL) pen [id number] Yes Inject 40 Units under the skin once daily at bedtime. Take as directed per insulin instructions. Dhaval Byrnes MD Taking Active   Discontinued 05/16/24 1205   isosorbide mononitrate ER (Imdur) 30 mg 24 hr tablet [id number]  Take 1 tablet (30 mg) by mouth once daily. Do not crush or chew. Su Hernandez MD PhD  Active   loperamide (Imodium) 2 mg capsule [id number] Yes Take 2 capsules by mouth every 12 hours if needed for diarrhea.   Patient taking differently: Take 2 capsules (4 mg) by mouth 2 times a day.    Aniya Zeng MD Taking Differently Active   pen needle, diabetic 31 gauge x 5/16\" needle [id number] Yes Use to inject insulin daily Dhaval Brynes MD Taking Active   prazosin (Minipress) 2 mg capsule [id number] Yes Take 1 capsule (2 mg) by mouth once daily at bedtime. Aniya Zeng MD Taking Active   spironolactone (Aldactone) 25 mg tablet [id number] Yes Take 1 tablet (25 mg) by mouth once daily. Aniya Zeng MD Taking Active   zolpidem (Ambien) 5 mg tablet [id number] Yes Take 1 tablet (5 mg) by mouth as needed at bedtime for sleep. Aniya Zeng MD Taking Active                      Allergies   Allergen Reactions    Metformin Diarrhea, GI Upset, GI intolerance, Other and Nausea/vomiting       Review of Systems   Constitutional: Positive for malaise/fatigue. Negative for decreased appetite, fever, weight gain and weight loss.   HENT:  Negative for sore throat.    Eyes:  Positive for blurred vision. Negative for double vision.   Cardiovascular:  Positive for orthopnea. Negative for chest " pain, dyspnea on exertion, palpitations and paroxysmal nocturnal dyspnea.   Respiratory:  Negative for cough and shortness of breath.    Skin:  Negative for color change, dry skin and itching.   Musculoskeletal:  Positive for falls. Negative for back pain.   Gastrointestinal:  Negative for abdominal pain, change in bowel habit, nausea and vomiting.   Genitourinary:  Negative for dysuria.   Neurological:  Negative for dizziness, headaches and light-headedness.   Psychiatric/Behavioral:  Negative for depression. The patient is not nervous/anxious.        Visit Vitals  /89 (BP Location: Right arm, Patient Position: Sitting, BP Cuff Size: Large adult)   Pulse 76   SpO2 96%   Smoking Status Never      Physical Exam  Very pleasant obese -American man in no apparent CP or painful distress. In wheelchair   Neck: No JVD or HJR  CVS: HS 1,2. No added sounds  Resp: CTA bilaterally. Percussion note resonant  Abdomen: Obese, SNT, BS wnl  Extremities: trace pedal oedema bilaterally  Skin: warm and dry  CNS: AO x 4, no gross deficits    IMPRESSION/PLAN:    63 y.o. former / known to have DM, cerebral palsy, nonischemic cardiomyopathy (on [facility] 8/2018 he had trivial CAD), HFrEF previously considered recovered who has completed enrollment in the GALACTIC -HF trial (Omecantic Mecarbil v/s placebo). On TTE 8/2018 which LVEF ~ 20-25%, he has been started on study medication and he previously had LV systolic recovery with LVEF 50-55% on TTE 2/2020 and was symptomatically improved. He had COVID 12/2021.  He was hospitalized that the [facility] 10/2021 and reports that he was found to have depressed ejection fraction. On TTE 10/2021 LVEF 15 -20% LVIDD 5.1 cm with decreased right ventricular systolic function. Normal nuclear pharmacological stress test 6/2022.  He is s/p S-ICD ("[employer] scientific; 3/6/2023; Dr. Mikcey Torres at [facility]) and was admitted 4/2023 with non healing wound from  ICD site c/f device infection. He is s/p ICD explant on 4/24/2023 from left lateral chest wall and epigastric region, . 4/24 cultures from lead tip, subxiphoid incision, left lateral chest wall incision all positive Staph aureas treat with antibiotics. He is s/p single chamber ICD re-implantation 4/28/2023   Mr. Ventura was hospitalized 2/2024 with metatarsal fracture and mild hyperkalemia, ADHF.  During his hospitalization, following diuresis he was hypotensive and isosorbide mononitrate was discontinued, and hydralazine dose was reduced by half.  He presents today for continued care.HF GDMT is being re-tiitrated.    NYHA FC ~ 2 ( difficult to assess as wheelchair bound but no symptoms with in home exertion)  ACC C  Volume status: Euvolemic  Perfusion status: Warm to touch    Plan:  #HFrEF (declined LVEF 10/2021)  -Medication:  -Resume isosorbide mononitrate at 30 mg once daily, plan to increase at future visits and to resume hydralazine as blood pressure allows-Continue carvedilol 50 mg twice daily, dapagliflozin 10 mg once daily, sacubitril/valsartan 97/103 mg twice daily, spironolactone 25 mg once daily  -Consider cardiac modulation once HF GDMT is optimized, if LVEF remains low      This note was transcribed using the Dragon Dictation system. There may be grammatical, punctuation, or verbiage errors that can occur with voice recognition programs.      Su Hernandez MD PhD

## 2024-06-26 ENCOUNTER — APPOINTMENT (OUTPATIENT)
Dept: CARDIOLOGY | Facility: HOSPITAL | Age: 64
End: 2024-06-26
Payer: COMMERCIAL

## 2024-07-05 ENCOUNTER — TELEMEDICINE (OUTPATIENT)
Dept: CARDIOLOGY | Facility: HOSPITAL | Age: 64
End: 2024-07-05
Payer: COMMERCIAL

## 2024-07-05 VITALS — DIASTOLIC BLOOD PRESSURE: 78 MMHG | SYSTOLIC BLOOD PRESSURE: 120 MMHG

## 2024-07-05 DIAGNOSIS — I50.42 CHRONIC COMBINED SYSTOLIC AND DIASTOLIC HEART FAILURE, NYHA CLASS 3 (MULTI): ICD-10-CM

## 2024-07-05 DIAGNOSIS — I50.20 HFREF (HEART FAILURE WITH REDUCED EJECTION FRACTION) (MULTI): ICD-10-CM

## 2024-07-05 DIAGNOSIS — I42.0 DILATED CARDIOMYOPATHY (MULTI): ICD-10-CM

## 2024-07-05 PROCEDURE — 3078F DIAST BP <80 MM HG: CPT | Performed by: STUDENT IN AN ORGANIZED HEALTH CARE EDUCATION/TRAINING PROGRAM

## 2024-07-05 PROCEDURE — 3008F BODY MASS INDEX DOCD: CPT | Performed by: STUDENT IN AN ORGANIZED HEALTH CARE EDUCATION/TRAINING PROGRAM

## 2024-07-05 PROCEDURE — 1036F TOBACCO NON-USER: CPT | Performed by: STUDENT IN AN ORGANIZED HEALTH CARE EDUCATION/TRAINING PROGRAM

## 2024-07-05 PROCEDURE — 3048F LDL-C <100 MG/DL: CPT | Performed by: STUDENT IN AN ORGANIZED HEALTH CARE EDUCATION/TRAINING PROGRAM

## 2024-07-05 PROCEDURE — 3074F SYST BP LT 130 MM HG: CPT | Performed by: STUDENT IN AN ORGANIZED HEALTH CARE EDUCATION/TRAINING PROGRAM

## 2024-07-05 PROCEDURE — 99213 OFFICE O/P EST LOW 20 MIN: CPT | Performed by: STUDENT IN AN ORGANIZED HEALTH CARE EDUCATION/TRAINING PROGRAM

## 2024-07-05 PROCEDURE — 3046F HEMOGLOBIN A1C LEVEL >9.0%: CPT | Performed by: STUDENT IN AN ORGANIZED HEALTH CARE EDUCATION/TRAINING PROGRAM

## 2024-07-05 PROCEDURE — G2211 COMPLEX E/M VISIT ADD ON: HCPCS | Performed by: STUDENT IN AN ORGANIZED HEALTH CARE EDUCATION/TRAINING PROGRAM

## 2024-07-05 RX ORDER — HYDRALAZINE HYDROCHLORIDE 50 MG/1
50 TABLET, FILM COATED ORAL 3 TIMES DAILY
Qty: 270 TABLET | Refills: 3 | Status: SHIPPED | OUTPATIENT
Start: 2024-07-05

## 2024-07-05 RX ORDER — ISOSORBIDE MONONITRATE 60 MG/1
60 TABLET, EXTENDED RELEASE ORAL DAILY
Qty: 90 TABLET | Refills: 3 | Status: SHIPPED | OUTPATIENT
Start: 2024-07-05 | End: 2025-07-05

## 2024-07-05 ASSESSMENT — ENCOUNTER SYMPTOMS
NERVOUS/ANXIOUS: 0
FEVER: 0
VOMITING: 0
DYSURIA: 0
DYSPNEA ON EXERTION: 0
BACK PAIN: 0
LIGHT-HEADEDNESS: 0
PND: 0
CHANGE IN BOWEL HABIT: 0
WEIGHT GAIN: 0
WEIGHT LOSS: 0
HEADACHES: 0
BLURRED VISION: 0
DIZZINESS: 0
DOUBLE VISION: 0
ABDOMINAL PAIN: 0
DECREASED APPETITE: 0
PALPITATIONS: 0
ORTHOPNEA: 0
COLOR CHANGE: 0
NAUSEA: 0
SHORTNESS OF BREATH: 0
DEPRESSION: 0
FALLS: 0
SORE THROAT: 0
COUGH: 0

## 2024-07-05 NOTE — PATIENT INSTRUCTIONS
- Med changes to his PillPak:  Increase Hydralazine to 50 mg three times a a day  Increase isosorbide mononitrate to 60 mg once a day    - Next appointment already arranged

## 2024-07-05 NOTE — PROGRESS NOTES
Accompanied by: alone    Chief Complaint  Ambulatory heart failure care    History of Present Illness    64 y.o. former / known to have DM, cerebral palsy, nonischemic cardiomyopathy (on [facility] 8/2018 he had trivial CAD), HFrEF previously considered recovered who has completed enrollment in the GALACTIC -HF trial ([employer] Mecarbil v/s placebo). On TTE 8/2018 which LVEF ~ 20-25%, he has been started on study medication and he previously had LV systolic recovery with LVEF 50-55% on TTE 2/2020 and was symptomatically improved. He had COVID 12/2021.  He was hospitalized that the [facility] 10/2021 and reports that he was found to have depressed ejection fraction. On TTE 10/2021 LVEF 15 -20% LVIDD 5.1 cm with decreased right ventricular systolic function. Normal nuclear pharmacological stress test 6/2022.  He is s/p S-ICD ([employer]; 3/6/2023; Dr. Mickey Torres at [facility]) and was admitted 4/2023 with non healing wound from ICD site c/f device infection. He is s/p ICD explant on 4/24/2023 from left lateral chest wall and epigastric region, . 4/24 cultures from lead tip, subxiphoid incision, left lateral chest wall incision all positive Staph aureas treat with antibiotics. He is s/p single chamber ICD re-implantation 4/28/2023   Mr. Ventura was hospitalized 2/2024 with metatarsal fracture and mild hyperkalemia, ADHF.  During his hospitalization, following diuresis he was hypotensive and isosorbide mononitrate was discontinued, and hydralazine dose was reduced by half.  He presents today for continued care.HF GDMT is being re-tiitrated.    Symptomatically, he denies  chest pain, dyspnea at rest, exertional dyspnea, orthopnea, PND, syncope, palpitations or leg swelling.      He has been fully adherent with prescribed therapies.  He has not had any defibrillator shocks.    Functionally, he is wheelchair bound due to cerebral palsy but is able to get around in his apartment  without exertional symptoms.    He was considered for CardioMEMS implantation, but for logistical reasons was unable to be implanted.    His most recent heart failure hospitalization was 2/2024.    The electronic medical record has been reviewed by me for salient history. All cardiovascular imaging and testing available in the electronic medical record, and [employer] has been reviewed.     Medication Documentation Review Audit       Reviewed by Catie Lizarraga RN (Registered Nurse) on 07/05/24 at 1406      Medication Order Taking? Sig Documenting Provider Last Dose Status   amiodarone (Pacerone) 200 mg tablet [id number] Yes Take 1 tablet (200 mg) by mouth once daily. Do not start before February 16, 2024. Atiya Petersen MD Taking Active   aspirin 81 mg chewable tablet [id number] Yes Chew and swallow 1 tablet by mouth daily. Andres Covarrubias MD Taking Active   atorvastatin (Lipitor) 40 mg tablet [id number] Yes Take 1 tablet (40 mg) by mouth once daily. Aniya Zeng MD Taking Active   carvedilol (Coreg) 25 mg tablet [id number] Yes Take 1 tablet (25 mg) by mouth 2 times a day. Cheryl White MD Taking Active   dapagliflozin propanediol (Farxiga) 10 mg [id number] Yes Take 1 tablet (10 mg) by mouth once daily. Aniya Zeng MD Taking Active   Dexcom G4 platinum  (Dexcom G7 ) misc [id number] Yes Use as instructed Aniya Zeng MD Taking Active   Dexcom G7 Sensor device [id number] Yes Please use to check your glucose before meals and at night. Aniya Zeng MD Taking Active   Entresto  mg tablet [id number] Yes Take 1 tablet by mouth 2 times a day. Aniya Zeng MD Taking Active   FeroSuL 325 mg (65 mg iron) tablet [id number] Yes Take 1 tablet by mouth twice daily. Su Hernandez MD PhD Taking Active   furosemide (Lasix) 20 mg tablet [id number] Yes Take 1 tablet (20 mg) by mouth once daily as needed (for weight gain >3 lbs in 3 days, Lower extremity edema/ shortness of breath) Atiya Petersen MD Taking  Active   hydrALAZINE (Apresoline) 25 mg tablet [id number] Yes Take 1 tablet (25 mg) by mouth 3 times a day. Atiya Petersen MD Taking Active   insulin glargine (Lantus) 100 unit/mL (3 mL) pen [id number] Yes Inject 40 Units under the skin once daily at bedtime. Take as directed per insulin instructions. Dhaval Byrnes MD Taking Active   isosorbide mononitrate ER (Imdur) 30 mg 24 hr tablet [id number] Yes Take 1 tablet (30 mg) by mouth once daily. Do not crush or chew. Su Hernandez MD PhD Taking Active   loperamide (Imodium) 2 mg capsule [id number] Yes Take 2 capsules by mouth every 12 hours if needed for diarrhea.   Patient taking differently: Take 2 capsules (4 mg) by mouth 2 times a day.    Aniya Zeng MD Taking Active   prazosin (Minipress) 2 mg capsule [id number] Yes Take 1 capsule (2 mg) by mouth once daily at bedtime. Aniya Zeng MD Taking Active   spironolactone (Aldactone) 25 mg tablet [id number] Yes Take 1 tablet (25 mg) by mouth once daily. Aniya Zeng MD Taking Active   zolpidem (Ambien) 5 mg tablet [id number] Yes Take 1 tablet (5 mg) by mouth as needed at bedtime for sleep. Aniya Zeng MD Taking Active                      Allergies   Allergen Reactions    Metformin Diarrhea, GI Upset, GI intolerance, Other and Nausea/vomiting       Review of Systems   Constitutional: Negative for decreased appetite, fever, malaise/fatigue, weight gain and weight loss.   HENT:  Negative for sore throat.    Eyes:  Negative for blurred vision and double vision.   Cardiovascular:  Negative for chest pain, dyspnea on exertion, orthopnea, palpitations and paroxysmal nocturnal dyspnea.   Respiratory:  Negative for cough and shortness of breath.    Skin:  Negative for color change, dry skin and itching.   Musculoskeletal:  Negative for back pain and falls.   Gastrointestinal:  Negative for abdominal pain, change in bowel habit, nausea and vomiting.   Genitourinary:  Negative for dysuria.   Neurological:  Negative  for dizziness, headaches and light-headedness.   Psychiatric/Behavioral:  Negative for depression. The patient is not nervous/anxious.          Visit Vitals  /78   Smoking Status Never      O/E:  No in person physical examination done  AO x 4  Apparent conversational SOB: N    IMPRESSION/PLAN:    64 y.o. ormer / known to have DM, cerebral palsy, nonischemic cardiomyopathy (on [facility] 8/2018 he had trivial CAD), HFrEF previously considered recovered who has completed enrollment in the GALACTIC -HF trial ([employer] Mecarbil v/s placebo). On TTE 8/2018 which LVEF ~ 20-25%, he has been started on study medication and he previously had LV systolic recovery with LVEF 50-55% on TTE 2/2020 and was symptomatically improved. He had COVID 12/2021.  He was hospitalized that the [facility] 10/2021 and reports that he was found to have depressed ejection fraction. On TTE 10/2021 LVEF 15 -20% LVIDD 5.1 cm with decreased right ventricular systolic function. Normal nuclear pharmacological stress test 6/2022.  He is s/p S-ICD ([employer]; 3/6/2023; Dr. Mickey Torres at [facility]) and was admitted 4/2023 with non healing wound from ICD site c/f device infection. He is s/p ICD explant on 4/24/2023 from left lateral chest wall and epigastric region, . 4/24 cultures from lead tip, subxiphoid incision, left lateral chest wall incision all positive Staph aureas treat with antibiotics. He is s/p single chamber ICD re-implantation 4/28/2023   Mr. Ventura was hospitalized 2/2024 with metatarsal fracture and mild hyperkalemia, ADHF.  During his hospitalization, following diuresis he was hypotensive and isosorbide mononitrate was discontinued, and hydralazine dose was reduced by half.  He presents today for continued care.HF GDMT is being re-tiitrated.    NYHA FC ~ 2 ( difficult to assess as wheelchair bound but no symptoms with in home exertion)  ACC C  Volume status: Euvolemic per  history  Perfusion status: Mentating well    Plan:  #HFrEF (declined LVEF 10/2021)  -Medication:  -Double isosorbide mononitrate to 60 mg once daily, plan to increase at future visits  -Double hydralazine  to 50 mg tid  -Continue carvedilol 50 mg twice daily, dapagliflozin 10 mg once daily, sacubitril/valsartan 97/103 mg twice daily, spironolactone 25 mg once daily  -Consider cardiac modulation once HF GDMT is optimized, if LVEF remains low    This note was transcribed using the Dragon Dictation system. There may be grammatical, punctuation, or verbiage errors that can occur with voice recognition programs.      Su Hernandez MD PhD

## 2024-09-27 ENCOUNTER — APPOINTMENT (OUTPATIENT)
Dept: CARDIOLOGY | Facility: HOSPITAL | Age: 64
End: 2024-09-27
Payer: COMMERCIAL

## 2024-11-08 ENCOUNTER — OFFICE VISIT (OUTPATIENT)
Dept: CARDIOLOGY | Facility: HOSPITAL | Age: 64
End: 2024-11-08
Payer: COMMERCIAL

## 2024-11-08 ENCOUNTER — HOSPITAL ENCOUNTER (OUTPATIENT)
Facility: HOSPITAL | Age: 64
Setting detail: OBSERVATION
End: 2024-11-08
Attending: EMERGENCY MEDICINE | Admitting: INTERNAL MEDICINE
Payer: COMMERCIAL

## 2024-11-08 ENCOUNTER — CLINICAL SUPPORT (OUTPATIENT)
Dept: EMERGENCY MEDICINE | Facility: HOSPITAL | Age: 64
End: 2024-11-08
Payer: COMMERCIAL

## 2024-11-08 ENCOUNTER — APPOINTMENT (OUTPATIENT)
Dept: RADIOLOGY | Facility: HOSPITAL | Age: 64
End: 2024-11-08
Payer: COMMERCIAL

## 2024-11-08 VITALS
DIASTOLIC BLOOD PRESSURE: 88 MMHG | SYSTOLIC BLOOD PRESSURE: 168 MMHG | BODY MASS INDEX: 27.36 KG/M2 | HEIGHT: 72 IN | HEART RATE: 100 BPM | WEIGHT: 202 LBS

## 2024-11-08 DIAGNOSIS — I50.9 HEART FAILURE, UNSPECIFIED HF CHRONICITY, UNSPECIFIED HEART FAILURE TYPE: ICD-10-CM

## 2024-11-08 DIAGNOSIS — M67.919: ICD-10-CM

## 2024-11-08 DIAGNOSIS — R07.9 CHEST PAIN, UNSPECIFIED TYPE: Primary | ICD-10-CM

## 2024-11-08 DIAGNOSIS — I86.1 VARICOCELE: ICD-10-CM

## 2024-11-08 DIAGNOSIS — I50.9 ACUTE CONGESTIVE HEART FAILURE, UNSPECIFIED HEART FAILURE TYPE: ICD-10-CM

## 2024-11-08 DIAGNOSIS — I50.20 HFREF (HEART FAILURE WITH REDUCED EJECTION FRACTION): ICD-10-CM

## 2024-11-08 DIAGNOSIS — G89.29 CHRONIC SHOULDER PAIN, UNSPECIFIED LATERALITY: ICD-10-CM

## 2024-11-08 DIAGNOSIS — I50.9 ACUTE ON CHRONIC CONGESTIVE HEART FAILURE, UNSPECIFIED HEART FAILURE TYPE: ICD-10-CM

## 2024-11-08 DIAGNOSIS — I42.0 DILATED CARDIOMYOPATHY (MULTI): ICD-10-CM

## 2024-11-08 DIAGNOSIS — E78.2 MIXED HYPERLIPIDEMIA: Chronic | ICD-10-CM

## 2024-11-08 DIAGNOSIS — I50.43 ACUTE ON CHRONIC COMBINED SYSTOLIC AND DIASTOLIC HRT FAIL: ICD-10-CM

## 2024-11-08 DIAGNOSIS — M25.519 CHRONIC SHOULDER PAIN, UNSPECIFIED LATERALITY: ICD-10-CM

## 2024-11-08 DIAGNOSIS — I10 BENIGN ESSENTIAL HYPERTENSION: Chronic | ICD-10-CM

## 2024-11-08 LAB
ALBUMIN SERPL BCP-MCNC: 4.1 G/DL (ref 3.4–5)
ALP SERPL-CCNC: 93 U/L (ref 33–136)
ALT SERPL W P-5'-P-CCNC: 10 U/L (ref 10–52)
ANION GAP SERPL CALC-SCNC: 13 MMOL/L (ref 10–20)
AST SERPL W P-5'-P-CCNC: 12 U/L (ref 9–39)
ATRIAL RATE: 97 BPM
BASOPHILS # BLD AUTO: 0.02 X10*3/UL (ref 0–0.1)
BASOPHILS NFR BLD AUTO: 0.4 %
BILIRUB SERPL-MCNC: 0.4 MG/DL (ref 0–1.2)
BNP SERPL-MCNC: 70 PG/ML (ref 0–99)
BUN SERPL-MCNC: 10 MG/DL (ref 6–23)
CALCIUM SERPL-MCNC: 9 MG/DL (ref 8.6–10.6)
CARDIAC TROPONIN I PNL SERPL HS: 18 NG/L (ref 0–53)
CARDIAC TROPONIN I PNL SERPL HS: 18 NG/L (ref 0–53)
CHLORIDE SERPL-SCNC: 104 MMOL/L (ref 98–107)
CO2 SERPL-SCNC: 26 MMOL/L (ref 21–32)
CREAT SERPL-MCNC: 0.65 MG/DL (ref 0.5–1.3)
CRP SERPL-MCNC: 0.96 MG/DL
EGFRCR SERPLBLD CKD-EPI 2021: >90 ML/MIN/1.73M*2
EOSINOPHIL # BLD AUTO: 0.06 X10*3/UL (ref 0–0.7)
EOSINOPHIL NFR BLD AUTO: 1.2 %
ERYTHROCYTE [DISTWIDTH] IN BLOOD BY AUTOMATED COUNT: 12.3 % (ref 11.5–14.5)
ERYTHROCYTE [SEDIMENTATION RATE] IN BLOOD BY WESTERGREN METHOD: 25 MM/H (ref 0–20)
EST. AVERAGE GLUCOSE BLD GHB EST-MCNC: 174 MG/DL
GLUCOSE BLD MANUAL STRIP-MCNC: 129 MG/DL (ref 74–99)
GLUCOSE SERPL-MCNC: 158 MG/DL (ref 74–99)
HBA1C MFR BLD: 7.7 %
HCT VFR BLD AUTO: 41.4 % (ref 41–52)
HGB BLD-MCNC: 13.9 G/DL (ref 13.5–17.5)
HOLD SPECIMEN: NORMAL
IMM GRANULOCYTES # BLD AUTO: 0.01 X10*3/UL (ref 0–0.7)
IMM GRANULOCYTES NFR BLD AUTO: 0.2 % (ref 0–0.9)
LYMPHOCYTES # BLD AUTO: 1.05 X10*3/UL (ref 1.2–4.8)
LYMPHOCYTES NFR BLD AUTO: 20.7 %
MAGNESIUM SERPL-MCNC: 1.73 MG/DL (ref 1.6–2.4)
MCH RBC QN AUTO: 29.8 PG (ref 26–34)
MCHC RBC AUTO-ENTMCNC: 33.6 G/DL (ref 32–36)
MCV RBC AUTO: 89 FL (ref 80–100)
MONOCYTES # BLD AUTO: 0.36 X10*3/UL (ref 0.1–1)
MONOCYTES NFR BLD AUTO: 7.1 %
NEUTROPHILS # BLD AUTO: 3.58 X10*3/UL (ref 1.2–7.7)
NEUTROPHILS NFR BLD AUTO: 70.4 %
NRBC BLD-RTO: 0 /100 WBCS (ref 0–0)
P AXIS: 66 DEGREES
P OFFSET: 175 MS
P ONSET: 121 MS
PLATELET # BLD AUTO: 195 X10*3/UL (ref 150–450)
POTASSIUM SERPL-SCNC: 3.7 MMOL/L (ref 3.5–5.3)
PR INTERVAL: 180 MS
PROT SERPL-MCNC: 7 G/DL (ref 6.4–8.2)
Q ONSET: 211 MS
QRS COUNT: 16 BEATS
QRS DURATION: 110 MS
QT INTERVAL: 382 MS
QTC CALCULATION(BAZETT): 485 MS
QTC FREDERICIA: 448 MS
R AXIS: 7 DEGREES
RBC # BLD AUTO: 4.67 X10*6/UL (ref 4.5–5.9)
SODIUM SERPL-SCNC: 139 MMOL/L (ref 136–145)
T AXIS: 32 DEGREES
T OFFSET: 402 MS
VENTRICULAR RATE: 97 BPM
WBC # BLD AUTO: 5.1 X10*3/UL (ref 4.4–11.3)

## 2024-11-08 PROCEDURE — 2500000005 HC RX 250 GENERAL PHARMACY W/O HCPCS

## 2024-11-08 PROCEDURE — 2500000002 HC RX 250 W HCPCS SELF ADMINISTERED DRUGS (ALT 637 FOR MEDICARE OP, ALT 636 FOR OP/ED)

## 2024-11-08 PROCEDURE — 96372 THER/PROPH/DIAG INJ SC/IM: CPT

## 2024-11-08 PROCEDURE — G0378 HOSPITAL OBSERVATION PER HR: HCPCS

## 2024-11-08 PROCEDURE — 71046 X-RAY EXAM CHEST 2 VIEWS: CPT

## 2024-11-08 PROCEDURE — 84484 ASSAY OF TROPONIN QUANT: CPT

## 2024-11-08 PROCEDURE — 99285 EMERGENCY DEPT VISIT HI MDM: CPT | Performed by: EMERGENCY MEDICINE

## 2024-11-08 PROCEDURE — 96366 THER/PROPH/DIAG IV INF ADDON: CPT

## 2024-11-08 PROCEDURE — 93005 ELECTROCARDIOGRAM TRACING: CPT

## 2024-11-08 PROCEDURE — 85025 COMPLETE CBC W/AUTO DIFF WBC: CPT

## 2024-11-08 PROCEDURE — 36415 COLL VENOUS BLD VENIPUNCTURE: CPT

## 2024-11-08 PROCEDURE — 82947 ASSAY GLUCOSE BLOOD QUANT: CPT

## 2024-11-08 PROCEDURE — 93010 ELECTROCARDIOGRAM REPORT: CPT | Performed by: EMERGENCY MEDICINE

## 2024-11-08 PROCEDURE — 83735 ASSAY OF MAGNESIUM: CPT

## 2024-11-08 PROCEDURE — 2500000004 HC RX 250 GENERAL PHARMACY W/ HCPCS (ALT 636 FOR OP/ED)

## 2024-11-08 PROCEDURE — 93010 ELECTROCARDIOGRAM REPORT: CPT | Performed by: INTERNAL MEDICINE

## 2024-11-08 PROCEDURE — 86140 C-REACTIVE PROTEIN: CPT

## 2024-11-08 PROCEDURE — 83036 HEMOGLOBIN GLYCOSYLATED A1C: CPT

## 2024-11-08 PROCEDURE — 71046 X-RAY EXAM CHEST 2 VIEWS: CPT | Mod: FOREIGN READ | Performed by: RADIOLOGY

## 2024-11-08 PROCEDURE — 2500000001 HC RX 250 WO HCPCS SELF ADMINISTERED DRUGS (ALT 637 FOR MEDICARE OP)

## 2024-11-08 PROCEDURE — 83880 ASSAY OF NATRIURETIC PEPTIDE: CPT

## 2024-11-08 PROCEDURE — 80053 COMPREHEN METABOLIC PANEL: CPT

## 2024-11-08 PROCEDURE — 84075 ASSAY ALKALINE PHOSPHATASE: CPT

## 2024-11-08 PROCEDURE — 96365 THER/PROPH/DIAG IV INF INIT: CPT | Mod: 59

## 2024-11-08 PROCEDURE — 85652 RBC SED RATE AUTOMATED: CPT | Performed by: EMERGENCY MEDICINE

## 2024-11-08 PROCEDURE — 99285 EMERGENCY DEPT VISIT HI MDM: CPT | Mod: 25

## 2024-11-08 RX ORDER — GABAPENTIN 300 MG/1
300 CAPSULE ORAL NIGHTLY
Status: ON HOLD | COMMUNITY

## 2024-11-08 RX ORDER — PRAZOSIN HYDROCHLORIDE 2 MG/1
2 CAPSULE ORAL NIGHTLY
Status: DISPENSED | OUTPATIENT
Start: 2024-11-08

## 2024-11-08 RX ORDER — ASPIRIN 325 MG
325 TABLET ORAL ONCE
Status: COMPLETED | OUTPATIENT
Start: 2024-11-08 | End: 2024-11-08

## 2024-11-08 RX ORDER — ISOSORBIDE MONONITRATE 60 MG/1
60 TABLET, EXTENDED RELEASE ORAL DAILY
Status: DISCONTINUED | OUTPATIENT
Start: 2024-11-08 | End: 2024-11-09

## 2024-11-08 RX ORDER — DEXTROSE 50 % IN WATER (D50W) INTRAVENOUS SYRINGE
25
Status: ACTIVE | OUTPATIENT
Start: 2024-11-08

## 2024-11-08 RX ORDER — FAMOTIDINE 20 MG/1
20 TABLET, FILM COATED ORAL DAILY
Status: DISPENSED | OUTPATIENT
Start: 2024-11-08

## 2024-11-08 RX ORDER — NITROGLYCERIN 0.4 MG/1
0.4 TABLET SUBLINGUAL ONCE
Status: COMPLETED | OUTPATIENT
Start: 2024-11-08 | End: 2024-11-08

## 2024-11-08 RX ORDER — DEXTROSE 50 % IN WATER (D50W) INTRAVENOUS SYRINGE
12.5
Status: ACTIVE | OUTPATIENT
Start: 2024-11-08

## 2024-11-08 RX ORDER — INSULIN GLARGINE 100 [IU]/ML
40 INJECTION, SOLUTION SUBCUTANEOUS NIGHTLY
Status: DISPENSED | OUTPATIENT
Start: 2024-11-08

## 2024-11-08 RX ORDER — AMIODARONE HYDROCHLORIDE 200 MG/1
200 TABLET ORAL DAILY
Status: DISPENSED | OUTPATIENT
Start: 2024-11-09

## 2024-11-08 RX ORDER — MAGNESIUM SULFATE HEPTAHYDRATE 40 MG/ML
2 INJECTION, SOLUTION INTRAVENOUS ONCE
Status: COMPLETED | OUTPATIENT
Start: 2024-11-08 | End: 2024-11-08

## 2024-11-08 RX ORDER — FAMOTIDINE 10 MG/1
10 TABLET ORAL NIGHTLY
Status: ON HOLD | COMMUNITY

## 2024-11-08 RX ORDER — TRAZODONE HYDROCHLORIDE 100 MG/1
100 TABLET ORAL NIGHTLY
Status: ON HOLD | COMMUNITY

## 2024-11-08 RX ORDER — DAPAGLIFLOZIN 10 MG/1
10 TABLET, FILM COATED ORAL DAILY
Status: DISPENSED | OUTPATIENT
Start: 2024-11-08

## 2024-11-08 RX ORDER — BLOOD-GLUCOSE,RECEIVER,CONT
1 EACH MISCELLANEOUS AS NEEDED
Status: ON HOLD | COMMUNITY

## 2024-11-08 RX ORDER — ATORVASTATIN CALCIUM 40 MG/1
40 TABLET, FILM COATED ORAL NIGHTLY
Status: DISPENSED | OUTPATIENT
Start: 2024-11-08

## 2024-11-08 RX ORDER — NAPROXEN SODIUM 220 MG/1
81 TABLET, FILM COATED ORAL DAILY
Status: DISPENSED | OUTPATIENT
Start: 2024-11-09

## 2024-11-08 RX ORDER — HYDRALAZINE HYDROCHLORIDE 25 MG/1
50 TABLET, FILM COATED ORAL 3 TIMES DAILY
Status: DISPENSED | OUTPATIENT
Start: 2024-11-08

## 2024-11-08 RX ORDER — LOPERAMIDE HYDROCHLORIDE 2 MG/1
4 CAPSULE ORAL 2 TIMES DAILY
Status: DISPENSED | OUTPATIENT
Start: 2024-11-08

## 2024-11-08 RX ORDER — ENOXAPARIN SODIUM 100 MG/ML
40 INJECTION SUBCUTANEOUS EVERY 24 HOURS
Status: DISPENSED | OUTPATIENT
Start: 2024-11-08

## 2024-11-08 RX ORDER — FERROUS SULFATE 325(65) MG
1 TABLET ORAL 2 TIMES DAILY
Status: DISPENSED | OUTPATIENT
Start: 2024-11-08

## 2024-11-08 RX ORDER — FUROSEMIDE 40 MG/1
20 TABLET ORAL DAILY PRN
Status: DISCONTINUED | OUTPATIENT
Start: 2024-11-08 | End: 2024-11-09

## 2024-11-08 RX ORDER — SPIRONOLACTONE 25 MG/1
25 TABLET ORAL DAILY
Status: DISPENSED | OUTPATIENT
Start: 2024-11-08

## 2024-11-08 RX ORDER — LIDOCAINE 560 MG/1
1 PATCH PERCUTANEOUS; TOPICAL; TRANSDERMAL DAILY
Status: DISPENSED | OUTPATIENT
Start: 2024-11-08

## 2024-11-08 RX ORDER — PANTOPRAZOLE SODIUM 40 MG/1
40 TABLET, DELAYED RELEASE ORAL 2 TIMES DAILY PRN
Status: DISCONTINUED | OUTPATIENT
Start: 2024-11-08 | End: 2024-11-08

## 2024-11-08 RX ORDER — HYDROCHLOROTHIAZIDE 12.5 MG/1
1 CAPSULE ORAL
Status: ON HOLD | COMMUNITY

## 2024-11-08 RX ORDER — POTASSIUM CHLORIDE 20 MEQ/1
20 TABLET, EXTENDED RELEASE ORAL ONCE
Status: COMPLETED | OUTPATIENT
Start: 2024-11-08 | End: 2024-11-08

## 2024-11-08 RX ORDER — FUROSEMIDE 10 MG/ML
80 INJECTION INTRAMUSCULAR; INTRAVENOUS ONCE
Status: DISCONTINUED | OUTPATIENT
Start: 2024-11-08 | End: 2024-11-08

## 2024-11-08 RX ORDER — CARVEDILOL 25 MG/1
25 TABLET ORAL 2 TIMES DAILY
Status: DISCONTINUED | OUTPATIENT
Start: 2024-11-08 | End: 2024-11-09

## 2024-11-08 RX ORDER — TALC
6 POWDER (GRAM) TOPICAL NIGHTLY PRN
Status: ACTIVE | OUTPATIENT
Start: 2024-11-08

## 2024-11-08 ASSESSMENT — COGNITIVE AND FUNCTIONAL STATUS - GENERAL
PATIENT BASELINE BEDBOUND: NO
DAILY ACTIVITIY SCORE: 22
HELP NEEDED FOR BATHING: A LITTLE
MOVING FROM LYING ON BACK TO SITTING ON SIDE OF FLAT BED WITH BEDRAILS: A LITTLE
WALKING IN HOSPITAL ROOM: TOTAL
MOBILITY SCORE: 14
CLIMB 3 TO 5 STEPS WITH RAILING: TOTAL
DRESSING REGULAR LOWER BODY CLOTHING: A LITTLE
STANDING UP FROM CHAIR USING ARMS: A LOT
TURNING FROM BACK TO SIDE WHILE IN FLAT BAD: A LITTLE

## 2024-11-08 ASSESSMENT — ENCOUNTER SYMPTOMS
WEIGHT LOSS: 0
SHORTNESS OF BREATH: 0
COUGH: 0
PALPITATIONS: 0
CHILLS: 0
LIGHT-HEADEDNESS: 0
BLURRED VISION: 0
PALPITATIONS: 0
FALLS: 0
ORTHOPNEA: 0
WEIGHT GAIN: 0
DIAPHORESIS: 0
DECREASED APPETITE: 0
NERVOUS/ANXIOUS: 0
BACK PAIN: 0
NAUSEA: 0
ABDOMINAL PAIN: 0
SORE THROAT: 0
CHANGE IN BOWEL HABIT: 0
SORE THROAT: 0
VOMITING: 0
HEADACHES: 0
CHEST TIGHTNESS: 1
DEPRESSION: 0
DYSPNEA ON EXERTION: 0
DIZZINESS: 0
DYSURIA: 0
FEVER: 0
VOMITING: 0
PND: 0
DIZZINESS: 0
NAUSEA: 0
FEVER: 0
COLOR CHANGE: 0
LIGHT-HEADEDNESS: 0
DOUBLE VISION: 0
DIARRHEA: 0
DYSURIA: 0
SHORTNESS OF BREATH: 0
COUGH: 0

## 2024-11-08 ASSESSMENT — COLUMBIA-SUICIDE SEVERITY RATING SCALE - C-SSRS
6. HAVE YOU EVER DONE ANYTHING, STARTED TO DO ANYTHING, OR PREPARED TO DO ANYTHING TO END YOUR LIFE?: NO
2. HAVE YOU ACTUALLY HAD ANY THOUGHTS OF KILLING YOURSELF?: NO
1. IN THE PAST MONTH, HAVE YOU WISHED YOU WERE DEAD OR WISHED YOU COULD GO TO SLEEP AND NOT WAKE UP?: NO

## 2024-11-08 ASSESSMENT — ACTIVITIES OF DAILY LIVING (ADL)
DRESSING YOURSELF: INDEPENDENT
HEARING - LEFT EAR: FUNCTIONAL
FEEDING YOURSELF: INDEPENDENT
LACK_OF_TRANSPORTATION: NO
WALKS IN HOME: NEEDS ASSISTANCE
PATIENT'S MEMORY ADEQUATE TO SAFELY COMPLETE DAILY ACTIVITIES?: YES
ASSISTIVE_DEVICE: WHEELCHAIR
GROOMING: INDEPENDENT
TOILETING: INDEPENDENT
HEARING - RIGHT EAR: FUNCTIONAL
JUDGMENT_ADEQUATE_SAFELY_COMPLETE_DAILY_ACTIVITIES: YES
BATHING: INDEPENDENT
ADEQUATE_TO_COMPLETE_ADL: YES

## 2024-11-08 ASSESSMENT — PAIN DESCRIPTION - DESCRIPTORS: DESCRIPTORS: STABBING

## 2024-11-08 ASSESSMENT — PAIN DESCRIPTION - LOCATION: LOCATION: CHEST

## 2024-11-08 ASSESSMENT — PAIN SCALES - GENERAL
PAINLEVEL_OUTOF10: 0-NO PAIN
PAINLEVEL_OUTOF10: 8
PAINLEVEL_OUTOF10: 7
PAINLEVEL_OUTOF10: 7
PAINLEVEL_OUTOF10: 8
PAINLEVEL_OUTOF10: 7
PAINLEVEL_OUTOF10: 8

## 2024-11-08 ASSESSMENT — HEART SCORE
RISK FACTORS: >2 RISK FACTORS OR HX OF ATHEROSCLEROTIC DISEASE
HISTORY: MODERATELY SUSPICIOUS
HEART SCORE: 4
AGE: 45-64
ECG: NORMAL
TROPONIN: LESS THAN OR EQUAL TO NORMAL LIMIT

## 2024-11-08 ASSESSMENT — LIFESTYLE VARIABLES
HAVE YOU EVER FELT YOU SHOULD CUT DOWN ON YOUR DRINKING: NO
TOTAL SCORE: 0
HAVE PEOPLE ANNOYED YOU BY CRITICIZING YOUR DRINKING: NO
EVER FELT BAD OR GUILTY ABOUT YOUR DRINKING: NO
EVER HAD A DRINK FIRST THING IN THE MORNING TO STEADY YOUR NERVES TO GET RID OF A HANGOVER: NO

## 2024-11-08 ASSESSMENT — PAIN - FUNCTIONAL ASSESSMENT
PAIN_FUNCTIONAL_ASSESSMENT: 0-10
PAIN_FUNCTIONAL_ASSESSMENT: 0-10

## 2024-11-08 NOTE — PROGRESS NOTES
Accompanied by: alone    Chief Complaint  Ambulatory heart failure care    History of Present Illness    64 y.o. former / known to have DM, cerebral palsy, nonischemic cardiomyopathy (on [facility] 8/2018 he had trivial CAD), HFrEF previously considered recovered who has completed enrollment in the GALACTIC -HF trial ([employer] Mecarbil v/s placebo). On TTE 8/2018 which LVEF ~ 20-25%, he has been started on study medication and he previously had LV systolic recovery with LVEF 50-55% on TTE 2/2020 and was symptomatically improved. He had COVID 12/2021.  He was hospitalized that the [facility] 10/2021 and reports that he was found to have depressed ejection fraction. On TTE 10/2021 LVEF 15 -20% LVIDD 5.1 cm with decreased right ventricular systolic function. Normal nuclear pharmacological stress test 6/2022.  He is s/p S-ICD ([employer]; 3/6/2023; Dr. Mickey Torres at [facility]) and was admitted 4/2023 with non healing wound from ICD site c/f device infection. He is s/p ICD explant on 4/24/2023 from left lateral chest wall and epigastric region, . 4/24 cultures from lead tip, subxiphoid incision, left lateral chest wall incision all positive Staph aureas treat with antibiotics. He is s/p single chamber ICD re-implantation 4/28/2023   Mr. Ventura was hospitalized 2/2024 with metatarsal fracture and mild hyperkalemia, ADHF.  During his hospitalization, following diuresis he was hypotensive and isosorbide mononitrate was discontinued, and hydralazine dose was reduced by half.  He presents today for continued care.HF GDMT is being re-tiitrated.    Symptomatically, he denies  chest pain, dyspnea at rest, exertional dyspnea, orthopnea, PND, syncope, palpitations or leg swelling.           He has been fully adherent with prescribed therapies.  He has not had any defibrillator shocks.    Functionally, he is wheelchair bound due to cerebral palsy but is able to get around in his  apartment without exertional symptoms.    He was considered for CardioMEMS implantation, but for logistical reasons was unable to be implanted.    His most recent heart failure hospitalization was 2024.    The electronic medical record has been reviewed by me for salient history. All cardiovascular imaging and testing available in the electronic medical record, and [employer] has been reviewed.     Medication Documentation Review Audit       Reviewed by Shantal Cutler CMA (Medical Assistant) on 24 at 0934      Medication Order Taking? Sig Documenting Provider Last Dose Status   amiodarone (Pacerone) 200 mg tablet [id number]  Take 1 tablet (200 mg) by mouth once daily. Do not start before 2024. Atiya Petersen MD   24 [id number]   aspirin 81 mg chewable tablet [id number] Yes Chew and swallow 1 tablet by mouth daily. Andres Covarrubias MD  Active   atorvastatin (Lipitor) 40 mg tablet [id number] Yes Take 1 tablet (40 mg) by mouth once daily. Aniya Zeng MD  Active   carvedilol (Coreg) 25 mg tablet [id number] Yes Take 1 tablet (25 mg) by mouth 2 times a day. Cheryl White MD  Active   dapagliflozin propanediol (Farxiga) 10 mg [id number] Yes Take 1 tablet (10 mg) by mouth once daily. Aniya Zeng MD  Active   Dexcom G4 platinum  (Dexcom G7 ) misc [id number] Yes Use as instructed Aniya Zeng MD  Active   Dexcom G7 Sensor device [id number] Yes Please use to check your glucose before meals and at night. Aniya Zeng MD  Active   Entresto  mg tablet [id number] Yes Take 1 tablet by mouth 2 times a day. Aniya Zeng MD  Active   FeroSuL 325 mg (65 mg iron) tablet [id number] Yes Take 1 tablet by mouth twice daily. Su Hernandez MD PhD  Active   furosemide (Lasix) 20 mg tablet [id number]  Take 1 tablet (20 mg) by mouth once daily as needed (for weight gain >3 lbs in 3 days, Lower extremity edema/ shortness of breath) Atiya Petersen MD   24 [id number]   hydrALAZINE  (Apresoline) 50 mg tablet [id number] Yes Take 1 tablet (50 mg) by mouth 3 times a day. Su Hernandez MD PhD  Active   insulin glargine (Lantus) 100 unit/mL (3 mL) pen [id number]  Inject 40 Units under the skin once daily at bedtime. Take as directed per insulin instructions. Dhaval Byrnes MD   24 2359   isosorbide mononitrate ER (Imdur) 60 mg 24 hr tablet [id number] Yes Take 1 tablet (60 mg) by mouth once daily. Do not crush or chew. Su Hernandez MD PhD  Active   loperamide (Imodium) 2 mg capsule [id number] Yes Take 2 capsules by mouth every 12 hours if needed for diarrhea.   Patient taking differently: Take 2 capsules (4 mg) by mouth 2 times a day.    Aniya Zeng MD  Active   prazosin (Minipress) 2 mg capsule [id number] Yes Take 1 capsule (2 mg) by mouth once daily at bedtime. Aniya Zeng MD  Active   spironolactone (Aldactone) 25 mg tablet [id number] Yes Take 1 tablet (25 mg) by mouth once daily. Aniya Zeng MD  Active   zolpidem (Ambien) 5 mg tablet [id number]  Take 1 tablet (5 mg) by mouth as needed at bedtime for sleep. Aniya Zeng MD   24 235                      Allergies   Allergen Reactions   • Metformin Diarrhea, GI Upset, GI intolerance, Other and Nausea/vomiting       Review of Systems   Constitutional: Negative for decreased appetite, fever, malaise/fatigue, weight gain and weight loss.   HENT:  Negative for sore throat.    Eyes:  Negative for blurred vision and double vision.   Cardiovascular:  Negative for chest pain, dyspnea on exertion, orthopnea, palpitations and paroxysmal nocturnal dyspnea.   Respiratory:  Negative for cough and shortness of breath.    Skin:  Negative for color change, dry skin and itching.   Musculoskeletal:  Negative for back pain and falls.   Gastrointestinal:  Negative for abdominal pain, change in bowel habit, nausea and vomiting.   Genitourinary:  Negative for dysuria.   Neurological:  Negative for dizziness, headaches and  light-headedness.   Psychiatric/Behavioral:  Negative for depression. The patient is not nervous/anxious.          Visit Vitals  Smoking Status Never      O/E:  No in person physical examination done  AO x 4  Apparent conversational SOB: N    IMPRESSION/PLAN:    64 y.o. ormer / known to have DM, cerebral palsy, nonischemic cardiomyopathy (on [facility] 8/2018 he had trivial CAD), HFrEF previously considered recovered who has completed enrollment in the GALACTIC -HF trial ([employer] Mecarbil v/s placebo). On TTE 8/2018 which LVEF ~ 20-25%, he has been started on study medication and he previously had LV systolic recovery with LVEF 50-55% on TTE 2/2020 and was symptomatically improved. He had COVID 12/2021.  He was hospitalized that the [facility] 10/2021 and reports that he was found to have depressed ejection fraction. On TTE 10/2021 LVEF 15 -20% LVIDD 5.1 cm with decreased right ventricular systolic function. Normal nuclear pharmacological stress test 6/2022.  He is s/p S-ICD ([employer]; 3/6/2023; Dr. Mickey Torres at [facility]) and was admitted 4/2023 with non healing wound from ICD site c/f device infection. He is s/p ICD explant on 4/24/2023 from left lateral chest wall and epigastric region, . 4/24 cultures from lead tip, subxiphoid incision, left lateral chest wall incision all positive Staph aureas treat with antibiotics. He is s/p single chamber ICD re-implantation 4/28/2023   Mr. Ventura was hospitalized 2/2024 with metatarsal fracture and mild hyperkalemia, ADHF.  During his hospitalization, following diuresis he was hypotensive and isosorbide mononitrate was discontinued, and hydralazine dose was reduced by half.  He presents today for continued care.HF GDMT is being re-tiitrated.    NYHA FC ~ 2 ( difficult to assess as wheelchair bound but no symptoms with in home exertion)  ACC C  Volume status: Euvolemic per history  Perfusion status: Mentating  well    Plan:  #HFrEF (declined LVEF 10/2021)  -Medication:  -Double isosorbide mononitrate to 60 mg once daily, plan to increase at future visits  -Double hydralazine  to 50 mg tid  -Continue carvedilol 50 mg twice daily, dapagliflozin 10 mg once daily, sacubitril/valsartan 97/103 mg twice daily, spironolactone 25 mg once daily  -Consider cardiac modulation once HF GDMT is optimized, if LVEF remains low    This note was transcribed using the Dragon Dictation system. There may be grammatical, punctuation, or verbiage errors that can occur with voice recognition programs.      Clarissa Grimaldo RN

## 2024-11-08 NOTE — SIGNIFICANT EVENT
Senior Staffing note     Delvis Ventura Jr. is a 64-year-old history of diabetes, cerebral palsy, nonischemic cardiomyopathy (on [facility] 8/2018 he had trivial CAD), HFrEF (TTE 10/21 LVEF 15 to 20% with decreased RV systolic function, normal stress test 6/2022) s/p S ICD ([employer] 3/2023) with ICD explant on 4/24/2023 due to infection, s/p single-chamber ICD reimplantation 4/28/2023 sent from heart failure clinic for evaluation of chest pain, SOB, and wrist numbness.     He reports chest pain and shortness of breath starting this morning.  His chest pain is nonradiating midsternal.  His hands and fingers were numb since 7 AM.  He reports difficulty gripping objects since this morning.  He says that he has chest pain shortness of breath is worse when walking and when he lays flat.  He uses 1 pillow at night.  He also does not lay flat because of the SOB.  He uses compression stockings regularly followed swelling and wheezes Lasix 20 mg oral every day.  He does report dry cough that started this morning.    He is concerned that he is not getting enough physical therapy as an outpatient and feels that he has been getting weaker over the last 3 years.    Does not report sick symptoms, fever, rash, or runny nose.  No dark stools, bleeding in urine, or otherwise bleeding.  Does not take a blood thinner.    ED course significant for unremarkable troponins, BNP, chest x-ray with some pulmonary vascular congestion.  EKG is also unremarkable.    Vitals:    11/08/24 1800   BP: 165/86   Pulse: 74   Resp: 18   Temp:    SpO2: 98%       Physical Exam  Constitutional:       General: He is not in acute distress.     Appearance: He is not ill-appearing or toxic-appearing.   HENT:      Nose: No rhinorrhea.      Mouth/Throat:      Mouth: Mucous membranes are moist.   Cardiovascular:      Rate and Rhythm: Normal rate and regular rhythm.      Pulses: Normal pulses.   Pulmonary:      Effort: Pulmonary effort is normal.  No respiratory distress.      Breath sounds: No wheezing or rales.   Abdominal:      General: There is distension.      Palpations: There is no mass.      Tenderness: There is abdominal tenderness.   Musculoskeletal:      Comments: Pitting   Compression stalkings       Skin:     Capillary Refill: Capillary refill takes less than 2 seconds.   Neurological:      Mental Status: He is alert.      Motor: Weakness present.      Comments: Weakness in legs bilaterally, chronic  Strength preserved in  and bicep   + tinel sign on left   Hands appear somewhat atrophied         Current Facility-Administered Medications:     [START ON 11/9/2024] amiodarone (Pacerone) tablet 200 mg, 200 mg, oral, Daily, Bruce Ballesteros MD    [START ON 11/9/2024] aspirin chewable tablet 81 mg, 81 mg, oral, Daily, Bruce Ballesteros MD    atorvastatin (Lipitor) tablet 40 mg, 40 mg, oral, Nightly, Bruce Ballesteros MD    carvedilol (Coreg) tablet 25 mg, 25 mg, oral, BID, Bruce Ballesteros MD    dapagliflozin propanediol (Farxiga) tablet 10 mg, 10 mg, oral, Daily, Bruce Ballesteros MD    dextrose 50 % injection 12.5 g, 12.5 g, intravenous, q15 min PRN, Caitlyn Rodriguez MD    dextrose 50 % injection 25 g, 25 g, intravenous, q15 min PRN, Caitlyn Rodriguez MD    enoxaparin (Lovenox) syringe 40 mg, 40 mg, subcutaneous, q24h, Caitlyn Rodriguez MD    famotidine (Pepcid) tablet 20 mg, 20 mg, oral, Daily, Caitlyn Rodriguez MD    ferrous sulfate (325 mg ferrous sulfate) tablet 325 mg, 1 tablet, oral, BID, Bruce Ballesteros MD    [Held by provider] furosemide (Lasix) tablet 20 mg, 20 mg, oral, Daily PRN, Bruce Ballesteros MD    glucagon (Glucagen) injection 1 mg, 1 mg, intramuscular, q15 min PRN, Caitlyn Rodriguez MD    glucagon (Glucagen) injection 1 mg, 1 mg, intramuscular, q15 min PRN, Caitlyn Rodriguez MD    hydrALAZINE (Apresoline) tablet 50 mg, 50 mg, oral, TID, Bruce Ballesteros MD    insulin glargine (Lantus) injection 40 Units, 40 Units, subcutaneous, Nightly, Caitlyn Rodriguez MD     isosorbide mononitrate ER (Imdur) 24 hr tablet 60 mg, 60 mg, oral, Daily, Bruce Ballesteros MD    lidocaine 4 % patch 1 patch, 1 patch, transdermal, Daily, Caitlyn Rodriguez MD, 1 patch at 11/08/24 3467    [Held by provider] loperamide (Imodium) capsule 4 mg, 4 mg, oral, BID, Bruce Ballesteros MD    prazosin (Minipress) capsule 2 mg, 2 mg, oral, Nightly, Bruce Ballesteros MD    sacubitriL-valsartan (Entresto)  mg per tablet 1 tablet, 1 tablet, oral, BID, Bruce Ballesteros MD    spironolactone (Aldactone) tablet 25 mg, 25 mg, oral, Daily, Bruce Ballesteros MD    Current Outpatient Medications:     amiodarone (Pacerone) 200 mg tablet, Take 1 tablet (200 mg) by mouth once daily. Do not start before February 16, 2024., Disp: 90 tablet, Rfl: 0    aspirin 81 mg chewable tablet, Chew and swallow 1 tablet by mouth daily., Disp: 30 tablet, Rfl: 2    atorvastatin (Lipitor) 40 mg tablet, Take 1 tablet (40 mg) by mouth once daily., Disp: 30 tablet, Rfl: 3    carvedilol (Coreg) 25 mg tablet, Take 1 tablet (25 mg) by mouth 2 times a day., Disp: 60 tablet, Rfl: 2    dapagliflozin propanediol (Farxiga) 10 mg, Take 1 tablet (10 mg) by mouth once daily., Disp: 30 tablet, Rfl: 3    Dexcom G4 platinum  (Dexcom G7 ) misc, Use as instructed, Disp: 1 each, Rfl: 3    Dexcom G7 Sensor device, Please use to check your glucose before meals and at night., Disp: 1 each, Rfl: 0    Entresto  mg tablet, Take 1 tablet by mouth 2 times a day., Disp: 60 tablet, Rfl: 3    FeroSuL 325 mg (65 mg iron) tablet, Take 1 tablet by mouth twice daily., Disp: 60 tablet, Rfl: 3    furosemide (Lasix) 20 mg tablet, Take 1 tablet (20 mg) by mouth once daily as needed (for weight gain >3 lbs in 3 days, Lower extremity edema/ shortness of breath), Disp: 30 tablet, Rfl: 0    hydrALAZINE (Apresoline) 50 mg tablet, Take 1 tablet (50 mg) by mouth 3 times a day., Disp: 270 tablet, Rfl: 3    insulin glargine (Lantus) 100 unit/mL (3 mL) pen, Inject 40 Units  under the skin once daily at bedtime. Take as directed per insulin instructions., Disp: 36 mL, Rfl: 3    isosorbide mononitrate ER (Imdur) 60 mg 24 hr tablet, Take 1 tablet (60 mg) by mouth once daily. Do not crush or chew., Disp: 90 tablet, Rfl: 3    loperamide (Imodium) 2 mg capsule, Take 2 capsules by mouth every 12 hours if needed for diarrhea. (Patient taking differently: Take 2 capsules (4 mg) by mouth 2 times a day.), Disp: 30 capsule, Rfl: 2    prazosin (Minipress) 2 mg capsule, Take 1 capsule (2 mg) by mouth once daily at bedtime., Disp: 30 capsule, Rfl: 3    spironolactone (Aldactone) 25 mg tablet, Take 1 tablet (25 mg) by mouth once daily., Disp: 30 tablet, Rfl: 3    zolpidem (Ambien) 5 mg tablet, Take 1 tablet (5 mg) by mouth as needed at bedtime for sleep., Disp: 14 tablet, Rfl: 0       A/P    Delvis Ventura Jr. is a 64-year-old history of diabetes, cerebral palsy, nonischemic cardiomyopathy (on [facility] 8/2018 he had trivial CAD), HFrEF (TTE 10/21 LVEF 15 to 20% with decreased RV systolic function, normal stress test 6/2022) s/p S ICD ([employer] 3/2023) with ICD explant on 4/24/2023 due to infection, s/p single-chamber ICD reimplantation 4/28/2023 sent from heart failure clinic for evaluation of chest pain, of unclear etiology.     At this point, ACS is less likely (unremarkable tropes and EKG). He is at lower risk for PE considering that he is not having tachycardia, or new oxygen requirement. his chest pain also did not improve with nitroglycerin sublingual tablet or lidocaine patch, although pain was reproducible with chest palpation at one point. ESR/CRP normal, no improvement in pain while leaning forward- less likely pericarditis. BNP is not elevated and his current weight is 91.6kg (discharge weight 94.3kg after hospitalization for HF exacerbation and IV diuresis 2/2024)/.  but he does have new dry cough, pitting edema in lower extremities, abdominal distention.  Don't feel that  "patient is volume overloaded requiring IV diuresis at this point - will monitor to see if there is improvement with lidocaine patch.     Regarding his numbness in his wrist, it is bilateral without cervical pain. Lower concern for spinal cord pathology. Tinnel's sign positive. Suspect carpal tunnel.     Plan:   Chest pain - lidocaine patch for suspected costochondritis, continue home cardiac meds, reassess volume status in AM  Retrial Pepcid (2024- prescribed pepcid for GERD-like symptoms)  Resume home meds as above  Cardiac meds: amio 200 daily, ASA 81, atorva 40 at bedtime, coreg 25 bid, farxiga 10 daily, hydral 50 tid, imdur 50 daily, entresto 97/103 bid, davina 25 dailly  Reassess volume status in AM for Diuretic need. Currrently his weight is below his last presumed dry weight. BNP is unremarkable.   DM2 - c/w home regimen: lantus 40 units nightly, SSI, hypoglycemia protocol   hold loperamide - chronic diarrhea (no sick symptoms prior to admit) but abdomen distended, will monitor   ?carpal tunnel and wrist numbness - order wrist splint   PT/OT consult - patient reports weakness over last few years.  Hgb drop since 2024, but no clinical concerns for bleedign  See Dr Rodriguez's excellent H&P for more details    Diet: cardiac  DVT ppx: lovenox  K>4, Mag>2      Full code  No NOK - doesn't report there is anyone he can trsut and all his close family is   reports he has documentation in chart saying that \"he doesn't want to be a vegetable\" but is okay with BLS if reversible cause  "

## 2024-11-08 NOTE — ED PROVIDER NOTES
History of Present Illness     History provided by: Patient  Limitations to History: None    HPI:  Delvis Ventura Jr. is a 64-year-old history of diabetes, cerebral palsy, nonischemic cardiomyopathy (on [facility] 2018 he had trivial CAD), HFrEF (TTE 10/21 LVEF 15 to 20% with decreased RV systolic function, normal stress test 2022) s/p S ICD ([employer] 3/2023) with ICD explant on 2023 due to infection, s/p single-chamber ICD reimplantation 2023 sent from heart failure clinic for evaluation of chest pain.  Patient complaining of nonradiating midsternal chest pain with some associated nausea that started at 7 AM.  Also having some associated paresthesias to the bilateral hands, states that he felt normal going to bed last night.  Has some mild associated shortness of breath, has been having worsening dyspnea on exertion.  Endorses compliance with all of his home medications, denies any lower extremity edema.    Physical Exam   Triage vitals:  T 36.7 °C (98 °F)  HR 97  /85  RR 18  O2 96 % None (Room air)    General: Awake, alert, in no acute distress, non-toxic appearing  Eyes: Gaze conjugate.  No scleral icterus or injection  HENT: Normo-cephalic, atraumatic. No stridor. External auditory canals without erythema or drainage.  TM's normal in appearance bilaterally without erythema, or bulging  CV: Regular rate, regular rhythm. No MRG. Cap refill less than 2 seconds, radial pulse 2+ bilaterally.  Resp: Breathing non-labored, clear to auscultation bilaterally, no accessory muscle use  GI: Soft, mildly distended, non-tender. No rebound or guarding.  MSK/Extremities: No gross bony deformities. Moving all extremities.  No lower extremity pitting edema.  Skin: Warm. Appropriate color  Neuro: Awake and Alert. Face symmetric. Appropriate tone. Moving all extremities equally.    Medical Decision Making & ED Course   Medical Decision Makin-year-old male history of diabetes, cerebral  palsy, nonischemic cardiomyopathy, CHF s/p ICD presenting from heart failure clinic (Dr. Hernandez) with chest pain.  Vitals stable, no evidence of STEMI on ECG, no acute ischemic changes.  Treated with aspirin and sublingual nitroglycerin.  Troponin with a delta negative.  Last stress test 2022.  Has not felt defibrillator fire.  DDx including cardiac ischemia/ACS, pneumonia, costochondritis, viral illness, volume overload.  Patient has moderate heart score without recent stress test, given risk factors will admit for telemetry monitoring and cardiac workup.  ----  Scoring Tools Utilized:   HEART Score: 4     Social Determinants of Health which Significantly Impact Care: None identified     EKG Independent Interpretation: See ED course for my independent interpretation if ECG was obtained.    Independent Result Review and Interpretation: Please see MDM and ED course for my independent interpretation of the results    Chronic conditions affecting the patient's care: Please see H&P and MDM    The patient was discussed with the following consultants/services: None    Care Considerations: As document above in [facility]    ED Course:  ED Course as of 11/08/24 1313   Fri Nov 08, 2024   1105 ECG 12 lead  Normal sinus rhythm rate 97, normal axis.  No acute ST segment elevations or depressions.  , , QTc 485. [KR]   1203 Troponin I, High Sensitivity (CMC): 18 [KR]   1208 BNP: 70 [KR]   1232 Chest x-ray showing stable enlargement of cardiac silhouette, AICD in place.  Increased central markings and pulmonary vascular congestion.  Also note of a possible infiltrate versus atelectasis in the right base medially with limited inspiratory effort, patient has no productive cough, no leukocytosis with lower suspicion for pneumonia however could be early in course. [KR]   1312 Troponin I, High Sensitivity (CMC): 18 [KR]      ED Course User Index  [KR] Fidelia Hooks DO         Diagnoses as of 11/08/24 1313   Chest pain,  unspecified type     Disposition   As a result of their workup, the patient will require admission to the hospital.  The patient was informed of his diagnosis.  The patient was given the opportunity to ask questions and I answered them. The patient agreed to be admitted to the hospital.    Procedures   Procedures    Patient seen and discussed with attending physician    Fidelia Hooks DO  Emergency Medicine     Fidelia Hooks DO  Resident  11/08/24 8427

## 2024-11-08 NOTE — PROGRESS NOTES
Pharmacy Medication History Review    Delvis Ventura Jr. is a 64 y.o. male admitted for Chest pain, unspecified type. Pharmacy reviewed the patient's [id number] medications and allergies for accuracy.    Medications ADDED:  Gabapentin  Famotidine  Trazodone  Freestyle Maite 3  Medications CHANGED:  Carvedilol- Pt now takes 50 mg twice daily instead of 25 mg  Apresoline- Pt takes 75 mg instead of 50 mg   Imdur- Pt takes 120 mg instead of 60 mg  Medications REMOVED/NO LONGER TAKING:   N/A    The list below reflects the updated PTA list.   Prior to Admission Medications   Prescriptions Last Dose Informant   Dexcom G4 platinum  (Dexcom G7 ) misc Not Taking Self   Sig: Use as instructed   Patient not taking: Reported on 2024   Dexcom G7 Sensor device Not Taking Self   Sig: Please use to check your glucose before meals and at night.   Patient not taking: Reported on 2024   Entresto  mg tablet 2024 Morning Self   Sig: Take 1 tablet by mouth 2 times a day.   FeroSuL 325 mg (65 mg iron) tablet 2024 Morning Self   Sig: Take 1 tablet by mouth twice daily.   FreeStyle Maite 3 May misc  Self   Sig: Inject 1 Device under the skin if needed. Use as instructed   amiodarone (Pacerone) 200 mg tablet 2024 Morning Self   Sig: Take 1 tablet (200 mg) by mouth once daily. Do not start before 2024.   aspirin 81 mg chewable tablet 2024 Morning Self   Sig: Chew and swallow 1 tablet by mouth daily.   atorvastatin (Lipitor) 40 mg tablet 2024 Morning Self   Sig: Take 1 tablet (40 mg) by mouth once daily.   blood-glucose sensor (FreeStyle Maite 3 Plus Sensor) device  Self   Si each.   carvedilol (Coreg) 25 mg tablet 2024 Morning Self   Sig: Take 1 tablet (25 mg) by mouth 2 times a day.   Patient taking differently: Take 2 tablets (50 mg) by mouth 2 times a day.   dapagliflozin propanediol (Farxiga) 10 mg 2024 Morning Self   Sig: Take 1  tablet (10 mg) by mouth once daily.   famotidine (Pepcid) 10 mg tablet 11/7/2024 Morning Self   Sig: Take 1 tablet (10 mg) by mouth once daily at bedtime.   furosemide (Lasix) 20 mg tablet 11/8/2024 Morning Self   Sig: Take 1 tablet (20 mg) by mouth once daily as needed (for weight gain >3 lbs in 3 days, Lower extremity edema/ shortness of breath)   gabapentin (Neurontin) 300 mg capsule 11/8/2024 Morning Self   Sig: Take 1 capsule (300 mg) by mouth once daily at bedtime.   hydrALAZINE (Apresoline) 50 mg tablet  Self   Sig: Take 1 tablet (50 mg) by mouth 3 times a day.   Patient taking differently: Take 1.5 tablets (75 mg) by mouth 3 times a day.   insulin glargine (Lantus) 100 unit/mL (3 mL) pen 11/7/2024 Evening Self   Sig: Inject 40 Units under the skin once daily at bedtime. Take as directed per insulin instructions.   isosorbide mononitrate ER (Imdur) 60 mg 24 hr tablet 11/8/2024 Morning Self   Sig: Take 1 tablet (60 mg) by mouth once daily. Do not crush or chew.   Patient taking differently: Take 2 tablets (120 mg) by mouth once daily. Do not crush or chew.   loperamide (Imodium) 2 mg capsule 11/8/2024 Morning Self   Sig: Take 2 capsules by mouth every 12 hours if needed for diarrhea.   Patient taking differently: Take 2 capsules (4 mg) by mouth 2 times a day.   prazosin (Minipress) 2 mg capsule 11/7/2024 Evening Self   Sig: Take 1 capsule (2 mg) by mouth once daily at bedtime.   spironolactone (Aldactone) 25 mg tablet  Self   Sig: Take 1 tablet (25 mg) by mouth once daily.   traZODone (Desyrel) 100 mg tablet 11/7/2024 Evening Self   Sig: Take 1 tablet (100 mg) by mouth once daily at bedtime.   zolpidem (Ambien) 5 mg tablet  Self   Sig: Take 1 tablet (5 mg) by mouth as needed at bedtime for sleep.      Facility-Administered Medications: None        The list below reflects the updated allergy list. Please review each documented allergy for additional clarification and justification.  Allergies  Reviewed by  "Breanna Phoenix on 11/8/2024        Severity Reactions Comments    Metformin Medium Diarrhea, GI Upset, GI intolerance, Other, Nausea/vomiting             Patient declines M2B at discharge.     Sources:   [facility]  Pharmacy dispense history  Patient interview Moderate historian  Chart Review  Care Everywhere     Additional Comments:  Patient reported taking the following meds despite there no recent fill history: Pacerone, Atorvastatin, Lasix, Lantus Minipress, Aldactone, Ambien.      BREANNA RENEE PHOENIX  Pharmacy Technician  11/08/24     Secure Chat preferred   If no response call [id number] or [employer] \"Med Rec\"   "

## 2024-11-08 NOTE — ED TRIAGE NOTES
Pt brought down from the HF clinic for c/o non radiating midsternum CP and nausea that started at 0700. Pt states he did not take nitroglycerin or ASA before his doctor appt.

## 2024-11-08 NOTE — H&P
History Of Present Illness  Delvis Ventura Jr. is a 64 y.o. 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     In the ED, patient is hypertensive, but not tachycardic. Troponin's negative x 2, BNP flat at 70. EKG without REESE and CXR with some increased central markings. Patient was admitted to cardiology for observation and symptom management,     Per the patient, he woke up this morning at 7 am with chest pain that is non radiating. It is in the middle of his chest and is not relieved by nitroglycerin x 2. He endorses chronic orthopnea, but denies fevers, chills, n/v/d, dysuria, cough, sore throat. He has had no recent travel or sick symptoms, but is largely wheelchair bound for the last 10 years. He is able to carry out all ADLs without issue.       Past Medical History  He has a past medical history of Benign essential hypertension (02/19/2016), Cerebral palsy (02/19/2016), Chronic combined systolic and diastolic heart failure, NYHA class 3 (09/25/2023), Chronic systolic (congestive) heart failure (09/01/2020), History of DVT (deep vein thrombosis) (01/03/2024), History of pulmonary embolus (PE) (01/03/2024), and Mixed hyperlipidemia (02/19/2016).    Surgical History  He has no past surgical history on file.     Social History  He reports that he has never smoked. He has never been exposed to tobacco smoke. He has never used smokeless tobacco. He reports current alcohol use. He reports that he does not use drugs.    Family History  No family history on file.     Allergies  Metformin    Review of Systems   Constitutional:  Negative for chills,  diaphoresis and fever.   HENT:  Negative for sore throat.    Respiratory:  Positive for chest tightness. Negative for cough and shortness of breath.    Cardiovascular:  Positive for chest pain. Negative for palpitations.   Gastrointestinal:  Negative for diarrhea, nausea and vomiting.   Genitourinary:  Negative for dysuria.   Skin:  Negative for rash.   Neurological:  Negative for dizziness and light-headedness.        Physical Exam  Constitutional:       General: He is not in acute distress.     Appearance: He is obese. He is not toxic-appearing.   HENT:      Nose: No congestion or rhinorrhea.   Eyes:      Extraocular Movements: Extraocular movements intact.   Neck:      Comments: JVD hard to assess 2/2 body habitus  Cardiovascular:      Rate and Rhythm: Normal rate and regular rhythm.      Pulses: Normal pulses.      Heart sounds: Normal heart sounds.      Comments: ICD in left chest, no signs of a pocket infection.  Pulmonary:      Breath sounds: No wheezing or rhonchi.   Chest:      Chest wall: Tenderness (over sternum) present.   Abdominal:      General: Bowel sounds are normal. There is distension.      Tenderness: There is no abdominal tenderness.   Musculoskeletal:      Right lower leg: Edema present.      Left lower leg: Edema present.   Skin:     General: Skin is warm and dry.   Neurological:      Mental Status: He is alert and oriented to person, place, and time.      Comments: MAEx4 spontaneously and against gravity.          Last Recorded Vitals  /84   Pulse 65   Temp 36.6 °C (97.9 °F)   Resp 13   Wt 91.6 kg (202 lb)   SpO2 95%     Relevant Results  Results for orders placed or performed during the hospital encounter of 11/08/24 (from the past 24 hours)   CBC and Auto Differential   Result Value Ref Range    WBC 5.1 4.4 - 11.3 x10*3/uL    nRBC 0.0 0.0 - 0.0 /100 WBCs    RBC 4.67 4.50 - 5.90 x10*6/uL    Hemoglobin 13.9 13.5 - 17.5 g/dL    Hematocrit 41.4 41.0 - 52.0 %    MCV 89 80 - 100 fL     MCH 29.8 26.0 - 34.0 pg    MCHC 33.6 32.0 - 36.0 g/dL    RDW 12.3 11.5 - 14.5 %    Platelets 195 150 - 450 x10*3/uL    Neutrophils % 70.4 40.0 - 80.0 %    Immature Granulocytes %, Automated 0.2 0.0 - 0.9 %    Lymphocytes % 20.7 13.0 - 44.0 %    Monocytes % 7.1 2.0 - 10.0 %    Eosinophils % 1.2 0.0 - 6.0 %    Basophils % 0.4 0.0 - 2.0 %    Neutrophils Absolute 3.58 1.20 - 7.70 x10*3/uL    Immature Granulocytes Absolute, Automated 0.01 0.00 - 0.70 x10*3/uL    Lymphocytes Absolute 1.05 (L) 1.20 - 4.80 x10*3/uL    Monocytes Absolute 0.36 0.10 - 1.00 x10*3/uL    Eosinophils Absolute 0.06 0.00 - 0.70 x10*3/uL    Basophils Absolute 0.02 0.00 - 0.10 x10*3/uL   Comprehensive metabolic panel   Result Value Ref Range    Glucose 158 (H) 74 - 99 mg/dL    Sodium 139 136 - 145 mmol/L    Potassium 3.7 3.5 - 5.3 mmol/L    Chloride 104 98 - 107 mmol/L    Bicarbonate 26 21 - 32 mmol/L    Anion Gap 13 10 - 20 mmol/L    Urea Nitrogen 10 6 - 23 mg/dL    Creatinine 0.65 0.50 - 1.30 mg/dL    eGFR >90 >60 mL/min/1.73m*2    Calcium 9.0 8.6 - 10.6 mg/dL    Albumin 4.1 3.4 - 5.0 g/dL    Alkaline Phosphatase 93 33 - 136 U/L    Total Protein 7.0 6.4 - 8.2 g/dL    AST 12 9 - 39 U/L    Bilirubin, Total 0.4 0.0 - 1.2 mg/dL    ALT 10 10 - 52 U/L   Magnesium   Result Value Ref Range    Magnesium 1.73 1.60 - 2.40 mg/dL   B-Type Natriuretic Peptide   Result Value Ref Range    BNP 70 0 - 99 pg/mL   Troponin I, High Sensitivity, Initial   Result Value Ref Range    Troponin I, High Sensitivity (CMC) 18 0 - 53 ng/L   Light Blue Top   Result Value Ref Range    Extra Tube Hold for add-ons.    C-reactive protein   Result Value Ref Range    C-Reactive Protein 0.96 <1.00 mg/dL   Troponin, High Sensitivity, 1 Hour   Result Value Ref Range    Troponin I, High Sensitivity (CMC) 18 0 - 53 ng/L         Assessment/Plan   Assessment & Plan  Chest pain, unspecified type      Delvis Gunnar Franco Audrey Leach is a 64 y.o. male with complex PMHx including T2DM, cerebral  palsy (wheelchair bound), nonischemic cardiomyopathy ([facility] 8/2018: trivial CAD), HFrEF (01/24 TTE: 15-20%) complicated by infected ICD (wound Cx: + staph aureus) with explantation and subsequent single chamber ICD re-implantation 4/28/2023 who is presenting directly from his outpatient heart failure cardiology appointment where he had complaints of new-onset chest pain since 7am on 11/8 not relieved by nitroglycerin, positional, or associated with diaphoresis, n/v/post-prandial. In the ED, VSS, troponin's ad EKG negative for ACS, BNP was not elevated to suggest ADHF, although CXR with some vascular congestion. Patient was admitted to cardiology floor for observation and further workup.    #Chest pain of unclear etiology  #HFrEF (EF 15-20% TTE Jan 2024) s/p ICD  #NICM  #non obstructive CAD  -acute onset 11/8, not relieved by nitroglycerin  -ED workup unremarkable  -Admission weight 92 kg/ discharge weight in Feb 2024 for ADHF: 94 kg  Plan:  -Continue home meds:   -ASA, atorvastatin   -GDMT: Coreg 50 BID, Farxiga 10 mg daily, entresto  daily, aldactone 25 mg daily   -anti-HTNs: hydral 50 TID, imdur 60 mg daily  -Daily weights  -Reassess volume status and need for diuresis in the am  -F/up ESR, CRP  -Lidocaine patch   -Consider TTE in the am    #Hx of NSVT/VT  -cotninue amiodarone 200 daily  -single chamber ICD with hx of prior device infection and removal    #IDDM  -lantus 40 units at night  -ldssi  -jardiance as for gdmt above     #insomnia  -takes prazasosin and zolpidem prn     #Bilateral wrist pain  -Carpal tunnel?  Plan:  -wrist splint for b/l hands    #hx of diarrhea  -abdominal distension on exam  -pt doesn't endorse constipation  Plan:  -Hold home loperamide for now     F: prn, EF 15-20%  E: prn  A: pIVs  DVT PPX: lovenox  Diet: cardiac  GI ppx: Protonix prn  Code: full    The plan is not finalized until the patient is seen and discussed with the attending, Dr. Koenig.      Caitlyn Rodriguez MD  PGY-1  Anesthesiology

## 2024-11-09 ENCOUNTER — APPOINTMENT (OUTPATIENT)
Dept: CARDIOLOGY | Facility: HOSPITAL | Age: 64
End: 2024-11-09
Payer: COMMERCIAL

## 2024-11-09 LAB
ALBUMIN SERPL BCP-MCNC: 3.7 G/DL (ref 3.4–5)
ANION GAP SERPL CALC-SCNC: 15 MMOL/L (ref 10–20)
AORTIC VALVE PEAK VELOCITY: 1.46 M/S
AV PEAK GRADIENT: 9 MMHG
AVA (PEAK VEL): 1.84 CM2
BASOPHILS # BLD AUTO: 0.02 X10*3/UL (ref 0–0.1)
BASOPHILS NFR BLD AUTO: 0.4 %
BUN SERPL-MCNC: 18 MG/DL (ref 6–23)
CALCIUM SERPL-MCNC: 8.7 MG/DL (ref 8.6–10.6)
CARDIAC TROPONIN I PNL SERPL HS: 17 NG/L (ref 0–53)
CHLORIDE SERPL-SCNC: 105 MMOL/L (ref 98–107)
CO2 SERPL-SCNC: 24 MMOL/L (ref 21–32)
CREAT SERPL-MCNC: 0.74 MG/DL (ref 0.5–1.3)
EGFRCR SERPLBLD CKD-EPI 2021: >90 ML/MIN/1.73M*2
EJECTION FRACTION APICAL 4 CHAMBER: 48.8
EJECTION FRACTION: 61 %
EOSINOPHIL # BLD AUTO: 0.08 X10*3/UL (ref 0–0.7)
EOSINOPHIL NFR BLD AUTO: 1.7 %
ERYTHROCYTE [DISTWIDTH] IN BLOOD BY AUTOMATED COUNT: 12.5 % (ref 11.5–14.5)
GLUCOSE BLD MANUAL STRIP-MCNC: 126 MG/DL (ref 74–99)
GLUCOSE BLD MANUAL STRIP-MCNC: 143 MG/DL (ref 74–99)
GLUCOSE BLD MANUAL STRIP-MCNC: 153 MG/DL (ref 74–99)
GLUCOSE BLD MANUAL STRIP-MCNC: 154 MG/DL (ref 74–99)
GLUCOSE BLD MANUAL STRIP-MCNC: 208 MG/DL (ref 74–99)
GLUCOSE SERPL-MCNC: 137 MG/DL (ref 74–99)
HCT VFR BLD AUTO: 37.6 % (ref 41–52)
HGB BLD-MCNC: 12.3 G/DL (ref 13.5–17.5)
IMM GRANULOCYTES # BLD AUTO: 0.01 X10*3/UL (ref 0–0.7)
IMM GRANULOCYTES NFR BLD AUTO: 0.2 % (ref 0–0.9)
LEFT VENTRICLE INTERNAL DIMENSION DIASTOLE: 5 CM (ref 3.5–6)
LEFT VENTRICULAR OUTFLOW TRACT DIAMETER: 2.1 CM
LYMPHOCYTES # BLD AUTO: 1.51 X10*3/UL (ref 1.2–4.8)
LYMPHOCYTES NFR BLD AUTO: 31.9 %
MAGNESIUM SERPL-MCNC: 2.07 MG/DL (ref 1.6–2.4)
MCH RBC QN AUTO: 30 PG (ref 26–34)
MCHC RBC AUTO-ENTMCNC: 32.7 G/DL (ref 32–36)
MCV RBC AUTO: 92 FL (ref 80–100)
MITRAL VALVE E/A RATIO: 0.92
MONOCYTES # BLD AUTO: 0.43 X10*3/UL (ref 0.1–1)
MONOCYTES NFR BLD AUTO: 9.1 %
NEUTROPHILS # BLD AUTO: 2.69 X10*3/UL (ref 1.2–7.7)
NEUTROPHILS NFR BLD AUTO: 56.7 %
NRBC BLD-RTO: 0 /100 WBCS (ref 0–0)
PHOSPHATE SERPL-MCNC: 2.7 MG/DL (ref 2.5–4.9)
PLATELET # BLD AUTO: 208 X10*3/UL (ref 150–450)
POTASSIUM SERPL-SCNC: 3.7 MMOL/L (ref 3.5–5.3)
RBC # BLD AUTO: 4.1 X10*6/UL (ref 4.5–5.9)
RIGHT VENTRICLE FREE WALL PEAK S': 8.85 CM/S
SODIUM SERPL-SCNC: 140 MMOL/L (ref 136–145)
TRICUSPID ANNULAR PLANE SYSTOLIC EXCURSION: 2 CM
WBC # BLD AUTO: 4.7 X10*3/UL (ref 4.4–11.3)

## 2024-11-09 PROCEDURE — 2500000002 HC RX 250 W HCPCS SELF ADMINISTERED DRUGS (ALT 637 FOR MEDICARE OP, ALT 636 FOR OP/ED)

## 2024-11-09 PROCEDURE — 93321 DOPPLER ECHO F-UP/LMTD STD: CPT | Performed by: STUDENT IN AN ORGANIZED HEALTH CARE EDUCATION/TRAINING PROGRAM

## 2024-11-09 PROCEDURE — 83735 ASSAY OF MAGNESIUM: CPT

## 2024-11-09 PROCEDURE — 93308 TTE F-UP OR LMTD: CPT | Performed by: STUDENT IN AN ORGANIZED HEALTH CARE EDUCATION/TRAINING PROGRAM

## 2024-11-09 PROCEDURE — 93325 DOPPLER ECHO COLOR FLOW MAPG: CPT | Performed by: STUDENT IN AN ORGANIZED HEALTH CARE EDUCATION/TRAINING PROGRAM

## 2024-11-09 PROCEDURE — 84484 ASSAY OF TROPONIN QUANT: CPT

## 2024-11-09 PROCEDURE — 99238 HOSP IP/OBS DSCHRG MGMT 30/<: CPT

## 2024-11-09 PROCEDURE — XXE2X19 MEASUREMENT OF CARDIAC OUTPUT, COMPUTER-AIDED ASSESSMENT, NEW TECHNOLOGY GROUP 9: ICD-10-PCS | Performed by: INTERNAL MEDICINE

## 2024-11-09 PROCEDURE — 2500000001 HC RX 250 WO HCPCS SELF ADMINISTERED DRUGS (ALT 637 FOR MEDICARE OP)

## 2024-11-09 PROCEDURE — 93010 ELECTROCARDIOGRAM REPORT: CPT | Performed by: INTERNAL MEDICINE

## 2024-11-09 PROCEDURE — 36415 COLL VENOUS BLD VENIPUNCTURE: CPT

## 2024-11-09 PROCEDURE — 97161 PT EVAL LOW COMPLEX 20 MIN: CPT | Mod: GP

## 2024-11-09 PROCEDURE — 97165 OT EVAL LOW COMPLEX 30 MIN: CPT | Mod: GO

## 2024-11-09 PROCEDURE — 93005 ELECTROCARDIOGRAM TRACING: CPT

## 2024-11-09 PROCEDURE — 93325 DOPPLER ECHO COLOR FLOW MAPG: CPT

## 2024-11-09 PROCEDURE — 85025 COMPLETE CBC W/AUTO DIFF WBC: CPT

## 2024-11-09 PROCEDURE — 80069 RENAL FUNCTION PANEL: CPT

## 2024-11-09 PROCEDURE — 2500000004 HC RX 250 GENERAL PHARMACY W/ HCPCS (ALT 636 FOR OP/ED)

## 2024-11-09 PROCEDURE — G0378 HOSPITAL OBSERVATION PER HR: HCPCS

## 2024-11-09 PROCEDURE — 82947 ASSAY GLUCOSE BLOOD QUANT: CPT

## 2024-11-09 PROCEDURE — 96372 THER/PROPH/DIAG INJ SC/IM: CPT

## 2024-11-09 RX ORDER — ISOSORBIDE MONONITRATE 60 MG/1
120 TABLET, EXTENDED RELEASE ORAL DAILY
Start: 2024-11-09 | End: 2024-12-09

## 2024-11-09 RX ORDER — CARVEDILOL 25 MG/1
50 TABLET ORAL 2 TIMES DAILY
Status: DISPENSED | OUTPATIENT
Start: 2024-11-09

## 2024-11-09 RX ORDER — ACETAMINOPHEN 325 MG/1
650 TABLET ORAL EVERY 6 HOURS
Status: DISPENSED | OUTPATIENT
Start: 2024-11-09

## 2024-11-09 RX ORDER — CAPSAICIN 0.75 MG/G
CREAM TOPICAL 2 TIMES DAILY
Status: DISPENSED | OUTPATIENT
Start: 2024-11-09

## 2024-11-09 RX ORDER — ISOSORBIDE MONONITRATE 60 MG/1
120 TABLET, EXTENDED RELEASE ORAL DAILY
Status: DISPENSED | OUTPATIENT
Start: 2024-11-09

## 2024-11-09 RX ORDER — OXYCODONE HYDROCHLORIDE 5 MG/1
5 TABLET ORAL ONCE
Status: COMPLETED | OUTPATIENT
Start: 2024-11-09 | End: 2024-11-09

## 2024-11-09 RX ORDER — FUROSEMIDE 40 MG/1
20 TABLET ORAL ONCE
Status: DISCONTINUED | OUTPATIENT
Start: 2024-11-09 | End: 2024-11-10

## 2024-11-09 RX ORDER — CARVEDILOL 25 MG/1
50 TABLET ORAL 2 TIMES DAILY
Start: 2024-11-09 | End: 2024-12-09

## 2024-11-09 RX ORDER — POTASSIUM CHLORIDE 1.5 G/1.58G
20 POWDER, FOR SOLUTION ORAL ONCE
Status: COMPLETED | OUTPATIENT
Start: 2024-11-09 | End: 2024-11-09

## 2024-11-09 ASSESSMENT — COGNITIVE AND FUNCTIONAL STATUS - GENERAL
DRESSING REGULAR UPPER BODY CLOTHING: A LITTLE
WALKING IN HOSPITAL ROOM: TOTAL
MOVING FROM LYING ON BACK TO SITTING ON SIDE OF FLAT BED WITH BEDRAILS: A LITTLE
TURNING FROM BACK TO SIDE WHILE IN FLAT BAD: A LITTLE
DAILY ACTIVITIY SCORE: 22
MOBILITY SCORE: 14
MOVING FROM LYING ON BACK TO SITTING ON SIDE OF FLAT BED WITH BEDRAILS: A LITTLE
TURNING FROM BACK TO SIDE WHILE IN FLAT BAD: A LITTLE
HELP NEEDED FOR BATHING: A LOT
STANDING UP FROM CHAIR USING ARMS: A LOT
DRESSING REGULAR LOWER BODY CLOTHING: TOTAL
HELP NEEDED FOR BATHING: A LITTLE
MOBILITY SCORE: 14
WALKING IN HOSPITAL ROOM: TOTAL
STANDING UP FROM CHAIR USING ARMS: A LOT
TOILETING: A LOT
MOVING FROM LYING ON BACK TO SITTING ON SIDE OF FLAT BED WITH BEDRAILS: A LITTLE
MOBILITY SCORE: 12
PERSONAL GROOMING: A LITTLE
CLIMB 3 TO 5 STEPS WITH RAILING: TOTAL
DRESSING REGULAR LOWER BODY CLOTHING: A LITTLE
MOVING TO AND FROM BED TO CHAIR: A LITTLE
HELP NEEDED FOR BATHING: A LITTLE
DRESSING REGULAR LOWER BODY CLOTHING: A LITTLE
TURNING FROM BACK TO SIDE WHILE IN FLAT BAD: A LOT
CLIMB 3 TO 5 STEPS WITH RAILING: TOTAL
DAILY ACTIVITIY SCORE: 15
CLIMB 3 TO 5 STEPS WITH RAILING: TOTAL
DAILY ACTIVITIY SCORE: 22
STANDING UP FROM CHAIR USING ARMS: A LOT
WALKING IN HOSPITAL ROOM: TOTAL

## 2024-11-09 ASSESSMENT — PAIN SCALES - GENERAL
PAINLEVEL_OUTOF10: 8
PAINLEVEL_OUTOF10: 5 - MODERATE PAIN
PAINLEVEL_OUTOF10: 5 - MODERATE PAIN
PAINLEVEL_OUTOF10: 7
PAINLEVEL_OUTOF10: 8

## 2024-11-09 ASSESSMENT — ACTIVITIES OF DAILY LIVING (ADL)
LACK_OF_TRANSPORTATION: NO
ADL_ASSISTANCE: INDEPENDENT
BATHING_ASSISTANCE: MODERATE
ADL_ASSISTANCE: INDEPENDENT

## 2024-11-09 ASSESSMENT — PAIN - FUNCTIONAL ASSESSMENT
PAIN_FUNCTIONAL_ASSESSMENT: 0-10

## 2024-11-09 NOTE — PROGRESS NOTES
Physical Therapy    Physical Therapy Evaluation    Patient Name: Delvis Ventura Jr.  MRN: [id number]  Department: [id number] NONV1  Room: [address] [place], [place] [address]  Today's Date: 11/9/2024   Time Calculation  Start Time: 1122  Stop Time: 1138  Time Calculation (min): 16 min    Assessment/Plan   PT Assessment  PT Assessment Results: Decreased strength, Decreased range of motion, Decreased endurance, Impaired balance, Decreased mobility, Decreased safety awareness, Pain  Rehab Prognosis: Good  Barriers to Discharge: none  Evaluation/Treatment Tolerance: Patient tolerated treatment well  Medical Staff Made Aware: Yes  Strengths: Attitude of self  Barriers to Participation: Comorbidities  End of Session Communication: Bedside nurse  Assessment Comment: pt admitted for chest pain. pt states hes notices increased weakness at BLE making ADLs and mobility increasingly difficult at home. pt benefits from continued PT at moderate intensity to improve strength, balance and mobility,  End of Session Patient Position: Bed, 3 rail up, Alarm off, not on at start of session (seated EOB)  IP OR SWING BED PT PLAN  Inpatient or Swing Bed: Inpatient  PT Plan  Treatment/Interventions: Bed mobility, Transfer training, Gait training, Stair training, Balance training, Strengthening, Endurance training, Range of motion, Therapeutic exercise, Therapeutic activity, Home exercise program, Positioning  PT Plan: Ongoing PT  PT Frequency: 3 times per week  PT Discharge Recommendations: Moderate intensity level of continued care  PT Recommended Transfer Status: Assist x1  PT - OK to Discharge: Yes (DC rec made)    Subjective   General Visit Information:  General  Reason for Referral: chest pain, deconditioning  Past Medical History Relevant to Rehab: T2DM, cerebral palsy (wheelchair bound), nonischemic cardiomyopathy ([facility] 8/2018: trivial CAD), HFrEF (01/24 TTE: 15-20%) complicated by infected ICD (wound Cx: + staph aureus) with explantation and  subsequent single chamber ICD re-implantation 4/28/2023  Family/Caregiver Present: No  Co-Treatment: OT  Co-Treatment Reason: Co-evaluation with OT for pt safety and to optimize pt's therapeutic potential  Prior to Session Communication: Bedside nurse  Patient Position Received: Bed, 3 rail up, Alarm off, not on at start of session  General Comment: pt supine alert and agreeable for PT  Home Living:  Home Living  Type of Home: Apartment  Lives With: Alone  Home Adaptive Equipment: Wheelchair-power  Home Layout: One level  Home Access: Elevator  Bathroom Shower/Tub: Tub/shower unit  Bathroom Accessibility:  (pt reports crawling into bathroom 2/2 WC not fitting)  Prior Level of Function:  Prior Function Per Pt/Caregiver Report  Level of [place]: Independent with ADLs and functional transfers, Independent with homemaking with ambulation  ADL Assistance: Independent  Homemaking Assistance: Independent  Ambulatory Assistance:  (primarily non-ambulatory, reports he completes squat pivots to/from wheelchair and commode)  Prior Function Comments: food delivered; uses [employer] for transport  Precautions:  Precautions  Medical Precautions: Fall precautions     Vital Signs (Past 2hrs)        Date/Time Vitals Session Patient Position Pulse Resp SpO2 BP MAP (mmHg)    11/09/24 1144 --  --  79  18  97 %  147/72  97                         Objective   Pain:  Pain Assessment  Pain Assessment: 0-10  0-10 (Numeric) Pain Score: 8  Pain Type: Acute pain  Pain Location: Chest  Cognition:  Cognition  Overall Cognitive Status: Within Functional Limits  Orientation Level: Oriented X4    General Assessments:                  Activity Tolerance  Endurance: Tolerates 10 - 20 min exercise with multiple rests    Sensation  Light Touch: Partial deficits in the RLE, Partial deficits in the LLE            Perception  Inattention/Neglect: Appears intact      Coordination  Movements are Fluid and Coordinated: No  Lower Body Coordination: hx  CP    Postural Control  Postural Control: Within Functional Limits    Static Sitting Balance  Static Sitting-Balance Support: Bilateral upper extremity supported, Feet supported  Static Sitting-Level of Assistance: Close supervision  Dynamic Sitting Balance  Dynamic Sitting-Balance Support: Bilateral upper extremity supported, Feet supported  Dynamic Sitting-Level of Assistance: Close supervision  Dynamic Sitting-Balance: Trunk control activities    Static Standing Balance  Static Standing-Balance Support:  (pt did not stand this session)  Functional Assessments:  Bed Mobility  Bed Mobility: Yes  Bed Mobility 1  Bed Mobility 1: Supine to sitting  Level of Assistance 1: Moderate assistance, +2  Bed Mobility Comments 1: HOB elevated; assist at hips    Transfers  Transfer: Yes  Transfer 1  Transfer From 1: Bed to  Transfer to 1: Wheelchair  Technique 1: Squat pivot  Transfer Level of Assistance 1: Minimum assistance, +2  Trials/Comments 1: assist blocking B knees; increased time to complete  Transfers 2  Transfer From 2: Wheelchair to  Transfer to 2: Bed  Technique 2: Squat pivot  Transfer Level of Assistance 2: Minimum assistance, +2, Minimal verbal cues    Ambulation/Gait Training  Ambulation/Gait Training Performed: No    Stairs  Stairs: No  Extremity/Trunk Assessments:  RLE   RLE : Exceptions to WFL  AROM RLE (degrees)  RLE AROM Comment: decreased terminal knee extension  PROM RLE (degrees)  RLE PROM Comment: increased tightness at hamstrings  Strength RLE  RLE Overall Strength: Greater than or equal to 3/5 as evidenced by functional mobility  LLE   LLE : Exceptions to WFL  PROM LLE (degrees)  LLE PROM Comment: increased tightness at hamstrings  Strength LLE  LLE Overall Strength: Greater than or equal to 3/5 as evidenced by functional mobility  Outcome Measures:  AMPAC Basic Mobility  Turning from your back to your side while in a flat bed without using bedrails: A little  Moving from lying on your back to sitting  on the side of a flat bed without using bedrails: A lot  Moving to and from bed to chair (including a wheelchair): A little  Standing up from a chair using your arms (e.g. wheelchair or bedside chair): A lot  To walk in hospital room: Total  Climbing 3-5 steps with railing: Total  Basic Mobility - Total Score: 12    Encounter Problems       Encounter Problems (Active)       Balance       STG - Maintains dynamic sitting balance without upper extremity support >/= 10 min supervision  (Progressing)       Start:  11/09/24    Expected End:  11/23/24               Mobility       pt will be independent with HEP program for BLEs to improve LE weakness  (Progressing)       Start:  11/09/24    Expected End:  11/23/24               PT Transfers       STG - Transfer from bed to chair CGA  (Progressing)       Start:  11/09/24    Expected End:  11/23/24            STG - Patient will perform bed mobility CGA (Progressing)       Start:  11/09/24    Expected End:  11/23/24               Pain - Adult              Education Documentation  Body Mechanics, taught by Melanie Stevens PT at 11/9/2024  1:28 PM.  Learner: Patient  Readiness: Acceptance  Method: Explanation  Response: Verbalizes Understanding    Precautions, taught by Melanie Stevens PT at 11/9/2024  1:28 PM.  Learner: Patient  Readiness: Acceptance  Method: Explanation  Response: Verbalizes Understanding    Mobility Training, taught by Melanie Stevens PT at 11/9/2024  1:28 PM.  Learner: Patient  Readiness: Acceptance  Method: Explanation  Response: Verbalizes Understanding    Education Comments  No comments found.        11/09/24 at 1:29 PM - Melanie Stevens PT

## 2024-11-09 NOTE — DISCHARGE INSTRUCTIONS
Discharge Instructions  Dear Delvis Ventura Jr.,    You were admitted to [facility] from the ED for chest pain. We did an extensive work-up, and all serious causes of chest pain were ruled out. You likely had costochondritis meaning inflammation of the cartilage connecting the ribs to the sternum,  which we treated with tylenol as needed, and started you on a Steroid taper. We continued you on all your home medications. Please continue to follow-up with your primary care provider and Dr. Hernandez.    Medication changes:  Start these medications: No medications to start  Stop these medications:  Change how you are taking these medications: Carvedilol 50 mg (two pills twice a day), Lipitor 40 mg at night, Gabapentin 100 mg three times a day, Imdur 120 mg once a day    If you would like to  your medications from another pharmacy, ask your pharmacy to contact [facility] pharmacy to transfer over the prescriptions    Appointments/Follow-Up:   We have requested an automated system to call you to schedule; however if you do not hear from them in 3 days, please call [facility] appointment line: [phone] or [phone] to make the appointment yourself    Please call and schedule appointment with your primary care doctor within 2 week, tell them that you were recently admitted to the hospital.   If you need to establish with new primary care doctor, please call and schedule an appointment with [facility]: phone: [phone]    Lab work needed: No need to make appointment or have prescription. Please go to any  labs to complete below    It was a pleasure taking care of you,  Your  Care Team

## 2024-11-09 NOTE — HOSPITAL COURSE
Delvis Ventura Jr. is a 64 y.o. male with complex PMHx including T2DM, cerebral palsy (wheelchair bound), nonischemic cardiomyopathy ([facility] 8/2018: trivial CAD), HFrEF (01/24 TTE: 15-20%) complicated by infected ICD (wound Cx: + staph aureus) with explantation and subsequent single chamber ICD re-implantation 4/28/2023, hx of historic provoked DVT who is presenting directly from his outpatient heart failure cardiology appointment (Dr. Hernandez) for chest pain. ACS was ruled out, and chest pain determined to be most likely 2/2 costochondritis.      CP resolved with prednisone 40 mg x5 days and gabapentin daily. TTE on 11/9 showed Left ventricular ejection fraction is normal, calculated by Yoo's biplane at 61%. There is normal right ventricular global systolic function. Pt reported continue scrotal pain and scrotal U/S with left sided varicocele. Urology was curbside consulted, said no further work up. He can follow outpatient with urology. Scrotal sling ordered for symptomatic management. Pt pending SNF placement. Patient developed mild pre-renal JOHN on 11/21 with hypotension. Gave fluids and blood pressure and creatinine improved to baseline. Pending was accepted to the rehab center of his choice. Patient was discharged on home GDMT, with minor changes included increasing his carvedilol to 50 mg a day, as well as increasing his Imdur to 120mg once a day for greater blood pressure control.

## 2024-11-09 NOTE — PROGRESS NOTES
Occupational Therapy    Evaluation    Patient Name: Delvis Ventura Jr.  MRN: [id number]  Department: [place] [address]  Room: [facility] [place]5060-A  Today's Date: 11/9/2024  Time Calculation  Start Time: 1122  Stop Time: 1138  Time Calculation (min): 16 min        Assessment:  Prognosis: Good  Barriers to Discharge: Decreased caregiver support  Evaluation/Treatment Tolerance: Patient limited by fatigue  Medical Staff Made Aware: Yes  End of Session Communication: Bedside nurse  End of Session Patient Position: Alarm off, not on at start of session (sitting EOB)  OT Assessment Results: Decreased ADL status, Decreased safe judgment during ADL, Decreased upper extremity strength, Decreased endurance  Prognosis: Good  Barriers to Discharge: Decreased caregiver support  Evaluation/Treatment Tolerance: Patient limited by fatigue  Medical Staff Made Aware: Yes  Strengths: Ability to acquire knowledge, Attitude of self  Barriers to Participation: Comorbidities, Support of Caregivers  Plan:  Treatment Interventions: ADL retraining, Functional transfer training, UE strengthening/ROM, Endurance training, Patient/family training, Equipment evaluation/education, Compensatory technique education  OT Frequency: 3 times per week  OT Discharge Recommendations: Moderate intensity level of continued care  Equipment Recommended upon Discharge:  (TBD)  OT Recommended Transfer Status: Assist of 2  OT - OK to Discharge:  (eval complete)  Treatment Interventions: ADL retraining, Functional transfer training, UE strengthening/ROM, Endurance training, Patient/family training, Equipment evaluation/education, Compensatory technique education    Subjective   Current Problem:  1. Chest pain, unspecified type  Transthoracic Echo (TTE) Limited    Transthoracic Echo (TTE) Limited      2. 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) with explantation and subsequent single chamber ICD re-implantation 4/28/2023  Family/Caregiver Present: No  Co-Treatment: PT  Co-Treatment Reason: Co-evaluation with PT for pt safety and to optimize pt's therapeutic potential  Prior to Session Communication: Bedside nurse  Patient Position Received: Bed, 3 rail up, Alarm off, not on at start of session  Preferred Learning Style: visual, verbal  General Comment: Pt pleasant and motivated to participate in therapy  Precautions:  Medical Precautions: Fall precautions    Pain:  Pain Assessment  Pain Assessment: 0-10  0-10 (Numeric) Pain Score: 8  Pain Type: Acute pain  Pain Location: Chest  Pain Interventions: Repositioned (RN present and aware)    Objective   Cognition:  Overall Cognitive Status: Within Functional Limits  Arousal/Alertness: Appropriate responses to stimuli  Orientation Level: Oriented X4  Following Commands: Follows all commands and directions without difficulty  Safety Judgment: Decreased awareness of need for safety precautions  Problem Solving: Assistance required to identify errors made  Insight: Mild  Impulsive: Mildly           Home Living:  Type of Home: Apartment  Lives With: Alone  Home Adaptive Equipment: Wheelchair-power  Home Layout: One level  Home Access: Elevator  Bathroom Shower/Tub: Tub/shower unit  Bathroom Accessibility: Reports wheelchair does not fit into bathroom, will crawl into bathroom  Prior Function:  Level of [place]: Independent with ADLs and functional transfers, Independent with homemaking with ambulation  Receives Help From:  (reports no outside assistance)  ADL Assistance: Independent  Homemaking Assistance: Independent  Ambulatory Assistance:  (primarily  non-ambulatory, reports he completes squat pivots to/from wheelchair and commode)  Prior Function Comments: has food delivered, reports uses [employer] for transportation    ADL:  Eating Assistance: Independent  Grooming Assistance: Stand by  Grooming Deficit: Setup (anticipated)  Bathing Assistance: Moderate  Bathing Deficit:  (anticipated)  UE Dressing Assistance: Minimal  UE Dressing Deficit:  (gown management)  LE Dressing Assistance: Total  LE Dressing Deficit: Don/doff L sock, Don/doff R sock  Toileting Assistance with Device: Maximal  Toileting Deficit:  (anticipated)  Activity Tolerance:  Endurance: Tolerates 10 - 20 min exercise with multiple rests  Bed Mobility/Transfers: Bed Mobility  Bed Mobility: Yes  Bed Mobility 1  Bed Mobility 1: Supine to sitting  Level of Assistance 1: Moderate assistance, +2  Bed Mobility Comments 1: HOB elevated, instructions for mechanics and positioning, assistance required at both trunk and BLEs    Transfers  Transfer: Yes  Transfer 1  Transfer From 1: Bed to  Transfer to 1: Wheelchair  Technique 1: Squat pivot  Transfer Device 1:  (none)  Transfer Level of Assistance 1: Minimum assistance, +2  Trials/Comments 1: Increased assistance for safety as pt limited by increased weakness but refusing close assistance. Limited ability to safely clear trunk over seated surfaces, assistance to block knees.    Sitting Balance:  Static Sitting Balance  Static Sitting-Balance Support: No upper extremity supported  Static Sitting-Level of Assistance: Independent  Dynamic Sitting Balance  Dynamic Sitting-Balance Support: No upper extremity supported  Dynamic Sitting-Level of Assistance: Independent     Vision:Vision - Basic Assessment  Current Vision: No visual deficits  Sensation:  Light Touch:  (diminished sensation in BLEs)  Strength:  Strength Comments: BUE strength grossly 3-/5  Perception:  Inattention/Neglect: Appears intact  Initiation: Appears intact  Motor Planning: Appears  intact  Perseveration: Not present  Coordination:  Movements are Fluid and Coordinated: No  Lower Body Coordination: hx of CP   Hand Function:  Gross Grasp: Functional  Coordination: Functional  Extremities: RUE   RUE : Within Functional Limits and LUE   LUE: Within Functional Limits    Outcome Measures:[facility] Daily Activity  Putting on and taking off regular lower body clothing: Total  Bathing (including washing, rinsing, drying): A lot  Putting on and taking off regular upper body clothing: A little  Toileting, which includes using toilet, bedpan or urinal: A lot  Taking care of personal grooming such as brushing teeth: A little  Eating Meals: None  Daily Activity - Total Score: 15    Brief Confusion Assessment Method (bCAM)  Feature 1: Altered Mental Status or Fluctuating Course: No  CAM Result: CAM -    Education Documentation  Body Mechanics, taught by Meghna Pike OT at 11/9/2024 12:33 PM.  Learner: Patient  Readiness: Acceptance  Method: Explanation, Demonstration  Response: Verbalizes Understanding, Demonstrated Understanding, Needs Reinforcement    ADL Training, taught by Meghna Pike OT at 11/9/2024 12:33 PM.  Learner: Patient  Readiness: Acceptance  Method: Explanation, Demonstration  Response: Verbalizes Understanding, Demonstrated Understanding, Needs Reinforcement    EDUCATION:  Education  Individual(s) Educated: Patient  Education Provided: Diagnosis & Precautions, Fall precautons, Risk and benefits of OT discussed with patient or other, POC discussed and agreed upon  Patient Response to Education: Patient/Caregiver Verbalized Understanding of Information    Goals:  Encounter Problems       Encounter Problems (Active)       ADLs       Patient with complete upper body dressing with modified independent level of assistance donning and doffing all UE clothes with AR PRN (Progressing)       Start:  11/09/24    Expected End:  11/23/24            Patient with complete lower body dressing with  minimal assist  level of assistance donning and doffing all LE clothes  with PRN adaptive equipment (Progressing)       Start:  11/09/24    Expected End:  11/23/24            Patient will complete daily grooming tasks with set-up level of assistance and PRN adaptive equipment while edge of bed . (Progressing)       Start:  11/09/24    Expected End:  11/23/24            Patient will complete toileting including hygiene clothing management/hygiene with minimal assist  level of assistance. (Progressing)       Start:  11/09/24    Expected End:  11/23/24               TRANSFERS       Patient will perform bed mobility modified independent level of assistance in order to improve safety and independence with mobility (Progressing)       Start:  11/09/24    Expected End:  11/23/24            Patient will complete functional transfer to wheelchair/commode with least restrictive device with modified independent level of assistance. (Progressing)       Start:  11/09/24    Expected End:  11/23/24

## 2024-11-09 NOTE — PROGRESS NOTES
11/09/24 0926   Discharge Planning   Living Arrangements Alone   Support Systems None   Assistance Needed none   Type of Residence Private residence   Number of Stairs to Enter Residence 0   Number of Stairs Within Residence 0   Do you have animals or pets at home? No   Who is requesting discharge planning? Provider   Home or Post Acute Services None   Expected Discharge Disposition Home   Does the patient need discharge transport arranged? No   Financial Resource Strain   How hard is it for you to pay for the very basics like food, housing, medical care, and heating? Not very   Housing Stability   In the last 12 months, was there a time when you were not able to pay the mortgage or rent on time? N   In the past 12 months, how many times have you moved where you were living? 1   At any time in the past 12 months, were you homeless or living in a shelter (including now)? N   Transportation Needs   In the past 12 months, has lack of transportation kept you from medical appointments or from getting medications? no   In the past 12 months, has lack of transportation kept you from meetings, work, or from getting things needed for daily living? No   Patient Choice   Provider Choice list and CMS website (https://medicare.gov/care-compare#search) for post-acute Quality and Resource Measure Data were provided and reviewed with: Patient   Patient / Family choosing to utilize agency / facility established prior to hospitalization No     Transitional Care Coordination Progress Note:  Patient discussed during interdisciplinary rounds.   Team members present: OFELIA SALINAS MD   Plan per Medical/Surgical team: Monitoring Pain   Payor: [employer]   Discharge disposition: Home   Potential Barriers: None   ADOD: 11-     Previous Home Care: None   DME: Wheelchair   Pharmacy: [facility]   Falls: None   PCP:  AICHA DE ANDA   Dialysis: None

## 2024-11-09 NOTE — PROGRESS NOTES
Delvis Ventura Jr. is a 64 y.o. male on day 0 of admission presenting with Chest pain, unspecified type.      Subjective     Patient reports recurrence of his chest pain this morning, which occurred when he was lying down. An EKG without ischemic changes, troponin pending. Per patient, lidocaine patch yesterday was not helpful for the midsternum stabbing pain he is experiencing. The patient has also experienced GERD before and he stats this pain is different.        Objective     Last Recorded Vitals  /81 (BP Location: Right arm, Patient Position: Lying)   Pulse 86   Temp 37 °C (98.6 °F) (Temporal)   Resp 18   Wt 97.1 kg (214 lb)   SpO2 97%   Intake/Output last 3 Shifts:    Intake/Output Summary (Last 24 hours) at 11/9/2024 1010  Last data filed at 11/8/2024 1845  Gross per 24 hour   Intake 50 ml   Output 850 ml   Net -800 ml       Admission Weight  Weight: 91.6 kg (202 lb) (11/08/24 1015)    Daily Weight  11/09/24 : 97.1 kg (214 lb)    Image Results  ECG 12 lead  Normal sinus rhythm  Possible Anterior infarct (cited on or before 10-FEB-2024)  Abnormal ECG  When compared with ECG of 10-FEB-2024 14:54,  QRS axis Shifted left    See ED provider note for full interpretation and clinical correlation  Confirmed by Christi Shrestha ([id number]) on 11/8/2024 11:13:11 PM  XR chest 2 views  Narrative: STUDY:  Chest Radiographs;  11/8/24 at 11:25 AM  INDICATION:  Chest pain, shortness of breath.  COMPARISON:  CTA chest and chest XR 1/3/24.  ACCESSION NUMBER(S):  [id number]  ORDERING CLINICIAN:  JANE SCHERER  TECHNIQUE:  Frontal and lateral chest.   FINDINGS:  CARDIOMEDIASTINAL SILHOUETTE:  The cardiomediastinal silhouette is enlarged of similar to the prior.   Left subclavian approach unipolar AICD is in place with intact  appearing lead extending to the right ventricle also present  previously     LUNGS:  Increased central markings are noted, accentuated at least in part by  limited inspiratory effort.   There is also possible developing  consolidation or atelectasis in the right base medially.  No pleural  effusion or pneumothorax.     ABDOMEN:  No remarkable upper abdominal findings.     BONES:  No acute osseous changes.  Impression: 1.Stable enlargement of the cardiomediastinal silhouette with  AICD in place.  2.Increased central markings which could be on the basis of  pulmonary vascular congestion or inflammatory change. There is also a  possible infiltrate or atelectasis in the right base medially.  Note  is also made of a contribution from limited inspiratory effort when  compared with the prior.  Signed by Kari Girard DO      Physical Exam  Constitutional:       General: He is not in acute distress.     Appearance: He is obese.   Eyes:      Extraocular Movements: Extraocular movements intact.   Neck:      Comments: No JVD appreciated. Thick neck circumference  Cardiovascular:      Rate and Rhythm: Normal rate and regular rhythm.      Heart sounds:      No friction rub.   Pulmonary:      Breath sounds: No wheezing, rhonchi or rales.   Musculoskeletal:      Right lower leg: Edema present.      Left lower leg: Edema present.      Comments: Reproducible pain on palpation of sternum. B/l pitting edema   Skin:     General: Skin is warm and dry.   Neurological:      General: No focal deficit present.      Mental Status: He is alert.         Relevant Results  Results for orders placed or performed during the hospital encounter of 11/08/24 (from the past 24 hours)   ECG 12 lead   Result Value Ref Range    Ventricular Rate 97 BPM    Atrial Rate 97 BPM    MA Interval 180 ms    QRS Duration 110 ms    QT Interval 382 ms    QTC Calculation(Bazett) 485 ms    P Axis 66 degrees    R Axis 7 degrees    T Axis 32 degrees    QRS Count 16 beats    Q Onset 211 ms    P Onset 121 ms    P Offset 175 ms    T Offset 402 ms    QTC Fredericia 448 ms   CBC and Auto Differential   Result Value Ref Range    WBC 5.1 4.4 - 11.3 x10*3/uL     nRBC 0.0 0.0 - 0.0 /100 WBCs    RBC 4.67 4.50 - 5.90 x10*6/uL    Hemoglobin 13.9 13.5 - 17.5 g/dL    Hematocrit 41.4 41.0 - 52.0 %    MCV 89 80 - 100 fL    MCH 29.8 26.0 - 34.0 pg    MCHC 33.6 32.0 - 36.0 g/dL    RDW 12.3 11.5 - 14.5 %    Platelets 195 150 - 450 x10*3/uL    Neutrophils % 70.4 40.0 - 80.0 %    Immature Granulocytes %, Automated 0.2 0.0 - 0.9 %    Lymphocytes % 20.7 13.0 - 44.0 %    Monocytes % 7.1 2.0 - 10.0 %    Eosinophils % 1.2 0.0 - 6.0 %    Basophils % 0.4 0.0 - 2.0 %    Neutrophils Absolute 3.58 1.20 - 7.70 x10*3/uL    Immature Granulocytes Absolute, Automated 0.01 0.00 - 0.70 x10*3/uL    Lymphocytes Absolute 1.05 (L) 1.20 - 4.80 x10*3/uL    Monocytes Absolute 0.36 0.10 - 1.00 x10*3/uL    Eosinophils Absolute 0.06 0.00 - 0.70 x10*3/uL    Basophils Absolute 0.02 0.00 - 0.10 x10*3/uL   Comprehensive metabolic panel   Result Value Ref Range    Glucose 158 (H) 74 - 99 mg/dL    Sodium 139 136 - 145 mmol/L    Potassium 3.7 3.5 - 5.3 mmol/L    Chloride 104 98 - 107 mmol/L    Bicarbonate 26 21 - 32 mmol/L    Anion Gap 13 10 - 20 mmol/L    Urea Nitrogen 10 6 - 23 mg/dL    Creatinine 0.65 0.50 - 1.30 mg/dL    eGFR >90 >60 mL/min/1.73m*2    Calcium 9.0 8.6 - 10.6 mg/dL    Albumin 4.1 3.4 - 5.0 g/dL    Alkaline Phosphatase 93 33 - 136 U/L    Total Protein 7.0 6.4 - 8.2 g/dL    AST 12 9 - 39 U/L    Bilirubin, Total 0.4 0.0 - 1.2 mg/dL    ALT 10 10 - 52 U/L   Magnesium   Result Value Ref Range    Magnesium 1.73 1.60 - 2.40 mg/dL   B-Type Natriuretic Peptide   Result Value Ref Range    BNP 70 0 - 99 pg/mL   Troponin I, High Sensitivity, Initial   Result Value Ref Range    Troponin I, High Sensitivity (CMC) 18 0 - 53 ng/L   Light Blue Top   Result Value Ref Range    Extra Tube Hold for add-ons.    C-reactive protein   Result Value Ref Range    C-Reactive Protein 0.96 <1.00 mg/dL   Troponin, High Sensitivity, 1 Hour   Result Value Ref Range    Troponin I, High Sensitivity (CMC) 18 0 - 53 ng/L   Sedimentation  rate, automated   Result Value Ref Range    Sedimentation Rate 25 (H) 0 - 20 mm/h   Hemoglobin A1C   Result Value Ref Range    Hemoglobin A1C 7.7 (H) See comment %    Estimated Average Glucose 174 Not Established mg/dL   POCT GLUCOSE   Result Value Ref Range    POCT Glucose 129 (H) 74 - 99 mg/dL   POCT GLUCOSE   Result Value Ref Range    POCT Glucose 143 (H) 74 - 99 mg/dL   Renal Function Panel   Result Value Ref Range    Glucose 137 (H) 74 - 99 mg/dL    Sodium 140 136 - 145 mmol/L    Potassium 3.7 3.5 - 5.3 mmol/L    Chloride 105 98 - 107 mmol/L    Bicarbonate 24 21 - 32 mmol/L    Anion Gap 15 10 - 20 mmol/L    Urea Nitrogen 18 6 - 23 mg/dL    Creatinine 0.74 0.50 - 1.30 mg/dL    eGFR >90 >60 mL/min/1.73m*2    Calcium 8.7 8.6 - 10.6 mg/dL    Phosphorus 2.7 2.5 - 4.9 mg/dL    Albumin 3.7 3.4 - 5.0 g/dL   CBC and Auto Differential   Result Value Ref Range    WBC 4.7 4.4 - 11.3 x10*3/uL    nRBC 0.0 0.0 - 0.0 /100 WBCs    RBC 4.10 (L) 4.50 - 5.90 x10*6/uL    Hemoglobin 12.3 (L) 13.5 - 17.5 g/dL    Hematocrit 37.6 (L) 41.0 - 52.0 %    MCV 92 80 - 100 fL    MCH 30.0 26.0 - 34.0 pg    MCHC 32.7 32.0 - 36.0 g/dL    RDW 12.5 11.5 - 14.5 %    Platelets 208 150 - 450 x10*3/uL    Neutrophils % 56.7 40.0 - 80.0 %    Immature Granulocytes %, Automated 0.2 0.0 - 0.9 %    Lymphocytes % 31.9 13.0 - 44.0 %    Monocytes % 9.1 2.0 - 10.0 %    Eosinophils % 1.7 0.0 - 6.0 %    Basophils % 0.4 0.0 - 2.0 %    Neutrophils Absolute 2.69 1.20 - 7.70 x10*3/uL    Immature Granulocytes Absolute, Automated 0.01 0.00 - 0.70 x10*3/uL    Lymphocytes Absolute 1.51 1.20 - 4.80 x10*3/uL    Monocytes Absolute 0.43 0.10 - 1.00 x10*3/uL    Eosinophils Absolute 0.08 0.00 - 0.70 x10*3/uL    Basophils Absolute 0.02 0.00 - 0.10 x10*3/uL   Magnesium   Result Value Ref Range    Magnesium 2.07 1.60 - 2.40 mg/dL   Troponin I, High Sensitivity   Result Value Ref Range    Troponin I, High Sensitivity (CMC) 17 0 - 53 ng/L   POCT GLUCOSE   Result Value Ref Range     POCT Glucose 126 (H) 74 - 99 mg/dL     Assessment & Plan  Chest pain, unspecified type    Delvis Ventura Jr. is a 64 y.o. 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 In the ED, VSS, troponin's ad EKG negative for ACS, BNP was not elevated to suggest ADHF, although CXR with some vascular congestion. Patient was admitted to cardiology floor for observation and further workup.    Pt had recurrence of his chest pain at 6am on 11/9. EKG was not concerning for ischemia, troponins are pending. On bedside examination, the pain is reproducible over his sternum, will continue supportive management of likely costochondritis. ESR and CRP were normal to mildly elevated, making this likely not pericarditis. Patient does not believe it is heart burn as he knows what that feels like and this stabbing pain feels different. Has a hx of provoked DVT in the past, however his clinical picture and Well score makes PE unlikely.        Updates 11/09:  -Lidocaine patch ineffective for chest pain per patient    -TTE today  -Pericarditis unlikely, positional sxs not endorsing. Esr 25, CRP 0.96   -Pain reproducible on palpation. Schedule tylenol. Can consider steroid dosepak if pain persists  -Home lasix 20 mg today       #Chest pain of unclear etiology  #HFrEF (EF 15-20% TTE Jan 2024) s/p ICD  #NICM  #non obstructive CAD  -acute onset 11/8, not relieved by nitroglycerin  -ED workup unremarkable  -Admission weight 92 kg/ discharge weight in Feb  2024 for ADHF: 94 kg  -Esr 25, CRP 0.96 . Not endorsing positional signs of pericarditis  -Pain reproducible on palpation, likely costochondritis   Plan:  -TTE   -Continue home meds:              -ASA, atorvastatin              -GDMT: Coreg 50 BID, Farxiga 10 mg daily, entresto  daily, aldactone 25 mg daily              -anti-HTNs: hydral 75 TID, imdur 120 mg daily  -Daily weights  -Lasix 20 mg po 11/9  -Lidocaine patch   -Scheduled tylenol +/- steroid dosepak  -Close cardiology follow-up with Dr. Hernandez upon discharge     #Hx of NSVT/VT  -cotninue amiodarone 200 daily  -single chamber ICD with hx of prior device infection and removal     #IDDM  -lantus 40 units at night  -ldssi  -jardiance as for gdmt above     #insomnia  -takes prazasosin and zolpidem prn     #Bilateral wrist pain  -Carpal tunnel?  Plan:  -wrist splint for b/l hands  -PT/OT eval     #hx of diarrhea  -abdominal distension on exam  -pt doesn't endorse constipation  Plan:  -Hold home loperamide for now to rule out constipation as etiology of CP       F: prn, EF 15-20%  E: prn  A: pIVs  DVT PPX: lovenox  Diet: cardiac  GI ppx: Protonix prn  Code: full     The patient was seen and discussed with the attending, Dr. Koenig.         Caitlyn Rodriguez MD  PGY-1

## 2024-11-10 VITALS
HEIGHT: 71 IN | TEMPERATURE: 97.7 F | BODY MASS INDEX: 29.96 KG/M2 | DIASTOLIC BLOOD PRESSURE: 90 MMHG | RESPIRATION RATE: 18 BRPM | SYSTOLIC BLOOD PRESSURE: 184 MMHG | HEART RATE: 83 BPM | WEIGHT: 214 LBS | OXYGEN SATURATION: 97 %

## 2024-11-10 LAB
ALBUMIN SERPL BCP-MCNC: 3.6 G/DL (ref 3.4–5)
ANION GAP SERPL CALC-SCNC: 13 MMOL/L (ref 10–20)
BASOPHILS # BLD AUTO: 0.02 X10*3/UL (ref 0–0.1)
BASOPHILS NFR BLD AUTO: 0.4 %
BUN SERPL-MCNC: 17 MG/DL (ref 6–23)
CALCIUM SERPL-MCNC: 9.1 MG/DL (ref 8.6–10.6)
CHLORIDE SERPL-SCNC: 106 MMOL/L (ref 98–107)
CO2 SERPL-SCNC: 25 MMOL/L (ref 21–32)
CREAT SERPL-MCNC: 0.93 MG/DL (ref 0.5–1.3)
EGFRCR SERPLBLD CKD-EPI 2021: >90 ML/MIN/1.73M*2
EOSINOPHIL # BLD AUTO: 0.11 X10*3/UL (ref 0–0.7)
EOSINOPHIL NFR BLD AUTO: 2.2 %
ERYTHROCYTE [DISTWIDTH] IN BLOOD BY AUTOMATED COUNT: 12.4 % (ref 11.5–14.5)
GLUCOSE BLD MANUAL STRIP-MCNC: 105 MG/DL (ref 74–99)
GLUCOSE BLD MANUAL STRIP-MCNC: 143 MG/DL (ref 74–99)
GLUCOSE BLD MANUAL STRIP-MCNC: 195 MG/DL (ref 74–99)
GLUCOSE BLD MANUAL STRIP-MCNC: 196 MG/DL (ref 74–99)
GLUCOSE SERPL-MCNC: 94 MG/DL (ref 74–99)
HCT VFR BLD AUTO: 35.1 % (ref 41–52)
HGB BLD-MCNC: 11.4 G/DL (ref 13.5–17.5)
IMM GRANULOCYTES # BLD AUTO: 0.01 X10*3/UL (ref 0–0.7)
IMM GRANULOCYTES NFR BLD AUTO: 0.2 % (ref 0–0.9)
LYMPHOCYTES # BLD AUTO: 1.75 X10*3/UL (ref 1.2–4.8)
LYMPHOCYTES NFR BLD AUTO: 35.6 %
MAGNESIUM SERPL-MCNC: 2.05 MG/DL (ref 1.6–2.4)
MCH RBC QN AUTO: 30.2 PG (ref 26–34)
MCHC RBC AUTO-ENTMCNC: 32.5 G/DL (ref 32–36)
MCV RBC AUTO: 93 FL (ref 80–100)
MONOCYTES # BLD AUTO: 0.46 X10*3/UL (ref 0.1–1)
MONOCYTES NFR BLD AUTO: 9.4 %
NEUTROPHILS # BLD AUTO: 2.56 X10*3/UL (ref 1.2–7.7)
NEUTROPHILS NFR BLD AUTO: 52.2 %
NRBC BLD-RTO: 0 /100 WBCS (ref 0–0)
PHOSPHATE SERPL-MCNC: 3.1 MG/DL (ref 2.5–4.9)
PLATELET # BLD AUTO: 205 X10*3/UL (ref 150–450)
POTASSIUM SERPL-SCNC: 4 MMOL/L (ref 3.5–5.3)
RBC # BLD AUTO: 3.78 X10*6/UL (ref 4.5–5.9)
SODIUM SERPL-SCNC: 140 MMOL/L (ref 136–145)
WBC # BLD AUTO: 4.9 X10*3/UL (ref 4.4–11.3)

## 2024-11-10 PROCEDURE — 2500000001 HC RX 250 WO HCPCS SELF ADMINISTERED DRUGS (ALT 637 FOR MEDICARE OP)

## 2024-11-10 PROCEDURE — 82947 ASSAY GLUCOSE BLOOD QUANT: CPT

## 2024-11-10 PROCEDURE — 2500000004 HC RX 250 GENERAL PHARMACY W/ HCPCS (ALT 636 FOR OP/ED)

## 2024-11-10 PROCEDURE — 99238 HOSP IP/OBS DSCHRG MGMT 30/<: CPT

## 2024-11-10 PROCEDURE — 36415 COLL VENOUS BLD VENIPUNCTURE: CPT

## 2024-11-10 PROCEDURE — 83735 ASSAY OF MAGNESIUM: CPT

## 2024-11-10 PROCEDURE — 96372 THER/PROPH/DIAG INJ SC/IM: CPT

## 2024-11-10 PROCEDURE — 2500000002 HC RX 250 W HCPCS SELF ADMINISTERED DRUGS (ALT 637 FOR MEDICARE OP, ALT 636 FOR OP/ED)

## 2024-11-10 PROCEDURE — 84100 ASSAY OF PHOSPHORUS: CPT

## 2024-11-10 PROCEDURE — G0378 HOSPITAL OBSERVATION PER HR: HCPCS

## 2024-11-10 PROCEDURE — 85025 COMPLETE CBC W/AUTO DIFF WBC: CPT

## 2024-11-10 RX ORDER — FUROSEMIDE 40 MG/1
20 TABLET ORAL ONCE
Status: COMPLETED | OUTPATIENT
Start: 2024-11-10 | End: 2024-11-10

## 2024-11-10 RX ORDER — PREDNISONE 20 MG/1
40 TABLET ORAL DAILY
Status: DISPENSED | OUTPATIENT
Start: 2024-11-10 | End: 2024-11-15

## 2024-11-10 RX ORDER — OXYCODONE HYDROCHLORIDE 5 MG/1
5 TABLET ORAL ONCE
Status: COMPLETED | OUTPATIENT
Start: 2024-11-10 | End: 2024-11-10

## 2024-11-10 ASSESSMENT — PAIN SCALES - GENERAL
PAINLEVEL_OUTOF10: 8
PAINLEVEL_OUTOF10: 5 - MODERATE PAIN
PAINLEVEL_OUTOF10: 7
PAINLEVEL_OUTOF10: 5 - MODERATE PAIN
PAINLEVEL_OUTOF10: 5 - MODERATE PAIN
PAINLEVEL_OUTOF10: 8

## 2024-11-10 ASSESSMENT — COGNITIVE AND FUNCTIONAL STATUS - GENERAL
STANDING UP FROM CHAIR USING ARMS: A LOT
MOVING FROM LYING ON BACK TO SITTING ON SIDE OF FLAT BED WITH BEDRAILS: A LITTLE
HELP NEEDED FOR BATHING: A LITTLE
DRESSING REGULAR LOWER BODY CLOTHING: A LITTLE
WALKING IN HOSPITAL ROOM: TOTAL
CLIMB 3 TO 5 STEPS WITH RAILING: TOTAL
DAILY ACTIVITIY SCORE: 22
MOBILITY SCORE: 14
TURNING FROM BACK TO SIDE WHILE IN FLAT BAD: A LITTLE

## 2024-11-10 ASSESSMENT — PAIN DESCRIPTION - DESCRIPTORS
DESCRIPTORS: ACHING

## 2024-11-10 ASSESSMENT — PAIN DESCRIPTION - LOCATION: LOCATION: GENERALIZED

## 2024-11-10 NOTE — PROGRESS NOTES
Olivia Ventura Tiffany is a 64 y.o. male on day 0 of admission presenting with Chest pain, unspecified type.      Subjective     Overnight patient had chest pain that was more consistent with costochondritis. Patient was given Tylenol with minimal relief.     Patient was seen this AM. Comfortable on side of bed with no new acute complaints at this time.     Patient otherwise continues to endorse reproducible chest pain that's sharp and centrally located.     Denies HA, change in vision, difficulty swallowing, sob, n/v/d/c, dysuria          Objective     Last Recorded Vitals  /75 (BP Location: Right arm, Patient Position: Lying) Comment: RN notified  Pulse 64   Temp 36.6 °C (97.9 °F) (Temporal)   Resp 18   Wt 97.1 kg (214 lb)   SpO2 96%   Intake/Output last 3 Shifts:    Intake/Output Summary (Last 24 hours) at 11/10/2024 0811  Last data filed at 11/10/2024 0806  Gross per 24 hour   Intake 953 ml   Output 2140 ml   Net -1187 ml       Admission Weight  Weight: 91.6 kg (202 lb) (11/08/24 1015)    Daily Weight  11/09/24 : 97.1 kg (214 lb)    Image Results  Transthoracic Echo (TTE) [facility], [address] [place] [address]                 Tel [phone] and Fax [phone]    TRANSTHORACIC ECHOCARDIOGRAM REPORT       Patient Name:       OLIVIA YU       Olamide Physician:    [id number] Santana Alegria MD  Study Date:         11/9/2024           Ordering Provider:    [id number] LEMUS  MRN/PID:            [id number]            Fellow:  Accession#:         [id number]        Nurse:                Logan Ramirez RN  Date of Birth/Age:  1960 / 64      Sonographer:          Kait Flores RDCS                      years  Gender assigned at  M                   Additional Staff:  Birth:  Height:             180.34 cm           Admit Date:            11/8/2024  Weight:             97.07 kg            Admission Status:     Inpatient -                                                                Routine  BSA / BMI:          2.17 m2 / 29.85     Encounter#:           [id number]                      kg/m2  Blood Pressure:     147/72 mmHg         Department Location:  [facility]                                                                Non Invasive    Study Type:    TRANSTHORACIC ECHO (TTE) LIMITED  Diagnosis/ICD: Chest pain, unspecified-R07.9; Heart failure, unspecified-I50.9  Indication:    Chest pain; Acute congestive heart failure  CPT Code:      Echo Limited-58958; Color Doppler-29996; Doppler Limited-79769    Patient History:  Pertinent History: Chest Pain, CHF, HTN, Hyperlipidemia and Dyspnea. DMII, HFrEF                     15-20%.    Study Detail: The following Echo studies were performed: 2D, M-Mode, Doppler and                color flow. Technically challenging study due to patient lying in                supine position, body habitus, prominent lung artifact and poor                acoustic windows. Definity used as a contrast agent for                endocardial border definition. Total contrast used for this                procedure was 2.0 mL via IV push.       PHYSICIAN INTERPRETATION:  Left Ventricle: Left ventricular ejection fraction is normal, calculated by Yoo's biplane at 61%. There are no regional left ventricular wall motion abnormalities. The left ventricular cavity size is normal. There is normal septal and mildly increased posterior left ventricular wall thickness. There is left ventricular concentric remodeling. Spectral Doppler shows a Grade I (impaired relaxation pattern) of left ventricular diastolic filling with an elevated left atrial pressure.  Left Atrium: The left atrium was not assessed.  Right Ventricle: The right ventricle was not assessed. There is normal right ventricular global systolic function. A device  is visualized in the right ventricle.  Right Atrium: The right atrium was not assessed. There is a device visualized in the right atrium.  Aortic Valve: The aortic valve is trileaflet. There is mild aortic valve cusp calcification. Aortic valve regurgitation was not assessed. The peak instantaneous gradient of the aortic valve is 9 mmHg.  Mitral Valve: The mitral valve is normal in structure. There is trace to mild mitral valve regurgitation.  Tricuspid Valve: The tricuspid valve was not assessed. There is trace to mild tricuspid regurgitation. The right ventricular systolic pressure is unable to be estimated.  Pulmonic Valve: The pulmonic valve was not assessed. Pulmonic valve regurgitation was not assessed.  Pericardium: Trivial pericardial effusion.  Aorta: The aortic root was not assessed.  Systemic Veins: The inferior vena cava was not well visualized.  In comparison to the previous echocardiogram(s): Compared with study dated 1/4/2024, Significant improvement of LVEF.       CONCLUSIONS:   1. Left ventricular ejection fraction is normal, calculated by Yoo's biplane at 61%.   2. There is normal right ventricular global systolic function.    QUANTITATIVE DATA SUMMARY:     2D MEASUREMENTS:          Normal Ranges:  IVSd:            1.00 cm  (0.6-1.1cm)  LVPWd:           1.10 cm  (0.6-1.1cm)  LVIDd:           5.00 cm  (3.9-5.9cm)  LVIDs:           3.70 cm  LV Mass Index:   90 g/m2  LVEDV Index:     48 ml/m2  LV % FS          26.0 %       LA VOLUME:                   Normal Ranges:  LA Vol A4C:        41.6 ml   (22+/-6mL/m2)  LA Vol Index A4C:  19.2ml/m2  LA Area A4C:       17.3 cm2  LA Major Axis A4C: 6.1 cm       M-MODE MEASUREMENTS:         Normal Ranges:  AoV Exc:             1.90 cm (1.5-2.5cm)       AORTA MEASUREMENTS:         Normal Ranges:  AoV Exc:            1.90 cm (1.5-2.5cm)       LV SYSTOLIC FUNCTION BY 2D PLANIMETRY (MOD):                       Normal Ranges:  EF-A4C View:    49 % (>=55%)  EF-A2C  View:    70 %  EF-Biplane:     61 %  LV EF Reported: 61 %       LV DIASTOLIC FUNCTION:             Normal Ranges:  MV Peak E:             0.75 m/s    (0.7-1.2 m/s)  MV Peak A:             0.82 m/s    (0.42-0.7 m/s)  E/A Ratio:             0.92        (1.0-2.2)  MV e'                  0.060 m/s   (>8.0)  MV lateral e'          0.07 m/s  MV medial e'           0.05 m/s  MV A Dur:              124.00 msec  E/e' Ratio:            12.56       (<8.0)  MV DT:                 338 msec    (150-240 msec)       MITRAL VALVE:          Normal Ranges:  MV DT:        338 msec (150-240msec)       AORTIC VALVE:           Normal Ranges:  AoV Vmax:      1.46 m/s (<=1.7m/s)  AoV Peak P.5 mmHg (<20mmHg)  LVOT Max Kaleb:  0.78 m/s (<=1.1m/s)  LVOT VTI:      14.10 cm  LVOT Diameter: 2.10 cm  (1.8-2.4cm)  AoV Area,Vmax: 1.84 cm2 (2.5-4.5cm2)       RIGHT VENTRICLE:  TAPSE: 20.3 mm  RV s'  0.09 m/s       [id number] Santana Alegria MD  Electronically signed on 2024 at 1:53:20 PM       ** Final **      Physical Exam  Constitutional:       General: He is not in acute distress.     Appearance: He is obese.   Eyes:      Extraocular Movements: Extraocular movements intact.   Neck:      Comments: No JVD appreciated. Thick neck circumference  Cardiovascular:      Rate and Rhythm: Normal rate and regular rhythm.      Heart sounds:      No friction rub.   Pulmonary:      Breath sounds: No wheezing, rhonchi or rales.   Musculoskeletal:      Right lower leg: Edema present.      Left lower leg: Edema present.      Comments: Reproducible pain on palpation of sternum. B/l pitting edema   Skin:     General: Skin is warm and dry.   Neurological:      General: No focal deficit present.      Mental Status: He is alert.       Relevant Results  Results for orders placed or performed during the hospital encounter of 24 (from the past 24 hours)   POCT GLUCOSE   Result Value Ref Range    POCT Glucose 126 (H) 74 - 99 mg/dL   POCT GLUCOSE   Result Value Ref  Range    POCT Glucose 153 (H) 74 - 99 mg/dL   Transthoracic Echo (TTE) Limited   Result Value Ref Range    AV pk sarah 1.46 m/s    LVOT diam 2.10 cm    MV E/A ratio 0.92     Tricuspid annular plane systolic excursion 2.0 cm    LV EF 61 %    RV free wall pk S' 8.85 cm/s    LVIDd 5.00 cm    Aortic Valve Area by Continuity of Peak Velocity 1.84 cm2    AV pk grad 9 mmHg    LV A4C EF 48.8    POCT GLUCOSE   Result Value Ref Range    POCT Glucose 208 (H) 74 - 99 mg/dL   POCT GLUCOSE   Result Value Ref Range    POCT Glucose 154 (H) 74 - 99 mg/dL   CBC and Auto Differential   Result Value Ref Range    WBC 4.9 4.4 - 11.3 x10*3/uL    nRBC 0.0 0.0 - 0.0 /100 WBCs    RBC 3.78 (L) 4.50 - 5.90 x10*6/uL    Hemoglobin 11.4 (L) 13.5 - 17.5 g/dL    Hematocrit 35.1 (L) 41.0 - 52.0 %    MCV 93 80 - 100 fL    MCH 30.2 26.0 - 34.0 pg    MCHC 32.5 32.0 - 36.0 g/dL    RDW 12.4 11.5 - 14.5 %    Platelets 205 150 - 450 x10*3/uL    Neutrophils % 52.2 40.0 - 80.0 %    Immature Granulocytes %, Automated 0.2 0.0 - 0.9 %    Lymphocytes % 35.6 13.0 - 44.0 %    Monocytes % 9.4 2.0 - 10.0 %    Eosinophils % 2.2 0.0 - 6.0 %    Basophils % 0.4 0.0 - 2.0 %    Neutrophils Absolute 2.56 1.20 - 7.70 x10*3/uL    Immature Granulocytes Absolute, Automated 0.01 0.00 - 0.70 x10*3/uL    Lymphocytes Absolute 1.75 1.20 - 4.80 x10*3/uL    Monocytes Absolute 0.46 0.10 - 1.00 x10*3/uL    Eosinophils Absolute 0.11 0.00 - 0.70 x10*3/uL    Basophils Absolute 0.02 0.00 - 0.10 x10*3/uL   POCT GLUCOSE   Result Value Ref Range    POCT Glucose 105 (H) 74 - 99 mg/dL     Assessment & Plan  Chest pain, unspecified type    Delvis Gunnar Ventura Jr. is a 64 y.o. 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 In the ED, VSS, troponin's ad EKG negative for ACS, BNP was not elevated to suggest ADHF, although CXR with some vascular congestion. Patient was admitted to cardiology floor for observation and further workup.    Pt had recurrence of his chest pain at 6am on 11/9. EKG was not concerning for ischemia, troponins are pending. On bedside examination, the pain is reproducible over his sternum, will continue supportive management of likely costochondritis. ESR and CRP were normal to mildly elevated, making this likely not pericarditis. Patient does not believe it is heart burn as he knows what that feels like and this stabbing pain feels different. Has a hx of provoked DVT in the past, however his clinical picture and Well score makes PE unlikely.        Updates 11/10:  - Episode of similar pain on presentation minimal relief with tylenol overnight  - TTE- 11/09- LVEF- 61%  - Pain reproducible on palpation. Schedule tylenol however not tolerating will consider Steroid Dosepak   - PT recs moderate intensity       #Chest pain of unclear etiology  #HFrEF (EF 15-20% TTE Jan 2024) s/p ICD  #NICM  #non obstructive CAD  -acute onset 11/8, not relieved by nitroglycerin  -ED workup unremarkable  -Admission weight 92 kg/ discharge weight in Feb 2024 for ADHF: 94 kg  -Esr 25, CRP 0.96 . Not endorsing positional signs of pericarditis  -Pain reproducible on palpation, likely costochondritis   Plan:  -TTE   -Continue home meds:              -ASA, atorvastatin              -GDMT: Coreg 50 BID, Farxiga 10 mg daily, entresto  daily, aldactone 25 mg daily              -anti-HTNs: hydral 75 TID, imdur 120 mg daily  -Daily weights  -Lasix 20 mg po 11/9  -Lidocaine patch   -Scheduled tylenol +/- steroid dosepak  -Close  cardiology follow-up with Dr. Hernandez upon discharge     #Hx of NSVT/VT  -cotninue amiodarone 200 daily  -single chamber ICD with hx of prior device infection and removal     #IDDM  -lantus 40 units at night  -ldssi  -jardiance as for gdmt above     #insomnia  -takes prazasosin and zolpidem prn     #Bilateral wrist pain  -Carpal tunnel?  Plan:  -wrist splint for b/l hands  -PT/OT eval     #hx of diarrhea  -abdominal distension on exam  -pt doesn't endorse constipation  Plan:  -Hold home loperamide for now to rule out constipation as etiology of CP       F: prn, EF 15-20%  E: prn  A: pIVs  DVT PPX: lovenox  Diet: cardiac  GI ppx: Protonix prn  Code: full     The patient was seen and discussed with the attending, Dr. Koenig.         Kristina Yoon MD  PGY-1

## 2024-11-10 NOTE — PROGRESS NOTES
"Transitional Care Coordinator Note: Patient discussed with medical team (Yaw), per team patient is medically ready. Discharge dispo: TCC spoke with patient to obtain SNF choices. TCC reviewed list with patient, patient selected 3-5 star facilities. No FOC provided at this time. Per patient [facility] is [facility] choices, per patient \"I don't like the location to much\". TCC sent referral to choices provided by patient, pending accepting facility and FOC. Patient requested to discharge to SNF that can assist with his transportation home upon discharge from SNF and a facility that has a  for his motorized wheel chair. Per patient Wheelchair: Edge 2.0 [employer]. Patient will require pre-cert prior to discharge.       Meggan Hart RN BSN   Transitional Care Coordinator   "

## 2024-11-10 NOTE — CARE PLAN
The patient's goals for the shift include  better pain controlled.    The clinical goals for the shift include Pain will be decreased by end of shift    Over the shift, the patient did make progress toward the following goals. Recommendations to promote continued progression include repositioning, pain medications as ordered, and monitoring VS.        Problem: Pain - Adult  Goal: Verbalizes/displays adequate comfort level or baseline comfort level  Outcome: Progressing     Problem: Safety - Adult  Goal: Free from fall injury  Outcome: Progressing     Problem: Chronic Conditions and Co-morbidities  Goal: Patient's chronic conditions and co-morbidity symptoms are monitored and maintained or improved  Outcome: Progressing     Problem: ACS/CP/NSTEMI/STEMI  Goal: Lab values return to normal range  Outcome: Progressing  Goal: Promote self management  Outcome: Progressing  Goal: Verbalize understanding of procedures/devices  Outcome: Progressing     Problem: Hypertensive Emergency/Crisis  Goal: Lab values return to normal range  Outcome: Progressing  Goal: Promote self management  Outcome: Progressing

## 2024-11-11 LAB
ALBUMIN SERPL BCP-MCNC: 3.9 G/DL (ref 3.4–5)
ANION GAP SERPL CALC-SCNC: 15 MMOL/L (ref 10–20)
BASOPHILS # BLD AUTO: 0.01 X10*3/UL (ref 0–0.1)
BASOPHILS NFR BLD AUTO: 0.1 %
BUN SERPL-MCNC: 19 MG/DL (ref 6–23)
CALCIUM SERPL-MCNC: 9.9 MG/DL (ref 8.6–10.6)
CHLORIDE SERPL-SCNC: 102 MMOL/L (ref 98–107)
CO2 SERPL-SCNC: 24 MMOL/L (ref 21–32)
CREAT SERPL-MCNC: 0.82 MG/DL (ref 0.5–1.3)
EGFRCR SERPLBLD CKD-EPI 2021: >90 ML/MIN/1.73M*2
EOSINOPHIL # BLD AUTO: 0 X10*3/UL (ref 0–0.7)
EOSINOPHIL NFR BLD AUTO: 0 %
ERYTHROCYTE [DISTWIDTH] IN BLOOD BY AUTOMATED COUNT: 12.4 % (ref 11.5–14.5)
GLUCOSE BLD MANUAL STRIP-MCNC: 123 MG/DL (ref 74–99)
GLUCOSE BLD MANUAL STRIP-MCNC: 222 MG/DL (ref 74–99)
GLUCOSE BLD MANUAL STRIP-MCNC: 244 MG/DL (ref 74–99)
GLUCOSE BLD MANUAL STRIP-MCNC: 254 MG/DL (ref 74–99)
GLUCOSE SERPL-MCNC: 92 MG/DL (ref 74–99)
HCT VFR BLD AUTO: 39 % (ref 41–52)
HGB BLD-MCNC: 12.5 G/DL (ref 13.5–17.5)
IMM GRANULOCYTES # BLD AUTO: 0.02 X10*3/UL (ref 0–0.7)
IMM GRANULOCYTES NFR BLD AUTO: 0.3 % (ref 0–0.9)
LYMPHOCYTES # BLD AUTO: 1.19 X10*3/UL (ref 1.2–4.8)
LYMPHOCYTES NFR BLD AUTO: 16.3 %
MAGNESIUM SERPL-MCNC: 2.15 MG/DL (ref 1.6–2.4)
MCH RBC QN AUTO: 29.5 PG (ref 26–34)
MCHC RBC AUTO-ENTMCNC: 32.1 G/DL (ref 32–36)
MCV RBC AUTO: 92 FL (ref 80–100)
MONOCYTES # BLD AUTO: 0.32 X10*3/UL (ref 0.1–1)
MONOCYTES NFR BLD AUTO: 4.4 %
NEUTROPHILS # BLD AUTO: 5.76 X10*3/UL (ref 1.2–7.7)
NEUTROPHILS NFR BLD AUTO: 78.9 %
NRBC BLD-RTO: 0 /100 WBCS (ref 0–0)
PHOSPHATE SERPL-MCNC: 4.1 MG/DL (ref 2.5–4.9)
PLATELET # BLD AUTO: 225 X10*3/UL (ref 150–450)
POTASSIUM SERPL-SCNC: 4.1 MMOL/L (ref 3.5–5.3)
RBC # BLD AUTO: 4.24 X10*6/UL (ref 4.5–5.9)
SODIUM SERPL-SCNC: 137 MMOL/L (ref 136–145)
WBC # BLD AUTO: 7.3 X10*3/UL (ref 4.4–11.3)

## 2024-11-11 PROCEDURE — 97530 THERAPEUTIC ACTIVITIES: CPT | Mod: GP,CQ

## 2024-11-11 PROCEDURE — 2500000001 HC RX 250 WO HCPCS SELF ADMINISTERED DRUGS (ALT 637 FOR MEDICARE OP)

## 2024-11-11 PROCEDURE — 2500000002 HC RX 250 W HCPCS SELF ADMINISTERED DRUGS (ALT 637 FOR MEDICARE OP, ALT 636 FOR OP/ED)

## 2024-11-11 PROCEDURE — 96372 THER/PROPH/DIAG INJ SC/IM: CPT

## 2024-11-11 PROCEDURE — 2500000004 HC RX 250 GENERAL PHARMACY W/ HCPCS (ALT 636 FOR OP/ED)

## 2024-11-11 PROCEDURE — 85025 COMPLETE CBC W/AUTO DIFF WBC: CPT

## 2024-11-11 PROCEDURE — G0378 HOSPITAL OBSERVATION PER HR: HCPCS

## 2024-11-11 PROCEDURE — 87081 CULTURE SCREEN ONLY: CPT

## 2024-11-11 PROCEDURE — 82947 ASSAY GLUCOSE BLOOD QUANT: CPT

## 2024-11-11 PROCEDURE — 80069 RENAL FUNCTION PANEL: CPT

## 2024-11-11 PROCEDURE — 83735 ASSAY OF MAGNESIUM: CPT

## 2024-11-11 PROCEDURE — 36415 COLL VENOUS BLD VENIPUNCTURE: CPT

## 2024-11-11 PROCEDURE — 99231 SBSQ HOSP IP/OBS SF/LOW 25: CPT

## 2024-11-11 PROCEDURE — 97110 THERAPEUTIC EXERCISES: CPT | Mod: GP,CQ

## 2024-11-11 RX ORDER — LOPERAMIDE HYDROCHLORIDE 2 MG/1
2 CAPSULE ORAL ONCE
Status: COMPLETED | OUTPATIENT
Start: 2024-11-11 | End: 2024-11-11

## 2024-11-11 RX ORDER — INSULIN LISPRO 100 [IU]/ML
0-10 INJECTION, SOLUTION INTRAVENOUS; SUBCUTANEOUS
Status: DISCONTINUED | OUTPATIENT
Start: 2024-11-11 | End: 2024-11-23 | Stop reason: HOSPADM

## 2024-11-11 RX ORDER — OXYCODONE HYDROCHLORIDE 5 MG/1
5 TABLET ORAL ONCE
Status: COMPLETED | OUTPATIENT
Start: 2024-11-11 | End: 2024-11-11

## 2024-11-11 RX ORDER — INSULIN LISPRO 100 [IU]/ML
0-10 INJECTION, SOLUTION INTRAVENOUS; SUBCUTANEOUS
Status: DISCONTINUED | OUTPATIENT
Start: 2024-11-12 | End: 2024-11-11

## 2024-11-11 ASSESSMENT — PAIN DESCRIPTION - DESCRIPTORS
DESCRIPTORS: SORE
DESCRIPTORS: ACHING

## 2024-11-11 ASSESSMENT — COGNITIVE AND FUNCTIONAL STATUS - GENERAL
MOVING TO AND FROM BED TO CHAIR: A LITTLE
DAILY ACTIVITIY SCORE: 22
TURNING FROM BACK TO SIDE WHILE IN FLAT BAD: A LITTLE
MOVING TO AND FROM BED TO CHAIR: A LITTLE
CLIMB 3 TO 5 STEPS WITH RAILING: TOTAL
STANDING UP FROM CHAIR USING ARMS: A LOT
WALKING IN HOSPITAL ROOM: TOTAL
DAILY ACTIVITIY SCORE: 22
HELP NEEDED FOR BATHING: A LITTLE
MOBILITY SCORE: 13
DRESSING REGULAR LOWER BODY CLOTHING: A LITTLE
MOBILITY SCORE: 13
MOBILITY SCORE: 14
STANDING UP FROM CHAIR USING ARMS: A LOT
STANDING UP FROM CHAIR USING ARMS: A LOT
WALKING IN HOSPITAL ROOM: TOTAL
WALKING IN HOSPITAL ROOM: TOTAL
CLIMB 3 TO 5 STEPS WITH RAILING: TOTAL
HELP NEEDED FOR BATHING: A LITTLE
TURNING FROM BACK TO SIDE WHILE IN FLAT BAD: A LITTLE
MOVING FROM LYING ON BACK TO SITTING ON SIDE OF FLAT BED WITH BEDRAILS: A LITTLE
TURNING FROM BACK TO SIDE WHILE IN FLAT BAD: A LITTLE
MOVING TO AND FROM BED TO CHAIR: A LITTLE
CLIMB 3 TO 5 STEPS WITH RAILING: TOTAL
DRESSING REGULAR LOWER BODY CLOTHING: A LITTLE
MOVING FROM LYING ON BACK TO SITTING ON SIDE OF FLAT BED WITH BEDRAILS: A LITTLE

## 2024-11-11 ASSESSMENT — PAIN - FUNCTIONAL ASSESSMENT
PAIN_FUNCTIONAL_ASSESSMENT: 0-10

## 2024-11-11 ASSESSMENT — PAIN SCALES - GENERAL
PAINLEVEL_OUTOF10: 6
PAINLEVEL_OUTOF10: 5 - MODERATE PAIN
PAINLEVEL_OUTOF10: 5 - MODERATE PAIN
PAINLEVEL_OUTOF10: 9
PAINLEVEL_OUTOF10: 0 - NO PAIN

## 2024-11-11 ASSESSMENT — PAIN DESCRIPTION - LOCATION: LOCATION: OTHER (COMMENT)

## 2024-11-11 NOTE — PROGRESS NOTES
Olivia Franco Audrey Leach is a 64 y.o. male on day 0 of admission presenting with Chest pain, unspecified type.      Subjective   NAEON.    Pt reports improvement in his chest pain overnight with his one time oxycodone and prednisone. Denies SOB, n/v, fevers/chills. Has been having BMs. Pending SNF placement.    Objective     Last Recorded Vitals  /81 (BP Location: Right arm, Patient Position: Lying)   Pulse 69   Temp 37.1 °C (98.8 °F) (Temporal)   Resp 18   Wt 98.2 kg (216 lb 7.9 oz)   SpO2 96%   Intake/Output last 3 Shifts:    Intake/Output Summary (Last 24 hours) at 11/11/2024 0740  Last data filed at 11/11/2024 0716  Gross per 24 hour   Intake 236 ml   Output 2885 ml   Net -2649 ml       Admission Weight  Weight: 91.6 kg (202 lb) (11/08/24 1015)    Daily Weight  11/11/24 : 98.2 kg (216 lb 7.9 oz)    Image Results  Transthoracic Echo (TTE) [facility], [address] [place] [address]                 Tel [phone] and Fax [phone]    TRANSTHORACIC ECHOCARDIOGRAM REPORT       Patient Name:       OLIVIA YU       Olamide Physician:    [id number] Santana Alegria MD  Study Date:         11/9/2024           Ordering Provider:    [id number] LEMUS  MRN/PID:            [id number]            Fellow:  Accession#:         [id number]        Nurse:                Logan Ramirez RN  Date of Birth/Age:  1960 / 64      Sonographer:          Kait Flores RDCS                      years  Gender assigned at                     Additional Staff:  Birth:  Height:             180.34 cm           Admit Date:           11/8/2024  Weight:             97.07 kg            Admission Status:     Inpatient -                                                                Routine  BSA / BMI:          2.17 m2 / 29.85     Encounter#:           [id number]                       kg/m2  Blood Pressure:     147/72 mmHg         Department Location:  [facility]                                                                Non Invasive    Study Type:    TRANSTHORACIC ECHO (TTE) LIMITED  Diagnosis/ICD: Chest pain, unspecified-R07.9; Heart failure, unspecified-I50.9  Indication:    Chest pain; Acute congestive heart failure  CPT Code:      Echo Limited-17275; Color Doppler-16222; Doppler Limited-70974    Patient History:  Pertinent History: Chest Pain, CHF, HTN, Hyperlipidemia and Dyspnea. DMII, HFrEF                     15-20%.    Study Detail: The following Echo studies were performed: 2D, M-Mode, Doppler and                color flow. Technically challenging study due to patient lying in                supine position, body habitus, prominent lung artifact and poor                acoustic windows. Definity used as a contrast agent for                endocardial border definition. Total contrast used for this                procedure was 2.0 mL via IV push.       PHYSICIAN INTERPRETATION:  Left Ventricle: Left ventricular ejection fraction is normal, calculated by Yoo's biplane at 61%. There are no regional left ventricular wall motion abnormalities. The left ventricular cavity size is normal. There is normal septal and mildly increased posterior left ventricular wall thickness. There is left ventricular concentric remodeling. Spectral Doppler shows a Grade I (impaired relaxation pattern) of left ventricular diastolic filling with an elevated left atrial pressure.  Left Atrium: The left atrium was not assessed.  Right Ventricle: The right ventricle was not assessed. There is normal right ventricular global systolic function. A device is visualized in the right ventricle.  Right Atrium: The right atrium was not assessed. There is a device visualized in the right atrium.  Aortic Valve: The aortic valve is trileaflet. There is mild aortic valve cusp calcification. Aortic  valve regurgitation was not assessed. The peak instantaneous gradient of the aortic valve is 9 mmHg.  Mitral Valve: The mitral valve is normal in structure. There is trace to mild mitral valve regurgitation.  Tricuspid Valve: The tricuspid valve was not assessed. There is trace to mild tricuspid regurgitation. The right ventricular systolic pressure is unable to be estimated.  Pulmonic Valve: The pulmonic valve was not assessed. Pulmonic valve regurgitation was not assessed.  Pericardium: Trivial pericardial effusion.  Aorta: The aortic root was not assessed.  Systemic Veins: The inferior vena cava was not well visualized.  In comparison to the previous echocardiogram(s): Compared with study dated 1/4/2024, Significant improvement of LVEF.       CONCLUSIONS:   1. Left ventricular ejection fraction is normal, calculated by Yoo's biplane at 61%.   2. There is normal right ventricular global systolic function.    QUANTITATIVE DATA SUMMARY:     2D MEASUREMENTS:          Normal Ranges:  IVSd:            1.00 cm  (0.6-1.1cm)  LVPWd:           1.10 cm  (0.6-1.1cm)  LVIDd:           5.00 cm  (3.9-5.9cm)  LVIDs:           3.70 cm  LV Mass Index:   90 g/m2  LVEDV Index:     48 ml/m2  LV % FS          26.0 %       LA VOLUME:                   Normal Ranges:  LA Vol A4C:        41.6 ml   (22+/-6mL/m2)  LA Vol Index A4C:  19.2ml/m2  LA Area A4C:       17.3 cm2  LA Major Axis A4C: 6.1 cm       M-MODE MEASUREMENTS:         Normal Ranges:  AoV Exc:             1.90 cm (1.5-2.5cm)       AORTA MEASUREMENTS:         Normal Ranges:  AoV Exc:            1.90 cm (1.5-2.5cm)       LV SYSTOLIC FUNCTION BY 2D PLANIMETRY (MOD):                       Normal Ranges:  EF-A4C View:    49 % (>=55%)  EF-A2C View:    70 %  EF-Biplane:     61 %  LV EF Reported: 61 %       LV DIASTOLIC FUNCTION:             Normal Ranges:  MV Peak E:             0.75 m/s    (0.7-1.2 m/s)  MV Peak A:             0.82 m/s    (0.42-0.7 m/s)  E/A Ratio:              0.92        (1.0-2.2)  MV e'                  0.060 m/s   (>8.0)  MV lateral e'          0.07 m/s  MV medial e'           0.05 m/s  MV A Dur:              124.00 msec  E/e' Ratio:            12.56       (<8.0)  MV DT:                 338 msec    (150-240 msec)       MITRAL VALVE:          Normal Ranges:  MV DT:        338 msec (150-240msec)       AORTIC VALVE:           Normal Ranges:  AoV Vmax:      1.46 m/s (<=1.7m/s)  AoV Peak P.5 mmHg (<20mmHg)  LVOT Max Kaleb:  0.78 m/s (<=1.1m/s)  LVOT VTI:      14.10 cm  LVOT Diameter: 2.10 cm  (1.8-2.4cm)  AoV Area,Vmax: 1.84 cm2 (2.5-4.5cm2)       RIGHT VENTRICLE:  TAPSE: 20.3 mm  RV s'  0.09 m/s       [id number] Santana Alegria MD  Electronically signed on 2024 at 1:53:20 PM       ** Final **      Physical Exam  Constitutional:       General: He is not in acute distress.     Appearance: He is obese.   Eyes:      Extraocular Movements: Extraocular movements intact.   Neck:      Comments: No JVD appreciated. Thick neck circumference  Cardiovascular:      Rate and Rhythm: Normal rate and regular rhythm.      Heart sounds:      No friction rub.   Pulmonary:      Breath sounds: No wheezing, rhonchi or rales.   Musculoskeletal:      Right lower leg: No edema.      Left lower leg: No edema.      Comments: Costochondritis pain improved on palpation.    Skin:     General: Skin is warm and dry.   Neurological:      General: No focal deficit present.      Mental Status: He is alert and oriented to person, place, and time.   Psychiatric:         Mood and Affect: Mood normal.         Behavior: Behavior normal.         Relevant Results  Results for orders placed or performed during the hospital encounter of 24 (from the past 24 hours)   POCT GLUCOSE   Result Value Ref Range    POCT Glucose 105 (H) 74 - 99 mg/dL   POCT GLUCOSE   Result Value Ref Range    POCT Glucose 196 (H) 74 - 99 mg/dL   POCT GLUCOSE   Result Value Ref Range    POCT Glucose 143 (H) 74 - 99 mg/dL   POCT GLUCOSE    Result Value Ref Range    POCT Glucose 195 (H) 74 - 99 mg/dL   POCT GLUCOSE   Result Value Ref Range    POCT Glucose 123 (H) 74 - 99 mg/dL   CBC and Auto Differential   Result Value Ref Range    WBC 7.3 4.4 - 11.3 x10*3/uL    nRBC 0.0 0.0 - 0.0 /100 WBCs    RBC 4.24 (L) 4.50 - 5.90 x10*6/uL    Hemoglobin 12.5 (L) 13.5 - 17.5 g/dL    Hematocrit 39.0 (L) 41.0 - 52.0 %    MCV 92 80 - 100 fL    MCH 29.5 26.0 - 34.0 pg    MCHC 32.1 32.0 - 36.0 g/dL    RDW 12.4 11.5 - 14.5 %    Platelets 225 150 - 450 x10*3/uL    Neutrophils % 78.9 40.0 - 80.0 %    Immature Granulocytes %, Automated 0.3 0.0 - 0.9 %    Lymphocytes % 16.3 13.0 - 44.0 %    Monocytes % 4.4 2.0 - 10.0 %    Eosinophils % 0.0 0.0 - 6.0 %    Basophils % 0.1 0.0 - 2.0 %    Neutrophils Absolute 5.76 1.20 - 7.70 x10*3/uL    Immature Granulocytes Absolute, Automated 0.02 0.00 - 0.70 x10*3/uL    Lymphocytes Absolute 1.19 (L) 1.20 - 4.80 x10*3/uL    Monocytes Absolute 0.32 0.10 - 1.00 x10*3/uL    Eosinophils Absolute 0.00 0.00 - 0.70 x10*3/uL    Basophils Absolute 0.01 0.00 - 0.10 x10*3/uL     Assessment & Plan  Chest pain, unspecified type    Delvis Ventura Jr. is a 64 y.o. 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 In the ED, VSS, troponin's ad EKG negative for ACS, BNP was not elevated to suggest ADHF, although CXR with some vascular congestion. Patient was admitted to cardiology floor for observation and further workup.    Pt had recurrence of his chest pain at 6am  on 11/9. EKG was not concerning for ischemia, troponins are pending. On bedside examination, the pain is reproducible over his sternum, will continue supportive management of likely costochondritis. ESR and CRP were normal to mildly elevated, making this likely not pericarditis. Patient does not believe it is heart burn as he knows what that feels like and this stabbing pain feels different. Has a hx of provoked DVT in the past, however his clinical picture and Well score makes PE unlikely.        Updates 11/11:  - Steroid 40 mg x 5 days is improving patient's pain  -Pt pending SNF placement      #Chest pain of unclear etiology  #HFrecoveredEF (EF 15-20% TTE Jan 2024--> EF 61% 11/9 TTE) s/p ICD  #NICM  #non obstructive CAD  -acute onset 11/8, not relieved by nitroglycerin  -ED workup unremarkable  -Admission weight 92 kg/ discharge weight in Feb 2024 for ADHF: 94 kg  -Esr 25, CRP 0.96 . Not endorsing positional signs of pericarditis  -Pain reproducible on palpation, likely costochondritis   -TTE 11/9: EF 61%!  Plan:  -TTE   -Continue home meds:              -ASA, atorvastatin              -GDMT: Coreg 50 BID, Farxiga 10 mg daily, entresto  daily, aldactone 25 mg daily              -anti-HTNs: hydral 75 TID, imdur 120 mg daily  -Daily weights  -PRN lasix 20 mg po as clinical picture dictates  -Scheduled tylenol +/- Prednisone 40 mg x5d (11/10-11/15)  -Close cardiology follow-up with Dr. Hernandez upon discharge     #Hx of NSVT/VT  -cotninue amiodarone 200 daily  -single chamber ICD with hx of prior device infection and removal     #IDDM  -lantus 40 units at night  -ldssi  -jardiance as for gdmt above     #insomnia  -takes prazasosin and zolpidem prn     #Bilateral wrist pain  -Carpal tunnel?  Plan:  -wrist splint for b/l hands  -PT/OT eval     #hx of diarrhea  -abdominal distension on exam  -pt doesn't endorse constipation  Plan:  -Hold home loperamide        F: prn, EF 61%  E: prn  A: pIVs  DVT PPX:  lovenox  Diet: cardiac  GI ppx: Pepcid prn  BM ppx: none  Code: full     The patient was seen and discussed with the attending, Dr. Charles.         Caitlyn Rodriguez MD  PGY-1

## 2024-11-11 NOTE — PROGRESS NOTES
Physical Therapy Treatment    Patient Name: Delvis Ventura Jr.  MRN: [id number]  Today's Date: 11/11/2024  Room: [address] [place] [address]  Time Calculation  Start Time: 1025  Stop Time: 1111  Time Calculation (min): 46 min       Assessment/Plan   PT Assessment  PT Assessment Results: Decreased strength, Decreased range of motion, Decreased endurance, Impaired balance, Decreased mobility, Decreased safety awareness, Pain  Rehab Prognosis: Good  Barriers to Discharge: SNF placement  Evaluation/Treatment Tolerance: Patient tolerated treatment well  Medical Staff Made Aware: Yes  Strengths: Attitude of self, Ability to acquire knowledge  Barriers to Participation: Comorbidities, Support of Caregivers  End of Session Communication: Bedside nurse  End of Session Patient Position: Bed, 3 rail up, Alarm off, not on at start of session     PT Plan  Treatment/Interventions: Bed mobility, Transfer training, Gait training, Stair training, Balance training, Strengthening, Endurance training, Range of motion, Therapeutic exercise, Therapeutic activity, Home exercise program, Positioning  PT Plan: Ongoing PT  PT Frequency: 3 times per week  PT Discharge Recommendations: Moderate intensity level of continued care  PT Recommended Transfer Status: Assist x1  PT - OK to Discharge: Yes (DC rec made)  Assessment: Patient is progressing Well with therapy this date. Able to complete several transfers and STS @ modA. Pt is very motivated and is independent at baseline, currently would not be able to return home safely. Would continue to benefit from continued skilled PT to address all mobility deficits; Patient remains appropriate for MOD intensity therapy when medically appropriate for discharge from acute stay.  Will continue to follow.     General Visit Information:   PT  Visit  PT Received On: 11/11/24  Prior to Session Communication: Bedside nurse  Patient Position Received: Bed, 3 rail up     Subjective   Subjective: Pt pleasant and  agreeable to therapy upon approach. Declines sitting up in wc today d/t not having a  and concern for battery life.    Precautions:  Precautions  Medical Precautions: Fall precautions  Vital Signs:  Vital Signs (Past 2hrs)        Date/Time Vitals Session Patient Position Pulse Resp SpO2 BP MAP (mmHg)    11/11/24 1112 --  --  67  19  98 %  141/76  98                     Objective   Pain:  Pain Assessment  Pain Assessment: 0-10  0-10 (Numeric) Pain Score: 5 - Moderate pain  Pain Type: Acute pain  Pain Location: Chest  Cognition:  Cognition  Overall Cognitive Status: Within Functional Limits  Arousal/Alertness: Appropriate responses to stimuli  Orientation Level: Oriented X4  Following Commands: Follows all commands and directions without difficulty  Insight: Mild  Impulsive: Mildly    PT Treatments:  Therapeutic Exercise  Therapeutic Exercise Performed: Yes  Therapeutic Exercise Activity 1: Supine B knee flexion to stretch quads. Pt complains of significant tightness since admission. Therapist demoed stretch using a sheet to complete stretching by self. Pt verbalizes understanding and able to demonstrate        Bed Mobility 1  Bed Mobility 1: Rolling right, Rolling left  Level of Assistance 1: Modified independent  Bed Mobility Comments 1: use of bed rails  Bed Mobility 2  Bed Mobility  2: Supine to sitting, Sitting to supine  Level of Assistance 2: Close supervision  Bed Mobility Comments 2: increased time and effort to complete, use of bed rails     Transfer 1  Transfer From 1: Sit to  Transfer to 1: Stand  Technique 1: Sit to stand, Stand to sit  Transfer Device 1: Walker  Transfer Level of Assistance 1: Moderate assistance, Moderate verbal cues  Trials/Comments 1: x2, RLE knee block for safety. Able to stand for ~10s each. Unable to fully straighten LE  Transfers 2  Transfer From 2: Sit to  Transfer to 2: Commode-standard  Technique 2: Squat pivot  Transfer Level of Assistance 2: Contact guard, Minimal  verbal cues  Trials/Comments 2: x2 needs cues for safety but able to use BUE to transfer to commode             Activity tolerance:  Activity Tolerance  Endurance: Tolerates 10 - 20 min exercise with multiple rests    Outcome Measures:  [facility] Basic Mobility  Turning from your back to your side while in a flat bed without using bedrails: None  Moving from lying on your back to sitting on the side of a flat bed without using bedrails: A little  Moving to and from bed to chair (including a wheelchair): A little  Standing up from a chair using your arms (e.g. wheelchair or bedside chair): A lot  To walk in hospital room: Total  Climbing 3-5 steps with railing: Total  Basic Mobility - Total Score: 14    Education Documentation  Body Mechanics, taught by Lexus Sue PTA at 11/11/2024 11:55 AM.  Learner: Patient  Readiness: Acceptance  Method: Explanation  Response: Verbalizes Understanding  Comment: safety during transfers    Precautions, taught by Lexus Sue PTA at 11/11/2024 11:55 AM.  Learner: Patient  Readiness: Acceptance  Method: Explanation  Response: Verbalizes Understanding  Comment: safety during transfers    Mobility Training, taught by Lexus Sue PTA at 11/11/2024 11:55 AM.  Learner: Patient  Readiness: Acceptance  Method: Explanation  Response: Verbalizes Understanding  Comment: safety during transfers    Handouts, taught by Lexus Sue PTA at 11/11/2024 11:55 AM.  Learner: Patient  Readiness: Acceptance  Method: Explanation  Response: Verbalizes Understanding  Comment: safety during transfers    Body Mechanics, taught by Lexus Sue PTA at 11/11/2024 11:55 AM.  Learner: Patient  Readiness: Acceptance  Method: Explanation  Response: Verbalizes Understanding  Comment: safety during transfers    Home Exercise Program, taught by Lexus Sue PTA at 11/11/2024 11:55 AM.  Learner: Patient  Readiness: Acceptance  Method: Explanation  Response: Verbalizes Understanding  Comment: safety during  transfers    Precautions, taught by Lexus Sue PTA at 11/11/2024 11:55 AM.  Learner: Patient  Readiness: Acceptance  Method: Explanation  Response: Verbalizes Understanding  Comment: safety during transfers    ADL Training, taught by Lexus Sue PTA at 11/11/2024 11:55 AM.  Learner: Patient  Readiness: Acceptance  Method: Explanation  Response: Verbalizes Understanding  Comment: safety during transfers    Education Comments  No comments found.          OP EDUCATION:       Encounter Problems       Encounter Problems (Active)       Balance       STG - Maintains dynamic sitting balance without upper extremity support >/= 10 min supervision  (Progressing)       Start:  11/09/24    Expected End:  11/23/24               Mobility       pt will be independent with HEP program for BLEs to improve LE weakness  (Progressing)       Start:  11/09/24    Expected End:  11/23/24               PT Transfers       STG - Transfer from bed to chair CGA  (Progressing)       Start:  11/09/24    Expected End:  11/23/24            STG - Patient will perform bed mobility CGA (Progressing)       Start:  11/09/24    Expected End:  11/23/24               Pain - Adult

## 2024-11-11 NOTE — CARE PLAN
Problem: Pain - Adult  Goal: Verbalizes/displays adequate comfort level or baseline comfort level  Outcome: Progressing     Problem: Safety - Adult  Goal: Free from fall injury  Outcome: Progressing     Problem: Chronic Conditions and Co-morbidities  Goal: Patient's chronic conditions and co-morbidity symptoms are monitored and maintained or improved  Outcome: Progressing     Problem: Hypertensive Emergency/Crisis  Goal: Promote self management  Outcome: Progressing     Patient remained stable. No c/o SOB this shift. Continue to monitor & treat pain. Pending pre-cert for SNF.

## 2024-11-11 NOTE — CARE PLAN
Problem: Pain - Adult  Goal: Verbalizes/displays adequate comfort level or baseline comfort level  Outcome: Progressing     Problem: Safety - Adult  Goal: Free from fall injury  Outcome: Progressing     Problem: Discharge Planning  Goal: Discharge to home or other facility with appropriate resources  Outcome: Progressing     Problem: ACS/CP/NSTEMI/STEMI  Goal: Chest pain managed (free from pain or at acceptable level)  Outcome: Progressing  Goal: Lab values return to normal range  Outcome: Progressing  Goal: Promote self management  Outcome: Progressing  Goal: Serial ECG will return to baseline  Outcome: Progressing  Goal: Verbalize understanding of procedures/devices  Outcome: Progressing  Goal: Wean vasopressors/achieve hemodynamic stability  Outcome: Progressing   The patient's goals for the shift include      The clinical goals for the shift include pt will remain HDS and safe this shift    Over the shift, the patient did make progress toward the following goals. Voids per urinal, calls appropriately and medicated with tylenol 650 mg po routinely for nchest discomfort. Will continue to monitor.

## 2024-11-12 LAB
ALBUMIN SERPL BCP-MCNC: 4.2 G/DL (ref 3.4–5)
ANION GAP SERPL CALC-SCNC: 14 MMOL/L (ref 10–20)
BASOPHILS # BLD AUTO: 0.01 X10*3/UL (ref 0–0.1)
BASOPHILS NFR BLD AUTO: 0.1 %
BUN SERPL-MCNC: 28 MG/DL (ref 6–23)
CALCIUM SERPL-MCNC: 10.4 MG/DL (ref 8.6–10.6)
CHLORIDE SERPL-SCNC: 98 MMOL/L (ref 98–107)
CO2 SERPL-SCNC: 25 MMOL/L (ref 21–32)
CREAT SERPL-MCNC: 0.98 MG/DL (ref 0.5–1.3)
EGFRCR SERPLBLD CKD-EPI 2021: 86 ML/MIN/1.73M*2
EOSINOPHIL # BLD AUTO: 0.01 X10*3/UL (ref 0–0.7)
EOSINOPHIL NFR BLD AUTO: 0.1 %
ERYTHROCYTE [DISTWIDTH] IN BLOOD BY AUTOMATED COUNT: 12.4 % (ref 11.5–14.5)
GLUCOSE BLD MANUAL STRIP-MCNC: 129 MG/DL (ref 74–99)
GLUCOSE BLD MANUAL STRIP-MCNC: 200 MG/DL (ref 74–99)
GLUCOSE BLD MANUAL STRIP-MCNC: 209 MG/DL (ref 74–99)
GLUCOSE BLD MANUAL STRIP-MCNC: 256 MG/DL (ref 74–99)
GLUCOSE SERPL-MCNC: 101 MG/DL (ref 74–99)
HCT VFR BLD AUTO: 40.3 % (ref 41–52)
HGB BLD-MCNC: 13.5 G/DL (ref 13.5–17.5)
IMM GRANULOCYTES # BLD AUTO: 0.03 X10*3/UL (ref 0–0.7)
IMM GRANULOCYTES NFR BLD AUTO: 0.3 % (ref 0–0.9)
LYMPHOCYTES # BLD AUTO: 1.76 X10*3/UL (ref 1.2–4.8)
LYMPHOCYTES NFR BLD AUTO: 18.8 %
MAGNESIUM SERPL-MCNC: 2.17 MG/DL (ref 1.6–2.4)
MCH RBC QN AUTO: 30.1 PG (ref 26–34)
MCHC RBC AUTO-ENTMCNC: 33.5 G/DL (ref 32–36)
MCV RBC AUTO: 90 FL (ref 80–100)
MONOCYTES # BLD AUTO: 0.56 X10*3/UL (ref 0.1–1)
MONOCYTES NFR BLD AUTO: 6 %
NEUTROPHILS # BLD AUTO: 6.99 X10*3/UL (ref 1.2–7.7)
NEUTROPHILS NFR BLD AUTO: 74.7 %
NRBC BLD-RTO: 0 /100 WBCS (ref 0–0)
PHOSPHATE SERPL-MCNC: 4.6 MG/DL (ref 2.5–4.9)
PLATELET # BLD AUTO: 222 X10*3/UL (ref 150–450)
POTASSIUM SERPL-SCNC: 4.2 MMOL/L (ref 3.5–5.3)
RBC # BLD AUTO: 4.48 X10*6/UL (ref 4.5–5.9)
SODIUM SERPL-SCNC: 133 MMOL/L (ref 136–145)
STAPHYLOCOCCUS SPEC CULT: NORMAL
WBC # BLD AUTO: 9.4 X10*3/UL (ref 4.4–11.3)

## 2024-11-12 PROCEDURE — 2500000001 HC RX 250 WO HCPCS SELF ADMINISTERED DRUGS (ALT 637 FOR MEDICARE OP)

## 2024-11-12 PROCEDURE — 97110 THERAPEUTIC EXERCISES: CPT | Mod: GO

## 2024-11-12 PROCEDURE — 36415 COLL VENOUS BLD VENIPUNCTURE: CPT

## 2024-11-12 PROCEDURE — 2500000004 HC RX 250 GENERAL PHARMACY W/ HCPCS (ALT 636 FOR OP/ED)

## 2024-11-12 PROCEDURE — G0378 HOSPITAL OBSERVATION PER HR: HCPCS

## 2024-11-12 PROCEDURE — 99232 SBSQ HOSP IP/OBS MODERATE 35: CPT

## 2024-11-12 PROCEDURE — 80069 RENAL FUNCTION PANEL: CPT

## 2024-11-12 PROCEDURE — 96372 THER/PROPH/DIAG INJ SC/IM: CPT

## 2024-11-12 PROCEDURE — 82947 ASSAY GLUCOSE BLOOD QUANT: CPT

## 2024-11-12 PROCEDURE — 97110 THERAPEUTIC EXERCISES: CPT | Mod: GP

## 2024-11-12 PROCEDURE — 2500000002 HC RX 250 W HCPCS SELF ADMINISTERED DRUGS (ALT 637 FOR MEDICARE OP, ALT 636 FOR OP/ED)

## 2024-11-12 PROCEDURE — 97535 SELF CARE MNGMENT TRAINING: CPT | Mod: GO

## 2024-11-12 PROCEDURE — 85025 COMPLETE CBC W/AUTO DIFF WBC: CPT

## 2024-11-12 PROCEDURE — 97530 THERAPEUTIC ACTIVITIES: CPT | Mod: GP

## 2024-11-12 PROCEDURE — 83735 ASSAY OF MAGNESIUM: CPT

## 2024-11-12 RX ORDER — ACETAMINOPHEN 325 MG/1
650 TABLET ORAL EVERY 6 HOURS PRN
Status: DISCONTINUED | OUTPATIENT
Start: 2024-11-12 | End: 2024-11-23 | Stop reason: HOSPADM

## 2024-11-12 RX ORDER — DICLOFENAC SODIUM 10 MG/G
4 GEL TOPICAL 4 TIMES DAILY PRN
Status: DISCONTINUED | OUTPATIENT
Start: 2024-11-12 | End: 2024-11-23 | Stop reason: HOSPADM

## 2024-11-12 RX ORDER — LOPERAMIDE HYDROCHLORIDE 2 MG/1
4 CAPSULE ORAL 2 TIMES DAILY
Status: DISCONTINUED | OUTPATIENT
Start: 2024-11-12 | End: 2024-11-23 | Stop reason: HOSPADM

## 2024-11-12 RX ORDER — LOPERAMIDE HYDROCHLORIDE 2 MG/1
4 CAPSULE ORAL 2 TIMES DAILY
Status: DISCONTINUED | OUTPATIENT
Start: 2024-11-12 | End: 2024-11-12

## 2024-11-12 ASSESSMENT — COGNITIVE AND FUNCTIONAL STATUS - GENERAL
TURNING FROM BACK TO SIDE WHILE IN FLAT BAD: A LITTLE
MOBILITY SCORE: 14
CLIMB 3 TO 5 STEPS WITH RAILING: TOTAL
TOILETING: A LOT
PERSONAL GROOMING: A LITTLE
CLIMB 3 TO 5 STEPS WITH RAILING: TOTAL
MOVING TO AND FROM BED TO CHAIR: A LITTLE
MOVING FROM LYING ON BACK TO SITTING ON SIDE OF FLAT BED WITH BEDRAILS: A LITTLE
HELP NEEDED FOR BATHING: A LITTLE
STANDING UP FROM CHAIR USING ARMS: A LOT
MOBILITY SCORE: 13
DRESSING REGULAR UPPER BODY CLOTHING: A LITTLE
TURNING FROM BACK TO SIDE WHILE IN FLAT BAD: A LITTLE
DAILY ACTIVITIY SCORE: 16
DAILY ACTIVITIY SCORE: 22
MOVING TO AND FROM BED TO CHAIR: A LITTLE
WALKING IN HOSPITAL ROOM: TOTAL
DRESSING REGULAR LOWER BODY CLOTHING: A LITTLE
DRESSING REGULAR LOWER BODY CLOTHING: A LOT
WALKING IN HOSPITAL ROOM: TOTAL
HELP NEEDED FOR BATHING: A LOT
STANDING UP FROM CHAIR USING ARMS: A LOT

## 2024-11-12 ASSESSMENT — PAIN SCALES - GENERAL
PAINLEVEL_OUTOF10: 7
PAINLEVEL_OUTOF10: 0 - NO PAIN
PAINLEVEL_OUTOF10: 4
PAINLEVEL_OUTOF10: 7
PAINLEVEL_OUTOF10: 0 - NO PAIN
PAINLEVEL_OUTOF10: 7

## 2024-11-12 ASSESSMENT — PAIN DESCRIPTION - DESCRIPTORS
DESCRIPTORS: ACHING

## 2024-11-12 ASSESSMENT — ACTIVITIES OF DAILY LIVING (ADL)
EFFECT OF PAIN ON DAILY ACTIVITIES: DEBILITATING
HOME_MANAGEMENT_TIME_ENTRY: 10

## 2024-11-12 ASSESSMENT — PAIN - FUNCTIONAL ASSESSMENT
PAIN_FUNCTIONAL_ASSESSMENT: 0-10

## 2024-11-12 NOTE — PROGRESS NOTES
Physical Therapy    Physical Therapy Treatment    Patient Name: Delvis Ventura Jr.  MRN: [id number]  Department: [place] [address]  Room: [facility]5060A  Today's Date: 11/12/2024  Time Calculation  Start Time: 1240  Stop Time: 1318  Time Calculation (min): 38 min         Assessment/Plan   PT Assessment  PT Assessment Results: Decreased strength, Decreased range of motion, Decreased endurance, Impaired balance, Decreased mobility, Decreased coordination, Impaired tone, Pain  Rehab Prognosis: Good  Barriers to Discharge: none  Evaluation/Treatment Tolerance: Patient limited by fatigue, Patient limited by pain  Medical Staff Made Aware: Yes  Strengths: Attitude of self, Coping skills  Barriers to Participation: Comorbidities  End of Session Communication: Bedside nurse  Assessment Comment: The pt is wheelchair bound, but very motivated to participate in therapy.  End of Session Patient Position: Bed, 3 rail up, Alarm off, not on at start of session  PT Plan  Inpatient/Swing Bed or Outpatient: Inpatient  PT Plan  Treatment/Interventions: Bed mobility, Transfer training, Balance training, Neuromuscular re-education, Strengthening, Endurance training, Range of motion, Therapeutic exercise, Therapeutic activity, Home exercise program  PT Plan: Ongoing PT  PT Frequency: 3 times per week  PT Discharge Recommendations: Moderate intensity level of continued care  Equipment Recommended upon Discharge:  (none)  PT Recommended Transfer Status: Assist x1  PT - OK to Discharge: Yes      General Visit Information:   PT  Visit  PT Received On: 11/12/24  Response to Previous Treatment: Patient with no complaints from previous session.  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    Subjective   Precautions:  Precautions  Hearing/Visual Limitations: Hearing and vision WFL with reading glasses.  Medical Precautions: Fall precautions  Precautions Comment: Pt in compliance with precaution throughout PT session.    Vital Signs (Past 2hrs)        Date/Time Vitals Session Patient Position Pulse Resp SpO2 BP MAP (mmHg)    11/12/24 1223 --  --  64  20  94 %  105/63  77     11/12/24 1240 During PT  Sitting  73  --  96 %  --  --                   Vital Signs Comment: vitals stable     Objective   Pain:  Pain Assessment  Pain Assessment: 0-10  0-10 (Numeric) Pain Score: 7  Pain Type: Acute pain  Pain Location: Leg  Pain Orientation: Right, Left (Thighs R>L)  Pain Descriptors: Aching  Pain Frequency: Constant/continuous  Pain Onset: Ongoing  Clinical Progression: Gradually worsening (with movement, especially standing)  Effect of Pain on Daily Activities: Debilitating  Patient's Stated Pain Goal: 5  Pain Interventions: Therapeutic presence  Response to Interventions: Slight pain increase  Multiple Pain Sites: Three  Pain 2  Pain Score 2: 7  Pain Type 2: Acute pain  Pain Location 2: Groin  Pain Orientation 2: Right, Left  Pain Descriptors 2: Aching  Pain Frequency 2: Constant/continuous  Pain Onset 2: On-going  Clinical Progression 2: Gradually worsening (with standing)  Patient's Stated Pain Goal 2: No pain  Pain Interventions 2: Therapeutic presence  Response to Interventions 2: Slight pain increase  Pain 3  Pain Score 3: 7  Pain Type 3: Acute pain  Pain Location 3: Chest  Pain Orientation 3: Mid  Pain Descriptors 3: Aching  Pain Onset 3: On-going  Clinical Progression 3: Not changed  Pain Frequency 3:  Constant/continuous  Patient's Stated Pain Goal 3: No pain  Pain Interventions 3: Therapeutic presence  Response to Interventions 3: Pain stable  Cognition:  Cognition  Overall Cognitive Status: Within Functional Limits  Arousal/Alertness: Appropriate responses to stimuli  Orientation Level: Oriented X4  Following Commands: Follows all commands and directions without difficulty  Coordination:  Movements are Fluid and Coordinated: No  Coordination Comment: impaired Efrem LE coordination  Postural Control:  Postural Control  Postural Control: Impaired  Posture Comment: The pt presented with good sitting posture and impaired standing posture using face-to-face HHA.  Static Sitting Balance  Static Sitting-Balance Support: Bilateral upper extremity supported, Feet supported  Static Sitting-Level of Assistance: Distant supervision  Static Sitting-Comment/Number of Minutes: Sitting EOB  Dynamic Sitting Balance  Dynamic Sitting-Balance Support: Bilateral upper extremity supported, Feet supported  Dynamic Sitting-Level of Assistance: Distant supervision  Dynamic Sitting-Comments: Sitting EOB  Static Standing Balance  Static Standing-Balance Support: Bilateral upper extremity supported  Static Standing-Level of Assistance: Maximum assistance  Static Standing-Comment/Number of Minutes: using face-to-face HHA  Dynamic Standing Balance  Dynamic Standing-Balance Support: Bilateral upper extremity supported  Dynamic Standing-Level of Assistance: Maximum assistance  Dynamic Standing-Comments: using face-to-face HHA  Extremity/Trunk Assessments:     RUE   RUE : Within Functional Limits  LUE   LUE: Within Functional Limits  RLE   RLE : Exceptions to WFL  AROM RLE (degrees)  RLE AROM Comment: Decreased knee ext  Strength RLE  R Hip Flexion: 3-/5  R Knee Flexion: 3-/5  R Knee Extension: 3-/5  R Ankle Dorsiflexion: 3-/5  R Ankle Plantar Flexion: 3-/5  LLE   LLE : Exceptions to WFL  AROM LLE (degrees)  LLE AROM Comment: Decreased knee  ext  Strength LLE  L Hip Flexion: 3-/5  L Knee Flexion: 3-/5  L Knee Extension: 3-/5  L Ankle Dorsiflexion: 3-/5  L Ankle Plantar Flexion: 3-/5  Activity Tolerance:  Activity Tolerance  Endurance: Decreased tolerance for upright activites  Treatments:  Therapeutic Exercise  Therapeutic Exercise Performed: Yes  Therapeutic Exercise Activity 1: LAQ 10 x 2  Therapeutic Exercise Activity 2: seated ankle pumps x 20  Therapeutic Exercise Activity 3: Efrem hamstring stretches 3 x 15 sec hold    Therapeutic Activity  Therapeutic Activity Performed: Yes  Therapeutic Activity 1: Pt performed EOB and OOB activities.    Balance/Neuromuscular Re-Education  Balance/Neuromuscular Re-Education Activity Performed: Yes  Balance/Neuromuscular Re-Education Activity 1: Supervision assist static sitting balance at EOB  Balance/Neuromuscular Re-Education Activity 2: Supervision assist dynamic sitting balance at EOB.  Balance/Neuromuscular Re-Education Activity 3: Max assist static standing balance using face-to-face HHA.  Balance/Neuromuscular Re-Education Activity 4: Max assist dynamic standing balance using face-to-face HHA.    Bed Mobility  Bed Mobility: Yes  Bed Mobility 1  Bed Mobility 1: Sitting to supine  Level of Assistance 1: Close supervision  Bed Mobility Comments 1: HOB slightly elevated    Ambulation/Gait Training  Ambulation/Gait Training Performed: No  Transfers  Transfer: Yes  Transfer 1  Transfer From 1: Sit to  Transfer to 1: Stand  Transfer Device 1:  (using face-to-face HHA)  Transfer Level of Assistance 1: Maximum assistance  Transfers 2  Transfer From 2: Stand to  Transfer to 2: Sit  Transfer Device 2:  (using face-to-face HHA)  Transfer Level of Assistance 2: Maximum assistance  Transfers 3  Transfer From 3: Commode-drop arm to  Transfer to 3: Bed  Technique 3: Sit pivot  Transfer Device 3:  (no device)  Transfer Level of Assistance 3: Close supervision    Stairs  Stairs: No    Outcome Measures:  [facility] Basic  Mobility  Turning from your back to your side while in a flat bed without using bedrails: None  Moving from lying on your back to sitting on the side of a flat bed without using bedrails: A little  Moving to and from bed to chair (including a wheelchair): A little  Standing up from a chair using your arms (e.g. wheelchair or bedside chair): A lot  To walk in hospital room: Total  Climbing 3-5 steps with railing: Total  Basic Mobility - Total Score: 14    OP EDUCATION:  Outpatient Education  Individual(s) Educated: Patient  Education Provided: Body Mechanics, Fall Risk, Home Exercise Program, Home Safety, Posture  Patient Response to Education: Patient/Caregiver Verbalized Understanding of Information, Patient/Caregiver Performed Return Demonstration of Exercises/Activities  Education Comment: Pt demonstrated good education retention with safe transfers.    Encounter Problems       Encounter Problems (Active)       Balance       STG - Maintains dynamic sitting balance without upper extremity support >/= 10 min supervision  (Progressing)       Start:  11/09/24    Expected End:  11/23/24               Mobility       pt will be independent with HEP program for BLEs to improve LE weakness  (Progressing)       Start:  11/09/24    Expected End:  11/23/24               PT Transfers       STG - Transfer from bed to chair CGA  (Progressing)       Start:  11/09/24    Expected End:  11/23/24            STG - Patient will perform bed mobility CGA (Progressing)       Start:  11/09/24    Expected End:  11/23/24               Pain - Adult

## 2024-11-12 NOTE — PROGRESS NOTES
Occupational Therapy    OT Treatment    Patient Name: Delvis Ventura Jr.  MRN: [id number]  Department: [place] [address]  Room: [facility]-A  Today's Date: 11/12/2024  Time Calculation  Start Time: 0834  Stop Time: 0908  Time Calculation (min): 34 min        Assessment:  End of Session Communication: Bedside nurse  End of Session Patient Position: Bed, 3 rail up, Alarm off, not on at start of session  OT Assessment Results: Decreased ADL status, Decreased upper extremity strength, Decreased endurance, Decreased functional mobility, Decreased IADLs    Plan:  Treatment Interventions: ADL retraining, Functional transfer training, UE strengthening/ROM, Endurance training, Patient/family training, Compensatory technique education, Equipment evaluation/education  OT Frequency: 3 times per week  OT Discharge Recommendations: Moderate intensity level of continued care    Subjective   Previous Visit Info:  OT Last Visit  OT Received On: 11/12/24    General:  General  Reason for Referral: chest pain, deconditioning  Past Medical History Relevant to Rehab: T2DM, cerebral palsy (wheelchair bound), nonischemic cardiomyopathy ([facility] 8/2018: trivial CAD), HFrEF (01/24 TTE: 15-20%) complicated by infected ICD (wound Cx: + staph aureus) with explantation and subsequent single chamber ICD re-implantation 4/28/2023  Prior to Session Communication: Bedside nurse  Patient Position Received: Bed, 2 rail up, Alarm off, not on at start of session  General Comment: Pt seated EOB upon arrival, willing and motivated to participate in OT.    Precautions:  Medical Precautions: Fall precautions    Pain:  Pain Assessment  Pain Assessment: 0-10  0-10 (Numeric) Pain Score: 0 - No pain    Objective    Cognition:  Cognition  Overall Cognitive Status: Within Functional Limits  Orientation Level: Oriented X4    Activities of Daily Living:   Grooming  Grooming Comments: pt utilizes wipes for hygiene; MOD A for back and buttocks    LE Dressing  LE  Dressing:  (MOD A to don /doff B socks)    Bed Mobility/Transfers:   Transfer 1  Transfer From 1: Bed to, Commode-standard to  Transfer to 1: Commode-standard, Bed  Technique 1: Squat pivot  Transfer Level of Assistance 1: Minimum assistance  Trials/Comments 1: R knee block; VCs for body mechanics and safety    Transfers 2  Technique 2: Sit to stand, Stand to sit  Transfer Device 2: Walker  Transfer Level of Assistance 2: Minimum assistance  Trials/Comments 2: 2x trials; VCs for body mechanics and hand placement    Sitting Balance:  Static Sitting Balance  Static Sitting-Level of Assistance: Independent  Dynamic Sitting Balance  Dynamic Sitting-Level of Assistance: Independent    Standing Balance:  Static Standing Balance  Static Standing-Comment/Number of Minutes: MOD A, R knee block, and FWW; completes 2x trials with static stance for ~20 seconds each trial; VCs for body mechanics and upright posutre    Therapy/Activity:   Therapeutic Exercise  Therapeutic Exercise Activity 1: 1x10 AROM cervical flexion /extension  Therapeutic Exercise Activity 2: 1x10 AROM cervical lateral flexion  Therapeutic Exercise Activity 3: 1x10 BUE elbow flexion /extension with mild resistance provided from therapist    Outcome Measures:  [facility] Daily Activity  Putting on and taking off regular lower body clothing: A lot  Bathing (including washing, rinsing, drying): A lot  Putting on and taking off regular upper body clothing: A little  Toileting, which includes using toilet, bedpan or urinal: A lot  Taking care of personal grooming such as brushing teeth: A little  Eating Meals: None  Daily Activity - Total Score: 16    OT Adult Other Outcome Measures  4AT: Negative    Education Documentation  Handouts, taught by Erika Norris OT at 11/12/2024  9:33 AM.  Learner: Patient  Readiness: Acceptance  Method: Explanation, Demonstration  Response: Verbalizes Understanding, Demonstrated Understanding    Body Mechanics, taught by Erika HARDEN  SHIRA Norris at 11/12/2024  9:33 AM.  Learner: Patient  Readiness: Acceptance  Method: Explanation, Demonstration  Response: Verbalizes Understanding, Demonstrated Understanding    Home Exercise Program, taught by Erika Norris OT at 11/12/2024  9:33 AM.  Learner: Patient  Readiness: Acceptance  Method: Explanation, Demonstration  Response: Verbalizes Understanding, Demonstrated Understanding    Precautions, taught by Erika Norris OT at 11/12/2024  9:33 AM.  Learner: Patient  Readiness: Acceptance  Method: Explanation, Demonstration  Response: Verbalizes Understanding, Demonstrated Understanding    ADL Training, taught by Erika Norris OT at 11/12/2024  9:33 AM.  Learner: Patient  Readiness: Acceptance  Method: Explanation, Demonstration  Response: Verbalizes Understanding, Demonstrated Understanding    Education Comments  No comments found.      Goals:  Encounter Problems       Encounter Problems (Active)       ADLs       Patient with complete upper body dressing with modified independent level of assistance donning and doffing all UE clothes with AR PRN (Progressing)       Start:  11/09/24    Expected End:  11/23/24            Patient with complete lower body dressing with minimal assist  level of assistance donning and doffing all LE clothes  with PRN adaptive equipment (Progressing)       Start:  11/09/24    Expected End:  11/23/24            Patient will complete daily grooming tasks with set-up level of assistance and PRN adaptive equipment while edge of bed . (Progressing)       Start:  11/09/24    Expected End:  11/23/24            Patient will complete toileting including hygiene clothing management/hygiene with minimal assist  level of assistance. (Progressing)       Start:  11/09/24    Expected End:  11/23/24               TRANSFERS       Patient will perform bed mobility modified independent level of assistance in order to improve safety and independence with mobility  (Progressing)       Start:  11/09/24    Expected End:  11/23/24            Patient will complete functional transfer to wheelchair/commode with least restrictive device with modified independent level of assistance. (Progressing)       Start:  11/09/24    Expected End:  11/23/24                     ---------------  Erika Norris (OTR/L, OTD)  Inpatient Occupational Therapist   Rehab Office: [phone]

## 2024-11-12 NOTE — CARE PLAN
Problem: Pain - Adult  Goal: Verbalizes/displays adequate comfort level or baseline comfort level  Outcome: Progressing     Problem: Safety - Adult  Goal: Free from fall injury  Outcome: Progressing     Problem: Chronic Conditions and Co-morbidities  Goal: Patient's chronic conditions and co-morbidity symptoms are monitored and maintained or improved  Outcome: Progressing     Problem: Pain  Goal: Takes deep breaths with improved pain control throughout the shift  Outcome: Progressing     Patient remained stable. No c/o SOB or chest pain this shift. Pending pre-cert for SNF.

## 2024-11-12 NOTE — CARE PLAN
Problem: Pain - Adult  Goal: Verbalizes/displays adequate comfort level or baseline comfort level  Outcome: Progressing     Problem: Safety - Adult  Goal: Free from fall injury  Outcome: Progressing     Problem: Skin  Goal: Decreased wound size/increased tissue granulation at next dressing change  Outcome: Progressing  Flowsheets (Taken 11/12/2024 0342)  Decreased wound size/increased tissue granulation at next dressing change:   Protective dressings over bony prominences   Promote sleep for wound healing   Utilize specialty bed per algorithm  Goal: Participates in plan/prevention/treatment measures  Outcome: Progressing  Goal: Prevent/manage excess moisture  Outcome: Progressing  Goal: Prevent/minimize sheer/friction injuries  Outcome: Progressing  Goal: Promote/optimize nutrition  Outcome: Progressing  Goal: Promote skin healing  Outcome: Progressing   The patient's goals for the shift include      The clinical goals for the shift include pt will have no diarrhea this shift    Over the shift, the patient did make progress toward the following goals. Pt medicated with imodium 2 mg po x 1 for c/o diarrhea x 1. Refused scheduled tylenol. Will continue to monitor.

## 2024-11-12 NOTE — PROGRESS NOTES
Delvis Ventura Jr. is a 64 y.o. male on day 0 of admission presenting with Chest pain, unspecified type.      Subjective   NAEON.    Patient was examined this AM comfortable sitting on side of bed. Patient denies any new concerns at this time. Patient continues to endorse improving chest pain that is reproducible on palpation, non radiating, that is dull.      Denies HA, change in vision, difficulty swallowing, sob, n/v/d/c, dysuria     Pending SNF placement.    Objective     Last Recorded Vitals  /57 (BP Location: Right arm, Patient Position: Lying)   Pulse 55   Temp 36.9 °C (98.4 °F) (Temporal)   Resp 20   Wt 99.3 kg (218 lb 14.7 oz)   SpO2 96%   Intake/Output last 3 Shifts:    Intake/Output Summary (Last 24 hours) at 11/12/2024 0803  Last data filed at 11/12/2024 0657  Gross per 24 hour   Intake 1560 ml   Output 2126 ml   Net -566 ml       Admission Weight  Weight: 91.6 kg (202 lb) (11/08/24 1015)    Daily Weight  11/12/24 : 99.3 kg (218 lb 14.7 oz)    Image Results  ECG 12 Lead  Normal sinus rhythm  Cannot rule out Inferior infarct , age undetermined  Possible Anterior infarct (cited on or before 10-FEB-2024)  Abnormal ECG  When compared with ECG of 08-NOV-2024 10:18,  No significant change was found      Physical Exam  Constitutional:       General: He is not in acute distress.     Appearance: He is obese.   Eyes:      Extraocular Movements: Extraocular movements intact.   Neck:      Comments: No JVD appreciated. Thick neck circumference  Cardiovascular:      Rate and Rhythm: Normal rate and regular rhythm.      Heart sounds:      No friction rub.   Pulmonary:      Breath sounds: No wheezing, rhonchi or rales.   Musculoskeletal:      Right lower leg: No edema.      Left lower leg: No edema.      Comments: Costochondritis pain improved on palpation.    Skin:     General: Skin is warm and dry.   Neurological:      General: No focal deficit present.      Mental Status: He is alert and  oriented to person, place, and time.   Psychiatric:         Mood and Affect: Mood normal.         Behavior: Behavior normal.       Relevant Results  Results for orders placed or performed during the hospital encounter of 11/08/24 (from the past 24 hours)   POCT GLUCOSE   Result Value Ref Range    POCT Glucose 254 (H) 74 - 99 mg/dL   POCT GLUCOSE   Result Value Ref Range    POCT Glucose 222 (H) 74 - 99 mg/dL   POCT GLUCOSE   Result Value Ref Range    POCT Glucose 244 (H) 74 - 99 mg/dL   POCT GLUCOSE   Result Value Ref Range    POCT Glucose 209 (H) 74 - 99 mg/dL   CBC and Auto Differential   Result Value Ref Range    WBC 9.4 4.4 - 11.3 x10*3/uL    nRBC 0.0 0.0 - 0.0 /100 WBCs    RBC 4.48 (L) 4.50 - 5.90 x10*6/uL    Hemoglobin 13.5 13.5 - 17.5 g/dL    Hematocrit 40.3 (L) 41.0 - 52.0 %    MCV 90 80 - 100 fL    MCH 30.1 26.0 - 34.0 pg    MCHC 33.5 32.0 - 36.0 g/dL    RDW 12.4 11.5 - 14.5 %    Platelets 222 150 - 450 x10*3/uL    Neutrophils % 74.7 40.0 - 80.0 %    Immature Granulocytes %, Automated 0.3 0.0 - 0.9 %    Lymphocytes % 18.8 13.0 - 44.0 %    Monocytes % 6.0 2.0 - 10.0 %    Eosinophils % 0.1 0.0 - 6.0 %    Basophils % 0.1 0.0 - 2.0 %    Neutrophils Absolute 6.99 1.20 - 7.70 x10*3/uL    Immature Granulocytes Absolute, Automated 0.03 0.00 - 0.70 x10*3/uL    Lymphocytes Absolute 1.76 1.20 - 4.80 x10*3/uL    Monocytes Absolute 0.56 0.10 - 1.00 x10*3/uL    Eosinophils Absolute 0.01 0.00 - 0.70 x10*3/uL    Basophils Absolute 0.01 0.00 - 0.10 x10*3/uL     Assessment & Plan  Chest pain, unspecified type    Delvis Franco Audrey Leach is a 64 y.o. 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 In the ED, VSS, troponin's ad EKG negative for ACS, BNP was not elevated to suggest ADHF, although CXR with some vascular congestion. Patient was admitted to cardiology floor for observation and further workup.    Pt had recurrence of his chest pain at 6am on 11/9. EKG was not concerning for ischemia, troponins are pending. On bedside examination, the pain is reproducible over his sternum, will continue supportive management of likely costochondritis. ESR and CRP were normal to mildly elevated, making this likely not pericarditis. Patient does not believe it is heart burn as he knows what that feels like and this stabbing pain feels different. Has a hx of provoked DVT in the past, however his clinical picture and Well score makes PE unlikely.        Updates 11/12:  - Steroid 40 mg x 5 days is improving patient's pain - day 3/5  - Loperamide PRN added   - Voltaren gel added for pain  - Pt pending SNF placement      #Chest pain of unclear etiology  #HFrecoveredEF (EF 15-20% TTE Jan 2024--> EF 61% 11/9 TTE) s/p ICD  #NICM  #non obstructive CAD  -acute onset 11/8, not relieved by nitroglycerin  -ED workup unremarkable  -Admission weight 92 kg/ discharge weight in Feb 2024 for ADHF: 94 kg  -Esr 25, CRP 0.96 . Not endorsing positional signs of pericarditis  -Pain reproducible on palpation, likely costochondritis   -TTE 11/9: EF 61%!  Plan:  -TTE   -Continue home meds:              -ASA, atorvastatin              -GDMT: Coreg 50 BID, Farxiga 10 mg daily, entresto  daily, aldactone 25 mg daily              -anti-HTNs: hydral 75 TID, imdur 120 mg daily  -Daily weights  -PRN lasix 20 mg po as clinical picture dictates  -Scheduled tylenol +/- Prednisone 40 mg x5d (11/10-11/15)  -Close cardiology follow-up with Dr. Hernandez upon discharge     #Hx of  NSVT/VT  -cotninue amiodarone 200 daily  -single chamber ICD with hx of prior device infection and removal     #IDDM  -lantus 40 units at night  -ldssi  -jardiance as for gdmt above     #insomnia  -takes prazasosin and zolpidem prn     #Bilateral wrist pain  -Carpal tunnel?  Plan:  -wrist splint for b/l hands  -PT/OT eval     #hx of diarrhea  -abdominal distension on exam  -pt doesn't endorse constipation  Plan:  -Hold home loperamide        F: prn, EF 61%  E: prn  A: pIVs  DVT PPX: lovenox  Diet: cardiac  GI ppx: Pepcid prn  BM ppx: none  Code: full     The patient was seen and discussed with the attending, Dr. Charles.         Kristina Yoon MD  PGY-1

## 2024-11-12 NOTE — PROGRESS NOTES
11/12/24        Transitional Care Coordination Progress Note:   Patient discussed during interdisciplinary rounds.   Team members present: OFELIA SALINAS MD   Plan per Medical/Surgical team: Patient is medically ready   Discharge disposition: [facility]  pending precert   Status-Observation  Pay- [facility]    Potential Barriers: None   ADOD: 11-   Herbert Andersen RN [facility] [phone]

## 2024-11-13 LAB
ALBUMIN SERPL BCP-MCNC: 3.8 G/DL (ref 3.4–5)
ANION GAP SERPL CALC-SCNC: 12 MMOL/L (ref 10–20)
ATRIAL RATE: 94 BPM
BASOPHILS # BLD AUTO: 0.02 X10*3/UL (ref 0–0.1)
BASOPHILS NFR BLD AUTO: 0.2 %
BUN SERPL-MCNC: 29 MG/DL (ref 6–23)
CALCIUM SERPL-MCNC: 9.9 MG/DL (ref 8.6–10.6)
CHLORIDE SERPL-SCNC: 100 MMOL/L (ref 98–107)
CO2 SERPL-SCNC: 27 MMOL/L (ref 21–32)
CREAT SERPL-MCNC: 1.01 MG/DL (ref 0.5–1.3)
EGFRCR SERPLBLD CKD-EPI 2021: 83 ML/MIN/1.73M*2
EOSINOPHIL # BLD AUTO: 0 X10*3/UL (ref 0–0.7)
EOSINOPHIL NFR BLD AUTO: 0 %
ERYTHROCYTE [DISTWIDTH] IN BLOOD BY AUTOMATED COUNT: 12.2 % (ref 11.5–14.5)
GLUCOSE BLD MANUAL STRIP-MCNC: 145 MG/DL (ref 74–99)
GLUCOSE BLD MANUAL STRIP-MCNC: 156 MG/DL (ref 74–99)
GLUCOSE BLD MANUAL STRIP-MCNC: 365 MG/DL (ref 74–99)
GLUCOSE BLD MANUAL STRIP-MCNC: 96 MG/DL (ref 74–99)
GLUCOSE SERPL-MCNC: 87 MG/DL (ref 74–99)
HCT VFR BLD AUTO: 36.8 % (ref 41–52)
HGB BLD-MCNC: 12.6 G/DL (ref 13.5–17.5)
IMM GRANULOCYTES # BLD AUTO: 0.03 X10*3/UL (ref 0–0.7)
IMM GRANULOCYTES NFR BLD AUTO: 0.3 % (ref 0–0.9)
LYMPHOCYTES # BLD AUTO: 2.1 X10*3/UL (ref 1.2–4.8)
LYMPHOCYTES NFR BLD AUTO: 23.5 %
MAGNESIUM SERPL-MCNC: 2.21 MG/DL (ref 1.6–2.4)
MCH RBC QN AUTO: 30.4 PG (ref 26–34)
MCHC RBC AUTO-ENTMCNC: 34.2 G/DL (ref 32–36)
MCV RBC AUTO: 89 FL (ref 80–100)
MONOCYTES # BLD AUTO: 0.72 X10*3/UL (ref 0.1–1)
MONOCYTES NFR BLD AUTO: 8.1 %
NEUTROPHILS # BLD AUTO: 6.07 X10*3/UL (ref 1.2–7.7)
NEUTROPHILS NFR BLD AUTO: 67.9 %
NRBC BLD-RTO: 0 /100 WBCS (ref 0–0)
P AXIS: 65 DEGREES
P OFFSET: 171 MS
P ONSET: 117 MS
PHOSPHATE SERPL-MCNC: 4.3 MG/DL (ref 2.5–4.9)
PLATELET # BLD AUTO: 222 X10*3/UL (ref 150–450)
POTASSIUM SERPL-SCNC: 3.9 MMOL/L (ref 3.5–5.3)
PR INTERVAL: 188 MS
Q ONSET: 211 MS
QRS COUNT: 16 BEATS
QRS DURATION: 112 MS
QT INTERVAL: 394 MS
QTC CALCULATION(BAZETT): 492 MS
QTC FREDERICIA: 457 MS
R AXIS: -11 DEGREES
RBC # BLD AUTO: 4.15 X10*6/UL (ref 4.5–5.9)
SODIUM SERPL-SCNC: 135 MMOL/L (ref 136–145)
T AXIS: -13 DEGREES
T OFFSET: 408 MS
VENTRICULAR RATE: 94 BPM
WBC # BLD AUTO: 8.9 X10*3/UL (ref 4.4–11.3)

## 2024-11-13 PROCEDURE — 2500000004 HC RX 250 GENERAL PHARMACY W/ HCPCS (ALT 636 FOR OP/ED)

## 2024-11-13 PROCEDURE — 82947 ASSAY GLUCOSE BLOOD QUANT: CPT

## 2024-11-13 PROCEDURE — 2500000001 HC RX 250 WO HCPCS SELF ADMINISTERED DRUGS (ALT 637 FOR MEDICARE OP)

## 2024-11-13 PROCEDURE — 36415 COLL VENOUS BLD VENIPUNCTURE: CPT

## 2024-11-13 PROCEDURE — 96372 THER/PROPH/DIAG INJ SC/IM: CPT

## 2024-11-13 PROCEDURE — 80069 RENAL FUNCTION PANEL: CPT

## 2024-11-13 PROCEDURE — 85025 COMPLETE CBC W/AUTO DIFF WBC: CPT

## 2024-11-13 PROCEDURE — 2500000005 HC RX 250 GENERAL PHARMACY W/O HCPCS

## 2024-11-13 PROCEDURE — 99231 SBSQ HOSP IP/OBS SF/LOW 25: CPT | Performed by: INTERNAL MEDICINE

## 2024-11-13 PROCEDURE — 2500000002 HC RX 250 W HCPCS SELF ADMINISTERED DRUGS (ALT 637 FOR MEDICARE OP, ALT 636 FOR OP/ED)

## 2024-11-13 PROCEDURE — 83735 ASSAY OF MAGNESIUM: CPT

## 2024-11-13 PROCEDURE — G0378 HOSPITAL OBSERVATION PER HR: HCPCS

## 2024-11-13 RX ORDER — GABAPENTIN 100 MG/1
100 CAPSULE ORAL 3 TIMES DAILY
Status: DISCONTINUED | OUTPATIENT
Start: 2024-11-13 | End: 2024-11-23 | Stop reason: HOSPADM

## 2024-11-13 RX ORDER — GABAPENTIN 100 MG/1
100 CAPSULE ORAL EVERY 8 HOURS PRN
Status: DISCONTINUED | OUTPATIENT
Start: 2024-11-13 | End: 2024-11-13

## 2024-11-13 ASSESSMENT — COGNITIVE AND FUNCTIONAL STATUS - GENERAL
DRESSING REGULAR LOWER BODY CLOTHING: A LITTLE
HELP NEEDED FOR BATHING: A LITTLE
HELP NEEDED FOR BATHING: A LITTLE
MOBILITY SCORE: 18
MOBILITY SCORE: 18
DAILY ACTIVITIY SCORE: 22
DRESSING REGULAR LOWER BODY CLOTHING: A LITTLE
CLIMB 3 TO 5 STEPS WITH RAILING: TOTAL
CLIMB 3 TO 5 STEPS WITH RAILING: TOTAL
DAILY ACTIVITIY SCORE: 22
WALKING IN HOSPITAL ROOM: TOTAL
WALKING IN HOSPITAL ROOM: TOTAL

## 2024-11-13 ASSESSMENT — PAIN - FUNCTIONAL ASSESSMENT
PAIN_FUNCTIONAL_ASSESSMENT: 0-10

## 2024-11-13 ASSESSMENT — PAIN SCALES - GENERAL
PAINLEVEL_OUTOF10: 0 - NO PAIN

## 2024-11-13 NOTE — PROGRESS NOTES
Delvis Ventura Jr. is a 64 y.o. male on day 0 of admission presenting with Chest pain, unspecified type.      Subjective   NAEON.    Patient was examined this AM. Patient denies any new concerns at this time.     Patient continues to endorse improving chest pain that is reproducible on palpation, non radiating, that is dull.      Denies HA, change in vision, difficulty swallowing, sob, n/v/d/c, dysuria     Pending SNF placement.    Objective     Last Recorded Vitals  /84 (BP Location: Right arm, Patient Position: Sitting)   Pulse 64   Temp 36.5 °C (97.7 °F) (Temporal)   Resp 20   Wt 99.5 kg (219 lb 5.7 oz)   SpO2 96%   Intake/Output last 3 Shifts:    Intake/Output Summary (Last 24 hours) at 11/13/2024 0807  Last data filed at 11/12/2024 2050  Gross per 24 hour   Intake 960 ml   Output 1175 ml   Net -215 ml       Admission Weight  Weight: 91.6 kg (202 lb) (11/08/24 1015)    Daily Weight  11/13/24 : 99.5 kg (219 lb 5.7 oz)    Image Results  Electrocardiogram, 12-lead PRN ACS symptoms  Sinus rhythm with 1st degree AV block  Cannot rule out Anterior infarct (cited on or before 10-FEB-2024)  Abnormal ECG  When compared with ECG of 09-NOV-2024 05:46,  Left posterior fascicular block is no longer Present      Physical Exam  Constitutional:       General: He is not in acute distress.     Appearance: He is obese.   Eyes:      Extraocular Movements: Extraocular movements intact.   Neck:      Comments: No JVD appreciated. Thick neck circumference  Cardiovascular:      Rate and Rhythm: Normal rate and regular rhythm.      Heart sounds:      No friction rub.   Pulmonary:      Breath sounds: No wheezing, rhonchi or rales.   Musculoskeletal:      Right lower leg: No edema.      Left lower leg: No edema.      Comments: Costochondritis pain improved on palpation.    Skin:     General: Skin is warm and dry.   Neurological:      General: No focal deficit present.      Mental Status: He is alert and oriented to  person, place, and time.   Psychiatric:         Mood and Affect: Mood normal.         Behavior: Behavior normal.         Relevant Results  Results for orders placed or performed during the hospital encounter of 11/08/24 (from the past 24 hours)   POCT GLUCOSE   Result Value Ref Range    POCT Glucose 129 (H) 74 - 99 mg/dL   POCT GLUCOSE   Result Value Ref Range    POCT Glucose 200 (H) 74 - 99 mg/dL   POCT GLUCOSE   Result Value Ref Range    POCT Glucose 256 (H) 74 - 99 mg/dL   POCT GLUCOSE   Result Value Ref Range    POCT Glucose 96 74 - 99 mg/dL   CBC and Auto Differential   Result Value Ref Range    WBC 8.9 4.4 - 11.3 x10*3/uL    nRBC 0.0 0.0 - 0.0 /100 WBCs    RBC 4.15 (L) 4.50 - 5.90 x10*6/uL    Hemoglobin 12.6 (L) 13.5 - 17.5 g/dL    Hematocrit 36.8 (L) 41.0 - 52.0 %    MCV 89 80 - 100 fL    MCH 30.4 26.0 - 34.0 pg    MCHC 34.2 32.0 - 36.0 g/dL    RDW 12.2 11.5 - 14.5 %    Platelets 222 150 - 450 x10*3/uL    Neutrophils % 67.9 40.0 - 80.0 %    Immature Granulocytes %, Automated 0.3 0.0 - 0.9 %    Lymphocytes % 23.5 13.0 - 44.0 %    Monocytes % 8.1 2.0 - 10.0 %    Eosinophils % 0.0 0.0 - 6.0 %    Basophils % 0.2 0.0 - 2.0 %    Neutrophils Absolute 6.07 1.20 - 7.70 x10*3/uL    Immature Granulocytes Absolute, Automated 0.03 0.00 - 0.70 x10*3/uL    Lymphocytes Absolute 2.10 1.20 - 4.80 x10*3/uL    Monocytes Absolute 0.72 0.10 - 1.00 x10*3/uL    Eosinophils Absolute 0.00 0.00 - 0.70 x10*3/uL    Basophils Absolute 0.02 0.00 - 0.10 x10*3/uL     Assessment & Plan  Chest pain, unspecified type    Delvis Franco Audrey Leach is a 64 y.o. 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 In the ED, VSS, troponin's ad EKG negative for ACS, BNP was not elevated to suggest ADHF, although CXR with some vascular congestion. Patient was admitted to cardiology floor for observation and further workup.    Pt had recurrence of his chest pain at 6am on 11/9. EKG was not concerning for ischemia. On bedside examination, the pain is reproducible over his sternum, will continue supportive management of likely costochondritis. ESR and CRP were normal to mildly elevated, making this likely not pericarditis. Patient does not believe it is heart burn as he knows what that feels like and this stabbing pain feels different. Has a hx of provoked DVT in the past, however his clinical picture and Well score makes PE unlikely.        Updates 11/13:  - Started gabapentin 100 for pain management   - Steroid 40 mg x 5 days is improving patient's pain - day 4/5  - Pt pending SNF placement      #Chest pain of unclear etiology  #HFrecoveredEF (EF 15-20% TTE Jan 2024--> EF 61% 11/9 TTE) s/p ICD  #NICM  #non obstructive CAD  -acute onset 11/8, not relieved by nitroglycerin  -ED workup unremarkable  -Admission weight 92 kg/ discharge weight in Feb 2024 for ADHF: 94 kg  -Esr 25, CRP 0.96 . Not endorsing positional signs of pericarditis  -Pain reproducible on palpation, likely costochondritis   -TTE 11/9: EF 61%!  Plan:  -TTE   -Continue home meds:              -ASA, atorvastatin              -GDMT: Coreg 50 BID, Farxiga 10 mg daily, entresto  daily, aldactone 25 mg daily              -anti-HTNs: hydral 75 TID, imdur 120 mg daily  -Daily weights  -PRN lasix 20 mg po as clinical picture dictates  -Scheduled tylenol +/- Prednisone 40 mg x5d (11/10-11/15)  -Close cardiology follow-up with Dr. Hernandez upon discharge     #Hx of NSVT/VT  -cotninue amiodarone 200 daily  -single chamber  ICD with hx of prior device infection and removal     #IDDM  -lantus 40 units at night  -ldssi  -jardiance as for gdmt above     #insomnia  -takes prazasosin and zolpidem prn     #Bilateral wrist pain  -Carpal tunnel?  Plan:  -wrist splint for b/l hands  -PT/OT eval     #hx of diarrhea  -abdominal distension on exam  -pt doesn't endorse constipation  Plan:  -Hold home loperamide        F: prn, EF 61%  E: prn  A: pIVs  DVT PPX: lovenox  Diet: cardiac  GI ppx: Pepcid prn  BM ppx: none  Code: full     The patient was seen and discussed with the attending, Dr. Charles.         Kristina Yoon MD  PGY-1    PATIENT AWAITING SNF PLACEMENT. Please disregard ICD comment as entered incorrectly below.

## 2024-11-13 NOTE — CARE PLAN
Problem: Pain - Adult  Goal: Verbalizes/displays adequate comfort level or baseline comfort level  Outcome: Progressing     Problem: Safety - Adult  Goal: Free from fall injury  Outcome: Progressing     Problem: Discharge Planning  Goal: Discharge to home or other facility with appropriate resources  Outcome: Progressing     Problem: ACS/CP/NSTEMI/STEMI  Goal: Chest pain managed (free from pain or at acceptable level)  Outcome: Progressing  Goal: Lab values return to normal range  Outcome: Progressing  Goal: Promote self management  Outcome: Progressing  Goal: Serial ECG will return to baseline  Outcome: Progressing  Goal: Verbalize understanding of procedures/devices  Outcome: Progressing  Goal: Wean vasopressors/achieve hemodynamic stability  Outcome: Progressing   The patient's goals for the shift include      The clinical goals for the shift include pt will remain HDS and safe this shift    Over the shift, the patient did make progress toward the following goals. Denies pain or discomfort, uses call appropriately to call for assistance. Will continue to monitor.

## 2024-11-13 NOTE — CARE PLAN
Problem: Safety - Adult  Goal: Free from fall injury  Outcome: Progressing   The patient's goals for the shift include update on discharge.     The clinical goals for the shift include safety, awaiting precert.    Over the shift, the patient did not make progress toward the following goals n/a. Barriers to progression include none. Recommendations to address these barriers include none.

## 2024-11-14 LAB
ATRIAL RATE: 69 BPM
GLUCOSE BLD MANUAL STRIP-MCNC: 110 MG/DL (ref 74–99)
GLUCOSE BLD MANUAL STRIP-MCNC: 188 MG/DL (ref 74–99)
GLUCOSE BLD MANUAL STRIP-MCNC: 188 MG/DL (ref 74–99)
GLUCOSE BLD MANUAL STRIP-MCNC: 221 MG/DL (ref 74–99)
P AXIS: 61 DEGREES
P OFFSET: 159 MS
P ONSET: 104 MS
PR INTERVAL: 226 MS
Q ONSET: 217 MS
QRS COUNT: 11 BEATS
QRS DURATION: 104 MS
QT INTERVAL: 464 MS
QTC CALCULATION(BAZETT): 497 MS
QTC FREDERICIA: 486 MS
R AXIS: 19 DEGREES
T AXIS: -15 DEGREES
T OFFSET: 449 MS
VENTRICULAR RATE: 69 BPM

## 2024-11-14 PROCEDURE — 2500000004 HC RX 250 GENERAL PHARMACY W/ HCPCS (ALT 636 FOR OP/ED)

## 2024-11-14 PROCEDURE — 2500000002 HC RX 250 W HCPCS SELF ADMINISTERED DRUGS (ALT 637 FOR MEDICARE OP, ALT 636 FOR OP/ED)

## 2024-11-14 PROCEDURE — 97110 THERAPEUTIC EXERCISES: CPT | Mod: GO

## 2024-11-14 PROCEDURE — 2500000001 HC RX 250 WO HCPCS SELF ADMINISTERED DRUGS (ALT 637 FOR MEDICARE OP)

## 2024-11-14 PROCEDURE — G0378 HOSPITAL OBSERVATION PER HR: HCPCS

## 2024-11-14 PROCEDURE — 97530 THERAPEUTIC ACTIVITIES: CPT | Mod: GO

## 2024-11-14 PROCEDURE — 99231 SBSQ HOSP IP/OBS SF/LOW 25: CPT | Performed by: INTERNAL MEDICINE

## 2024-11-14 PROCEDURE — 82947 ASSAY GLUCOSE BLOOD QUANT: CPT

## 2024-11-14 PROCEDURE — 2500000005 HC RX 250 GENERAL PHARMACY W/O HCPCS

## 2024-11-14 PROCEDURE — 97110 THERAPEUTIC EXERCISES: CPT | Mod: GP

## 2024-11-14 PROCEDURE — 96372 THER/PROPH/DIAG INJ SC/IM: CPT

## 2024-11-14 RX ORDER — PREDNISONE 20 MG/1
TABLET ORAL
Status: DISPENSED
Start: 2024-11-14 | End: 2024-11-14

## 2024-11-14 SDOH — ECONOMIC STABILITY: FOOD INSECURITY: HOW HARD IS IT FOR YOU TO PAY FOR THE VERY BASICS LIKE FOOD, HOUSING, MEDICAL CARE, AND HEATING?: NOT VERY HARD

## 2024-11-14 SDOH — HEALTH STABILITY: MENTAL HEALTH: HOW OFTEN DO YOU HAVE SIX OR MORE DRINKS ON ONE OCCASION?: NEVER

## 2024-11-14 SDOH — SOCIAL STABILITY: SOCIAL INSECURITY: WITHIN THE LAST YEAR, HAVE YOU BEEN HUMILIATED OR EMOTIONALLY ABUSED IN OTHER WAYS BY YOUR PARTNER OR EX-PARTNER?: NO

## 2024-11-14 SDOH — SOCIAL STABILITY: SOCIAL INSECURITY: HAVE YOU HAD ANY THOUGHTS OF HARMING ANYONE ELSE?: NO

## 2024-11-14 SDOH — ECONOMIC STABILITY: INCOME INSECURITY: IN THE PAST 12 MONTHS HAS THE ELECTRIC, GAS, OIL, OR WATER COMPANY THREATENED TO SHUT OFF SERVICES IN YOUR HOME?: NO

## 2024-11-14 SDOH — SOCIAL STABILITY: SOCIAL INSECURITY: ABUSE: ADULT

## 2024-11-14 SDOH — ECONOMIC STABILITY: FOOD INSECURITY: WITHIN THE PAST 12 MONTHS, THE FOOD YOU BOUGHT JUST DIDN'T LAST AND YOU DIDN'T HAVE MONEY TO GET MORE.: NEVER TRUE

## 2024-11-14 SDOH — SOCIAL STABILITY: SOCIAL INSECURITY: DO YOU FEEL UNSAFE GOING BACK TO THE PLACE WHERE YOU ARE LIVING?: NO

## 2024-11-14 SDOH — SOCIAL STABILITY: SOCIAL INSECURITY: WERE YOU ABLE TO COMPLETE ALL THE BEHAVIORAL HEALTH SCREENINGS?: YES

## 2024-11-14 SDOH — SOCIAL STABILITY: SOCIAL INSECURITY: DOES ANYONE TRY TO KEEP YOU FROM HAVING/CONTACTING OTHER FRIENDS OR DOING THINGS OUTSIDE YOUR HOME?: NO

## 2024-11-14 SDOH — SOCIAL STABILITY: SOCIAL INSECURITY
WITHIN THE LAST YEAR, HAVE YOU BEEN RAPED OR FORCED TO HAVE ANY KIND OF SEXUAL ACTIVITY BY YOUR PARTNER OR EX-PARTNER?: NO

## 2024-11-14 SDOH — ECONOMIC STABILITY: FOOD INSECURITY: WITHIN THE PAST 12 MONTHS, YOU WORRIED THAT YOUR FOOD WOULD RUN OUT BEFORE YOU GOT THE MONEY TO BUY MORE.: NEVER TRUE

## 2024-11-14 SDOH — ECONOMIC STABILITY: HOUSING INSECURITY: AT ANY TIME IN THE PAST 12 MONTHS, WERE YOU HOMELESS OR LIVING IN A SHELTER (INCLUDING NOW)?: NO

## 2024-11-14 SDOH — ECONOMIC STABILITY: TRANSPORTATION INSECURITY: IN THE PAST 12 MONTHS, HAS LACK OF TRANSPORTATION KEPT YOU FROM MEDICAL APPOINTMENTS OR FROM GETTING MEDICATIONS?: NO

## 2024-11-14 SDOH — SOCIAL STABILITY: SOCIAL INSECURITY: DO YOU FEEL ANYONE HAS EXPLOITED OR TAKEN ADVANTAGE OF YOU FINANCIALLY OR OF YOUR PERSONAL PROPERTY?: NO

## 2024-11-14 SDOH — SOCIAL STABILITY: SOCIAL INSECURITY: HAS ANYONE EVER THREATENED TO HURT YOUR FAMILY OR YOUR PETS?: NO

## 2024-11-14 SDOH — SOCIAL STABILITY: SOCIAL INSECURITY: WITHIN THE LAST YEAR, HAVE YOU BEEN AFRAID OF YOUR PARTNER OR EX-PARTNER?: NO

## 2024-11-14 SDOH — SOCIAL STABILITY: SOCIAL INSECURITY: HAVE YOU HAD THOUGHTS OF HARMING ANYONE ELSE?: NO

## 2024-11-14 SDOH — ECONOMIC STABILITY: HOUSING INSECURITY: IN THE LAST 12 MONTHS, WAS THERE A TIME WHEN YOU WERE NOT ABLE TO PAY THE MORTGAGE OR RENT ON TIME?: NO

## 2024-11-14 SDOH — SOCIAL STABILITY: SOCIAL INSECURITY: ARE THERE ANY APPARENT SIGNS OF INJURIES/BEHAVIORS THAT COULD BE RELATED TO ABUSE/NEGLECT?: NO

## 2024-11-14 SDOH — SOCIAL STABILITY: SOCIAL INSECURITY: ARE YOU OR HAVE YOU BEEN THREATENED OR ABUSED PHYSICALLY, EMOTIONALLY, OR SEXUALLY BY ANYONE?: NO

## 2024-11-14 SDOH — HEALTH STABILITY: MENTAL HEALTH: HOW OFTEN DO YOU HAVE A DRINK CONTAINING ALCOHOL?: NEVER

## 2024-11-14 SDOH — ECONOMIC STABILITY: HOUSING INSECURITY: IN THE PAST 12 MONTHS, HOW MANY TIMES HAVE YOU MOVED WHERE YOU WERE LIVING?: 1

## 2024-11-14 SDOH — HEALTH STABILITY: MENTAL HEALTH: HOW MANY DRINKS CONTAINING ALCOHOL DO YOU HAVE ON A TYPICAL DAY WHEN YOU ARE DRINKING?: PATIENT DOES NOT DRINK

## 2024-11-14 ASSESSMENT — COGNITIVE AND FUNCTIONAL STATUS - GENERAL
WALKING IN HOSPITAL ROOM: TOTAL
MOBILITY SCORE: 13
MOBILITY SCORE: 17
HELP NEEDED FOR BATHING: A LITTLE
STANDING UP FROM CHAIR USING ARMS: A LITTLE
DAILY ACTIVITIY SCORE: 16
DAILY ACTIVITIY SCORE: 22
DRESSING REGULAR UPPER BODY CLOTHING: A LITTLE
DRESSING REGULAR LOWER BODY CLOTHING: A LOT
PERSONAL GROOMING: A LITTLE
STANDING UP FROM CHAIR USING ARMS: A LOT
CLIMB 3 TO 5 STEPS WITH RAILING: TOTAL
CLIMB 3 TO 5 STEPS WITH RAILING: TOTAL
MOVING TO AND FROM BED TO CHAIR: A LITTLE
TOILETING: A LOT
WALKING IN HOSPITAL ROOM: TOTAL
MOVING FROM LYING ON BACK TO SITTING ON SIDE OF FLAT BED WITH BEDRAILS: A LITTLE
DRESSING REGULAR LOWER BODY CLOTHING: A LITTLE
HELP NEEDED FOR BATHING: A LOT
TURNING FROM BACK TO SIDE WHILE IN FLAT BAD: A LITTLE

## 2024-11-14 ASSESSMENT — ACTIVITIES OF DAILY LIVING (ADL)
LACK_OF_TRANSPORTATION: NO
EFFECT OF PAIN ON DAILY ACTIVITIES: DEBILITATING

## 2024-11-14 ASSESSMENT — PAIN SCALES - GENERAL
PAINLEVEL_OUTOF10: 0 - NO PAIN
PAINLEVEL_OUTOF10: 7
PAINLEVEL_OUTOF10: 0 - NO PAIN
PAINLEVEL_OUTOF10: 8

## 2024-11-14 ASSESSMENT — PAIN - FUNCTIONAL ASSESSMENT
PAIN_FUNCTIONAL_ASSESSMENT: 0-10

## 2024-11-14 ASSESSMENT — LIFESTYLE VARIABLES
HOW OFTEN DO YOU HAVE A DRINK CONTAINING ALCOHOL: NEVER
AUDIT-C TOTAL SCORE: 0
AUDIT-C TOTAL SCORE: 0
PRESCIPTION_ABUSE_PAST_12_MONTHS: NO
SKIP TO QUESTIONS 9-10: 1
SKIP TO QUESTIONS 9-10: 1
HOW OFTEN DO YOU HAVE 6 OR MORE DRINKS ON ONE OCCASION: NEVER
AUDIT-C TOTAL SCORE: 0
HOW MANY STANDARD DRINKS CONTAINING ALCOHOL DO YOU HAVE ON A TYPICAL DAY: PATIENT DOES NOT DRINK
SUBSTANCE_ABUSE_PAST_12_MONTHS: NO

## 2024-11-14 ASSESSMENT — PAIN DESCRIPTION - DESCRIPTORS: DESCRIPTORS: THROBBING;SHOOTING;SHARP;ACHING

## 2024-11-14 NOTE — PROGRESS NOTES
Physical Therapy    Physical Therapy Treatment    Patient Name: Delvis Ventura Jr.  MRN: [id number]  Department: [place] [address]  Room: [address] [place] [address]  Today's Date: 11/14/2024  Time Calculation  Start Time: 1401  Stop Time: 1431  Time Calculation (min): 30 min         Assessment/Plan   PT Assessment  PT Assessment Results: Decreased strength, Decreased range of motion, Decreased endurance, Impaired balance, Decreased mobility, Decreased coordination, Impaired tone, Pain  Rehab Prognosis: Good  Barriers to Discharge: none  Evaluation/Treatment Tolerance: Patient limited by fatigue, Patient limited by pain  Medical Staff Made Aware: Yes  Strengths: Attitude of self, Coping skills  Barriers to Participation: Comorbidities  End of Session Communication: Bedside nurse  Assessment Comment: The pt performed supine therapeutic exercises with moderate difficulty due to decreased strength.  End of Session Patient Position: Bed, 3 rail up, Alarm off, not on at start of session  PT Plan  Inpatient/Swing Bed or Outpatient: Inpatient  PT Plan  Treatment/Interventions: Endurance training, Range of motion, Therapeutic exercise, Strengthening  PT Plan: Ongoing PT  PT Frequency: 3 times per week  PT Discharge Recommendations: Moderate intensity level of continued care  Equipment Recommended upon Discharge: Wheelchair  PT Recommended Transfer Status: Assist x1  PT - OK to Discharge: Yes      General Visit Information:   PT  Visit  PT Received On: 11/14/24  Response to Previous Treatment: Patient reporting fatigue but able to participate.  General  Reason for Referral: Chest pain, deconditioning  Past Medical History Relevant to Rehab: T2DM, cerebral palsy (wheelchair bound), nonischemic cardiomyopathy (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    Subjective   Precautions:  Precautions  Hearing/Visual Limitations: Hearing and vision WFL with reading glasses.  Medical Precautions: Fall precautions  Precautions Comment: Pt in compliance with precaution throughout PT session.    Vital Signs (Past 2hrs)        Date/Time Vitals Session Patient Position Pulse Resp SpO2 BP MAP (mmHg)    11/14/24 1401 During PT  Lying  63  --  96 %  --  --                   Vital Signs Comment: vitals stable     Objective   Pain:  Pain Assessment  Pain Assessment: 0-10  0-10 (Numeric) Pain Score: 8  Pain Type: Acute pain  Pain Location: Leg  Pain Orientation: Right, Proximal (thigh)  Pain Descriptors: Throbbing, Shooting, Sharp, Aching  Pain Frequency: Constant/continuous  Pain Onset: Ongoing  Clinical Progression: Gradually worsening (with standing)  Effect of Pain on Daily Activities: Debilitating  Patient's Stated Pain Goal: No pain  Pain Interventions: Therapeutic presence  Response to Interventions: No change in pain  Cognition:  Cognition  Overall Cognitive Status: Within Functional Limits  Arousal/Alertness: Appropriate responses to stimuli  Orientation Level: Oriented X4  Following Commands: Follows all commands and directions without difficulty  Coordination:  Movements are Fluid and Coordinated: No  Coordination Comment: impaired Efrem LE coordination  Postural Control:  Postural Control  Postural Control:  (n/a)  Static Sitting Balance  Static Sitting-Balance Support:  (n/a)  Dynamic Sitting Balance  Dynamic Sitting-Balance Support:  (n/a)  Static Standing Balance  Static Standing-Balance Support:  (n/a)  Dynamic Standing Balance  Dynamic Standing-Balance Support:  (n/a)  Extremity/Trunk Assessments:  GUSTAVO    RUE : Within Functional Limits  LUE   LUE: Within Functional Limits  RLE   RLE : Exceptions to WFL  AROM RLE (degrees)  RLE AROM Comment: Decreased knee ext  Strength RLE  R Hip Flexion: 3-/5  R Knee Flexion: 3-/5  R Knee Extension: 3-/5  R Ankle Dorsiflexion: 3-/5  R Ankle Plantar Flexion: 3-/5  LLE   LLE : Exceptions to WFL  AROM LLE (degrees)  LLE AROM Comment: Decreased knee ext  Strength LLE  L Hip Flexion: 3-/5  L Knee Flexion: 3-/5  L Knee Extension: 3-/5  L Ankle Dorsiflexion: 3-/5  L Ankle Plantar Flexion: 3-/5  Activity Tolerance:  Activity Tolerance  Endurance:  (Pt tolerated 30 min of exercises)  Treatments:  Therapeutic Exercise  Therapeutic Exercise Performed: Yes  Therapeutic Exercise Activity 1: ankle pumps x 15  Therapeutic Exercise Activity 2: heel slides with resisted ext x 15  Therapeutic Exercise Activity 3: SAQ x 15  Therapeutic Exercise Activity 4: quad sets x 15  Therapeutic Exercise Activity 5: SLR x 15  Therapeutic Exercise Activity 6: Hip ABD x 15  Therapeutic Exercise Activity 7: glute sets x 15    Therapeutic Activity  Therapeutic Activity Performed: No    Balance/Neuromuscular Re-Education  Balance/Neuromuscular Re-Education Activity Performed: No    Bed Mobility  Bed Mobility: No    Ambulation/Gait Training  Ambulation/Gait Training Performed: No  Transfers  Transfer: No    Stairs  Stairs: No    Outcome Measures:  [facility] Basic Mobility  Turning from your back to your side while in a flat bed without using bedrails: A little  Moving from lying on your back to sitting on the side of a flat bed without using bedrails: A little  Moving to and from bed to chair (including a wheelchair): A little  Standing up from a chair using your arms (e.g. wheelchair or bedside chair): A lot  To walk in hospital room: Total  Climbing 3-5 steps with railing: Total  Basic Mobility - Total Score: 13    OP EDUCATION:  Outpatient Education  Individual(s) Educated: Patient  Education Provided: Home Exercise  Program  Patient Response to Education: Patient/Caregiver Verbalized Understanding of Information, Patient/Caregiver Performed Return Demonstration of Exercises/Activities  Education Comment: Pt received education on proper exercise form.    Encounter Problems       Encounter Problems (Active)       Balance       STG - Maintains dynamic sitting balance without upper extremity support >/= 10 min supervision  (Progressing)       Start:  11/09/24    Expected End:  11/23/24               Mobility       pt will be independent with HEP program for BLEs to improve LE weakness  (Progressing)       Start:  11/09/24    Expected End:  11/23/24               PT Transfers       STG - Transfer from bed to chair CGA  (Progressing)       Start:  11/09/24    Expected End:  11/23/24            STG - Patient will perform bed mobility CGA (Progressing)       Start:  11/09/24    Expected End:  11/23/24               Pain - Adult

## 2024-11-14 NOTE — PROGRESS NOTES
Delvis Ventura Jr. is a 64 y.o. male on day 0 of admission presenting with Chest pain, unspecified type.      Subjective   NAEON.    Patient was examined this AM. Patient denies any new concerns at this time.     Patient continues to endorse improving chest pain that is reproducible on palpation, non radiating, that is dull.  That is improving. Yesterday patient was started on gabapentin with relief.     Denies HA, change in vision, difficulty swallowing, sob, n/v/d/c, dysuria     Pending SNF placement.    Objective     Last Recorded Vitals  /61 (BP Location: Right arm, Patient Position: Lying)   Pulse 60   Temp 36 °C (96.8 °F) (Temporal)   Resp 20   Wt 101 kg (222 lb 0.1 oz)   SpO2 96%   Intake/Output last 3 Shifts:    Intake/Output Summary (Last 24 hours) at 11/14/2024 0738  Last data filed at 11/14/2024 0713  Gross per 24 hour   Intake 680 ml   Output 2941 ml   Net -2261 ml       Admission Weight  Weight: 91.6 kg (202 lb) (11/08/24 1015)    Daily Weight  11/14/24 : 101 kg (222 lb 0.1 oz)    Image Results  ECG 12 Lead  Normal sinus rhythm  Cannot rule out Inferior infarct , age undetermined  Possible Anterior infarct (cited on or before 10-FEB-2024)  Abnormal ECG  When compared with ECG of 08-NOV-2024 10:18,  No significant change was found  Confirmed by Júnior Witt ([id number]) on 11/13/2024 1:46:05 PM      Physical Exam  Constitutional:       General: He is not in acute distress.     Appearance: He is obese.   Eyes:      Extraocular Movements: Extraocular movements intact.   Neck:      Comments: No JVD appreciated. Thick neck circumference  Cardiovascular:      Rate and Rhythm: Normal rate and regular rhythm.      Heart sounds:      No friction rub.   Pulmonary:      Breath sounds: No wheezing, rhonchi or rales.   Musculoskeletal:      Right lower leg: No edema.      Left lower leg: No edema.      Comments: Costochondritis pain improved on palpation.    Skin:     General: Skin is warm and  dry.   Neurological:      General: No focal deficit present.      Mental Status: He is alert and oriented to person, place, and time.   Psychiatric:         Mood and Affect: Mood normal.         Behavior: Behavior normal.         Relevant Results  Results for orders placed or performed during the hospital encounter of 11/08/24 (from the past 24 hours)   POCT GLUCOSE   Result Value Ref Range    POCT Glucose 156 (H) 74 - 99 mg/dL   POCT GLUCOSE   Result Value Ref Range    POCT Glucose 365 (H) 74 - 99 mg/dL   POCT GLUCOSE   Result Value Ref Range    POCT Glucose 145 (H) 74 - 99 mg/dL   POCT GLUCOSE   Result Value Ref Range    POCT Glucose 110 (H) 74 - 99 mg/dL     Assessment & Plan  Chest pain, unspecified type    Delvis Ventura Jr. is a 64 y.o. male with complex PMHx including T2DM, cerebral palsy (wheelchair bound), nonischemic cardiomyopathy ([facility] 8/2018: trivial CAD), HFrEF (01/24 TTE: 15-20%) complicated by infected ICD (wound Cx: + staph aureus) with explantation and subsequent single chamber ICD re-implantation 4/28/2023 who is presenting directly from his outpatient heart failure cardiology appointment where he had complaints of new-onset chest pain since 7am on 11/8 not relieved by nitroglycerin,not positional, nor associated with diaphoresis, n/v/post-prandial. In the ED, VSS, troponin's ad EKG negative for ACS, BNP was not elevated to suggest ADHF, although CXR with some vascular congestion. Patient was admitted to cardiology floor for observation and further workup.    Pt had recurrence of his chest pain at 6am on 11/9. EKG was not concerning for ischemia. On bedside examination, the pain is reproducible over his sternum, will continue supportive management of likely costochondritis. ESR and CRP were normal to mildly elevated, making this likely not pericarditis. Patient does not believe it is heart burn as he knows what that feels like and this stabbing pain feels different. Has a hx of  provoked DVT in the past, however his clinical picture and Well score makes PE unlikely.        Updates 11/14:  - gabapentin 100 for pain management   - Steroid 40 mg x 5 days is improving patient's pain - day 5/5  - Pt pending SNF placement      #Chest pain of unclear etiology  #HFrecoveredEF (EF 15-20% TTE Jan 2024--> EF 61% 11/9 TTE) s/p ICD  #NICM  #non obstructive CAD  -acute onset 11/8, not relieved by nitroglycerin  -ED workup unremarkable  -Admission weight 92 kg/ discharge weight in Feb 2024 for ADHF: 94 kg  -Esr 25, CRP 0.96 . Not endorsing positional signs of pericarditis  -Pain reproducible on palpation, likely costochondritis   -TTE 11/9: EF 61%!  Plan:  -TTE   -Continue home meds:              -ASA, atorvastatin              -GDMT: Coreg 50 BID, Farxiga 10 mg daily, entresto  daily, aldactone 25 mg daily              -anti-HTNs: hydral 75 TID, imdur 120 mg daily  -Daily weights  -PRN lasix 20 mg po as clinical picture dictates  -Scheduled tylenol +/- Prednisone 40 mg x5d (11/10-11/15)  -Close cardiology follow-up with Dr. Hernandez upon discharge     #Hx of NSVT/VT  -cotninue amiodarone 200 daily  -single chamber ICD with hx of prior device infection and removal     #IDDM  -lantus 40 units at night  -ldssi  -jardiance as for gdmt above     #insomnia  -takes prazasosin and zolpidem prn     #Bilateral wrist pain  -Carpal tunnel?  Plan:  -wrist splint for b/l hands  -PT/OT eval     #hx of diarrhea  -abdominal distension on exam  -pt doesn't endorse constipation  Plan:  -Hold home loperamide        F: prn, EF 61%  E: prn  A: pIVs  DVT PPX: lovenox  Diet: cardiac  GI ppx: Pepcid prn  BM ppx: none  Code: full     The patient was seen and discussed with the attending, Dr. Charles.         Kristina Yoon MD  PGY-1

## 2024-11-14 NOTE — PROGRESS NOTES
SW met with pt to offer support per his request. Pt is alert and fully oriented. Pt requests to contact his apartment office and ask them to send a text message to him with a link for online payment for his rent. And pt also want the leasing office to be aware he is here inpatient.   SW called [facility], [phone] and informed that pt is inpatient at [facility], and requested to text him with an online payment link. Office staff replied they will. Pt denied any further issues or questions on social work at the moment.    Dung Amos, WESTLEYA, LSW

## 2024-11-14 NOTE — NURSING NOTE
Pt wants to pass along that he needs a script for freestyle guillermo 3 at discharge to facility. Dr Romero notified.

## 2024-11-14 NOTE — PROGRESS NOTES
Occupational Therapy    OT Treatment    Patient Name: Delvis Ventura Jr.  MRN: [id number]  Department: [place] [id number]  Room: [address] [place] [address]  Today's Date: 11/14/2024  Time Calculation  Start Time: 1234  Stop Time: 1302  Time Calculation (min): 28 min        Assessment:  OT Assessment: Pt tolerated UE and trunk strengthening seated EOB. Pt continues to benefit from skilled OT services to address goals.,  Prognosis: Good  Evaluation/Treatment Tolerance: Patient tolerated treatment well  End of Session Communication: Bedside nurse  End of Session Patient Position: Bed, 2 rail up, Alarm off, not on at start of session (Seated EOB)  Prognosis: Good  Evaluation/Treatment Tolerance: Patient tolerated treatment well  Plan:  Treatment Interventions: ADL retraining, Functional transfer training, UE strengthening/ROM, Endurance training, Patient/family training, Compensatory technique education, Equipment evaluation/education  OT Frequency: 3 times per week  OT Discharge Recommendations: Moderate intensity level of continued care  Equipment Recommended upon Discharge:  (TBD)  OT Recommended Transfer Status: Assist of 2  OT - OK to Discharge: Yes  Treatment Interventions: ADL retraining, Functional transfer training, UE strengthening/ROM, Endurance training, Patient/family training, Compensatory technique education, Equipment evaluation/education    Subjective   Previous Visit Info:  OT Last Visit  OT Received On: 11/14/24  General:  General  Missed Visit Reason: Other (Comment) (Attempted at 1153AM: Pt eating EOB, requested OT to return following lunch)  Prior to Session Communication: Bedside nurse  Patient Position Received: Bed, 2 rail up, Alarm off, not on at start of session (Seated EOB)  General Comment: Pt pleasant and cooperative  Precautions:  Medical Precautions: Fall precautions            Pain:  Pain Assessment  Pain Assessment: 0-10  0-10 (Numeric) Pain Score: 0 - No pain    Objective     Cognition:  Cognition  Overall Cognitive Status: Within Functional Limits  Orientation Level: Oriented X4  Coordination:        Bed Mobility/Transfers:      Transfers  Transfer: Yes  Transfer 1  Transfer From 1: Sit to, Stand to  Transfer to 1: Sit, Stand  Technique 1: Sit to stand, Stand to sit  Transfer Device 1: Walker  Transfer Level of Assistance 1: Maximum assistance, Minimal verbal cues  Trials/Comments 1: STS x2 with MAX A for ~ 75% stand, R knee block provided. Able to maintain partial stand ~ 15 seconds.    Sitting Balance:  Static Sitting Balance  Static Sitting-Balance Support: No upper extremity supported  Static Sitting-Level of Assistance: Independent  Dynamic Sitting Balance  Dynamic Sitting-Balance Support: No upper extremity supported  Dynamic Sitting-Level of Assistance: Independent    Therapy/Activity: Therapeutic Exercise  Therapeutic Exercise Performed: Yes  Therapeutic Exercise Activity 1: Cervical flexion/extension x 10  Therapeutic Exercise Activity 2: Cervical rotation x 10  Therapeutic Exercise Activity 3: Scapular protraction/retraction x 10  Therapeutic Exercise Activity 4: Alternating overhead press x 10  Therapeutic Exercise Activity 5: Elbow flexion/extension x 10  Therapeutic Exercise Activity 6: Shoulder shrugs x 10  Therapeutic Exercise Activity 7: Lateral trunk flexion x 10  Therapeutic Exercise Activity 8: Crossing midline reaching x10    Therapeutic Activity  Therapeutic Activity Performed: Yes  Therapeutic Activity 1: EOB for entire session working on UE strengthening, trunk stability and weightshifting           Outcome Measures:[facility] Daily Activity  Putting on and taking off regular lower body clothing: A lot  Bathing (including washing, rinsing, drying): A lot  Putting on and taking off regular upper body clothing: A little  Toileting, which includes using toilet, bedpan or urinal: A lot  Taking care of personal grooming such as brushing teeth: A little  Eating Meals:  None  Daily Activity - Total Score: 16         and OT Adult Other Outcome Measures  4AT: negative    Education Documentation  ADL Training, taught by Marah Haider OT at 11/14/2024  2:33 PM.  Learner: Patient  Readiness: Acceptance  Method: Explanation  Response: Verbalizes Understanding    Education Comments  No comments found.        OP EDUCATION:       Goals:  Encounter Problems       Encounter Problems (Active)       ADLs       Patient with complete upper body dressing with modified independent level of assistance donning and doffing all UE clothes with AR PRN (Progressing)       Start:  11/09/24    Expected End:  11/23/24            Patient with complete lower body dressing with minimal assist  level of assistance donning and doffing all LE clothes  with PRN adaptive equipment (Progressing)       Start:  11/09/24    Expected End:  11/23/24            Patient will complete daily grooming tasks with set-up level of assistance and PRN adaptive equipment while edge of bed . (Progressing)       Start:  11/09/24    Expected End:  11/23/24            Patient will complete toileting including hygiene clothing management/hygiene with minimal assist  level of assistance. (Progressing)       Start:  11/09/24    Expected End:  11/23/24               TRANSFERS       Patient will perform bed mobility modified independent level of assistance in order to improve safety and independence with mobility (Progressing)       Start:  11/09/24    Expected End:  11/23/24            Patient will complete functional transfer to wheelchair/commode with least restrictive device with modified independent level of assistance. (Progressing)       Start:  11/09/24    Expected End:  11/23/24

## 2024-11-15 ENCOUNTER — APPOINTMENT (OUTPATIENT)
Dept: RADIOLOGY | Facility: HOSPITAL | Age: 64
End: 2024-11-15
Payer: COMMERCIAL

## 2024-11-15 LAB
APPEARANCE UR: CLEAR
BILIRUB UR STRIP.AUTO-MCNC: NEGATIVE MG/DL
COLOR UR: COLORLESS
GLUCOSE BLD MANUAL STRIP-MCNC: 111 MG/DL (ref 74–99)
GLUCOSE BLD MANUAL STRIP-MCNC: 152 MG/DL (ref 74–99)
GLUCOSE BLD MANUAL STRIP-MCNC: 178 MG/DL (ref 74–99)
GLUCOSE BLD MANUAL STRIP-MCNC: 209 MG/DL (ref 74–99)
GLUCOSE UR STRIP.AUTO-MCNC: ABNORMAL MG/DL
HOLD SPECIMEN: NORMAL
KETONES UR STRIP.AUTO-MCNC: NEGATIVE MG/DL
LEUKOCYTE ESTERASE UR QL STRIP.AUTO: NEGATIVE
NITRITE UR QL STRIP.AUTO: NEGATIVE
PH UR STRIP.AUTO: 6.5 [PH]
PROT UR STRIP.AUTO-MCNC: NEGATIVE MG/DL
RBC # UR STRIP.AUTO: NEGATIVE /UL
SP GR UR STRIP.AUTO: 1.02
UROBILINOGEN UR STRIP.AUTO-MCNC: NORMAL MG/DL

## 2024-11-15 PROCEDURE — 2500000001 HC RX 250 WO HCPCS SELF ADMINISTERED DRUGS (ALT 637 FOR MEDICARE OP)

## 2024-11-15 PROCEDURE — 81003 URINALYSIS AUTO W/O SCOPE: CPT

## 2024-11-15 PROCEDURE — 93975 VASCULAR STUDY: CPT

## 2024-11-15 PROCEDURE — 82947 ASSAY GLUCOSE BLOOD QUANT: CPT

## 2024-11-15 PROCEDURE — 96372 THER/PROPH/DIAG INJ SC/IM: CPT

## 2024-11-15 PROCEDURE — 76870 US EXAM SCROTUM: CPT | Performed by: STUDENT IN AN ORGANIZED HEALTH CARE EDUCATION/TRAINING PROGRAM

## 2024-11-15 PROCEDURE — 2500000002 HC RX 250 W HCPCS SELF ADMINISTERED DRUGS (ALT 637 FOR MEDICARE OP, ALT 636 FOR OP/ED)

## 2024-11-15 PROCEDURE — G0378 HOSPITAL OBSERVATION PER HR: HCPCS

## 2024-11-15 PROCEDURE — 99231 SBSQ HOSP IP/OBS SF/LOW 25: CPT

## 2024-11-15 PROCEDURE — 2500000004 HC RX 250 GENERAL PHARMACY W/ HCPCS (ALT 636 FOR OP/ED)

## 2024-11-15 RX ORDER — TAMSULOSIN HYDROCHLORIDE 0.4 MG/1
0.4 CAPSULE ORAL DAILY
Status: DISCONTINUED | OUTPATIENT
Start: 2024-11-15 | End: 2024-11-16

## 2024-11-15 ASSESSMENT — PAIN - FUNCTIONAL ASSESSMENT
PAIN_FUNCTIONAL_ASSESSMENT: 0-10
PAIN_FUNCTIONAL_ASSESSMENT: 0-10

## 2024-11-15 ASSESSMENT — PAIN SCALES - GENERAL
PAINLEVEL_OUTOF10: 0 - NO PAIN
PAINLEVEL_OUTOF10: 0 - NO PAIN

## 2024-11-15 NOTE — PROGRESS NOTES
11/15/24        Transitional Care Coordination Progress Note:   Patient discussed during interdisciplinary rounds.   Team members present: OFELIA SALINAS MD   Plan per Medical/Surgical team: Medically ready pending the results of a Pssr   Discharge disposition: SNF   Status-Observation   Payer-  [facility]   Potential Barriers: None   ADOD: 11-   Herbert Andersen RN [facility] [phone]

## 2024-11-15 NOTE — CARE PLAN
The patient's goals for the shift include  rest    The clinical goals for the shift include pt will remain HDS and safe this shift    Over the shift, the patient made progress toward the following goals.       Problem: Pain - Adult  Goal: Verbalizes/displays adequate comfort level or baseline comfort level  Outcome: Progressing     Problem: Safety - Adult  Goal: Free from fall injury  Outcome: Progressing     Problem: Discharge Planning  Goal: Discharge to home or other facility with appropriate resources  Outcome: Progressing     Problem: Chronic Conditions and Co-morbidities  Goal: Patient's chronic conditions and co-morbidity symptoms are monitored and maintained or improved  Outcome: Progressing     Problem: ACS/CP/NSTEMI/STEMI  Goal: Chest pain managed (free from pain or at acceptable level)  Outcome: Progressing  Goal: Lab values return to normal range  Outcome: Progressing  Goal: Promote self management  Outcome: Progressing  Goal: Serial ECG will return to baseline  Outcome: Progressing  Goal: Verbalize understanding of procedures/devices  Outcome: Progressing  Goal: Wean vasopressors/achieve hemodynamic stability  Outcome: Progressing     Problem: Hypertensive Emergency/Crisis  Goal: Blood pressure gradually reduced to goal range  Outcome: Progressing  Goal: Free from signs of organ damage  Outcome: Progressing  Goal: Lab values return to normal range  Outcome: Progressing  Goal: Promote self management  Outcome: Progressing     Problem: Skin  Goal: Decreased wound size/increased tissue granulation at next dressing change  Outcome: Progressing  Goal: Participates in plan/prevention/treatment measures  Outcome: Progressing  Goal: Prevent/manage excess moisture  Outcome: Progressing  Goal: Prevent/minimize sheer/friction injuries  Outcome: Progressing  Goal: Promote/optimize nutrition  Outcome: Progressing  Goal: Promote skin healing  Outcome: Progressing     Problem: Diabetes  Goal: Achieve decreasing blood  glucose levels by end of shift  Outcome: Progressing  Goal: Increase stability of blood glucose readings by end of shift  Outcome: Progressing  Goal: Decrease in ketones present in urine by end of shift  Outcome: Progressing  Goal: Maintain electrolyte levels within acceptable range throughout shift  Outcome: Progressing  Goal: Maintain glucose levels >70mg/dl to <250mg/dl throughout shift  Outcome: Progressing  Goal: No changes in neurological exam by end of shift  Outcome: Progressing  Goal: Learn about and adhere to nutrition recommendations by end of shift  Outcome: Progressing  Goal: Vital signs within normal range for age by end of shift  Outcome: Progressing  Goal: Increase self care and/or family involovement by end of shift  Outcome: Progressing  Goal: Receive DSME education by end of shift  Outcome: Progressing     Problem: Pain  Goal: Takes deep breaths with improved pain control throughout the shift  Outcome: Progressing  Goal: Turns in bed with improved pain control throughout the shift  Outcome: Progressing  Goal: Walks with improved pain control throughout the shift  Outcome: Progressing  Goal: Performs ADL's with improved pain control throughout shift  Outcome: Progressing  Goal: Participates in PT with improved pain control throughout the shift  Outcome: Progressing  Goal: Free from opioid side effects throughout the shift  Outcome: Progressing  Goal: Free from acute confusion related to pain meds throughout the shift  Outcome: Progressing

## 2024-11-15 NOTE — PROGRESS NOTES
Delvis Ventura Jr. is a 64 y.o. male on day 0 of admission presenting with Chest pain, unspecified type.      Subjective   NAEON.    Patient was examined this AM. Patient reports continued testicular pain. Denies dysuria, hematuria, feelings of incomplete bladder emptying. The pain is not radiating, and feels better with prednisone and gabapentin. However, still bothersome.    Denies HA, change in vision, difficulty swallowing, sob, n/v/d/c, dysuria     Pending SNF placement.    Objective     Last Recorded Vitals  /83 (BP Location: Left arm, Patient Position: Sitting)   Pulse 62   Temp 36.7 °C (98.1 °F) (Temporal)   Resp 18   Wt 103 kg (227 lb 4.7 oz)   SpO2 94%   Intake/Output last 3 Shifts:    Intake/Output Summary (Last 24 hours) at 11/15/2024 1108  Last data filed at 11/15/2024 0834  Gross per 24 hour   Intake 240 ml   Output 675 ml   Net -435 ml       Admission Weight  Weight: 91.6 kg (202 lb) (11/08/24 1015)    Daily Weight  11/15/24 : 103 kg (227 lb 4.7 oz)    Image Results  Electrocardiogram, 12-lead PRN ACS symptoms  Sinus rhythm with 1st degree AV block  Cannot rule out Anterior infarct (cited on or before 10-FEB-2024)  Abnormal ECG  When compared with ECG of 09-NOV-2024 05:46,  No significant change since  Confirmed by Mert Palencia ([id number]) on 11/14/2024 4:18:34 PM      Physical Exam  Constitutional:       General: He is not in acute distress.     Appearance: He is obese.   Eyes:      Extraocular Movements: Extraocular movements intact.   Cardiovascular:      Rate and Rhythm: Normal rate and regular rhythm.      Heart sounds:      No friction rub.   Pulmonary:      Breath sounds: No wheezing, rhonchi or rales.   Abdominal:      General: Bowel sounds are normal. There is distension.      Tenderness: There is no abdominal tenderness.   Musculoskeletal:      Cervical back: Normal range of motion.      Right lower leg: No edema.      Left lower leg: No edema.      Comments:  Costochondritis pain improved on palpation.    Skin:     General: Skin is warm and dry.   Neurological:      General: No focal deficit present.      Mental Status: He is alert and oriented to person, place, and time.   Psychiatric:         Mood and Affect: Mood normal.         Behavior: Behavior normal.         Relevant Results  Results for orders placed or performed during the hospital encounter of 11/08/24 (from the past 24 hours)   POCT GLUCOSE   Result Value Ref Range    POCT Glucose 188 (H) 74 - 99 mg/dL   POCT GLUCOSE   Result Value Ref Range    POCT Glucose 221 (H) 74 - 99 mg/dL   POCT GLUCOSE   Result Value Ref Range    POCT Glucose 188 (H) 74 - 99 mg/dL   POCT GLUCOSE   Result Value Ref Range    POCT Glucose 111 (H) 74 - 99 mg/dL   Urinalysis with Reflex Culture and Microscopic   Result Value Ref Range    Color, Urine Colorless (N) Light-Yellow, Yellow, Dark-Yellow    Appearance, Urine Clear Clear    Specific Gravity, Urine 1.016 1.005 - 1.035    pH, Urine 6.5 5.0, 5.5, 6.0, 6.5, 7.0, 7.5, 8.0    Protein, Urine NEGATIVE NEGATIVE, 10 (TRACE), 20 (TRACE) mg/dL    Glucose, Urine OVER (4+) (A) Normal mg/dL    Blood, Urine NEGATIVE NEGATIVE    Ketones, Urine NEGATIVE NEGATIVE mg/dL    Bilirubin, Urine NEGATIVE NEGATIVE    Urobilinogen, Urine Normal Normal mg/dL    Nitrite, Urine NEGATIVE NEGATIVE    Leukocyte Esterase, Urine NEGATIVE NEGATIVE     Assessment & Plan  Chest pain, unspecified type    Delvsi Ventura Jr. is a 64 y.o. 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 In the ED, VSS, troponin's ad EKG negative for ACS, BNP was not elevated to suggest ADHF, although CXR with some vascular congestion. Patient was admitted to cardiology floor for observation and further workup.    Pt had recurrence of his chest pain at 6am on 11/9. EKG was not concerning for ischemia. On bedside examination, the pain is reproducible over his sternum, will continue supportive management of likely costochondritis. ESR and CRP were normal to mildly elevated, making this likely not pericarditis. Patient does not believe it is heart burn as he knows what that feels like and this stabbing pain feels different. Has a hx of provoked DVT in the past, however his clinical picture and Well score makes PE unlikely.        Updates 11/15:  - Continued testicular pain. UA and US ordered  - Costochondritis improved  - Finished steroid course  - Pt pending SNF placement      #Chest pain of unclear etiology  #HFrecoveredEF (EF 15-20% TTE Jan 2024--> EF 61% 11/9 TTE) s/p ICD  #NICM  #non obstructive CAD  -acute onset 11/8, not relieved by nitroglycerin  -ED workup unremarkable  -Admission weight 92 kg/ discharge weight in Feb 2024 for ADHF: 94 kg  -Esr 25, CRP 0.96 . Not endorsing positional signs of pericarditis  -Pain reproducible on palpation, likely costochondritis   -TTE 11/9: EF 61%!  Plan:  -TTE   -Continue home meds:              -ASA, atorvastatin              -GDMT: Coreg 50 BID, Farxiga 10 mg daily, entresto  daily, aldactone 25 mg daily              -anti-HTNs: hydral 75 TID, imdur 120 mg daily  -Daily weights  -PRN lasix 20 mg po as clinical picture dictates  -Scheduled tylenol  - S/p Prednisone 40 mg x5d (11/10-11/15)  -Close cardiology follow-up with Dr. Hernandez upon discharge     #Hx of NSVT/VT  -cotninue amiodarone 200 daily  -single chamber ICD with hx of prior device infection and removal     #IDDM  -lantus 40 units at  night  -ldssi  -jardiance as for gdmt above     #insomnia  -takes prazasosin and zolpidem prn    #hx of diarrhea  -abdominal distension on exam  -pt doesn't endorse constipation  Plan:  -Hold home loperamide     #Testicular pain  -PE unremarkable  Plan:  -UA  -scrotal US     F: prn, EF 61%  E: prn  A: pIVs  DVT PPX: lovenox  Diet: cardiac  GI ppx: Pepcid prn  BM ppx: none  Code: full     The patient was seen and discussed with the attending, Dr. Charles.       Caitlyn Rodriguez MD  PGY-1

## 2024-11-16 LAB
GLUCOSE BLD MANUAL STRIP-MCNC: 166 MG/DL (ref 74–99)
GLUCOSE BLD MANUAL STRIP-MCNC: 204 MG/DL (ref 74–99)
GLUCOSE BLD MANUAL STRIP-MCNC: 246 MG/DL (ref 74–99)
GLUCOSE BLD MANUAL STRIP-MCNC: 67 MG/DL (ref 74–99)
GLUCOSE BLD MANUAL STRIP-MCNC: 93 MG/DL (ref 74–99)

## 2024-11-16 PROCEDURE — 2500000002 HC RX 250 W HCPCS SELF ADMINISTERED DRUGS (ALT 637 FOR MEDICARE OP, ALT 636 FOR OP/ED)

## 2024-11-16 PROCEDURE — 2500000004 HC RX 250 GENERAL PHARMACY W/ HCPCS (ALT 636 FOR OP/ED)

## 2024-11-16 PROCEDURE — 2500000001 HC RX 250 WO HCPCS SELF ADMINISTERED DRUGS (ALT 637 FOR MEDICARE OP)

## 2024-11-16 PROCEDURE — 99231 SBSQ HOSP IP/OBS SF/LOW 25: CPT

## 2024-11-16 PROCEDURE — 96372 THER/PROPH/DIAG INJ SC/IM: CPT

## 2024-11-16 PROCEDURE — G0378 HOSPITAL OBSERVATION PER HR: HCPCS

## 2024-11-16 PROCEDURE — 82947 ASSAY GLUCOSE BLOOD QUANT: CPT

## 2024-11-16 ASSESSMENT — COGNITIVE AND FUNCTIONAL STATUS - GENERAL
DAILY ACTIVITIY SCORE: 22
WALKING IN HOSPITAL ROOM: TOTAL
CLIMB 3 TO 5 STEPS WITH RAILING: TOTAL
STANDING UP FROM CHAIR USING ARMS: A LITTLE
HELP NEEDED FOR BATHING: A LITTLE
DRESSING REGULAR LOWER BODY CLOTHING: A LITTLE
MOBILITY SCORE: 17

## 2024-11-16 ASSESSMENT — PAIN - FUNCTIONAL ASSESSMENT: PAIN_FUNCTIONAL_ASSESSMENT: 0-10

## 2024-11-16 ASSESSMENT — PAIN SCALES - GENERAL
PAINLEVEL_OUTOF10: 0 - NO PAIN
PAINLEVEL_OUTOF10: 0 - NO PAIN

## 2024-11-16 NOTE — PROGRESS NOTES
Delvis Ventura Jr. is a 64 y.o. male on day 0 of admission presenting with Chest pain, unspecified type.      Subjective   NAEON.    Patient had a scrotal ultrasound yesterday significant for a left varicocele. Patients reports improvement in testicular pain, chest pain is gone. Reports urinary urgency, denies dysuria. Asymptomatic otherwise.    Objective     Last Recorded Vitals  /64 (BP Location: Right arm, Patient Position: Sitting)   Pulse 70   Temp 36.3 °C (97.3 °F) (Temporal)   Resp 17   Wt 99 kg (218 lb 4.1 oz)   SpO2 94%   Intake/Output last 3 Shifts:    Intake/Output Summary (Last 24 hours) at 11/16/2024 1502  Last data filed at 11/16/2024 1100  Gross per 24 hour   Intake 840 ml   Output 825 ml   Net 15 ml       Admission Weight  Weight: 91.6 kg (202 lb) (11/08/24 1015)    Daily Weight  11/16/24 : 99 kg (218 lb 4.1 oz)    Image Results  US scrotum w doppler  Narrative: Interpreted By:  Levon Cantu and Beyersdorf Conner   STUDY:  US SCROTUM WITH DOPPLER;  11/15/2024 4:55 pm      INDICATION:  Signs/Symptoms:Pain..          COMPARISON:  None.      ACCESSION NUMBER(S):  [id number]      ORDERING CLINICIAN:  DAVIDE LEE      TECHNIQUE:  Multiple ultrasonographic images of scrotum and tested were obtained.  This examination was interpreted at [facility].      FINDINGS:  RIGHT HEMISCROTUM:      RIGHT TESTICLE:  The right testicle measures 2.9 cm x 2.4 cmx 5.3 cm. The right  testicle demonstrates a homogeneous echotexture and normal contour.  Normal vascularity and Doppler waveforms are observed in the right  testicle.      RIGHT EPIDIDYMIS:  The right epididymal head measures .77 cm  x .99 cm  x 1.3 cm  and is  within normal limits.      LEFT HEMISCROTUM:      LEFT TESTICLE:  The left testicle measures 3.3 cm x 2.3 cm x 5.1 cm. The left  testicle demonstrates a homogeneous echotexture and normal contour.  Increased vascularity in the left  hemiscrotum, which is further  increased on Valsalva maneuver, consistent with varicocele.      LEFT EPIDIDYMIS:  The left epididymal head measures .89 cm  x 1.1 x 1.4 cm  and is  within normal limits. There is an echogenic focus within the  epididymal head measuring 0.3 X 0.1 X 0.1 cm.      Impression: 1. No sonographic evidence of testicular torsion or mass.  2. Left varicocele.              I personally reviewed the image(s)/study and resident interpretation.  I agree with the findings as stated by resident Soto Solomon.  Data analyzed and images interpreted at [facility], [place], [place].      MACRO:  None      Signed by: Levon Cantu 11/15/2024 10:25 PM  Dictation workstation:   [id number]      Physical Exam  Constitutional:       General: He is not in acute distress.     Appearance: He is obese.   HENT:      Nose: Nose normal.   Cardiovascular:      Rate and Rhythm: Normal rate and regular rhythm.      Heart sounds:      No friction rub.      Comments: ICD in place, no signs of infection  Pulmonary:      Breath sounds: No wheezing, rhonchi or rales.   Abdominal:      General: Bowel sounds are normal. There is distension.      Tenderness: There is no abdominal tenderness.   Musculoskeletal:      Cervical back: Normal range of motion.      Right lower leg: No edema.      Left lower leg: No edema.   Skin:     General: Skin is warm and dry.   Neurological:      General: No focal deficit present.      Mental Status: He is alert and oriented to person, place, and time.   Psychiatric:         Mood and Affect: Mood normal.         Behavior: Behavior normal.         Relevant Results  Results for orders placed or performed during the hospital encounter of 11/08/24 (from the past 24 hours)   POCT GLUCOSE   Result Value Ref Range    POCT Glucose 152 (H) 74 - 99 mg/dL   POCT GLUCOSE   Result Value Ref Range    POCT Glucose 209 (H) 74 - 99 mg/dL   POCT GLUCOSE   Result Value Ref  Range    POCT Glucose 67 (L) 74 - 99 mg/dL   POCT GLUCOSE   Result Value Ref Range    POCT Glucose 93 74 - 99 mg/dL   POCT GLUCOSE   Result Value Ref Range    POCT Glucose 166 (H) 74 - 99 mg/dL     Assessment & Plan  Chest pain, unspecified type    Delvis Ventura Jr. is a 64 y.o. male with complex PMHx including T2DM, cerebral palsy (wheelchair bound), nonischemic cardiomyopathy ([facility] 8/2018: trivial CAD), HFrEF (01/24 TTE: 15-20%) complicated by infected ICD (wound Cx: + staph aureus) with explantation and subsequent single chamber ICD re-implantation 4/28/2023 who is presenting directly from his outpatient heart failure cardiology appointment where he had complaints of new-onset chest pain since 7am on 11/8 not relieved by nitroglycerin,not positional, nor associated with diaphoresis, n/v/post-prandial. In the ED, VSS, troponin's ad EKG negative for ACS, BNP was not elevated to suggest ADHF, although CXR with some vascular congestion. Patient was admitted to cardiology floor for observation and further workup.    Pt had recurrence of his chest pain at 6am on 11/9. EKG was not concerning for ischemia. On bedside examination, the pain is reproducible over his sternum, will continue supportive management of likely costochondritis. ESR and CRP were normal to mildly elevated, making this likely not pericarditis. Patient does not believe it is heart burn as he knows what that feels like and this stabbing pain feels different. Has a hx of provoked DVT in the past, however his clinical picture and Well score makes PE unlikely.        Updates 11/16:  -Left varicocele on US scrotum  -per urology, no further work-up needed. Urology follow-up outpatient  -Pending SNF      #Chest pain of unclear etiology  #HFrecoveredEF (EF 15-20% TTE Jan 2024--> EF 61% 11/9 TTE) s/p ICD  #NICM  #non obstructive CAD  -acute onset 11/8, not relieved by nitroglycerin  -ED workup unremarkable  -Admission weight 92 kg/ discharge  weight in Feb 2024 for ADHF: 94 kg  -Esr 25, CRP 0.96 . Not endorsing positional signs of pericarditis  -Pain reproducible on palpation, likely costochondritis   -TTE 11/9: EF 61%!  Plan:  -TTE   -Continue home meds:              -ASA, atorvastatin              -GDMT: Coreg 50 BID, Farxiga 10 mg daily, entresto  daily, aldactone 25 mg daily              -anti-HTNs: hydral 75 TID, imdur 120 mg daily  -Daily weights  -PRN lasix 20 mg po as clinical picture dictates  -Scheduled tylenol  - S/p Prednisone 40 mg x5d (11/10-11/15)  -Close cardiology follow-up with Dr. Hernandez upon discharge     #Hx of NSVT/VT  -cotninue amiodarone 200 daily  -single chamber ICD with hx of prior device infection and removal     #IDDM  -lantus 40 units at night  -ldssi  -jardiance as for gdmt above     #insomnia  -takes prazasosin and zolpidem prn    #hx of diarrhea  -abdominal distension on exam  -pt doesn't endorse constipation  Plan:  -PRN home loperamide     #Testicular pain  #Left sided varicocele  -PE unremarkable  -UA negative  -Scrotal U/s with left sided varicocele  Plan:  -Per urology, outpatient urology follow-up     F: prn, EF 61%  E: prn  A: pIVs  DVT PPX: lovenox  Diet: cardiac  GI ppx: Pepcid prn  BM ppx: none  Code: full     The patient was seen and discussed with the attending, Dr. Charles.       Caitlyn Rodriguez MD  PGY-1

## 2024-11-16 NOTE — CARE PLAN
Problem: Pain - Adult  Goal: Verbalizes/displays adequate comfort level or baseline comfort level  Outcome: Progressing     Problem: Safety - Adult  Goal: Free from fall injury  Outcome: Progressing     Problem: Discharge Planning  Goal: Discharge to home or other facility with appropriate resources  Outcome: Progressing     Problem: Chronic Conditions and Co-morbidities  Goal: Patient's chronic conditions and co-morbidity symptoms are monitored and maintained or improved  Outcome: Progressing     Problem: ACS/CP/NSTEMI/STEMI  Goal: Chest pain managed (free from pain or at acceptable level)  Outcome: Progressing  Goal: Lab values return to normal range  Outcome: Progressing  Goal: Promote self management  Outcome: Progressing  Goal: Serial ECG will return to baseline  Outcome: Progressing  Goal: Verbalize understanding of procedures/devices  Outcome: Progressing  Goal: Wean vasopressors/achieve hemodynamic stability  Outcome: Progressing     Problem: Hypertensive Emergency/Crisis  Goal: Blood pressure gradually reduced to goal range  Outcome: Progressing  Goal: Free from signs of organ damage  Outcome: Progressing  Goal: Lab values return to normal range  Outcome: Progressing  Goal: Promote self management  Outcome: Progressing     Problem: Skin  Goal: Decreased wound size/increased tissue granulation at next dressing change  Outcome: Progressing  Goal: Participates in plan/prevention/treatment measures  Outcome: Progressing  Goal: Prevent/manage excess moisture  Outcome: Progressing  Goal: Prevent/minimize sheer/friction injuries  Outcome: Progressing  Goal: Promote/optimize nutrition  Outcome: Progressing  Goal: Promote skin healing  Outcome: Progressing     Problem: Diabetes  Goal: Achieve decreasing blood glucose levels by end of shift  Outcome: Progressing  Goal: Increase stability of blood glucose readings by end of shift  Outcome: Progressing  Goal: Decrease in ketones present in urine by end of  shift  Outcome: Progressing  Goal: Maintain electrolyte levels within acceptable range throughout shift  Outcome: Progressing  Goal: Maintain glucose levels >70mg/dl to <250mg/dl throughout shift  Outcome: Progressing  Goal: No changes in neurological exam by end of shift  Outcome: Progressing  Goal: Learn about and adhere to nutrition recommendations by end of shift  Outcome: Progressing  Goal: Vital signs within normal range for age by end of shift  Outcome: Progressing  Goal: Increase self care and/or family involovement by end of shift  Outcome: Progressing  Goal: Receive DSME education by end of shift  Outcome: Progressing     Problem: Pain  Goal: Takes deep breaths with improved pain control throughout the shift  Outcome: Progressing  Goal: Turns in bed with improved pain control throughout the shift  Outcome: Progressing  Goal: Walks with improved pain control throughout the shift  Outcome: Progressing  Goal: Performs ADL's with improved pain control throughout shift  Outcome: Progressing  Goal: Participates in PT with improved pain control throughout the shift  Outcome: Progressing  Goal: Free from opioid side effects throughout the shift  Outcome: Progressing  Goal: Free from acute confusion related to pain meds throughout the shift  Outcome: Progressing

## 2024-11-16 NOTE — PROGRESS NOTES
Patient tripped the screen for on loc/ pssr awaiting  state to come out and review patient medical team notified

## 2024-11-17 VITALS
TEMPERATURE: 98.6 F | HEIGHT: 71 IN | OXYGEN SATURATION: 94 % | BODY MASS INDEX: 31.48 KG/M2 | RESPIRATION RATE: 20 BRPM | SYSTOLIC BLOOD PRESSURE: 132 MMHG | WEIGHT: 224.87 LBS | HEART RATE: 78 BPM | DIASTOLIC BLOOD PRESSURE: 75 MMHG

## 2024-11-17 LAB
GLUCOSE BLD MANUAL STRIP-MCNC: 118 MG/DL (ref 74–99)
GLUCOSE BLD MANUAL STRIP-MCNC: 165 MG/DL (ref 74–99)
GLUCOSE BLD MANUAL STRIP-MCNC: 183 MG/DL (ref 74–99)
GLUCOSE BLD MANUAL STRIP-MCNC: 212 MG/DL (ref 74–99)

## 2024-11-17 PROCEDURE — 2500000001 HC RX 250 WO HCPCS SELF ADMINISTERED DRUGS (ALT 637 FOR MEDICARE OP)

## 2024-11-17 PROCEDURE — 2500000002 HC RX 250 W HCPCS SELF ADMINISTERED DRUGS (ALT 637 FOR MEDICARE OP, ALT 636 FOR OP/ED)

## 2024-11-17 PROCEDURE — 96372 THER/PROPH/DIAG INJ SC/IM: CPT

## 2024-11-17 PROCEDURE — G0378 HOSPITAL OBSERVATION PER HR: HCPCS

## 2024-11-17 PROCEDURE — 2500000004 HC RX 250 GENERAL PHARMACY W/ HCPCS (ALT 636 FOR OP/ED)

## 2024-11-17 PROCEDURE — 99231 SBSQ HOSP IP/OBS SF/LOW 25: CPT

## 2024-11-17 PROCEDURE — 82947 ASSAY GLUCOSE BLOOD QUANT: CPT

## 2024-11-17 ASSESSMENT — COGNITIVE AND FUNCTIONAL STATUS - GENERAL
CLIMB 3 TO 5 STEPS WITH RAILING: TOTAL
STANDING UP FROM CHAIR USING ARMS: A LITTLE
HELP NEEDED FOR BATHING: A LITTLE
DRESSING REGULAR LOWER BODY CLOTHING: A LITTLE
WALKING IN HOSPITAL ROOM: TOTAL
DAILY ACTIVITIY SCORE: 22
MOBILITY SCORE: 17

## 2024-11-17 ASSESSMENT — PAIN SCALES - GENERAL
PAINLEVEL_OUTOF10: 0 - NO PAIN
PAINLEVEL_OUTOF10: 0 - NO PAIN

## 2024-11-17 NOTE — CARE PLAN
Transitional Care Coordinator Note: Patient discussed with medical team, per medical team patient is  medically ready. Discharge dispo: Recc MOD.  Patient tripped the screen for on loc/ pssr awaiting state to come out.  Maddison Jackson RN  Transitional Care Coordinator

## 2024-11-17 NOTE — PROGRESS NOTES
Delvis Ventura Jr. is a 64 y.o. male on day 0 of admission presenting with Chest pain, unspecified type.      Subjective   NAEON.    Patient reports some continued scrotal discomfort. Otherwise denies CP, SOB, GIVENS, fevers, or chills.    Objective     Last Recorded Vitals  /67   Pulse 73   Temp 36.8 °C (98.2 °F)   Resp 18   Wt 102 kg (224 lb 13.9 oz)   SpO2 96%   Intake/Output last 3 Shifts:    Intake/Output Summary (Last 24 hours) at 11/17/2024 1339  Last data filed at 11/17/2024 1146  Gross per 24 hour   Intake 480 ml   Output 2475 ml   Net -1995 ml       Admission Weight  Weight: 91.6 kg (202 lb) (11/08/24 1015)    Daily Weight  11/17/24 : 102 kg (224 lb 13.9 oz)    Image Results  US scrotum w doppler  Narrative: Interpreted By:  Levon Cantu and Beyersdorf Conner   STUDY:  US SCROTUM WITH DOPPLER;  11/15/2024 4:55 pm      INDICATION:  Signs/Symptoms:Pain..          COMPARISON:  None.      ACCESSION NUMBER(S):  [id number]      ORDERING CLINICIAN:  DAVIDE LEE      TECHNIQUE:  Multiple ultrasonographic images of scrotum and tested were obtained.  This examination was interpreted at [facility].      FINDINGS:  RIGHT HEMISCROTUM:      RIGHT TESTICLE:  The right testicle measures 2.9 cm x 2.4 cmx 5.3 cm. The right  testicle demonstrates a homogeneous echotexture and normal contour.  Normal vascularity and Doppler waveforms are observed in the right  testicle.      RIGHT EPIDIDYMIS:  The right epididymal head measures .77 cm  x .99 cm  x 1.3 cm  and is  within normal limits.      LEFT HEMISCROTUM:      LEFT TESTICLE:  The left testicle measures 3.3 cm x 2.3 cm x 5.1 cm. The left  testicle demonstrates a homogeneous echotexture and normal contour.  Increased vascularity in the left hemiscrotum, which is further  increased on Valsalva maneuver, consistent with varicocele.      LEFT EPIDIDYMIS:  The left epididymal head measures .89 cm  x 1.1 x 1.4 cm   and is  within normal limits. There is an echogenic focus within the  epididymal head measuring 0.3 X 0.1 X 0.1 cm.      Impression: 1. No sonographic evidence of testicular torsion or mass.  2. Left varicocele.              I personally reviewed the image(s)/study and resident interpretation.  I agree with the findings as stated by resident Soto Solomon.  Data analyzed and images interpreted at [facility], [place], [place].      MACRO:  None      Signed by: Levon Cantu 11/15/2024 10:25 PM  Dictation workstation:   [id number]      Physical Exam  Constitutional:       General: He is not in acute distress.     Appearance: He is obese.   HENT:      Nose: Nose normal.   Eyes:      Conjunctiva/sclera: Conjunctivae normal.   Cardiovascular:      Rate and Rhythm: Normal rate and regular rhythm.      Heart sounds:      No friction rub.      Comments: ICD in place, no signs of infection  Pulmonary:      Breath sounds: No wheezing, rhonchi or rales.   Abdominal:      General: Bowel sounds are normal. There is distension.      Tenderness: There is no abdominal tenderness.   Musculoskeletal:      Cervical back: Normal range of motion.      Right lower leg: No edema.      Left lower leg: No edema.   Skin:     General: Skin is warm and dry.   Neurological:      General: No focal deficit present.      Mental Status: He is alert and oriented to person, place, and time.   Psychiatric:         Mood and Affect: Mood normal.         Behavior: Behavior normal.         Relevant Results  Results for orders placed or performed during the hospital encounter of 11/08/24 (from the past 24 hours)   POCT GLUCOSE   Result Value Ref Range    POCT Glucose 246 (H) 74 - 99 mg/dL   POCT GLUCOSE   Result Value Ref Range    POCT Glucose 204 (H) 74 - 99 mg/dL   POCT GLUCOSE   Result Value Ref Range    POCT Glucose 118 (H) 74 - 99 mg/dL   POCT GLUCOSE   Result Value Ref Range    POCT Glucose 183 (H) 74 - 99 mg/dL      Assessment & Plan  Chest pain, unspecified type    Delvis Ventura Jr. is a 64 y.o. 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 In the ED, VSS, troponin's ad EKG negative for ACS, BNP was not elevated to suggest ADHF, although CXR with some vascular congestion. Patient was admitted to cardiology floor for observation and further workup.    Pt had recurrence of his chest pain at 6am on 11/9. EKG was not concerning for ischemia. On bedside examination, the pain is reproducible over his sternum, will continue supportive management of likely costochondritis. ESR and CRP were normal to mildly elevated, making this likely not pericarditis. Patient does not believe it is heart burn as he knows what that feels like and this stabbing pain feels different. Has a hx of provoked DVT in the past, however his clinical picture and Well score makes PE unlikely.        Updates 11/17:  -Scrotal sling ordered for continued discomfort  -Pending SNF      #Chest pain of unclear etiology  #HFrecoveredEF (EF 15-20% TTE Jan 2024--> EF 61% 11/9 TTE) s/p ICD  #NICM  #non obstructive CAD  -acute onset 11/8, not relieved by nitroglycerin  -ED workup unremarkable  -Admission weight 92 kg/ discharge weight in Feb 2024 for ADHF: 94 kg  -Esr 25, CRP 0.96 . Not endorsing positional signs of pericarditis  -Pain reproducible on palpation, likely costochondritis   -TTE 11/9: EF 61%!  Plan:  -TTE   -Continue home meds:              -ASA, atorvastatin               -GDMT: Coreg 50 BID, Farxiga 10 mg daily, entresto  daily, aldactone 25 mg daily              -anti-HTNs: hydral 75 TID, imdur 120 mg daily  -Daily weights  -PRN lasix 20 mg po as clinical picture dictates  -Scheduled tylenol  - S/p Prednisone 40 mg x5d (11/10-11/15)  -Continue Gabapentin 100 mg TID  -Close cardiology follow-up with Dr. Hernandez upon discharge     #Hx of NSVT/VT  -cotninue amiodarone 200 daily  -single chamber ICD with hx of prior device infection and removal     #IDDM  -lantus 40 units at night  -ldssi  -jardiance as for gdmt above     #insomnia  -takes prazasosin and zolpidem prn    #hx of diarrhea  -abdominal distension on exam  -pt doesn't endorse constipation  Plan:  -PRN home loperamide     #Testicular pain  #Left sided varicocele  -PE unremarkable  -UA negative  -Scrotal U/s with left sided varicocele  Plan:  -Per urology, outpatient urology follow-up  -Scrotal sling for comfort     F: prn, EF 61%  E: prn  A: pIVs  DVT PPX: lovenox  Diet: cardiac  GI ppx: Pepcid prn  BM ppx: none  Code: full     The patient was seen and discussed with the attending, Dr. Charles.       Caitlyn Rodriguez MD  PGY-1

## 2024-11-18 LAB
ALBUMIN SERPL BCP-MCNC: 4.1 G/DL (ref 3.4–5)
ANION GAP SERPL CALC-SCNC: 14 MMOL/L (ref 10–20)
BUN SERPL-MCNC: 36 MG/DL (ref 6–23)
CALCIUM SERPL-MCNC: 10.2 MG/DL (ref 8.6–10.6)
CHLORIDE SERPL-SCNC: 100 MMOL/L (ref 98–107)
CO2 SERPL-SCNC: 25 MMOL/L (ref 21–32)
CREAT SERPL-MCNC: 1.2 MG/DL (ref 0.5–1.3)
EGFRCR SERPLBLD CKD-EPI 2021: 68 ML/MIN/1.73M*2
ERYTHROCYTE [DISTWIDTH] IN BLOOD BY AUTOMATED COUNT: 12.5 % (ref 11.5–14.5)
GLUCOSE BLD MANUAL STRIP-MCNC: 143 MG/DL (ref 74–99)
GLUCOSE BLD MANUAL STRIP-MCNC: 158 MG/DL (ref 74–99)
GLUCOSE BLD MANUAL STRIP-MCNC: 202 MG/DL (ref 74–99)
GLUCOSE BLD MANUAL STRIP-MCNC: 244 MG/DL (ref 74–99)
GLUCOSE SERPL-MCNC: 102 MG/DL (ref 74–99)
HCT VFR BLD AUTO: 39.2 % (ref 41–52)
HGB BLD-MCNC: 12.9 G/DL (ref 13.5–17.5)
MAGNESIUM SERPL-MCNC: 2.34 MG/DL (ref 1.6–2.4)
MCH RBC QN AUTO: 30.3 PG (ref 26–34)
MCHC RBC AUTO-ENTMCNC: 32.9 G/DL (ref 32–36)
MCV RBC AUTO: 92 FL (ref 80–100)
NRBC BLD-RTO: 0 /100 WBCS (ref 0–0)
PHOSPHATE SERPL-MCNC: 4.1 MG/DL (ref 2.5–4.9)
PLATELET # BLD AUTO: 233 X10*3/UL (ref 150–450)
POTASSIUM SERPL-SCNC: 4.8 MMOL/L (ref 3.5–5.3)
RBC # BLD AUTO: 4.26 X10*6/UL (ref 4.5–5.9)
SODIUM SERPL-SCNC: 134 MMOL/L (ref 136–145)
WBC # BLD AUTO: 7.3 X10*3/UL (ref 4.4–11.3)

## 2024-11-18 PROCEDURE — 2500000004 HC RX 250 GENERAL PHARMACY W/ HCPCS (ALT 636 FOR OP/ED)

## 2024-11-18 PROCEDURE — 96372 THER/PROPH/DIAG INJ SC/IM: CPT

## 2024-11-18 PROCEDURE — 97110 THERAPEUTIC EXERCISES: CPT | Mod: GP,CQ

## 2024-11-18 PROCEDURE — 85027 COMPLETE CBC AUTOMATED: CPT

## 2024-11-18 PROCEDURE — 2500000001 HC RX 250 WO HCPCS SELF ADMINISTERED DRUGS (ALT 637 FOR MEDICARE OP)

## 2024-11-18 PROCEDURE — 2500000002 HC RX 250 W HCPCS SELF ADMINISTERED DRUGS (ALT 637 FOR MEDICARE OP, ALT 636 FOR OP/ED)

## 2024-11-18 PROCEDURE — 83735 ASSAY OF MAGNESIUM: CPT

## 2024-11-18 PROCEDURE — 97530 THERAPEUTIC ACTIVITIES: CPT | Mod: GP,CQ

## 2024-11-18 PROCEDURE — 2500000005 HC RX 250 GENERAL PHARMACY W/O HCPCS

## 2024-11-18 PROCEDURE — 84132 ASSAY OF SERUM POTASSIUM: CPT

## 2024-11-18 PROCEDURE — 99231 SBSQ HOSP IP/OBS SF/LOW 25: CPT | Performed by: INTERNAL MEDICINE

## 2024-11-18 PROCEDURE — 36415 COLL VENOUS BLD VENIPUNCTURE: CPT

## 2024-11-18 PROCEDURE — G0378 HOSPITAL OBSERVATION PER HR: HCPCS

## 2024-11-18 PROCEDURE — 82947 ASSAY GLUCOSE BLOOD QUANT: CPT

## 2024-11-18 ASSESSMENT — COGNITIVE AND FUNCTIONAL STATUS - GENERAL
WALKING IN HOSPITAL ROOM: TOTAL
HELP NEEDED FOR BATHING: A LITTLE
MOVING TO AND FROM BED TO CHAIR: A LITTLE
TURNING FROM BACK TO SIDE WHILE IN FLAT BAD: A LITTLE
DAILY ACTIVITIY SCORE: 22
STANDING UP FROM CHAIR USING ARMS: A LITTLE
DAILY ACTIVITIY SCORE: 22
MOBILITY SCORE: 17
DRESSING REGULAR LOWER BODY CLOTHING: A LITTLE
STANDING UP FROM CHAIR USING ARMS: A LOT
MOBILITY SCORE: 13
CLIMB 3 TO 5 STEPS WITH RAILING: TOTAL
WALKING IN HOSPITAL ROOM: TOTAL
MOBILITY SCORE: 17
MOVING FROM LYING ON BACK TO SITTING ON SIDE OF FLAT BED WITH BEDRAILS: A LITTLE
WALKING IN HOSPITAL ROOM: TOTAL
HELP NEEDED FOR BATHING: A LITTLE
STANDING UP FROM CHAIR USING ARMS: A LITTLE
DRESSING REGULAR LOWER BODY CLOTHING: A LITTLE

## 2024-11-18 ASSESSMENT — PAIN SCALES - GENERAL
PAINLEVEL_OUTOF10: 5 - MODERATE PAIN
PAINLEVEL_OUTOF10: 0 - NO PAIN
PAINLEVEL_OUTOF10: 0 - NO PAIN

## 2024-11-18 ASSESSMENT — PAIN - FUNCTIONAL ASSESSMENT: PAIN_FUNCTIONAL_ASSESSMENT: 0-10

## 2024-11-18 NOTE — PROGRESS NOTES
Physical Therapy Treatment    Patient Name: Delvis Ventura Jr.  MRN: [id number]  Today's Date: 11/18/2024  Room: [address] [place] [address]  Time Calculation  Start Time: 1045  Stop Time: 1126  Time Calculation (min): 41 min       Assessment/Plan   PT Assessment  PT Assessment Results: Decreased strength, Decreased range of motion, Decreased endurance, Impaired balance, Decreased mobility, Decreased coordination, Impaired tone, Pain  Rehab Prognosis: Good  Barriers to Discharge: none  Evaluation/Treatment Tolerance: Patient tolerated treatment well, Patient limited by pain  Medical Staff Made Aware: Yes  Strengths: Attitude of self, Coping skills, Ability to acquire knowledge  Barriers to Participation: Comorbidities, Support of Caregivers  End of Session Communication: Bedside nurse  End of Session Patient Position: Bed, 3 rail up  PT Plan  Inpatient/Swing Bed or Outpatient: Inpatient  PT Plan  Treatment/Interventions: Endurance training, Range of motion, Therapeutic exercise, Strengthening  PT Plan: Ongoing PT  PT Frequency: 4 times per week  PT Discharge Recommendations: Moderate intensity level of continued care  Equipment Recommended upon Discharge: Wheelchair  PT Recommended Transfer Status: Assist x1  PT - OK to Discharge: Yes  Assessment: Patient is progressing fairly, able to complete 5x STS at mod-maxA. Increased frequency to help reduce risk of losing more functional mobility, pt is very motivated to participate. Would continue to benefit from continued skilled PT to address all mobility deficits; Patient remains appropriate for MOD intensity therapy when medically appropriate for discharge from acute stay.  Will continue to follow.     General Visit Information:   PT  Visit  PT Received On: 11/18/24  Prior to Session Communication: Bedside nurse  Patient Position Received: Bed, 3 rail up, Alarm off, not on at start of session     Subjective   Subjective: Pt reports frustration with insurance and the delay  going to SNF for therapy. Pt states that he feels like he is losing strength and is worried about becoming independent again  Precautions:  Precautions  Medical Precautions: Fall precautions        Objective   Pain:  Pain Assessment  Pain Assessment: 0-10  0-10 (Numeric) Pain Score: 5 - Moderate pain  Pain Type: Acute pain  Pain Location:  (BLE)  Pain Descriptors: Tightness, Discomfort, Cramping  Pain Frequency: Constant/continuous  Cognition:  Cognition  Overall Cognitive Status: Within Functional Limits  Arousal/Alertness: Appropriate responses to stimuli  Orientation Level: Oriented X4  Following Commands: Follows all commands and directions without difficulty  Safety Judgment: Good awareness of safety precautions  Insight: Mild  Impulsive: Mildly     Static Sitting balance:  Static Sitting Balance  Static Sitting-Balance Support: Feet supported  Static Sitting-Level of Assistance: Independent  Static Standing Balance:  Static Standing Balance  Static Standing-Balance Support: Bilateral upper extremity supported  Static Standing-Level of Assistance: Moderate assistance, Maximum assistance  Static Standing-Comment/Number of Minutes: mod-maxA for ~5s each  Dynamic Sitting Balance:  Dynamic Sitting Balance  Dynamic Sitting-Balance Support: Feet supported  Dynamic Sitting-Level of Assistance: Distant supervision      PT Treatments:  Therapeutic Exercise  Therapeutic Exercise Activity 1: PNF w/5s contraction x3 stretches on B hamstrings. Supine B knee flexion to stretch quads 30sx2  Therapeutic Exercise Activity 2: 10x BLE LAQ, 5x B heel slides        Bed Mobility 1  Bed Mobility 1: Supine to sitting  Level of Assistance 1: Close supervision  Bed Mobility Comments 1: increased time and effort to complete  Bed Mobility 2  Bed Mobility  2: Scooting  Level of Assistance 2: Close supervision  Bed Mobility Comments 2: increased time to scoot EOB, vc for weight shifting and advancing hips     Transfer 1  Transfer From 1:  Sit to  Transfer to 1: Stand  Transfer Level of Assistance 1: Moderate assistance, Maximum assistance, Moderate verbal cues  Trials/Comments 1: x5. Pt holding onto therapist arms, unable to properly block knees using fww. Pt demos excessive ER at hips, knee block with better results on outside of knee. Able to get to full stand 2/5 trials, with 3/5 ~50%. vc for upright posture in standing, limited by BLE pain/cramping.             Activity tolerance:  Activity Tolerance  Endurance: Tolerates 10 - 20 min exercise with multiple rests, Decreased tolerance for upright activites  Activity Tolerance Comments: limited by BLE pain    Outcome Measures:  [facility] Basic Mobility  Turning from your back to your side while in a flat bed without using bedrails: A little  Moving from lying on your back to sitting on the side of a flat bed without using bedrails: A little  Moving to and from bed to chair (including a wheelchair): A little  Standing up from a chair using your arms (e.g. wheelchair or bedside chair): A lot  To walk in hospital room: Total  Climbing 3-5 steps with railing: Total  Basic Mobility - Total Score: 13    Education Documentation  Body Mechanics, taught by Lexus Sue PTA at 11/18/2024 11:41 AM.  Learner: Patient  Readiness: Acceptance  Method: Explanation  Response: Verbalizes Understanding, Needs Reinforcement  Comment: Educated to continue to complete LE stretches demoed to pt and AROM to prevent stiffness in bed    Precautions, taught by Lexus Sue PTA at 11/18/2024 11:41 AM.  Learner: Patient  Readiness: Acceptance  Method: Explanation  Response: Verbalizes Understanding, Needs Reinforcement  Comment: Educated to continue to complete LE stretches demoed to pt and AROM to prevent stiffness in bed    Mobility Training, taught by Lexus Sue PTA at 11/18/2024 11:41 AM.  Learner: Patient  Readiness: Acceptance  Method: Explanation  Response: Verbalizes Understanding, Needs Reinforcement  Comment: Educated  to continue to complete LE stretches demoed to pt and AROM to prevent stiffness in bed    Handouts, taught by Lexus Sue PTA at 11/18/2024 11:41 AM.  Learner: Patient  Readiness: Acceptance  Method: Explanation  Response: Verbalizes Understanding, Needs Reinforcement  Comment: Educated to continue to complete LE stretches demoed to pt and AROM to prevent stiffness in bed    Body Mechanics, taught by Lexus Sue PTA at 11/18/2024 11:41 AM.  Learner: Patient  Readiness: Acceptance  Method: Explanation  Response: Verbalizes Understanding, Needs Reinforcement  Comment: Educated to continue to complete LE stretches demoed to pt and AROM to prevent stiffness in bed    Home Exercise Program, taught by Lexus Sue PTA at 11/18/2024 11:41 AM.  Learner: Patient  Readiness: Acceptance  Method: Explanation  Response: Verbalizes Understanding, Needs Reinforcement  Comment: Educated to continue to complete LE stretches demoed to pt and AROM to prevent stiffness in bed    Precautions, taught by Lexus Sue PTA at 11/18/2024 11:41 AM.  Learner: Patient  Readiness: Acceptance  Method: Explanation  Response: Verbalizes Understanding, Needs Reinforcement  Comment: Educated to continue to complete LE stretches demoed to pt and AROM to prevent stiffness in bed    ADL Training, taught by Lexus Sue PTA at 11/18/2024 11:41 AM.  Learner: Patient  Readiness: Acceptance  Method: Explanation  Response: Verbalizes Understanding, Needs Reinforcement  Comment: Educated to continue to complete LE stretches demoed to pt and AROM to prevent stiffness in bed    Education Comments  No comments found.          OP EDUCATION:       Encounter Problems       Encounter Problems (Active)       Balance       STG - Maintains dynamic sitting balance without upper extremity support >/= 10 min supervision  (Progressing)       Start:  11/09/24    Expected End:  11/23/24               Mobility       pt will be independent with HEP program for BLEs to  improve LE weakness  (Progressing)       Start:  11/09/24    Expected End:  11/23/24               PT Transfers       STG - Transfer from bed to chair CGA  (Progressing)       Start:  11/09/24    Expected End:  11/23/24            STG - Patient will perform bed mobility CGA (Met)       Start:  11/09/24    Expected End:  11/23/24    Resolved:  11/18/24            Pain - Adult

## 2024-11-18 NOTE — CARE PLAN
Problem: Pain - Adult  Goal: Verbalizes/displays adequate comfort level or baseline comfort level  Outcome: Progressing     Problem: Safety - Adult  Goal: Free from fall injury  Outcome: Progressing     Problem: Discharge Planning  Goal: Discharge to home or other facility with appropriate resources  Outcome: Progressing     Problem: ACS/CP/NSTEMI/STEMI  Goal: Chest pain managed (free from pain or at acceptable level)  Outcome: Progressing  Goal: Lab values return to normal range  Outcome: Progressing  Goal: Promote self management  Outcome: Progressing  Goal: Serial ECG will return to baseline  Outcome: Progressing  Goal: Verbalize understanding of procedures/devices  Outcome: Progressing  Goal: Wean vasopressors/achieve hemodynamic stability  Outcome: Progressing   The patient's goals for the shift include      The clinical goals for the shift include pt will remain HDS and safe this shift    Over the shift, the patient did make progress toward the following goals. Good urine output this shift, denies pain or discomfort, calls appropriately for assistance. Will continue to monitor.

## 2024-11-18 NOTE — PROGRESS NOTES
Delvis Ventura Jr. is a 64 y.o. male admitted on 11/8/2024 presenting with Chest pain, unspecified type.      Subjective   NAEON.    Patient reports some continued scrotal discomfort. Otherwise denies CP, SOB, GIVENS, fevers, or chills.    Objective     Last Recorded Vitals  /76   Pulse 64   Temp 36.3 °C (97.3 °F)   Resp 19   Wt 102 kg (223 lb 15.8 oz)   SpO2 94%   Intake/Output last 3 Shifts:    Intake/Output Summary (Last 24 hours) at 11/18/2024 1221  Last data filed at 11/18/2024 1014  Gross per 24 hour   Intake 360 ml   Output 2175 ml   Net -1815 ml       Admission Weight  Weight: 91.6 kg (202 lb) (11/08/24 1015)    Daily Weight  11/18/24 : 102 kg (223 lb 15.8 oz)    Image Results  US scrotum w doppler  Narrative: Interpreted By:  Levon Cantu and Beyersdorf Conner   STUDY:  US SCROTUM WITH DOPPLER;  11/15/2024 4:55 pm      INDICATION:  Signs/Symptoms:Pain..          COMPARISON:  None.      ACCESSION NUMBER(S):  [id number]      ORDERING CLINICIAN:  DAVIDE LEE      TECHNIQUE:  Multiple ultrasonographic images of scrotum and tested were obtained.  This examination was interpreted at [facility].      FINDINGS:  RIGHT HEMISCROTUM:      RIGHT TESTICLE:  The right testicle measures 2.9 cm x 2.4 cmx 5.3 cm. The right  testicle demonstrates a homogeneous echotexture and normal contour.  Normal vascularity and Doppler waveforms are observed in the right  testicle.      RIGHT EPIDIDYMIS:  The right epididymal head measures .77 cm  x .99 cm  x 1.3 cm  and is  within normal limits.      LEFT HEMISCROTUM:      LEFT TESTICLE:  The left testicle measures 3.3 cm x 2.3 cm x 5.1 cm. The left  testicle demonstrates a homogeneous echotexture and normal contour.  Increased vascularity in the left hemiscrotum, which is further  increased on Valsalva maneuver, consistent with varicocele.      LEFT EPIDIDYMIS:  The left epididymal head measures .89 cm  x 1.1 x 1.4 cm   and is  within normal limits. There is an echogenic focus within the  epididymal head measuring 0.3 X 0.1 X 0.1 cm.      Impression: 1. No sonographic evidence of testicular torsion or mass.  2. Left varicocele.              I personally reviewed the image(s)/study and resident interpretation.  I agree with the findings as stated by resident Soto Solomon.  Data analyzed and images interpreted at [facility], [place], [place].      MACRO:  None      Signed by: Levon Cantu 11/15/2024 10:25 PM  Dictation workstation:   [id number]    Physical Exam  Constitutional:       General: He is not in acute distress.     Appearance: He is obese.   HENT:      Nose: Nose normal.   Eyes:      Conjunctiva/sclera: Conjunctivae normal.   Cardiovascular:      Rate and Rhythm: Normal rate and regular rhythm.      Heart sounds:      No friction rub.      Comments: ICD in place, no signs of infection  Pulmonary:      Breath sounds: No wheezing, rhonchi or rales.   Abdominal:      General: Bowel sounds are normal.      Tenderness: There is no abdominal tenderness.   Musculoskeletal:      Cervical back: Normal range of motion.      Right lower leg: No edema.      Left lower leg: No edema.   Skin:     General: Skin is warm and dry.   Neurological:      General: No focal deficit present.      Mental Status: He is alert and oriented to person, place, and time.   Psychiatric:         Mood and Affect: Mood normal.         Behavior: Behavior normal.     Relevant Results  Results for orders placed or performed during the hospital encounter of 11/08/24 (from the past 24 hours)   POCT GLUCOSE   Result Value Ref Range    POCT Glucose 165 (H) 74 - 99 mg/dL   POCT GLUCOSE   Result Value Ref Range    POCT Glucose 212 (H) 74 - 99 mg/dL   POCT GLUCOSE   Result Value Ref Range    POCT Glucose 158 (H) 74 - 99 mg/dL   CBC   Result Value Ref Range    WBC 7.3 4.4 - 11.3 x10*3/uL    nRBC 0.0 0.0 - 0.0 /100 WBCs    RBC 4.26  (L) 4.50 - 5.90 x10*6/uL    Hemoglobin 12.9 (L) 13.5 - 17.5 g/dL    Hematocrit 39.2 (L) 41.0 - 52.0 %    MCV 92 80 - 100 fL    MCH 30.3 26.0 - 34.0 pg    MCHC 32.9 32.0 - 36.0 g/dL    RDW 12.5 11.5 - 14.5 %    Platelets 233 150 - 450 x10*3/uL   Renal function panel   Result Value Ref Range    Glucose 102 (H) 74 - 99 mg/dL    Sodium 134 (L) 136 - 145 mmol/L    Potassium 4.8 3.5 - 5.3 mmol/L    Chloride 100 98 - 107 mmol/L    Bicarbonate 25 21 - 32 mmol/L    Anion Gap 14 10 - 20 mmol/L    Urea Nitrogen 36 (H) 6 - 23 mg/dL    Creatinine 1.20 0.50 - 1.30 mg/dL    eGFR 68 >60 mL/min/1.73m*2    Calcium 10.2 8.6 - 10.6 mg/dL    Phosphorus 4.1 2.5 - 4.9 mg/dL    Albumin 4.1 3.4 - 5.0 g/dL   Magnesium   Result Value Ref Range    Magnesium 2.34 1.60 - 2.40 mg/dL     Assessment & Plan  Chest pain, unspecified type    Delvis Ventura Jr. is a 64 y.o. 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 In the ED, VSS, troponin's ad EKG negative for ACS, BNP was not elevated to suggest ADHF, although CXR with some vascular congestion. Patient was admitted to cardiology floor for observation and further workup.    Pt had recurrence of his chest pain at 6am on 11/9. EKG was not concerning for ischemia. Has a hx of provoked DVT in the past, however his clinical picture and Well score makes PE unlikely. On bedside examination, the pain is reproducible over his sternum, will continue supportive management of likely costochondritis. ESR and CRP  were normal to mildly elevated, making this likely not pericarditis. Patient does not believe it is heart burn as he knows what that feels like and this stabbing pain feels different. He reports that the chest pain is improving today.    Updates 11/18:  - Awaiting SNF placement     #Chest pain of unclear etiology  #HFrecoveredEF (EF 15-20% TTE Jan 2024--> EF 61% 11/9 TTE) s/p ICD  #NICM  #non obstructive CAD  -acute onset 11/8, not relieved by nitroglycerin  -ED workup unremarkable  -Admission weight 92 kg/ discharge weight in Feb 2024 for ADHF: 94 kg  -Esr 25, CRP 0.96 . Not endorsing positional signs of pericarditis  -Pain reproducible on palpation, likely costochondritis   -TTE 11/9: EF 61%!  Plan:  -TTE   -Continue home meds:              -ASA, atorvastatin              -GDMT: Coreg 50 BID, Farxiga 10 mg daily, entresto  daily, aldactone 25 mg daily              -anti-HTNs: hydral 75 TID, imdur 120 mg daily  -Daily weights  -PRN lasix 20 mg po as clinical picture dictates  -Scheduled tylenol  -S/p Prednisone 40 mg x5d (11/10-11/15)  -Continue Gabapentin 100 mg TID  -Close cardiology follow-up with Dr. Hernandez upon discharge     #Hx of NSVT/VT  -continue amiodarone 200 daily  -single chamber ICD with hx of prior device infection and removal     #IDDM  -lantus 40 units at night  -ldssi  -jardiance as for gdmt above     #insomnia  -takes prazasosin and zolpidem prn    #hx of diarrhea  -abdominal distension on exam  -pt doesn't endorse constipation  Plan:  -PRN home loperamide     #Testicular pain  #Left sided varicocele  -PE unremarkable  -UA negative  -Scrotal U/s with left sided varicocele  Plan:  -Per urology, outpatient urology follow-up  -Scrotal sling for comfort     F: prn, EF 61%  E: prn  A: pIVs  DVT PPX: lovenox  Diet: cardiac  GI ppx: Pepcid prn  BM ppx: none  Code: full     The patient was seen and discussed with the attending, Dr. Crocker.    DOMENICA TURNER  MS3

## 2024-11-19 LAB
GLUCOSE BLD MANUAL STRIP-MCNC: 128 MG/DL (ref 74–99)
GLUCOSE BLD MANUAL STRIP-MCNC: 172 MG/DL (ref 74–99)
GLUCOSE BLD MANUAL STRIP-MCNC: 93 MG/DL (ref 74–99)

## 2024-11-19 PROCEDURE — 2500000001 HC RX 250 WO HCPCS SELF ADMINISTERED DRUGS (ALT 637 FOR MEDICARE OP)

## 2024-11-19 PROCEDURE — 2500000002 HC RX 250 W HCPCS SELF ADMINISTERED DRUGS (ALT 637 FOR MEDICARE OP, ALT 636 FOR OP/ED)

## 2024-11-19 PROCEDURE — G0378 HOSPITAL OBSERVATION PER HR: HCPCS

## 2024-11-19 PROCEDURE — 82947 ASSAY GLUCOSE BLOOD QUANT: CPT

## 2024-11-19 PROCEDURE — 99232 SBSQ HOSP IP/OBS MODERATE 35: CPT | Performed by: INTERNAL MEDICINE

## 2024-11-19 PROCEDURE — 2500000005 HC RX 250 GENERAL PHARMACY W/O HCPCS

## 2024-11-19 PROCEDURE — 2500000004 HC RX 250 GENERAL PHARMACY W/ HCPCS (ALT 636 FOR OP/ED)

## 2024-11-19 PROCEDURE — 96372 THER/PROPH/DIAG INJ SC/IM: CPT

## 2024-11-19 RX ORDER — ONDANSETRON 4 MG/1
4 TABLET, FILM COATED ORAL ONCE
Status: COMPLETED | OUTPATIENT
Start: 2024-11-19 | End: 2024-11-19

## 2024-11-19 ASSESSMENT — COGNITIVE AND FUNCTIONAL STATUS - GENERAL
DAILY ACTIVITIY SCORE: 22
WALKING IN HOSPITAL ROOM: TOTAL
HELP NEEDED FOR BATHING: A LITTLE
DRESSING REGULAR LOWER BODY CLOTHING: A LITTLE
MOBILITY SCORE: 17
STANDING UP FROM CHAIR USING ARMS: A LITTLE
CLIMB 3 TO 5 STEPS WITH RAILING: TOTAL

## 2024-11-19 NOTE — PROGRESS NOTES
Delvis Ventura Jr. is a 64 y.o. male admitted on 11/8/2024 presenting with Chest pain, unspecified type.      Subjective   NAEON. Patient denies any chest pain, shortness of breath, or palpitations. No arrhythmias seen overnight on telemetry.    Objective     Last Recorded Vitals  /70 (BP Location: Right arm, Patient Position: Lying)   Pulse 60   Temp 36.1 °C (97 °F) (Temporal)   Resp 18   Wt 102 kg (225 lb 1.4 oz)   SpO2 96%   Intake/Output last 3 Shifts:    Intake/Output Summary (Last 24 hours) at 11/19/2024 1246  Last data filed at 11/19/2024 1100  Gross per 24 hour   Intake 600 ml   Output 2600 ml   Net -2000 ml       Admission Weight  Weight: 91.6 kg (202 lb) (11/08/24 1015)    Daily Weight  11/19/24 : 102 kg (225 lb 1.4 oz)    Image Results  US scrotum w doppler  Narrative: Interpreted By:  Levon Cantu and Beyersdorf Conner   STUDY:  US SCROTUM WITH DOPPLER;  11/15/2024 4:55 pm      INDICATION:  Signs/Symptoms:Pain..          COMPARISON:  None.      ACCESSION NUMBER(S):  [id number]      ORDERING CLINICIAN:  DAVIDE LEE      TECHNIQUE:  Multiple ultrasonographic images of scrotum and tested were obtained.  This examination was interpreted at [facility].      FINDINGS:  RIGHT HEMISCROTUM:      RIGHT TESTICLE:  The right testicle measures 2.9 cm x 2.4 cmx 5.3 cm. The right  testicle demonstrates a homogeneous echotexture and normal contour.  Normal vascularity and Doppler waveforms are observed in the right  testicle.      RIGHT EPIDIDYMIS:  The right epididymal head measures .77 cm  x .99 cm  x 1.3 cm  and is  within normal limits.      LEFT HEMISCROTUM:      LEFT TESTICLE:  The left testicle measures 3.3 cm x 2.3 cm x 5.1 cm. The left  testicle demonstrates a homogeneous echotexture and normal contour.  Increased vascularity in the left hemiscrotum, which is further  increased on Valsalva maneuver, consistent with varicocele.      LEFT  EPIDIDYMIS:  The left epididymal head measures .89 cm  x 1.1 x 1.4 cm  and is  within normal limits. There is an echogenic focus within the  epididymal head measuring 0.3 X 0.1 X 0.1 cm.      Impression: 1. No sonographic evidence of testicular torsion or mass.  2. Left varicocele.              I personally reviewed the image(s)/study and resident interpretation.  I agree with the findings as stated by resident Soto Solomon.  Data analyzed and images interpreted at [facility], [place], [place].      MACRO:  None      Signed by: Levon Cantu 11/15/2024 10:25 PM  Dictation workstation:   [id number]    Physical Exam  Constitutional:       General: He is not in acute distress.     Appearance: He is obese.   HENT:      Nose: Nose normal.   Eyes:      Conjunctiva/sclera: Conjunctivae normal.   Cardiovascular:      Rate and Rhythm: Normal rate and regular rhythm.      Heart sounds:      No friction rub.      Comments: ICD in place, no signs of infection  Pulmonary:      Breath sounds: No wheezing, rhonchi or rales.   Abdominal:      General: Bowel sounds are normal.      Tenderness: There is no abdominal tenderness.   Musculoskeletal:      Cervical back: Normal range of motion.      Right lower leg: No edema.      Left lower leg: No edema.   Skin:     General: Skin is warm and dry.   Neurological:      General: No focal deficit present.      Mental Status: He is alert and oriented to person, place, and time.   Psychiatric:         Mood and Affect: Mood normal.         Behavior: Behavior normal.     Relevant Results  Results for orders placed or performed during the hospital encounter of 11/08/24 (from the past 24 hours)   POCT GLUCOSE   Result Value Ref Range    POCT Glucose 143 (H) 74 - 99 mg/dL   POCT GLUCOSE   Result Value Ref Range    POCT Glucose 202 (H) 74 - 99 mg/dL   POCT GLUCOSE   Result Value Ref Range    POCT Glucose 93 74 - 99 mg/dL     Assessment & Plan  Chest pain,  unspecified type    Delvis Ventura Jr. is a 64 y.o. male with complex PMHx including T2DM, cerebral palsy (wheelchair bound), nonischemic cardiomyopathy ([facility] 8/2018: trivial CAD), HFrEF (01/24 TTE: 15-20%) complicated by infected ICD (wound Cx: + staph aureus) with explantation and subsequent single chamber ICD re-implantation 4/28/2023 who is presenting directly from his outpatient heart failure cardiology appointment where he had complaints of new-onset chest pain since 7am on 11/8 not relieved by nitroglycerin, not positional, nor associated with diaphoresis, n/v/post-prandial. In the ED, VSS, troponin's ad EKG negative for ACS, BNP was not elevated to suggest ADHF, although CXR with some vascular congestion. Patient was admitted to cardiology floor for observation and further workup.    Pt had recurrence of his chest pain at 6am on 11/9. EKG was not concerning for ischemia. Has a hx of provoked DVT in the past, however his clinical picture and Well score makes PE unlikely. On bedside examination, the pain is reproducible over his sternum, will continue supportive management of likely costochondritis. ESR and CRP were normal to mildly elevated, making this likely not pericarditis. Patient does not believe it is heart burn as he knows what that feels like and this stabbing pain feels different. Patient denies any chest pain, shortness of breath, or palpitations today.    Updates 11/19:  - Awaiting SNF placement     #Chest pain of unclear etiology  #HFrecoveredEF (EF 15-20% TTE Jan 2024--> EF 61% 11/9 TTE) s/p ICD  #NICM  #non obstructive CAD  -acute onset 11/8, not relieved by nitroglycerin  -ED workup unremarkable  -Admission weight 92 kg/ discharge weight in Feb 2024 for ADHF: 94 kg  -Esr 25, CRP 0.96 . Not endorsing positional signs of pericarditis  -Pain reproducible on palpation, likely costochondritis   -TTE 11/9: EF 61%!  Plan:  -TTE   -Continue home meds:              -ASA, atorvastatin               -GDMT: Coreg 50 BID, Farxiga 10 mg daily, entresto  daily, aldactone 25 mg daily              -anti-HTNs: hydral 75 TID, imdur 120 mg daily  -Daily weights  -PRN lasix 20 mg po as clinical picture dictates  -Scheduled tylenol  -S/p Prednisone 40 mg x5d (11/10-11/15)  -Continue Gabapentin 100 mg TID  -Close cardiology follow-up with Dr. Hernandez upon discharge     #Hx of NSVT/VT  -continue amiodarone 200 daily  -single chamber ICD with hx of prior device infection and removal     #IDDM  -lantus 40 units at night  -ldssi  -jardiance as for gdmt above     #insomnia  -takes prazasosin and zolpidem prn    #hx of diarrhea  -abdominal distension on exam  -pt doesn't endorse constipation  Plan:  -PRN home loperamide     #Testicular pain  #Left sided varicocele  -PE unremarkable  -UA negative  -Scrotal U/s with left sided varicocele  Plan:  -Per urology, outpatient urology follow-up  -Scrotal sling for comfort     F: prn, EF 61%  E: prn  A: pIVs  DVT PPX: lovenox  Diet: cardiac  GI ppx: Pepcid prn  BM ppx: none  Code: full     The patient was seen and discussed with the attending, Dr. Crocker.    Kevin Breaux MD  Internal Medicine, PGY1

## 2024-11-19 NOTE — CARE PLAN
Problem: Pain - Adult  Goal: Verbalizes/displays adequate comfort level or baseline comfort level  Outcome: Progressing     Problem: Safety - Adult  Goal: Free from fall injury  Outcome: Progressing     Problem: Discharge Planning  Goal: Discharge to home or other facility with appropriate resources  Outcome: Progressing     Problem: ACS/CP/NSTEMI/STEMI  Goal: Chest pain managed (free from pain or at acceptable level)  Outcome: Progressing  Goal: Lab values return to normal range  Outcome: Progressing  Goal: Promote self management  Outcome: Progressing  Goal: Serial ECG will return to baseline  Outcome: Progressing  Goal: Verbalize understanding of procedures/devices  Outcome: Progressing  Goal: Wean vasopressors/achieve hemodynamic stability  Outcome: Progressing   The patient's goals for the shift include      The clinical goals for the shift include pt will remain HDS and safe this shift    Over the shift, the patient did make progress toward the following goals. Pt voids per urinal, GOP this shift, and uses call light appropriately to call for assistance. Will continue to monitor.

## 2024-11-20 LAB
GLUCOSE BLD MANUAL STRIP-MCNC: 115 MG/DL (ref 74–99)
GLUCOSE BLD MANUAL STRIP-MCNC: 171 MG/DL (ref 74–99)
GLUCOSE BLD MANUAL STRIP-MCNC: 226 MG/DL (ref 74–99)
GLUCOSE BLD MANUAL STRIP-MCNC: 258 MG/DL (ref 74–99)

## 2024-11-20 PROCEDURE — 2500000001 HC RX 250 WO HCPCS SELF ADMINISTERED DRUGS (ALT 637 FOR MEDICARE OP)

## 2024-11-20 PROCEDURE — 97530 THERAPEUTIC ACTIVITIES: CPT | Mod: GP,CQ

## 2024-11-20 PROCEDURE — 2500000002 HC RX 250 W HCPCS SELF ADMINISTERED DRUGS (ALT 637 FOR MEDICARE OP, ALT 636 FOR OP/ED)

## 2024-11-20 PROCEDURE — 82947 ASSAY GLUCOSE BLOOD QUANT: CPT

## 2024-11-20 PROCEDURE — 2500000004 HC RX 250 GENERAL PHARMACY W/ HCPCS (ALT 636 FOR OP/ED)

## 2024-11-20 PROCEDURE — 97110 THERAPEUTIC EXERCISES: CPT | Mod: GP,CQ

## 2024-11-20 PROCEDURE — G0378 HOSPITAL OBSERVATION PER HR: HCPCS

## 2024-11-20 PROCEDURE — 99231 SBSQ HOSP IP/OBS SF/LOW 25: CPT | Performed by: INTERNAL MEDICINE

## 2024-11-20 PROCEDURE — 96372 THER/PROPH/DIAG INJ SC/IM: CPT

## 2024-11-20 ASSESSMENT — PAIN - FUNCTIONAL ASSESSMENT
PAIN_FUNCTIONAL_ASSESSMENT: 0-10

## 2024-11-20 ASSESSMENT — COGNITIVE AND FUNCTIONAL STATUS - GENERAL
MOBILITY SCORE: 13
CLIMB 3 TO 5 STEPS WITH RAILING: TOTAL
MOVING FROM LYING ON BACK TO SITTING ON SIDE OF FLAT BED WITH BEDRAILS: A LITTLE
MOBILITY SCORE: 17
TURNING FROM BACK TO SIDE WHILE IN FLAT BAD: A LITTLE
CLIMB 3 TO 5 STEPS WITH RAILING: TOTAL
MOVING TO AND FROM BED TO CHAIR: A LITTLE
HELP NEEDED FOR BATHING: A LITTLE
STANDING UP FROM CHAIR USING ARMS: A LITTLE
WALKING IN HOSPITAL ROOM: TOTAL
STANDING UP FROM CHAIR USING ARMS: A LOT
DAILY ACTIVITIY SCORE: 22
DRESSING REGULAR LOWER BODY CLOTHING: A LITTLE
WALKING IN HOSPITAL ROOM: TOTAL

## 2024-11-20 ASSESSMENT — PAIN SCALES - GENERAL
PAINLEVEL_OUTOF10: 0 - NO PAIN

## 2024-11-20 NOTE — PROGRESS NOTES
Physical Therapy Treatment    Patient Name: Delvis Ventura Jr.  MRN: [id number]  Today's Date: 11/20/2024  Room: [address] [place] [address]  Time Calculation  Start Time: 0947  Stop Time: 1026  Time Calculation (min): 39 min       Assessment/Plan   PT Assessment  Rehab Prognosis: Good  Barriers to Discharge: none  Evaluation/Treatment Tolerance: Patient tolerated treatment well  Medical Staff Made Aware: Yes  Strengths: Ability to acquire knowledge, Attitude of self, Rehab experience  End of Session Communication: Bedside nurse  End of Session Patient Position: Alarm off, not on at start of session, Bed, 2 rail up (seated EOB)  PT Plan  Inpatient/Swing Bed or Outpatient: Inpatient  PT Plan  Treatment/Interventions: Endurance training, Range of motion, Therapeutic exercise, Strengthening  PT Plan: Ongoing PT  PT Frequency: 4 times per week  PT Discharge Recommendations: Moderate intensity level of continued care  Equipment Recommended upon Discharge: Wheelchair  PT Recommended Transfer Status: Assist x1  PT - OK to Discharge: Yes  Assessment: Patient is progressing Well with therapy this date. Pt. Completed all PT interventions with good tolerance. Required redirection throughout session to return to task. Would continue to benefit from continued skilled PT to address all mobility deficits; Patient remains appropriate for MOD intensity therapy when medically appropriate for discharge from acute stay.  Will continue to follow.     General Visit Information:   PT  Visit  PT Received On: 11/20/24  Response to Previous Treatment: Patient with no complaints from previous session.  Prior to Session Communication: Bedside nurse  Patient Position Received: Bed, 2 rail up, Alarm off, not on at start of session  Family/Caregiver Present: No     Subjective   Subjective: pt. Very agreeable and ready to complete PT exercises during session; very talkative  Precautions:  Precautions  Medical Precautions: Fall precautions, cardiac  precautions  Vital Signs:  Vital Signs (Past 2hrs)        Date/Time Vitals Session Patient Position Pulse Resp SpO2 BP MAP (mmHg)    11/20/24 0947 During PT  Sitting  77  --  --  --  --                     Objective   Pain:  Pain Assessment  Pain Assessment: 0-10  0-10 (Numeric) Pain Score:  (pain did not limit participation in PT)  Cognition:  Cognition  Overall Cognitive Status: Within Functional Limits  Orientation Level: Oriented X4     Static Sitting balance:  Static Sitting Balance  Static Sitting-Balance Support: Feet supported  Static Sitting-Level of Assistance: Independent  Static Standing Balance:  Static Standing Balance  Static Standing-Balance Support: Bilateral upper extremity supported  Static Standing-Level of Assistance: Moderate assistance  Static Standing-Comment/Number of Minutes: L knee blocked  Dynamic Sitting Balance:  Dynamic Sitting Balance  Dynamic Sitting-Balance Support: Feet supported  Dynamic Sitting-Level of Assistance: Close supervision  Dynamic Sitting-Balance: Forward lean, Lateral lean, Reaching for objects  Dynamic Sitting-Comments: seated EOB activities       PT Treatments:  Therapeutic Exercise  Therapeutic Exercise Performed: Yes, tactile cues for proper form  Therapeutic Exercise Activity 1: 12 x heel slides using bed sheet bilat LE (vc for use of sheet to assist only as needed)  Therapeutic Exercise Activity 2: 20 x ankle pumps bilat LE  Therapeutic Exercise Activity 3: 10 x hip abduction bilat LE using bed sheet (vc for use of sheet to assist only as needed)        Bed Mobility  Bed Mobility: Yes  Bed Mobility 1  Bed Mobility 1: Supine to sitting  Level of Assistance 1: Close supervision  Bed Mobility Comments 1: HOB elevated and use of bedrails  Bed Mobility 2  Bed Mobility  2: Scooting  Level of Assistance 2: Close supervision  Bed Mobility Comments 2: vc for positioning at EOB  Ambulation/Gait Training  Ambulation/Gait Training Performed: No  Transfers  Transfer:  Yes  Transfer 1  Transfer From 1: Sit to  Transfer to 1: Stand  Technique 1: Sit to stand  Transfer Device 1:  (no device)  Transfer Level of Assistance 1: Moderate assistance x1 Minimal verbal cues  Trials/Comments 1: x 4 trials; vc for positioning and handplacement, up to 5s each  Transfers 2  Transfer From 2: Stand to  Transfer to 2: Sit  Technique 2: Stand to sit  Transfer Device 2:  (no device)  Transfer Level of Assistance 2: Moderate assistance x1  Trials/Comments 2: x 4 trials; assist given to promote controlled eccentric descent to seated position    Transfer: Yes  Transfer 1  Transfer From 1: Sit to  Transfer to 1: Stand  Transfer Device 1:  (no device) arm and arm assist  Transfer Level of Assistance 1: minAx2 Minimal verbal cues  Trials/Comments 1: x 2 trials; vc for positioning and handplacement, up to 5s each  Transfers 3  Transfer From 3: Bed to  Transfer to 3: Commode-standard  Technique 3: Squat pivot  Transfer Device 3:  (no device)  Transfer Level of Assistance 3: Minimum assistance  Trials/Comments 3: vc for handplacement  Stairs  Stairs: No          Activity tolerance:  Activity Tolerance  Endurance: Tolerates 10 - 20 min exercise with multiple rests, Decreased tolerance for upright activites    Outcome Measures:  [facility] Basic Mobility  Turning from your back to your side while in a flat bed without using bedrails: A little  Moving from lying on your back to sitting on the side of a flat bed without using bedrails: A little  Moving to and from bed to chair (including a wheelchair): A little  Standing up from a chair using your arms (e.g. wheelchair or bedside chair): A lot  To walk in hospital room: Total  Climbing 3-5 steps with railing: Total  Basic Mobility - Total Score: 13    Education Documentation  Body Mechanics, taught by SULEIMAN Lyon at 11/20/2024 11:00 AM.  Learner: Patient  Readiness: Acceptance  Method: Explanation  Response: Verbalizes Understanding  Comment: reviewed PT  rehab POC; supine ther-ex; transfer techniques    Precautions, taught by SULEIMAN Lyon at 11/20/2024 11:00 AM.  Learner: Patient  Readiness: Acceptance  Method: Explanation  Response: Verbalizes Understanding  Comment: reviewed PT rehab POC; supine ther-ex; transfer techniques    Mobility Training, taught by SULEIMAN Lyon at 11/20/2024 11:00 AM.  Learner: Patient  Readiness: Acceptance  Method: Explanation  Response: Verbalizes Understanding  Comment: reviewed PT rehab POC; supine ther-ex; transfer techniques    Education Comments  No comments found.      OP EDUCATION:  Encounter Problems       Encounter Problems (Active)       Balance       STG - Maintains dynamic sitting balance without upper extremity support >/= 10 min supervision  (Progressing)       Start:  11/09/24    Expected End:  11/23/24               Mobility       pt will be independent with HEP program for BLEs to improve LE weakness  (Progressing)       Start:  11/09/24    Expected End:  11/23/24               PT Transfers       STG - Transfer from bed to chair CGA  (Progressing)       Start:  11/09/24    Expected End:  11/23/24            STG - Patient will perform bed mobility CGA (Met)       Start:  11/09/24    Expected End:  11/23/24    Resolved:  11/18/24            Pain - Adult

## 2024-11-20 NOTE — CARE PLAN
Problem: Pain - Adult  Goal: Verbalizes/displays adequate comfort level or baseline comfort level  Outcome: Progressing     Problem: Safety - Adult  Goal: Free from fall injury  Outcome: Progressing     Problem: ACS/CP/NSTEMI/STEMI  Goal: Chest pain managed (free from pain or at acceptable level)  Outcome: Progressing  Goal: Lab values return to normal range  Outcome: Progressing  Goal: Promote self management  Outcome: Progressing  Goal: Serial ECG will return to baseline  Outcome: Progressing  Goal: Verbalize understanding of procedures/devices  Outcome: Progressing  Goal: Wean vasopressors/achieve hemodynamic stability  Outcome: Progressing   The patient's goals for the shift include      The clinical goals for the shift include pt will remain HDS and safe this shift    Over the shift, the patient did not make progress toward the following goals. Voids per urinal, denies any pain or discomfort, uses call light appropriately to call for assistance. Will continue to monitor.

## 2024-11-20 NOTE — PROGRESS NOTES
Delvis Ventura Jr. is a 64 y.o. male admitted on 11/8/2024 presenting with Chest pain, unspecified type.      Subjective   NAEON. Patient denies any chest pain, shortness of breath, or palpitations.     Objective     Last Recorded Vitals  /62 (BP Location: Right arm, Patient Position: Sitting)   Pulse 53   Temp 36.4 °C (97.5 °F) (Temporal)   Resp 16   Wt 101 kg (221 lb 9 oz)   SpO2 97%   Intake/Output last 3 Shifts:    Intake/Output Summary (Last 24 hours) at 11/20/2024 1625  Last data filed at 11/20/2024 1000  Gross per 24 hour   Intake --   Output 1200 ml   Net -1200 ml       Admission Weight  Weight: 91.6 kg (202 lb) (11/08/24 1015)    Daily Weight  11/20/24 : 101 kg (221 lb 9 oz)    Image Results  US scrotum w doppler  Narrative: Interpreted By:  Levon Cantu and Beyersdorf Conner   STUDY:  US SCROTUM WITH DOPPLER;  11/15/2024 4:55 pm      INDICATION:  Signs/Symptoms:Pain..          COMPARISON:  None.      ACCESSION NUMBER(S):  [id number]      ORDERING CLINICIAN:  DAVIDE LEE      TECHNIQUE:  Multiple ultrasonographic images of scrotum and tested were obtained.  This examination was interpreted at [facility].      FINDINGS:  RIGHT HEMISCROTUM:      RIGHT TESTICLE:  The right testicle measures 2.9 cm x 2.4 cmx 5.3 cm. The right  testicle demonstrates a homogeneous echotexture and normal contour.  Normal vascularity and Doppler waveforms are observed in the right  testicle.      RIGHT EPIDIDYMIS:  The right epididymal head measures .77 cm  x .99 cm  x 1.3 cm  and is  within normal limits.      LEFT HEMISCROTUM:      LEFT TESTICLE:  The left testicle measures 3.3 cm x 2.3 cm x 5.1 cm. The left  testicle demonstrates a homogeneous echotexture and normal contour.  Increased vascularity in the left hemiscrotum, which is further  increased on Valsalva maneuver, consistent with varicocele.      LEFT EPIDIDYMIS:  The left epididymal head measures .89  cm  x 1.1 x 1.4 cm  and is  within normal limits. There is an echogenic focus within the  epididymal head measuring 0.3 X 0.1 X 0.1 cm.      Impression: 1. No sonographic evidence of testicular torsion or mass.  2. Left varicocele.              I personally reviewed the image(s)/study and resident interpretation.  I agree with the findings as stated by resident Soto Solomon.  Data analyzed and images interpreted at [facility], [place], [place].      MACRO:  None      Signed by: Levno Cantu 11/15/2024 10:25 PM  Dictation workstation:   [id number]    Physical Exam  Constitutional:       General: He is not in acute distress.     Appearance: He is obese.   HENT:      Nose: Nose normal.   Eyes:      Conjunctiva/sclera: Conjunctivae normal.   Cardiovascular:      Rate and Rhythm: Normal rate and regular rhythm.      Heart sounds:      No friction rub.      Comments: ICD in place, no signs of infection  Pulmonary:      Breath sounds: No wheezing, rhonchi or rales.   Abdominal:      General: Bowel sounds are normal.      Tenderness: There is no abdominal tenderness.   Musculoskeletal:      Cervical back: Normal range of motion.      Right lower leg: No edema.      Left lower leg: No edema.   Skin:     General: Skin is warm and dry.   Neurological:      General: No focal deficit present.      Mental Status: He is alert and oriented to person, place, and time.   Psychiatric:         Mood and Affect: Mood normal.         Behavior: Behavior normal.     Relevant Results  Results for orders placed or performed during the hospital encounter of 11/08/24 (from the past 24 hours)   POCT GLUCOSE   Result Value Ref Range    POCT Glucose 172 (H) 74 - 99 mg/dL   POCT GLUCOSE   Result Value Ref Range    POCT Glucose 115 (H) 74 - 99 mg/dL   POCT GLUCOSE   Result Value Ref Range    POCT Glucose 226 (H) 74 - 99 mg/dL     Assessment & Plan  Chest pain, unspecified type    Delvis Ventura   is a 64 y.o. 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 In the ED, VSS, troponin's ad EKG negative for ACS, BNP was not elevated to suggest ADHF, although CXR with some vascular congestion. Patient was admitted to cardiology floor for observation and further workup.    Pt had recurrence of his chest pain at 6am on 11/9. EKG was not concerning for ischemia. Has a hx of provoked DVT in the past, however his clinical picture and Well score makes PE unlikely. On bedside examination, the pain is reproducible over his sternum, will continue supportive management of likely costochondritis. ESR and CRP were normal to mildly elevated, making this likely not pericarditis. Patient denies any chest pain, shortness of breath, or palpitations today.    Updates 11/20:  - Awaiting SNF placement     #Chest pain of unclear etiology  #HFrecoveredEF (EF 15-20% TTE Jan 2024--> EF 61% 11/9 TTE) s/p ICD  #NICM  #non obstructive CAD  -acute onset 11/8, not relieved by nitroglycerin  -ED workup unremarkable  -Admission weight 92 kg/ discharge weight in Feb 2024 for ADHF: 94 kg  -Esr 25, CRP 0.96 . Not endorsing positional signs of pericarditis  -Pain reproducible on palpation, likely costochondritis   -TTE 11/9: EF 61%!  Plan:  -TTE   -Continue home meds:              -ASA, atorvastatin              -GDMT: Coreg 50 BID, Farxiga 10 mg daily, entresto  daily, aldactone 25 mg daily              -anti-HTNs: hydral 75 TID, imdur 120 mg daily  -Daily  weights  -PRN lasix 20 mg po as clinical picture dictates  -Scheduled tylenol  -S/p Prednisone 40 mg x5d (11/10-11/15)  -Continue Gabapentin 100 mg TID  -Close cardiology follow-up with Dr. Hernandez upon discharge     #Hx of NSVT/VT  -continue amiodarone 200 daily  -single chamber ICD with hx of prior device infection and removal     #IDDM  -lantus 40 units at night  -ldssi  -jardiance as for gdmt above     #insomnia  -takes prazasosin and zolpidem prn    #hx of diarrhea  -abdominal distension on exam  -pt doesn't endorse constipation  Plan:  -PRN home loperamide     #Testicular pain  #Left sided varicocele  -PE unremarkable  -UA negative  -Scrotal U/s with left sided varicocele  Plan:  -Per urology, outpatient urology follow-up  -Scrotal sling for comfort     F: prn, EF 61%  E: prn  A: pIVs  DVT PPX: lovenox  Diet: cardiac  GI ppx: Pepcid prn  BM ppx: none  Code: full     The patient was seen and discussed with the attending, Dr. Crocker.    Kevin Breaux MD  Internal Medicine, PGY1

## 2024-11-21 ENCOUNTER — APPOINTMENT (OUTPATIENT)
Dept: RADIOLOGY | Facility: HOSPITAL | Age: 64
End: 2024-11-21
Payer: COMMERCIAL

## 2024-11-21 LAB
ALBUMIN SERPL BCP-MCNC: 3.6 G/DL (ref 3.4–5)
ANION GAP SERPL CALC-SCNC: 14 MMOL/L (ref 10–20)
APPEARANCE UR: CLEAR
BASOPHILS # BLD AUTO: 0.02 X10*3/UL (ref 0–0.1)
BASOPHILS NFR BLD AUTO: 0.2 %
BILIRUB UR STRIP.AUTO-MCNC: NEGATIVE MG/DL
BUN SERPL-MCNC: 42 MG/DL (ref 6–23)
CALCIUM SERPL-MCNC: 9.2 MG/DL (ref 8.6–10.6)
CHLORIDE SERPL-SCNC: 99 MMOL/L (ref 98–107)
CO2 SERPL-SCNC: 23 MMOL/L (ref 21–32)
COLOR UR: ABNORMAL
CREAT SERPL-MCNC: 2.06 MG/DL (ref 0.5–1.3)
EGFRCR SERPLBLD CKD-EPI 2021: 35 ML/MIN/1.73M*2
EOSINOPHIL # BLD AUTO: 0.09 X10*3/UL (ref 0–0.7)
EOSINOPHIL NFR BLD AUTO: 1.1 %
ERYTHROCYTE [DISTWIDTH] IN BLOOD BY AUTOMATED COUNT: 12.3 % (ref 11.5–14.5)
GLUCOSE BLD MANUAL STRIP-MCNC: 144 MG/DL (ref 74–99)
GLUCOSE BLD MANUAL STRIP-MCNC: 297 MG/DL (ref 74–99)
GLUCOSE BLD MANUAL STRIP-MCNC: 81 MG/DL (ref 74–99)
GLUCOSE SERPL-MCNC: 244 MG/DL (ref 74–99)
GLUCOSE UR STRIP.AUTO-MCNC: ABNORMAL MG/DL
HCT VFR BLD AUTO: 34.6 % (ref 41–52)
HGB BLD-MCNC: 11.3 G/DL (ref 13.5–17.5)
HYALINE CASTS #/AREA URNS AUTO: ABNORMAL /LPF
IMM GRANULOCYTES # BLD AUTO: 0.03 X10*3/UL (ref 0–0.7)
IMM GRANULOCYTES NFR BLD AUTO: 0.4 % (ref 0–0.9)
KETONES UR STRIP.AUTO-MCNC: NEGATIVE MG/DL
LEUKOCYTE ESTERASE UR QL STRIP.AUTO: NEGATIVE
LYMPHOCYTES # BLD AUTO: 1.52 X10*3/UL (ref 1.2–4.8)
LYMPHOCYTES NFR BLD AUTO: 18 %
MAGNESIUM SERPL-MCNC: 2.08 MG/DL (ref 1.6–2.4)
MCH RBC QN AUTO: 30.5 PG (ref 26–34)
MCHC RBC AUTO-ENTMCNC: 32.7 G/DL (ref 32–36)
MCV RBC AUTO: 93 FL (ref 80–100)
MONOCYTES # BLD AUTO: 0.85 X10*3/UL (ref 0.1–1)
MONOCYTES NFR BLD AUTO: 10.1 %
MUCOUS THREADS #/AREA URNS AUTO: ABNORMAL /LPF
NEUTROPHILS # BLD AUTO: 5.94 X10*3/UL (ref 1.2–7.7)
NEUTROPHILS NFR BLD AUTO: 70.2 %
NITRITE UR QL STRIP.AUTO: NEGATIVE
NRBC BLD-RTO: 0 /100 WBCS (ref 0–0)
PH UR STRIP.AUTO: 6 [PH]
PHOSPHATE SERPL-MCNC: 4.3 MG/DL (ref 2.5–4.9)
PLATELET # BLD AUTO: 227 X10*3/UL (ref 150–450)
POTASSIUM SERPL-SCNC: 5.3 MMOL/L (ref 3.5–5.3)
PROT UR STRIP.AUTO-MCNC: ABNORMAL MG/DL
RBC # BLD AUTO: 3.71 X10*6/UL (ref 4.5–5.9)
RBC # UR STRIP.AUTO: NEGATIVE /UL
RBC #/AREA URNS AUTO: ABNORMAL /HPF
SODIUM SERPL-SCNC: 131 MMOL/L (ref 136–145)
SP GR UR STRIP.AUTO: 1.02
SQUAMOUS #/AREA URNS AUTO: ABNORMAL /HPF
UROBILINOGEN UR STRIP.AUTO-MCNC: NORMAL MG/DL
WBC # BLD AUTO: 8.5 X10*3/UL (ref 4.4–11.3)
WBC #/AREA URNS AUTO: ABNORMAL /HPF

## 2024-11-21 PROCEDURE — 2500000004 HC RX 250 GENERAL PHARMACY W/ HCPCS (ALT 636 FOR OP/ED)

## 2024-11-21 PROCEDURE — 99231 SBSQ HOSP IP/OBS SF/LOW 25: CPT | Performed by: INTERNAL MEDICINE

## 2024-11-21 PROCEDURE — 96372 THER/PROPH/DIAG INJ SC/IM: CPT

## 2024-11-21 PROCEDURE — 97530 THERAPEUTIC ACTIVITIES: CPT | Mod: GO

## 2024-11-21 PROCEDURE — 2500000001 HC RX 250 WO HCPCS SELF ADMINISTERED DRUGS (ALT 637 FOR MEDICARE OP)

## 2024-11-21 PROCEDURE — 80069 RENAL FUNCTION PANEL: CPT

## 2024-11-21 PROCEDURE — 74018 RADEX ABDOMEN 1 VIEW: CPT

## 2024-11-21 PROCEDURE — 81001 URINALYSIS AUTO W/SCOPE: CPT

## 2024-11-21 PROCEDURE — 85025 COMPLETE CBC W/AUTO DIFF WBC: CPT

## 2024-11-21 PROCEDURE — 83735 ASSAY OF MAGNESIUM: CPT

## 2024-11-21 PROCEDURE — 36415 COLL VENOUS BLD VENIPUNCTURE: CPT

## 2024-11-21 PROCEDURE — 2500000002 HC RX 250 W HCPCS SELF ADMINISTERED DRUGS (ALT 637 FOR MEDICARE OP, ALT 636 FOR OP/ED)

## 2024-11-21 PROCEDURE — 82947 ASSAY GLUCOSE BLOOD QUANT: CPT

## 2024-11-21 PROCEDURE — G0378 HOSPITAL OBSERVATION PER HR: HCPCS

## 2024-11-21 PROCEDURE — 74018 RADEX ABDOMEN 1 VIEW: CPT | Performed by: RADIOLOGY

## 2024-11-21 ASSESSMENT — COGNITIVE AND FUNCTIONAL STATUS - GENERAL
PERSONAL GROOMING: A LITTLE
DRESSING REGULAR LOWER BODY CLOTHING: A LOT
TOILETING: A LOT
HELP NEEDED FOR BATHING: A LOT
DRESSING REGULAR UPPER BODY CLOTHING: A LITTLE
DAILY ACTIVITIY SCORE: 16

## 2024-11-21 NOTE — PROGRESS NOTES
Occupational Therapy    Occupational Therapy Treatment    Name: Delvis Ventura Jr.  MRN: [id number]  : 1960  Date: 24  Room: [facility]      Time Calculation  Start Time: 1056  Stop Time: 1115  Time Calculation (min): 19 min    Assessment:  OT Assessment: Pt demos improved functional transfers on this date, however continues to demo decreased activity tolerance, functional transfers, and mobility resulting in the need for continued skilled OT services at Mod intensity to allow for a safe and funcitonal d/c.  Prognosis: Good  Barriers to Discharge: Decreased caregiver support  Evaluation/Treatment Tolerance: Patient tolerated treatment well  Medical Staff Made Aware: Yes  End of Session Communication: Bedside nurse  End of Session Patient Position: Up in chair  Plan:  Treatment Interventions: ADL retraining, Functional transfer training, UE strengthening/ROM, Endurance training, Patient/family training, Compensatory technique education, Equipment evaluation/education  OT Frequency: 3 times per week  OT Discharge Recommendations: Moderate intensity level of continued care  Equipment Recommended upon Discharge: Wheelchair  OT Recommended Transfer Status: Assist of 2  OT - OK to Discharge: Yes    Subjective   General:  OT Last Visit  OT Received On: 24  Reason for Referral: Chest pain, deconditioning  Past Medical History Relevant to Rehab: T2DM, cerebral palsy (wheelchair bound), nonischemic cardiomyopathy ([facility] 2018: trivial CAD), HFrEF ( TTE: 15-20%) complicated by 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 Pleasant and agreeable to getting into w/c for OT session.   Precautions:  Medical  Precautions: Fall precautions  Precautions Comment: CP    Cognition:  Overall Cognitive Status: Within Functional Limits  Orientation Level: Oriented X4    Pain Assessment:  Pain Assessment  0-10 (Numeric) Pain Score:  (Pt is reporting low back pain, however does not rate pain)  Pain Interventions: Repositioned  Response to Interventions: Relief     Objective     Bed Mobility/Transfers:      Transfers  Transfer: Yes  Transfer 1  Transfer From 1: Bed to  Transfer to 1: Wheelchair  Technique 1: Lateral  Transfer Device 1:  (none)  Transfer Level of Assistance 1: Minimum assistance  Trials/Comments 1: Lateral transfer to pt's Power chair w/ drop arm.    Therapy/Activity:      Therapeutic Activity  Therapeutic Activity 1: Pt sitting EOB reporting low back pain on approach. Pt reports increased comfort when seated in his personal power chair. OTR assists pt in s/u of transfer and pt completes w/ min A and extended time. Pillow placed behind pt's back for comfort and pt reports decreaesd pain w/ new positioning. Pt expresses great fear of loosing his IND and motivation to attend rehab prior to d/c home to allow pt to regain functional IND so he can remain living in his apt.    Outcome Measures:  [facility] Daily Activity  Putting on and taking off regular lower body clothing: A lot  Bathing (including washing, rinsing, drying): A lot  Putting on and taking off regular upper body clothing: A little  Toileting, which includes using toilet, bedpan or urinal: A lot  Taking care of personal grooming such as brushing teeth: A little  Eating Meals: None  Daily Activity - Total Score: 16     Education Documentation  Body Mechanics, taught by Urszula Mayen OT at 11/21/2024 11:29 AM.  Learner: Patient  Readiness: Acceptance  Method: Explanation  Response: Verbalizes Understanding    Precautions, taught by Urszula Mayen OT at 11/21/2024 11:29 AM.  Learner: Patient  Readiness: Acceptance  Method: Explanation  Response: Verbalizes  Understanding    ADL Training, taught by Urszula Peacock OT at 11/21/2024 11:29 AM.  Learner: Patient  Readiness: Acceptance  Method: Explanation  Response: Verbalizes Understanding    Education Comments  No comments found.        Goals:  Encounter Problems       Encounter Problems (Active)       ADLs       Patient with complete upper body dressing with modified independent level of assistance donning and doffing all UE clothes with AR PRN (Progressing)       Start:  11/09/24    Expected End:  11/23/24            Patient with complete lower body dressing with minimal assist  level of assistance donning and doffing all LE clothes  with PRN adaptive equipment (Progressing)       Start:  11/09/24    Expected End:  11/23/24            Patient will complete daily grooming tasks with set-up level of assistance and PRN adaptive equipment while edge of bed . (Progressing)       Start:  11/09/24    Expected End:  11/23/24            Patient will complete toileting including hygiene clothing management/hygiene with minimal assist  level of assistance. (Progressing)       Start:  11/09/24    Expected End:  11/23/24               TRANSFERS       Patient will perform bed mobility modified independent level of assistance in order to improve safety and independence with mobility (Progressing)       Start:  11/09/24    Expected End:  11/23/24            Patient will complete functional transfer to wheelchair/commode with least restrictive device with modified independent level of assistance. (Progressing)       Start:  11/09/24    Expected End:  11/23/24 11/21/24 at 11:30 AM   URSZULA PEACOCK, SHIRA   [phone]

## 2024-11-21 NOTE — PROGRESS NOTES
Delvis Ventura Jr. is a 64 y.o. male admitted on 11/8/2024 presenting with Chest pain, unspecified type.      Subjective   NAEON. Patient denies any chest pain, shortness of breath, or palpitations this morning. This afternoon, the patient became hypotensive with systolic blood pressures consistently in the 80s and diastolic blood pressures in the 40-50s. Patient denies any chest pain, shortness of breath, palpitations or headache with this drop in blood pressure. He is conversational and describes some mild lightheadedness. Ordered 500cc of LR, RFP, Mag, CBC, UA, and KUB.     Objective     Last Recorded Vitals  /65 (BP Location: Right arm, Patient Position: Lying)   Pulse 60   Temp 36.6 °C (97.9 °F) (Temporal)   Resp 18   Wt 102 kg (225 lb 8.5 oz)   SpO2 97%   Intake/Output last 3 Shifts:    Intake/Output Summary (Last 24 hours) at 11/21/2024 1343  Last data filed at 11/21/2024 0734  Gross per 24 hour   Intake 360 ml   Output 950 ml   Net -590 ml       Admission Weight  Weight: 91.6 kg (202 lb) (11/08/24 1015)    Daily Weight  11/21/24 : 102 kg (225 lb 8.5 oz)    Image Results  US scrotum w doppler  Narrative: Interpreted By:  Levon Cantu and Beyersdorf Conner   STUDY:  US SCROTUM WITH DOPPLER;  11/15/2024 4:55 pm      INDICATION:  Signs/Symptoms:Pain..          COMPARISON:  None.      ACCESSION NUMBER(S):  [id number]      ORDERING CLINICIAN:  DAVIDE LEE      TECHNIQUE:  Multiple ultrasonographic images of scrotum and tested were obtained.  This examination was interpreted at [facility].      FINDINGS:  RIGHT HEMISCROTUM:      RIGHT TESTICLE:  The right testicle measures 2.9 cm x 2.4 cmx 5.3 cm. The right  testicle demonstrates a homogeneous echotexture and normal contour.  Normal vascularity and Doppler waveforms are observed in the right  testicle.      RIGHT EPIDIDYMIS:  The right epididymal head measures .77 cm  x .99 cm  x 1.3 cm  and  is  within normal limits.      LEFT HEMISCROTUM:      LEFT TESTICLE:  The left testicle measures 3.3 cm x 2.3 cm x 5.1 cm. The left  testicle demonstrates a homogeneous echotexture and normal contour.  Increased vascularity in the left hemiscrotum, which is further  increased on Valsalva maneuver, consistent with varicocele.      LEFT EPIDIDYMIS:  The left epididymal head measures .89 cm  x 1.1 x 1.4 cm  and is  within normal limits. There is an echogenic focus within the  epididymal head measuring 0.3 X 0.1 X 0.1 cm.      Impression: 1. No sonographic evidence of testicular torsion or mass.  2. Left varicocele.              I personally reviewed the image(s)/study and resident interpretation.  I agree with the findings as stated by resident Soto Solomon.  Data analyzed and images interpreted at [facility], [place], [place].      MACRO:  None      Signed by: Levon Cantu 11/15/2024 10:25 PM  Dictation workstation:   [id number]    Physical Exam  Constitutional:       General: He is not in acute distress.     Appearance: He is obese.   HENT:      Nose: Nose normal.   Eyes:      Conjunctiva/sclera: Conjunctivae normal.   Cardiovascular:      Rate and Rhythm: Normal rate and regular rhythm.      Heart sounds:      No friction rub.      Comments: ICD in place, no signs of infection  Pulmonary:      Breath sounds: No wheezing, rhonchi or rales.   Abdominal:      General: Bowel sounds are normal.      Tenderness: There is no abdominal tenderness.   Musculoskeletal:      Cervical back: Normal range of motion.      Right lower leg: No edema.      Left lower leg: No edema.   Skin:     General: Skin is warm and dry.   Neurological:      General: No focal deficit present.      Mental Status: He is alert and oriented to person, place, and time.   Psychiatric:         Mood and Affect: Mood normal.         Behavior: Behavior normal.     Relevant Results  Results for orders placed or  performed during the hospital encounter of 11/08/24 (from the past 24 hours)   POCT GLUCOSE   Result Value Ref Range    POCT Glucose 171 (H) 74 - 99 mg/dL   POCT GLUCOSE   Result Value Ref Range    POCT Glucose 258 (H) 74 - 99 mg/dL   POCT GLUCOSE   Result Value Ref Range    POCT Glucose 81 74 - 99 mg/dL   POCT GLUCOSE   Result Value Ref Range    POCT Glucose 297 (H) 74 - 99 mg/dL     Assessment & Plan  Chest pain, unspecified type    Delvis Ventura Jr. is a 64 y.o. male with complex PMHx including T2DM, cerebral palsy (wheelchair bound), nonischemic cardiomyopathy ([facility] 8/2018: trivial CAD), HFrEF (01/24 TTE: 15-20%) complicated by infected ICD (wound Cx: + staph aureus) with explantation and subsequent single chamber ICD re-implantation 4/28/2023 who is presenting directly from his outpatient heart failure cardiology appointment where he had complaints of new-onset chest pain since 7am on 11/8 not relieved by nitroglycerin, not positional, nor associated with diaphoresis, n/v/post-prandial. In the ED, VSS, troponin's ad EKG negative for ACS, BNP was not elevated to suggest ADHF, although CXR with some vascular congestion. Patient was admitted to cardiology floor for observation and further workup.    Pt had recurrence of his chest pain at 6am on 11/9. EKG was not concerning for ischemia. Has a hx of provoked DVT in the past, however his clinical picture and Well score makes PE unlikely. On bedside examination, the pain is reproducible over his sternum, will continue supportive management of likely costochondritis. ESR and CRP were normal to mildly elevated, making this likely not pericarditis. Patient denies any chest pain, shortness of breath, or palpitations today.    Updates 11/21:  -This afternoon, the patient became hypotensive with systolic blood pressures consistently in the 80s and diastolic blood pressures in the 40-50s. Ordered 500cc of LR, RFP, Mag, CBC, UA, and KUB. Patient denies any new  symptoms with this drop in blood pressure.   - Awaiting SNF placement     #Chest pain of unclear etiology  #HFrecoveredEF (EF 15-20% TTE Jan 2024--> EF 61% 11/9 TTE) s/p ICD  #NICM  #non obstructive CAD  -acute onset 11/8, not relieved by nitroglycerin  -ED workup unremarkable  -Admission weight 92 kg/ discharge weight in Feb 2024 for ADHF: 94 kg  -Esr 25, CRP 0.96 . Not endorsing positional signs of pericarditis  -Pain reproducible on palpation, likely costochondritis   -TTE 11/9: EF 61%!  Plan:  -TTE   -Continue home meds:              -ASA, atorvastatin              -GDMT: Coreg 50 BID, Farxiga 10 mg daily, entresto  daily, aldactone 25 mg daily              -anti-HTNs: hydral 75 TID, imdur 120 mg daily  -Daily weights  -PRN lasix 20 mg po as clinical picture dictates  -Scheduled tylenol  -S/p Prednisone 40 mg x5d (11/10-11/15)  -Continue Gabapentin 100 mg TID  -Close cardiology follow-up with Dr. Hernandez upon discharge     #Hx of NSVT/VT  -continue amiodarone 200 daily  -single chamber ICD with hx of prior device infection and removal     #IDDM  -lantus 40 units at night  -ldssi  -jardiance as for gdmt above     #insomnia  -takes prazasosin and zolpidem prn    #hx of diarrhea  -abdominal distension on exam  -pt doesn't endorse constipation  Plan:  -PRN home loperamide     #Testicular pain  #Left sided varicocele  -PE unremarkable  -UA negative  -Scrotal U/s with left sided varicocele  Plan:  -Per urology, outpatient urology follow-up  -Scrotal sling for comfort     F: prn, EF 61%  E: prn  A: pIVs  DVT PPX: lovenox  Diet: cardiac  GI ppx: Pepcid prn  BM ppx: none  Code: full     The patient was seen and discussed with the attending, Dr. Crocker.    Kevin Breaux MD  Internal Medicine, PGY1

## 2024-11-21 NOTE — PROGRESS NOTES
Therapy Communication Note    Patient Name: Delvis Ventura Jr.  MRN: [id number]  Department: [place] [address]  Room: 5060/5060-A  Today's Date: 11/21/2024     Discipline: Physical Therapy    Missed Visit Reason: Missed Visit Reason: Other (Comment) (low BP per nurse, will attempt later with time permitting and if cleared with vitals)    Missed Time: Attempt    Comment:

## 2024-11-21 NOTE — CARE PLAN
Problem: Pain - Adult  Goal: Verbalizes/displays adequate comfort level or baseline comfort level  Outcome: Progressing     Problem: Safety - Adult  Goal: Free from fall injury  Outcome: Progressing     Problem: Discharge Planning  Goal: Discharge to home or other facility with appropriate resources  Outcome: Progressing     Problem: ACS/CP/NSTEMI/STEMI  Goal: Chest pain managed (free from pain or at acceptable level)  Outcome: Progressing  Goal: Lab values return to normal range  Outcome: Progressing  Goal: Promote self management  Outcome: Progressing  Goal: Serial ECG will return to baseline  Outcome: Progressing  Goal: Verbalize understanding of procedures/devices  Outcome: Progressing  Goal: Wean vasopressors/achieve hemodynamic stability  Outcome: Progressing     The patient's goals for the shift include      The clinical goals for the shift include Pt will remain HDS and safe this shift    Over the shift, the patient did not make progress toward the following goals. Barriers to progression include: pt denies pain or discomfort, voiding per urinal with GOP. Will continue to monitor.

## 2024-11-22 LAB
ALBUMIN SERPL BCP-MCNC: 3.7 G/DL (ref 3.4–5)
ALBUMIN SERPL BCP-MCNC: 3.7 G/DL (ref 3.4–5)
ANION GAP SERPL CALC-SCNC: 12 MMOL/L (ref 10–20)
ANION GAP SERPL CALC-SCNC: 14 MMOL/L (ref 10–20)
BUN SERPL-MCNC: 36 MG/DL (ref 6–23)
BUN SERPL-MCNC: 37 MG/DL (ref 6–23)
CALCIUM SERPL-MCNC: 8.7 MG/DL (ref 8.6–10.6)
CALCIUM SERPL-MCNC: 8.8 MG/DL (ref 8.6–10.6)
CHLORIDE SERPL-SCNC: 98 MMOL/L (ref 98–107)
CHLORIDE SERPL-SCNC: 99 MMOL/L (ref 98–107)
CO2 SERPL-SCNC: 23 MMOL/L (ref 21–32)
CO2 SERPL-SCNC: 27 MMOL/L (ref 21–32)
CREAT SERPL-MCNC: 1.54 MG/DL (ref 0.5–1.3)
CREAT SERPL-MCNC: 1.7 MG/DL (ref 0.5–1.3)
EGFRCR SERPLBLD CKD-EPI 2021: 44 ML/MIN/1.73M*2
EGFRCR SERPLBLD CKD-EPI 2021: 50 ML/MIN/1.73M*2
GLUCOSE BLD MANUAL STRIP-MCNC: 131 MG/DL (ref 74–99)
GLUCOSE BLD MANUAL STRIP-MCNC: 157 MG/DL (ref 74–99)
GLUCOSE BLD MANUAL STRIP-MCNC: 169 MG/DL (ref 74–99)
GLUCOSE SERPL-MCNC: 152 MG/DL (ref 74–99)
GLUCOSE SERPL-MCNC: 173 MG/DL (ref 74–99)
HOLD SPECIMEN: NORMAL
MAGNESIUM SERPL-MCNC: 2.17 MG/DL (ref 1.6–2.4)
MAGNESIUM SERPL-MCNC: 2.17 MG/DL (ref 1.6–2.4)
PHOSPHATE SERPL-MCNC: 3.7 MG/DL (ref 2.5–4.9)
PHOSPHATE SERPL-MCNC: 3.9 MG/DL (ref 2.5–4.9)
POTASSIUM SERPL-SCNC: 4.3 MMOL/L (ref 3.5–5.3)
POTASSIUM SERPL-SCNC: 4.7 MMOL/L (ref 3.5–5.3)
SODIUM SERPL-SCNC: 131 MMOL/L (ref 136–145)
SODIUM SERPL-SCNC: 133 MMOL/L (ref 136–145)

## 2024-11-22 PROCEDURE — 2500000001 HC RX 250 WO HCPCS SELF ADMINISTERED DRUGS (ALT 637 FOR MEDICARE OP)

## 2024-11-22 PROCEDURE — 1200000002 HC GENERAL ROOM WITH TELEMETRY DAILY

## 2024-11-22 PROCEDURE — 36415 COLL VENOUS BLD VENIPUNCTURE: CPT

## 2024-11-22 PROCEDURE — 2500000002 HC RX 250 W HCPCS SELF ADMINISTERED DRUGS (ALT 637 FOR MEDICARE OP, ALT 636 FOR OP/ED)

## 2024-11-22 PROCEDURE — 97110 THERAPEUTIC EXERCISES: CPT | Mod: GP,CQ

## 2024-11-22 PROCEDURE — 83735 ASSAY OF MAGNESIUM: CPT

## 2024-11-22 PROCEDURE — 99231 SBSQ HOSP IP/OBS SF/LOW 25: CPT | Performed by: INTERNAL MEDICINE

## 2024-11-22 PROCEDURE — 97530 THERAPEUTIC ACTIVITIES: CPT | Mod: GP,CQ

## 2024-11-22 PROCEDURE — 80069 RENAL FUNCTION PANEL: CPT

## 2024-11-22 PROCEDURE — 82947 ASSAY GLUCOSE BLOOD QUANT: CPT

## 2024-11-22 PROCEDURE — 2500000004 HC RX 250 GENERAL PHARMACY W/ HCPCS (ALT 636 FOR OP/ED)

## 2024-11-22 ASSESSMENT — COGNITIVE AND FUNCTIONAL STATUS - GENERAL
MOVING FROM LYING ON BACK TO SITTING ON SIDE OF FLAT BED WITH BEDRAILS: A LITTLE
TURNING FROM BACK TO SIDE WHILE IN FLAT BAD: A LITTLE
DRESSING REGULAR LOWER BODY CLOTHING: A LITTLE
STANDING UP FROM CHAIR USING ARMS: A LITTLE
STANDING UP FROM CHAIR USING ARMS: A LOT
MOBILITY SCORE: 17
CLIMB 3 TO 5 STEPS WITH RAILING: TOTAL
CLIMB 3 TO 5 STEPS WITH RAILING: TOTAL
WALKING IN HOSPITAL ROOM: TOTAL
HELP NEEDED FOR BATHING: A LITTLE
DAILY ACTIVITIY SCORE: 22
MOVING TO AND FROM BED TO CHAIR: A LITTLE
WALKING IN HOSPITAL ROOM: TOTAL
MOBILITY SCORE: 13

## 2024-11-22 ASSESSMENT — PAIN SCALES - GENERAL
PAINLEVEL_OUTOF10: 9
PAINLEVEL_OUTOF10: 0 - NO PAIN

## 2024-11-22 ASSESSMENT — PAIN - FUNCTIONAL ASSESSMENT
PAIN_FUNCTIONAL_ASSESSMENT: 0-10
PAIN_FUNCTIONAL_ASSESSMENT: 0-10

## 2024-11-22 NOTE — PROGRESS NOTES
Patient to discharge today at 230 pm going to [place] at [place] being transported by [facility] patient nurse facility  and medical team notified

## 2024-11-22 NOTE — PROGRESS NOTES
Delvis Ventura Jr. is a 64 y.o. male on day 0 of admission presenting with Chest pain, unspecified type.      Subjective   Feeling overall stable. Reports some R-sided testicular pain, present during position changes, which has been present since admission. Has not tried anything for pain.        Objective     Last Recorded Vitals  /66   Pulse 69   Temp 36.9 °C (98.4 °F)   Resp 20   Wt 104 kg (229 lb 0.9 oz)   SpO2 96%   Intake/Output last 3 Shifts:    Intake/Output Summary (Last 24 hours) at 11/22/2024 1312  Last data filed at 11/22/2024 1100  Gross per 24 hour   Intake 480 ml   Output 2075 ml   Net -1595 ml       Admission Weight  Weight: 91.6 kg (202 lb) (11/08/24 1015)    Daily Weight  11/22/24 : 104 kg (229 lb 0.9 oz)    Image Results  XR abdomen 1 view  Narrative: Interpreted By:  Nick Fountain,   STUDY:  XR ABDOMEN 1 VIEW;  11/21/2024 2:45 pm      INDICATION:  Signs/Symptoms:abdominal pain/constipation.          COMPARISON:  CT dated 08/19/2023      ACCESSION NUMBER(S):  [id number]      ORDERING CLINICIAN:  MONE LOYA      FINDINGS:  Nonobstructive bowel gas pattern. Limited evaluation of  pneumoperitoneum on supine imaging, however no gross evidence of free  air is noted.          Osseous structures demonstrate no acute bony changes.      Impression: 1.  Nonobstructive bowel-gas pattern. Consider CT as warranted if  there is persistent clinical concern.      MACRO:  None      Signed by: Nick Fountain 11/21/2024 5:27 PM  Dictation workstation:   [id number]      Physical Exam  Constitutional:       General: He is not in acute distress.     Appearance: He is not ill-appearing.   HENT:      Head: Normocephalic and atraumatic.      Right Ear: External ear normal.      Left Ear: External ear normal.      Nose: No congestion.      Mouth/Throat:      Mouth: Mucous membranes are moist.   Eyes:      General:         Right eye: No discharge.         Left eye: No discharge.       Conjunctiva/sclera: Conjunctivae normal.   Cardiovascular:      Rate and Rhythm: Normal rate and regular rhythm.   Pulmonary:      Comments: Breathing comfortably on room air  Abdominal:      General: Abdomen is flat. There is no distension.      Palpations: Abdomen is soft.      Tenderness: There is no abdominal tenderness.   Genitourinary:     Comments: No appreciable swelling of the R-scrotum compared to L  Musculoskeletal:         General: No swelling.   Skin:     General: Skin is warm and dry.      Capillary Refill: Capillary refill takes less than 2 seconds.   Neurological:      Mental Status: He is alert.       Relevant Results  Results for orders placed or performed during the hospital encounter of 11/08/24 (from the past 24 hours)   Urinalysis with Reflex Culture and Microscopic   Result Value Ref Range    Color, Urine Light-Yellow Light-Yellow, Yellow, Dark-Yellow    Appearance, Urine Clear Clear    Specific Gravity, Urine 1.021 1.005 - 1.035    pH, Urine 6.0 5.0, 5.5, 6.0, 6.5, 7.0, 7.5, 8.0    Protein, Urine 10 (TRACE) NEGATIVE, 10 (TRACE), 20 (TRACE) mg/dL    Glucose, Urine OVER (4+) (A) Normal mg/dL    Blood, Urine NEGATIVE NEGATIVE    Ketones, Urine NEGATIVE NEGATIVE mg/dL    Bilirubin, Urine NEGATIVE NEGATIVE    Urobilinogen, Urine Normal Normal mg/dL    Nitrite, Urine NEGATIVE NEGATIVE    Leukocyte Esterase, Urine NEGATIVE NEGATIVE   Extra Urine Gray Tube   Result Value Ref Range    Extra Tube Hold for add-ons.    Urinalysis Microscopic   Result Value Ref Range    WBC, Urine 1-5 1-5, NONE /HPF    RBC, Urine 1-2 NONE, 1-2, 3-5 /HPF    Squamous Epithelial Cells, Urine 1-9 (SPARSE) Reference range not established. /HPF    Mucus, Urine FEW Reference range not established. /LPF    Hyaline Casts, Urine OCCASIONAL (A) NONE /LPF   POCT GLUCOSE   Result Value Ref Range    POCT Glucose 144 (H) 74 - 99 mg/dL   Renal Function Panel   Result Value Ref Range    Glucose 152 (H) 74 - 99 mg/dL    Sodium 131 (L) 136  - 145 mmol/L    Potassium 4.3 3.5 - 5.3 mmol/L    Chloride 98 98 - 107 mmol/L    Bicarbonate 23 21 - 32 mmol/L    Anion Gap 14 10 - 20 mmol/L    Urea Nitrogen 37 (H) 6 - 23 mg/dL    Creatinine 1.70 (H) 0.50 - 1.30 mg/dL    eGFR 44 (L) >60 mL/min/1.73m*2    Calcium 8.7 8.6 - 10.6 mg/dL    Phosphorus 3.9 2.5 - 4.9 mg/dL    Albumin 3.7 3.4 - 5.0 g/dL   Magnesium   Result Value Ref Range    Magnesium 2.17 1.60 - 2.40 mg/dL   POCT GLUCOSE   Result Value Ref Range    POCT Glucose 157 (H) 74 - 99 mg/dL            Assessment/Plan    Delvis Ventura Jr. is a 64 y.o. 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 In the ED, VSS, troponin's ad EKG negative for ACS, BNP was not elevated to suggest ADHF, although CXR with some vascular congestion. Patient was admitted to cardiology floor for observation and further workup.     Pt had recurrence of his chest pain at 6am on 11/9. EKG was not concerning for ischemia. Has a hx of provoked DVT in the past, however his clinical picture and Well score makes PE unlikely. On bedside examination, the pain is reproducible over his sternum, will continue supportive management of likely costochondritis. ESR and CRP were normal to mildly elevated, making this likely not pericarditis. Patient denies any chest pain, shortness of breath, or palpitations today.    Yesterday, was hypotensive with SBP in the 80's. Received 500mL of LR with good response in BP. RFP obtained at that time with Cr of 2.06  (1.20 previously), which improved to 1.70 after fluids. Discussed with nursing, and patient has been consistently having low BP's after morning meds.     Assessment & Plan  Chest pain, unspecified type    Updates 11/22:  -Additional 1 L of fluids administered this AM    -Awaiting SNF placement   -Consider adjusting timing of AM meds due to BP changes     #Chest pain of unclear etiology  #HFrecoveredEF (EF 15-20% TTE Jan 2024--> EF 61% 11/9 TTE) s/p ICD  #NICM  #non obstructive CAD  -acute onset 11/8, not relieved by nitroglycerin  -ED workup unremarkable  -Admission weight 92 kg/ discharge weight in Feb 2024 for ADHF: 94 kg  -Esr 25, CRP 0.96 . Not endorsing positional signs of pericarditis  -Pain reproducible on palpation, likely costochondritis   -TTE 11/9: EF 61%!  Plan:  -TTE   -Continue home meds:              -ASA, atorvastatin              -GDMT: Coreg 50 BID, Farxiga 10 mg daily, entresto  daily, aldactone 25 mg daily              -anti-HTNs: hydral 75 TID, imdur 120 mg daily  -Daily weights  -PRN lasix 20 mg po as clinical picture dictates  -Scheduled tylenol  -S/p Prednisone 40 mg x5d (11/10-11/15)  -Continue Gabapentin 100 mg TID  -Close cardiology follow-up with Dr. Hernandez upon discharge     #Hx of NSVT/VT  -continue amiodarone 200 daily  -single chamber ICD with hx of prior device infection and removal     #IDDM  -lantus 40 units at night  -ldssi  -jardiance as for gdmt above     #insomnia  -takes prazasosin and zolpidem prn     #hx of diarrhea  -abdominal distension on exam  -pt doesn't endorse constipation  Plan:  -PRN home loperamide      #Testicular pain  #Left sided varicocele  -PE unremarkable  -UA negative  -Scrotal U/s with left sided varicocele  Plan:  -Per urology, outpatient urology follow-up  -Scrotal sling for comfort     F: prn, EF 61%  E: prn  A: pIVs  DVT PPX: lovenox  Diet: cardiac  GI ppx: Pepcid prn  BM ppx: none  Code: full     The patient was seen and discussed with the  attending, Dr. Crocker.    Mei Lowery M.D.  Internal Medicine-Pediatrics, PGY-1

## 2024-11-22 NOTE — PROGRESS NOTES
Physical Therapy Treatment    Patient Name: Delvis Ventura Jr.  MRN: [id number]  Today's Date: 11/22/2024  Room: [address] [place] [address]  Time Calculation  Start Time: 0840  Stop Time: 0927  Time Calculation (min): 47 min       Assessment/Plan   PT Assessment  PT Assessment Results: Decreased strength, Decreased range of motion, Decreased endurance, Impaired balance, Decreased mobility, Decreased coordination, Impaired tone, Pain  Rehab Prognosis: Good  Barriers to Discharge: none  Evaluation/Treatment Tolerance: Patient tolerated treatment well  Medical Staff Made Aware: Yes  Strengths: Ability to acquire knowledge, Attitude of self, Coping skills, Insight into problems, Premorbid level of function, Rehab experience  End of Session Communication: Bedside nurse  End of Session Patient Position: Bed, 2 rail up, Alarm off, not on at start of session  PT Plan  Inpatient/Swing Bed or Outpatient: Inpatient  PT Plan  Treatment/Interventions: Endurance training, Range of motion, Therapeutic exercise, Strengthening  PT Plan: Ongoing PT  PT Frequency: 4 times per week  PT Discharge Recommendations: Moderate intensity level of continued care  Equipment Recommended upon Discharge: Wheelchair  PT Recommended Transfer Status: Assist x1  PT - OK to Discharge: Yes  Assessment: Patient is progressing Well with therapy this date. All exercises performed supine due to pt. experiencing low back pain and hot packs applied by nurse prior to PT session. Would continue to benefit from continued skilled PT to address all mobility deficits; Patient remains appropriate for MOD intensity therapy when medically appropriate for discharge from acute stay.  Will continue to follow.     General Visit Information:   PT  Visit  PT Received On: 11/22/24  Response to Previous Treatment: Patient with no complaints from previous session.  Prior to Session Communication: Bedside nurse  Patient Position Received: Bed, 2 rail up, Alarm off, not on at  start of session  Family/Caregiver Present: No     Subjective   Subjective: pt. In good spirits and happy to complete PT   Precautions:  Precautions  Medical Precautions: Fall precautions  Precautions Comment: CP  Vital Signs:  Vital Signs (Past 2hrs)        Date/Time Vitals Session Patient Position Pulse Resp SpO2 BP MAP (mmHg)    11/22/24 0840 During PT  Lying  69  --  --  93/58  --                     Objective   Pain:  Pain Assessment  Pain Assessment: 0-10  0-10 (Numeric) Pain Score: 9  Pain Location: Back  Pain Interventions: Heat applied (by nurse)  Response to Interventions: Decrease in pain  Cognition:  Cognition  Overall Cognitive Status: Within Functional Limits  Orientation Level: Oriented X4     PT Treatments:  Therapeutic Exercise  Therapeutic Exercise Performed: Yes  Therapeutic Exercise Activity 1: 12 x heel slides bilat LE supine  Therapeutic Exercise Activity 2: 15 x ankle pumps bilat LE  Therapeutic Exercise Activity 3: 12 x hip abduction bilat LE AAROM  Therapeutic Exercise Activity 4: quad sets x 12 with 5s hold (vc for pushing knee into hand/bed while palpating for quad contraction)  Therapeutic Exercise Activity 5: glute sets x 12 with 5s hold  Therapeutic Exercise Activity 6: hamstring stretch bilat LE 3 x 30s hold PROM (vc for relaxing hamstring musculature; supine thrapist assisted)  Therapeutic Exercise Activity 7: quad stretch 3 x 30s hold bilat LE PROM (vc for relaxing quad musculature; supine thrapist assisted)        Bed Mobility  Bed Mobility: No  Ambulation/Gait Training  Ambulation/Gait Training Performed: No  Transfers  Transfer: No  Stairs  Stairs: No          Activity tolerance:  Activity Tolerance  Endurance: Tolerates 10 - 20 min exercise with multiple rests, Decreased tolerance for upright activites    Outcome Measures:  [facility] Basic Mobility  Turning from your back to your side while in a flat bed without using bedrails: A little  Moving from lying on your back to sitting on  the side of a flat bed without using bedrails: A little  Moving to and from bed to chair (including a wheelchair): A little  Standing up from a chair using your arms (e.g. wheelchair or bedside chair): A lot  To walk in hospital room: Total  Climbing 3-5 steps with railing: Total  Basic Mobility - Total Score: 13     Education Documentation  Body Mechanics, taught by SULEIMAN Lyon at 11/22/2024  9:50 AM.  Learner: Patient  Readiness: Eager  Method: Explanation  Response: Verbalizes Understanding  Comment: reviewed rehab POC and supine ther-ex    Precautions, taught by SULEIMAN Lyon at 11/22/2024  9:50 AM.  Learner: Patient  Readiness: Eager  Method: Explanation  Response: Verbalizes Understanding  Comment: reviewed rehab POC and supine ther-ex    Mobility Training, taught by SULEIMAN Lyon at 11/22/2024  9:50 AM.  Learner: Patient  Readiness: Eager  Method: Explanation  Response: Verbalizes Understanding  Comment: reviewed rehab POC and supine ther-ex    Education Comments  No comments found.          OP EDUCATION:       Encounter Problems       Encounter Problems (Active)       Balance       STG - Maintains dynamic sitting balance without upper extremity support >/= 10 min supervision  (Progressing)       Start:  11/09/24    Expected End:  11/23/24               Mobility       pt will be independent with HEP program for BLEs to improve LE weakness  (Progressing)       Start:  11/09/24    Expected End:  11/23/24               PT Transfers       STG - Transfer from bed to chair CGA  (Progressing)       Start:  11/09/24    Expected End:  11/23/24            STG - Patient will perform bed mobility CGA (Met)       Start:  11/09/24    Expected End:  11/23/24    Resolved:  11/18/24            Pain - Adult

## 2024-11-22 NOTE — CARE PLAN
The patient's goals for the shift include      The clinical goals for the shift include Pt will remain HDS and safe this shift    Over the shift, the patient did not make progress toward the following goals. Barriers to progression include none. Recommendations to address these barriers include none.

## 2024-11-23 VITALS
TEMPERATURE: 98.2 F | BODY MASS INDEX: 30.4 KG/M2 | OXYGEN SATURATION: 94 % | HEART RATE: 65 BPM | WEIGHT: 217.15 LBS | RESPIRATION RATE: 18 BRPM | HEIGHT: 71 IN | SYSTOLIC BLOOD PRESSURE: 99 MMHG | DIASTOLIC BLOOD PRESSURE: 59 MMHG

## 2024-11-23 PROBLEM — R07.9 CHEST PAIN, UNSPECIFIED TYPE: Status: RESOLVED | Noted: 2024-11-08 | Resolved: 2024-11-23

## 2024-11-23 LAB
ALBUMIN SERPL BCP-MCNC: 3.6 G/DL (ref 3.4–5)
ANION GAP SERPL CALC-SCNC: 13 MMOL/L (ref 10–20)
BUN SERPL-MCNC: 35 MG/DL (ref 6–23)
CALCIUM SERPL-MCNC: 8.9 MG/DL (ref 8.6–10.6)
CHLORIDE SERPL-SCNC: 102 MMOL/L (ref 98–107)
CO2 SERPL-SCNC: 24 MMOL/L (ref 21–32)
CREAT SERPL-MCNC: 1.25 MG/DL (ref 0.5–1.3)
EGFRCR SERPLBLD CKD-EPI 2021: 64 ML/MIN/1.73M*2
GLUCOSE BLD MANUAL STRIP-MCNC: 104 MG/DL (ref 74–99)
GLUCOSE BLD MANUAL STRIP-MCNC: 127 MG/DL (ref 74–99)
GLUCOSE BLD MANUAL STRIP-MCNC: 156 MG/DL (ref 74–99)
GLUCOSE BLD MANUAL STRIP-MCNC: 168 MG/DL (ref 74–99)
GLUCOSE SERPL-MCNC: 158 MG/DL (ref 74–99)
MAGNESIUM SERPL-MCNC: 2.27 MG/DL (ref 1.6–2.4)
PHOSPHATE SERPL-MCNC: 3.5 MG/DL (ref 2.5–4.9)
POTASSIUM SERPL-SCNC: 4.4 MMOL/L (ref 3.5–5.3)
SODIUM SERPL-SCNC: 135 MMOL/L (ref 136–145)

## 2024-11-23 PROCEDURE — 80069 RENAL FUNCTION PANEL: CPT

## 2024-11-23 PROCEDURE — 2500000001 HC RX 250 WO HCPCS SELF ADMINISTERED DRUGS (ALT 637 FOR MEDICARE OP)

## 2024-11-23 PROCEDURE — 82947 ASSAY GLUCOSE BLOOD QUANT: CPT

## 2024-11-23 PROCEDURE — 83735 ASSAY OF MAGNESIUM: CPT

## 2024-11-23 PROCEDURE — 36415 COLL VENOUS BLD VENIPUNCTURE: CPT

## 2024-11-23 PROCEDURE — 99238 HOSP IP/OBS DSCHRG MGMT 30/<: CPT

## 2024-11-23 PROCEDURE — 2500000002 HC RX 250 W HCPCS SELF ADMINISTERED DRUGS (ALT 637 FOR MEDICARE OP, ALT 636 FOR OP/ED)

## 2024-11-23 RX ORDER — CARVEDILOL 25 MG/1
50 TABLET ORAL 2 TIMES DAILY
Qty: 120 TABLET | Refills: 1 | Status: SHIPPED | OUTPATIENT
Start: 2024-11-23 | End: 2025-01-22

## 2024-11-23 RX ORDER — ISOSORBIDE MONONITRATE 120 MG/1
120 TABLET, EXTENDED RELEASE ORAL DAILY
Qty: 30 TABLET | Refills: 1 | Status: SHIPPED | OUTPATIENT
Start: 2024-11-24

## 2024-11-23 RX ORDER — GABAPENTIN 100 MG/1
100 CAPSULE ORAL 3 TIMES DAILY
Qty: 90 CAPSULE | Refills: 1 | Status: SHIPPED | OUTPATIENT
Start: 2024-11-23 | End: 2025-01-22

## 2024-11-23 RX ORDER — HYDRALAZINE HYDROCHLORIDE 50 MG/1
75 TABLET, FILM COATED ORAL 3 TIMES DAILY
Qty: 135 TABLET | Refills: 0 | Status: CANCELLED | OUTPATIENT
Start: 2024-11-23 | End: 2024-12-23

## 2024-11-23 RX ORDER — ATORVASTATIN CALCIUM 40 MG/1
40 TABLET, FILM COATED ORAL NIGHTLY
Qty: 30 TABLET | Refills: 1 | Status: SHIPPED | OUTPATIENT
Start: 2024-11-23 | End: 2025-01-22

## 2024-11-23 ASSESSMENT — COGNITIVE AND FUNCTIONAL STATUS - GENERAL
HELP NEEDED FOR BATHING: A LITTLE
MOBILITY SCORE: 17
DAILY ACTIVITIY SCORE: 22
WALKING IN HOSPITAL ROOM: TOTAL
STANDING UP FROM CHAIR USING ARMS: A LITTLE
DRESSING REGULAR LOWER BODY CLOTHING: A LITTLE
CLIMB 3 TO 5 STEPS WITH RAILING: TOTAL

## 2024-11-23 ASSESSMENT — PAIN - FUNCTIONAL ASSESSMENT: PAIN_FUNCTIONAL_ASSESSMENT: 0-10

## 2024-11-23 ASSESSMENT — PAIN SCALES - GENERAL: PAINLEVEL_OUTOF10: 0 - NO PAIN

## 2024-11-23 NOTE — CARE PLAN
The patient's goals for the shift include  DC to rehab    The clinical goals for the shift include Patient will be free of chest pain throughout shift    Over the shift, the patient did make progress toward the following goals.       Problem: Pain - Adult  Goal: Verbalizes/displays adequate comfort level or baseline comfort level  Outcome: Met     Problem: Safety - Adult  Goal: Free from fall injury  Outcome: Met     Problem: Discharge Planning  Goal: Discharge to home or other facility with appropriate resources  Outcome: Met     Problem: Chronic Conditions and Co-morbidities  Goal: Patient's chronic conditions and co-morbidity symptoms are monitored and maintained or improved  Outcome: Met     Problem: ACS/CP/NSTEMI/STEMI  Goal: Chest pain managed (free from pain or at acceptable level)  Outcome: Met  Goal: Lab values return to normal range  Outcome: Met  Goal: Promote self management  Outcome: Met  Goal: Serial ECG will return to baseline  Outcome: Met  Goal: Verbalize understanding of procedures/devices  Outcome: Met  Goal: Wean vasopressors/achieve hemodynamic stability  Outcome: Met     Problem: Hypertensive Emergency/Crisis  Goal: Blood pressure gradually reduced to goal range  Outcome: Met  Goal: Free from signs of organ damage  Outcome: Met  Goal: Lab values return to normal range  Outcome: Met  Goal: Promote self management  Outcome: Met     Problem: Skin  Goal: Decreased wound size/increased tissue granulation at next dressing change  Outcome: Met  Goal: Participates in plan/prevention/treatment measures  Outcome: Met  Goal: Prevent/manage excess moisture  Outcome: Met  Goal: Prevent/minimize sheer/friction injuries  Outcome: Met  Goal: Promote/optimize nutrition  Outcome: Met  Goal: Promote skin healing  Outcome: Met     Problem: Diabetes  Goal: Achieve decreasing blood glucose levels by end of shift  Outcome: Met  Goal: Increase stability of blood glucose readings by end of shift  Outcome: Met  Goal:  Decrease in ketones present in urine by end of shift  Outcome: Met  Goal: Maintain electrolyte levels within acceptable range throughout shift  Outcome: Met  Goal: Maintain glucose levels >70mg/dl to <250mg/dl throughout shift  Outcome: Met  Goal: No changes in neurological exam by end of shift  Outcome: Met  Goal: Learn about and adhere to nutrition recommendations by end of shift  Outcome: Met  Goal: Vital signs within normal range for age by end of shift  Outcome: Met  Goal: Increase self care and/or family involovement by end of shift  Outcome: Met  Goal: Receive DSME education by end of shift  Outcome: Met     Problem: Pain  Goal: Takes deep breaths with improved pain control throughout the shift  Outcome: Met  Goal: Turns in bed with improved pain control throughout the shift  Outcome: Met  Goal: Walks with improved pain control throughout the shift  Outcome: Met  Goal: Performs ADL's with improved pain control throughout shift  Outcome: Met  Goal: Participates in PT with improved pain control throughout the shift  Outcome: Met  Goal: Free from opioid side effects throughout the shift  Outcome: Met  Goal: Free from acute confusion related to pain meds throughout the shift  Outcome: Met

## 2024-11-23 NOTE — DISCHARGE SUMMARY
Discharge Diagnosis  Chest pain, unspecified type    Issues Requiring Follow-Up  -Rehab at SNF    Test Results Pending At Discharge  Pending Labs       No current pending labs.            Hospital Course  Delvis Ventura Jr. is a 64 y.o. 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 Hernandez) for chest pain. ACS was ruled out, and chest pain determined to be most likely 2/2 costochondritis.      CP resolved with prednisone 40 mg x5 days and gabapentin daily. TTE on 11/9 showed Left ventricular ejection fraction is normal, calculated by Yoo's biplane at 61%. There is normal right ventricular global systolic function. Pt reported continue scrotal pain and scrotal U/S with left sided varicocele. Urology was curbside consulted, said no further work up. He can follow outpatient with urology. Scrotal sling ordered for symptomatic management. Pt pending SNF placement. Patient developed mild pre-renal JOHN on 11/21 with hypotension. Gave fluids and blood pressure and creatinine improved to baseline. Pending was accepted to the rehab center of his choice. Patient was discharged on home GDMT, with minor changes included increasing his carvedilol to 50 mg a day, as well as increasing his Imdur to 120mg once a day for greater blood pressure control.    Pertinent Physical Exam At Time of Discharge  Physical Exam  Constitutional:       Appearance: Normal appearance.   HENT:      Head: Normocephalic and atraumatic.   Cardiovascular:      Rate and Rhythm: Normal rate and regular rhythm.      Pulses: Normal pulses.      Heart sounds: Normal heart sounds.    Pulmonary:      Effort: Pulmonary effort is normal.      Breath sounds: Normal breath sounds.   Abdominal:      General: Abdomen is flat. Bowel sounds are normal.      Palpations: Abdomen is soft.   Musculoskeletal:      Cervical back: Normal range of motion and neck supple.   Skin:     General: Skin is warm and dry.      Capillary Refill: Capillary refill takes less than 2 seconds.   Neurological:      General: No focal deficit present.      Mental Status: He is alert and oriented to person, place, and time.         Home Medications     Medication List      CONTINUE taking these medications     * FreeStyle Maite 3 Conger misc; Generic drug: blood-glucose   meter,continuous   * Dexcom G7  misc; Generic drug: blood-glucose meter,continuous;   Use as instructed   * FreeStyle Maite 3 Plus Sensor device; Generic drug: blood-glucose   sensor   * Dexcom G7 Sensor device; Generic drug: blood-glucose sensor; Please   use to check your glucose before meals and at night.  * This list has 4 medication(s) that are the same as other medications   prescribed for you. Read the directions carefully, and ask your doctor or   other care provider to review them with you.     ASK your doctor about these medications     amiodarone 200 mg tablet; Commonly known as: Pacerone; Take 1 tablet   (200 mg) by mouth once daily. Do not start before February 16, 2024.   aspirin 81 mg chewable tablet; Chew and swallow 1 tablet by mouth daily.   atorvastatin 40 mg tablet; Commonly known as: Lipitor; Take 1 tablet (40   mg) by mouth once daily.   carvedilol 25 mg tablet; Commonly known as: Coreg; Take 1 tablet (25 mg)   by mouth 2 times a day.   dapagliflozin propanediol 10 mg; Commonly known as: Farxiga; Take 1   tablet (10 mg) by mouth once daily.   Entresto  mg tablet; Generic drug: sacubitriL-valsartan; Take 1   tablet by mouth 2 times a day.   famotidine 10 mg tablet; Commonly known as: Pepcid   FeroSuL tablet; Generic drug:  ferrous sulfate (325 mg ferrous sulfate);   Take 1 tablet by mouth twice daily.   furosemide 20 mg tablet; Commonly known as: Lasix; Take 1 tablet (20 mg)   by mouth once daily as needed (for weight gain >3 lbs in 3 days, Lower   extremity edema/ shortness of breath)   gabapentin 300 mg capsule; Commonly known as: Neurontin   hydrALAZINE 50 mg tablet; Commonly known as: Apresoline; Take 1 tablet   (50 mg) by mouth 3 times a day.   insulin glargine 100 unit/mL (3 mL) pen; Commonly known as: Lantus;   Inject 40 Units under the skin once daily at bedtime. Take as directed per   insulin instructions.   isosorbide mononitrate ER 60 mg 24 hr tablet; Commonly known as: Imdur;   Take 1 tablet (60 mg) by mouth once daily. Do not crush or chew.   loperamide 2 mg capsule; Commonly known as: Imodium; Take 2 capsules by   mouth every 12 hours if needed for diarrhea.   prazosin 2 mg capsule; Commonly known as: Minipress; Take 1 capsule (2   mg) by mouth once daily at bedtime.   spironolactone 25 mg tablet; Commonly known as: Aldactone; Take 1 tablet   (25 mg) by mouth once daily.   traZODone 100 mg tablet; Commonly known as: Desyrel   zolpidem 5 mg tablet; Commonly known as: Ambien; Take 1 tablet (5 mg) by   mouth as needed at bedtime for sleep.       Outpatient Follow-Up  No future appointments.    Kevin Breaux MD

## 2024-11-23 NOTE — NURSING NOTE
RN gave phone report to accepting facility before 1400 to OFELIA Dhillon. All patient belongings gathered. MD ordered to hold 2nd dose of hydralazine due to recent soft BP. Per pt request, RN gave AVS with all other paperwork being handed over to transport/facility. RN reiterated pts diagnosis of chostochondritis that was treated with steroids. No questions from patient . RN gave face to face report to [facility] transport. Patient left floor by personal electric wheelchair accompanied by transport who was holding his personal belongings. PIV removed & telemetry box collected.

## 2025-04-16 ENCOUNTER — APPOINTMENT (OUTPATIENT)
Dept: CARDIOLOGY | Facility: HOSPITAL | Age: 65
End: 2025-04-16
Payer: COMMERCIAL

## 2025-05-09 ENCOUNTER — OFFICE VISIT (OUTPATIENT)
Dept: CARDIOLOGY | Facility: HOSPITAL | Age: 65
End: 2025-05-09
Payer: COMMERCIAL

## 2025-05-09 VITALS
BODY MASS INDEX: 29.4 KG/M2 | OXYGEN SATURATION: 99 % | SYSTOLIC BLOOD PRESSURE: 174 MMHG | DIASTOLIC BLOOD PRESSURE: 65 MMHG | HEART RATE: 98 BPM | HEIGHT: 71 IN | WEIGHT: 210 LBS

## 2025-05-09 DIAGNOSIS — I50.20 HFREF (HEART FAILURE WITH REDUCED EJECTION FRACTION): Primary | ICD-10-CM

## 2025-05-09 DIAGNOSIS — I50.22 CHRONIC CLINICAL SYSTOLIC HEART FAILURE: ICD-10-CM

## 2025-05-09 PROCEDURE — 3008F BODY MASS INDEX DOCD: CPT | Performed by: STUDENT IN AN ORGANIZED HEALTH CARE EDUCATION/TRAINING PROGRAM

## 2025-05-09 PROCEDURE — 1036F TOBACCO NON-USER: CPT | Performed by: STUDENT IN AN ORGANIZED HEALTH CARE EDUCATION/TRAINING PROGRAM

## 2025-05-09 PROCEDURE — 99215 OFFICE O/P EST HI 40 MIN: CPT | Performed by: STUDENT IN AN ORGANIZED HEALTH CARE EDUCATION/TRAINING PROGRAM

## 2025-05-09 PROCEDURE — 3077F SYST BP >= 140 MM HG: CPT | Performed by: STUDENT IN AN ORGANIZED HEALTH CARE EDUCATION/TRAINING PROGRAM

## 2025-05-09 PROCEDURE — 99213 OFFICE O/P EST LOW 20 MIN: CPT | Performed by: STUDENT IN AN ORGANIZED HEALTH CARE EDUCATION/TRAINING PROGRAM

## 2025-05-09 PROCEDURE — 3078F DIAST BP <80 MM HG: CPT | Performed by: STUDENT IN AN ORGANIZED HEALTH CARE EDUCATION/TRAINING PROGRAM

## 2025-05-09 RX ORDER — HYDRALAZINE HYDROCHLORIDE 100 MG/1
100 TABLET, FILM COATED ORAL 3 TIMES DAILY
Qty: 270 TABLET | Refills: 3 | Status: SHIPPED | OUTPATIENT
Start: 2025-05-09 | End: 2026-05-09

## 2025-05-09 ASSESSMENT — PATIENT HEALTH QUESTIONNAIRE - PHQ9
2. FEELING DOWN, DEPRESSED OR HOPELESS: NOT AT ALL
SUM OF ALL RESPONSES TO PHQ9 QUESTIONS 1 AND 2: 0
1. LITTLE INTEREST OR PLEASURE IN DOING THINGS: NOT AT ALL

## 2025-05-09 ASSESSMENT — ENCOUNTER SYMPTOMS
PND: 0
DOUBLE VISION: 0
DYSPNEA ON EXERTION: 0
WEIGHT LOSS: 0
OCCASIONAL FEELINGS OF UNSTEADINESS: 1
SHORTNESS OF BREATH: 0
BLURRED VISION: 1
WEIGHT GAIN: 0
DECREASED APPETITE: 0
FALLS: 0
DIZZINESS: 0
BACK PAIN: 0
NERVOUS/ANXIOUS: 0
COUGH: 0
ABDOMINAL PAIN: 0
COLOR CHANGE: 0
ORTHOPNEA: 0
SORE THROAT: 0
DYSURIA: 0
VOMITING: 0
NAUSEA: 0
HEADACHES: 0
LOSS OF SENSATION IN FEET: 0
CHANGE IN BOWEL HABIT: 0
PALPITATIONS: 0
DEPRESSION: 0
LIGHT-HEADEDNESS: 0

## 2025-05-09 ASSESSMENT — PAIN SCALES - GENERAL: PAINLEVEL_OUTOF10: 0-NO PAIN

## 2025-05-09 NOTE — PROGRESS NOTES
Accompanied by: alone    Chief Complaint  Ambulatory heart failure care    History of Present Illness    64 y.o. former / known to have DM, cerebral palsy, nonischemic cardiomyopathy (on [facility] 8/2018 he had trivial CAD), HFrEF previously considered recovered who has completed enrollment in the GALACTIC -HF trial ([place] Mecarbil v/s placebo). On TTE 8/2018 which LVEF ~ 20-25%, he has been started on study medication and he previously had LV systolic recovery with LVEF 50-55% on TTE 2/2020 and was symptomatically improved. He had COVID 12/2021.  He was hospitalized that the [facility] 10/2021 and reports that he was found to have depressed ejection fraction. On TTE 10/2021 LVEF 15 -20% LVIDD 5.1 cm with decreased right ventricular systolic function. Normal nuclear pharmacological stress test 6/2022.  He is s/p S-ICD ([employer]; 3/6/2023; Dr. Mickey Torres at [facility]) and was admitted 4/2023 with non healing wound from ICD site c/f device infection. He is s/p ICD explant on 4/24/2023 from left lateral chest wall and epigastric region, . 4/24 cultures from lead tip, subxiphoid incision, left lateral chest wall incision all positive Staph aureas treat with antibiotics. He is s/p single chamber ICD re-implantation 4/28/2023   Mr. Ventuar was hospitalized 2/2024 with metatarsal fracture and mild hyperkalemia, ADHF.  During his hospitalization, following diuresis he was hypotensive and isosorbide mononitrate was discontinued, and hydralazine dose was reduced by half.  He presents today for continued care.HF GDMT is being re-tiitrated.    Symptomatically, he denies  chest pain, dyspnea at rest, exertional dyspnea, orthopnea, PND, syncope, palpitations or leg swelling.      He has been fully adherent with prescribed therapies.  He has not had any defibrillator shocks.    Functionally, he is wheelchair bound due to cerebral palsy but is able to get around in his apartment  without exertional symptoms.    He was considered for CardioMEMS implantation, but for logistical reasons was unable to be implanted.    His most recent heart failure hospitalization was 2/2024.    The electronic medical record has been reviewed by me for salient history. All cardiovascular imaging and testing available in the electronic medical record, and [employer] has been reviewed.   Medication Documentation Review Audit       Reviewed by Shannon Valente RN (Registered Nurse) on 05/09/25 at 0931      Medication Order Taking? Sig Documenting Provider Last Dose Status   amiodarone (Pacerone) 200 mg tablet [id number] Yes Take 1 tablet (200 mg) by mouth once daily. Do not start before February 16, 2024. Atiya Petersen MD  Active   aspirin 81 mg chewable tablet [id number] Yes Chew and swallow 1 tablet by mouth daily. Andres Covarrubias MD  Active   atorvastatin (Lipitor) 40 mg tablet [id number] Yes Take 1 tablet (40 mg) by mouth once daily at bedtime. Kevin Breaux MD  Active   blood-glucose sensor (FreeStyle Maite 3 Plus Sensor) device [id number] Yes 1 each. Historical Provider, MD  Active   carvedilol (Coreg) 25 mg tablet [id number] Yes Take 2 tablets (50 mg) by mouth 2 times a day. Kevin Breaux MD  Active   dapagliflozin propanediol (Farxiga) 10 mg [id number] Yes Take 1 tablet (10 mg) by mouth once daily. Aniya Zeng MD  Active   Dexcom G4 platinum  (Dexcom G7 ) misc [id number] Yes Use as instructed Aniya Zeng MD  Active   Dexcom G7 Sensor device [id number] Yes Please use to check your glucose before meals and at night. Aniya Zeng MD  Active   Entresto  mg tablet [id number] Yes Take 1 tablet by mouth 2 times a day. Aniya Zeng MD  Active   famotidine (Pepcid) 10 mg tablet [id number] Yes Take 1 tablet (10 mg) by mouth once daily at bedtime. Historical Provider, MD  Active   FeroSuL 325 mg (65 mg iron) tablet [id number] Yes Take 1 tablet by mouth twice daily. Su Hernandez MD  PhD  Active   FreeStyle Maite 3 [place] misc [id number] Yes Inject 1 Device under the skin if needed. Use as instructed Historical Provider, MD  Active   gabapentin (Neurontin) 100 mg capsule [id number] Yes Take 1 capsule (100 mg) by mouth 3 times a day. Kevin Breaux MD  Active   hydrALAZINE (Apresoline) 50 mg tablet [id number] Yes Take 1 tablet (50 mg) by mouth 3 times a day. Su Hernandez MD PhD  Active   insulin glargine (Lantus) 100 unit/mL (3 mL) pen [id number] Yes Inject 40 Units under the skin once daily at bedtime. Take as directed per insulin instructions. Dhaval Byrnes MD  Active   isosorbide mononitrate ER (Imdur) 120 mg 24 hr tablet [id number] Yes Take 1 tablet (120 mg) by mouth once daily. Do not crush or chew. Kevin Breaux MD  Active   loperamide (Imodium) 2 mg capsule [id number] Yes Take 2 capsules by mouth every 12 hours if needed for diarrhea. Aniya Zeng MD  Active   prazosin (Minipress) 2 mg capsule [id number] Yes Take 1 capsule (2 mg) by mouth once daily at bedtime. Aniya Zeng MD  Active   spironolactone (Aldactone) 25 mg tablet [id number] Yes Take 1 tablet (25 mg) by mouth once daily. Aniya Zeng MD  Active   traZODone (Desyrel) 100 mg tablet [id number] Yes Take 1 tablet (100 mg) by mouth once daily at bedtime. Historical Provider, MD  Active   zolpidem (Ambien) 5 mg tablet [id number] Yes Take 1 tablet (5 mg) by mouth as needed at bedtime for sleep. Aniya Zeng MD  Active                   Allergies  Reviewed by Shannon Valente RN on 5/9/2025        Severity Reactions Comments    Metformin Medium Diarrhea, GI Upset, GI intolerance, Other, Nausea/vomiting             Allergies   Allergen Reactions    Metformin Diarrhea, GI Upset, GI intolerance, Other and Nausea/vomiting       Review of Systems   Constitutional: Negative for decreased appetite, weight gain and weight loss.   HENT:  Negative for sore throat.    Eyes:  Positive for blurred vision. Negative for double  "vision.   Cardiovascular:  Negative for chest pain, dyspnea on exertion, leg swelling, orthopnea, palpitations and paroxysmal nocturnal dyspnea.   Respiratory:  Negative for cough and shortness of breath.    Skin:  Negative for color change, dry skin and itching.   Musculoskeletal:  Negative for back pain and falls.   Gastrointestinal:  Negative for abdominal pain, change in bowel habit, nausea and vomiting.   Genitourinary:  Negative for dysuria.   Neurological:  Negative for dizziness, headaches and light-headedness.   Psychiatric/Behavioral:  Negative for depression. The patient is not nervous/anxious.          Visit Vitals  /65   Pulse 98   Ht 1.803 m (5' 11\")   Wt 95.3 kg (210 lb)   SpO2 99%   BMI 29.29 kg/m²   Smoking Status Never   BSA 2.18 m²      Vitals:    05/09/25 0927 05/09/25 0942   BP: 168/82 174/65   BP Location: Left arm    Patient Position: Sitting    BP Cuff Size: Large adult    Pulse: 98    SpO2: 99%    Weight: 95.3 kg (210 lb)    Height: 1.803 m (5' 11\")       O/E:  Very pleasant obese -American man in no apparent CP or painful distress. In wheelchair   Neck: No JVD or HJR  CVS: HS 1,2. No added sounds  Resp: CTA bilaterally. Percussion note resonant  Abdomen: Obese, SNT, BS wnl  Extremities: trace pedal oedema bilaterally  Skin: warm and dry  CNS: AO x 4, no gross deficits       IMPRESSION/PLAN:    64 y.o. former / known to have DM, cerebral palsy, nonischemic cardiomyopathy (on [facility] 8/2018 he had trivial CAD), HFrEF previously considered recovered who has completed enrollment in the GALACTIC -HF trial (Om[place] Mecarbil v/s placebo). On TTE 8/2018 which LVEF ~ 20-25%, he has been started on study medication and he previously had LV systolic recovery with LVEF 50-55% on TTE 2/2020 and was symptomatically improved. He had COVID 12/2021.  He was hospitalized that the [facility] 10/2021 and reports that he was found to have depressed " ejection fraction. On TTE 10/2021 LVEF 15 -20% LVIDD 5.1 cm with decreased right ventricular systolic function. Normal nuclear pharmacological stress test 6/2022.  He is s/p S-ICD ([employer]; 3/6/2023; Dr. Mickey [facility]) and was admitted 4/2023 with non healing wound from ICD site c/f device infection. He is s/p ICD explant on 4/24/2023 from left lateral chest wall and epigastric region, . 4/24 cultures from lead tip, subxiphoid incision, left lateral chest wall incision all positive Staph aureas treat with antibiotics. He is s/p single chamber ICD re-implantation 4/28/2023   Mr. Ventura was hospitalized 2/2024 with metatarsal fracture and mild hyperkalemia, ADHF.  During his hospitalization, following diuresis he was hypotensive and isosorbide mononitrate was discontinued, and hydralazine dose was reduced by half.  HF GDMT has been re-tiitrated.    NYHA FC ~ 2 ( difficult to assess as wheelchair bound but no symptoms with in home exertion)  ACC C  Volume status: Euvolemic per history  Perfusion status: Mentating well    Plan:  #HFrEF (declined LVEF 10/2021)  - TTE before next visit  - Labs today  -Medication:  - Continue sosorbide mononitrate 120 mg once daily  -Increase hydralazine  to 100 mg tid  -Continue carvedilol 50 mg twice daily, dapagliflozin 10 mg once daily, sacubitril/valsartan 97/103 mg twice daily, spironolactone 25 mg once daily  -Consider cardiac modulation once HF GDMT is optimized, if LVEF remains low    This note was transcribed using the Dragon Dictation system. There may be grammatical, punctuation, or verbiage errors that can occur with voice recognition programs.      Su Hernandez MD PhD

## 2025-05-09 NOTE — PATIENT INSTRUCTIONS
Thank you for coming in today. If you have any questions or concerns, you may call the Heart Failure Office at [phone] option [phone], or [phone].  You may also contact our heart failure nursing team via email on [email or web address].    For quicker results set-up your  [employer] account to receive results and other correspondence directly to your phone.    Please bring all your pills/medications to your Cardiology appointments.    **  - No new medication changes today    -We will renew your heart failure medications today    - Please make the following medication changes:  1.  INCREASE hydralazine to 100 mg 3 times daily    - Please have the following tests done:  1.Blood tests today (comprehensive panel, lipids BNP, CBC, iron, TIBC, ferritin)    2.  Echocardiogram in 3 MONTHS, CALL  [phone]   OR    [phone] to schedule      - Please make an appointment to be seen in 3-4 months

## 2025-05-10 LAB
ALBUMIN SERPL-MCNC: 4.3 G/DL (ref 3.6–5.1)
BASOPHILS # BLD AUTO: 18 CELLS/UL (ref 0–200)
BASOPHILS NFR BLD AUTO: 0.3 %
BNP SERPL-MCNC: NORMAL PG/ML
BUN SERPL-MCNC: 7 MG/DL (ref 7–25)
BUN/CREAT SERPL: 11 (CALC) (ref 6–22)
CALCIUM SERPL-MCNC: 9.3 MG/DL (ref 8.6–10.3)
CHLORIDE SERPL-SCNC: 104 MMOL/L (ref 98–110)
CO2 SERPL-SCNC: 27 MMOL/L (ref 20–32)
CREAT SERPL-MCNC: 0.66 MG/DL (ref 0.7–1.35)
EGFRCR SERPLBLD CKD-EPI 2021: 105 ML/MIN/1.73M2
EOSINOPHIL # BLD AUTO: 48 CELLS/UL (ref 15–500)
EOSINOPHIL NFR BLD AUTO: 0.8 %
ERYTHROCYTE [DISTWIDTH] IN BLOOD BY AUTOMATED COUNT: 13.3 % (ref 11–15)
FERRITIN SERPL-MCNC: 279 NG/ML (ref 24–380)
GLUCOSE SERPL-MCNC: 99 MG/DL (ref 65–99)
HCT VFR BLD AUTO: 45.9 % (ref 38.5–50)
HGB BLD-MCNC: 14.5 G/DL (ref 13.2–17.1)
IRON SATN MFR SERPL: 24 % (CALC) (ref 20–48)
IRON SERPL-MCNC: 66 MCG/DL (ref 50–180)
LYMPHOCYTES # BLD AUTO: 1260 CELLS/UL (ref 850–3900)
LYMPHOCYTES NFR BLD AUTO: 21 %
MCH RBC QN AUTO: 30.1 PG (ref 27–33)
MCHC RBC AUTO-ENTMCNC: 31.6 G/DL (ref 32–36)
MCV RBC AUTO: 95.2 FL (ref 80–100)
MONOCYTES # BLD AUTO: 228 CELLS/UL (ref 200–950)
MONOCYTES NFR BLD AUTO: 3.8 %
NEUTROPHILS # BLD AUTO: 4446 CELLS/UL (ref 1500–7800)
NEUTROPHILS NFR BLD AUTO: 74.1 %
PHOSPHATE SERPL-MCNC: 2.6 MG/DL (ref 2.5–4.5)
PLATELET # BLD AUTO: 232 THOUSAND/UL (ref 140–400)
PMV BLD REES-ECKER: 10.1 FL (ref 7.5–12.5)
POTASSIUM SERPL-SCNC: 4 MMOL/L (ref 3.5–5.3)
RBC # BLD AUTO: 4.82 MILLION/UL (ref 4.2–5.8)
SODIUM SERPL-SCNC: 141 MMOL/L (ref 135–146)
TIBC SERPL-MCNC: 278 MCG/DL (CALC) (ref 250–425)
WBC # BLD AUTO: 6 THOUSAND/UL (ref 3.8–10.8)

## 2025-05-12 LAB
ALBUMIN SERPL-MCNC: 4.3 G/DL (ref 3.6–5.1)
BASOPHILS # BLD AUTO: 18 CELLS/UL (ref 0–200)
BASOPHILS NFR BLD AUTO: 0.3 %
BNP SERPL-MCNC: 92 PG/ML
BUN SERPL-MCNC: 7 MG/DL (ref 7–25)
BUN/CREAT SERPL: 11 (CALC) (ref 6–22)
CALCIUM SERPL-MCNC: 9.3 MG/DL (ref 8.6–10.3)
CHLORIDE SERPL-SCNC: 104 MMOL/L (ref 98–110)
CO2 SERPL-SCNC: 27 MMOL/L (ref 20–32)
CREAT SERPL-MCNC: 0.66 MG/DL (ref 0.7–1.35)
EGFRCR SERPLBLD CKD-EPI 2021: 105 ML/MIN/1.73M2
EOSINOPHIL # BLD AUTO: 48 CELLS/UL (ref 15–500)
EOSINOPHIL NFR BLD AUTO: 0.8 %
ERYTHROCYTE [DISTWIDTH] IN BLOOD BY AUTOMATED COUNT: 13.3 % (ref 11–15)
FERRITIN SERPL-MCNC: 279 NG/ML (ref 24–380)
GLUCOSE SERPL-MCNC: 99 MG/DL (ref 65–99)
HCT VFR BLD AUTO: 45.9 % (ref 38.5–50)
HGB BLD-MCNC: 14.5 G/DL (ref 13.2–17.1)
IRON SATN MFR SERPL: 24 % (CALC) (ref 20–48)
IRON SERPL-MCNC: 66 MCG/DL (ref 50–180)
LYMPHOCYTES # BLD AUTO: 1260 CELLS/UL (ref 850–3900)
LYMPHOCYTES NFR BLD AUTO: 21 %
MCH RBC QN AUTO: 30.1 PG (ref 27–33)
MCHC RBC AUTO-ENTMCNC: 31.6 G/DL (ref 32–36)
MCV RBC AUTO: 95.2 FL (ref 80–100)
MONOCYTES # BLD AUTO: 228 CELLS/UL (ref 200–950)
MONOCYTES NFR BLD AUTO: 3.8 %
NEUTROPHILS # BLD AUTO: 4446 CELLS/UL (ref 1500–7800)
NEUTROPHILS NFR BLD AUTO: 74.1 %
PHOSPHATE SERPL-MCNC: 2.6 MG/DL (ref 2.5–4.5)
PLATELET # BLD AUTO: 232 THOUSAND/UL (ref 140–400)
PMV BLD REES-ECKER: 10.1 FL (ref 7.5–12.5)
POTASSIUM SERPL-SCNC: 4 MMOL/L (ref 3.5–5.3)
RBC # BLD AUTO: 4.82 MILLION/UL (ref 4.2–5.8)
SODIUM SERPL-SCNC: 141 MMOL/L (ref 135–146)
TIBC SERPL-MCNC: 278 MCG/DL (CALC) (ref 250–425)
WBC # BLD AUTO: 6 THOUSAND/UL (ref 3.8–10.8)